# Patient Record
Sex: MALE | Race: WHITE | NOT HISPANIC OR LATINO | Employment: OTHER | URBAN - METROPOLITAN AREA
[De-identification: names, ages, dates, MRNs, and addresses within clinical notes are randomized per-mention and may not be internally consistent; named-entity substitution may affect disease eponyms.]

---

## 2017-04-17 ENCOUNTER — OFFICE VISIT (OUTPATIENT)
Dept: FAMILY MEDICINE | Facility: CLINIC | Age: 82
End: 2017-04-17
Payer: MEDICARE

## 2017-04-17 VITALS
HEART RATE: 72 BPM | TEMPERATURE: 98 F | WEIGHT: 245 LBS | OXYGEN SATURATION: 98 % | BODY MASS INDEX: 34.3 KG/M2 | RESPIRATION RATE: 18 BRPM | SYSTOLIC BLOOD PRESSURE: 130 MMHG | HEIGHT: 71 IN | DIASTOLIC BLOOD PRESSURE: 80 MMHG

## 2017-04-17 DIAGNOSIS — Z13.220 LIPID SCREENING: Primary | ICD-10-CM

## 2017-04-17 DIAGNOSIS — Z13.1 SCREENING FOR DIABETES MELLITUS: ICD-10-CM

## 2017-04-17 DIAGNOSIS — Z13.0 SCREENING, ANEMIA, DEFICIENCY, IRON: ICD-10-CM

## 2017-04-17 DIAGNOSIS — I10 BENIGN ESSENTIAL HYPERTENSION: ICD-10-CM

## 2017-04-17 LAB
ANION GAP SERPL CALCULATED.3IONS-SCNC: 9 MMOL/L (ref 3–14)
BUN SERPL-MCNC: 14 MG/DL (ref 7–30)
CALCIUM SERPL-MCNC: 9.2 MG/DL (ref 8.5–10.1)
CHLORIDE SERPL-SCNC: 108 MMOL/L (ref 94–109)
CO2 SERPL-SCNC: 25 MMOL/L (ref 20–32)
CREAT SERPL-MCNC: 1.05 MG/DL (ref 0.66–1.25)
ERYTHROCYTE [DISTWIDTH] IN BLOOD BY AUTOMATED COUNT: 13 % (ref 10–15)
GFR SERPL CREATININE-BSD FRML MDRD: 67 ML/MIN/1.7M2
GLUCOSE SERPL-MCNC: 104 MG/DL (ref 70–99)
HCT VFR BLD AUTO: 42.9 % (ref 40–53)
HGB BLD-MCNC: 14.8 G/DL (ref 13.3–17.7)
MCH RBC QN AUTO: 30.2 PG (ref 26.5–33)
MCHC RBC AUTO-ENTMCNC: 34.5 G/DL (ref 31.5–36.5)
MCV RBC AUTO: 88 FL (ref 78–100)
PLATELET # BLD AUTO: 178 10E9/L (ref 150–450)
POTASSIUM SERPL-SCNC: 3.9 MMOL/L (ref 3.4–5.3)
RBC # BLD AUTO: 4.9 10E12/L (ref 4.4–5.9)
SODIUM SERPL-SCNC: 142 MMOL/L (ref 133–144)
WBC # BLD AUTO: 5.5 10E9/L (ref 4–11)

## 2017-04-17 PROCEDURE — 36415 COLL VENOUS BLD VENIPUNCTURE: CPT | Performed by: FAMILY MEDICINE

## 2017-04-17 PROCEDURE — 80048 BASIC METABOLIC PNL TOTAL CA: CPT | Performed by: FAMILY MEDICINE

## 2017-04-17 PROCEDURE — 99213 OFFICE O/P EST LOW 20 MIN: CPT | Performed by: FAMILY MEDICINE

## 2017-04-17 PROCEDURE — 85027 COMPLETE CBC AUTOMATED: CPT | Performed by: FAMILY MEDICINE

## 2017-04-17 RX ORDER — DOXAZOSIN 4 MG/1
4 TABLET ORAL AT BEDTIME
Qty: 90 TABLET | Refills: 3 | Status: SHIPPED | OUTPATIENT
Start: 2017-04-17 | End: 2018-04-09

## 2017-04-17 RX ORDER — AMLODIPINE BESYLATE 10 MG/1
10 TABLET ORAL DAILY
Qty: 90 TABLET | Refills: 3 | Status: SHIPPED | OUTPATIENT
Start: 2017-04-17 | End: 2018-04-09

## 2017-04-17 NOTE — LETTER
[facility]  [address]  [address]  [facility] [id number] [phone]                                                                                                           Fish Sanchez Wilmajohana  [address]  [facility] [address]    April 18, 2017      Dear Fish,    The results of your recent tests were reviewed and are enclosed.     Results are Normal!    Results for orders placed or performed in visit on 04/17/17   Basic metabolic panel   Result Value Ref Range    Sodium 142 133 - 144 mmol/L    Potassium 3.9 3.4 - 5.3 mmol/L    Chloride 108 94 - 109 mmol/L    Carbon Dioxide 25 20 - 32 mmol/L    Anion Gap 9 3 - 14 mmol/L    Glucose 104 (H) 70 - 99 mg/dL    Urea Nitrogen 14 7 - 30 mg/dL    Creatinine 1.05 0.66 - 1.25 mg/dL    GFR Estimate 67 >60 mL/min/1.7m2    GFR Estimate If Black 81 >60 mL/min/1.7m2    Calcium 9.2 8.5 - 10.1 mg/dL   CBC with platelets   Result Value Ref Range    WBC 5.5 4.0 - 11.0 10e9/L    RBC Count 4.90 4.4 - 5.9 10e12/L    Hemoglobin 14.8 13.3 - 17.7 g/dL    Hematocrit 42.9 40.0 - 53.0 %    MCV 88 78 - 100 fl    MCH 30.2 26.5 - 33.0 pg    MCHC 34.5 31.5 - 36.5 g/dL    RDW 13.0 10.0 - 15.0 %    Platelet Count 178 150 - 450 10e9/L           Thank you for choosing [facility].  We appreciate the opportunity to serve you and look forward to supporting your healthcare needs in the future.    If you have any questions or concerns, please call me or my staff at [phone].      Sincerely,    Antonio Underwood MD

## 2017-04-17 NOTE — PROGRESS NOTES
SUBJECTIVE:                                                    Fish Hong is a 86 year old male who presents to clinic today for the following health issues:      Hypertension Follow-up      Outpatient blood pressures are being checked at home.  Results are average.    Low Salt Diet: no added salt       Amount of exercise or physical activity: 2-3 days/week for an average of 15-30 minutes    Problems taking medications regularly: No    Medication side effects: none    Diet: low salt and low fat/cholesterol    Here with  His daughter. His wive is in icu at the [facility] and he says she is dying. He is due for bp refills discussed gettign pneumovax, prefers to wait as anticipates timse is dying soon.   PROBLEMS TO ADD ON...    Problem list and histories reviewed & adjusted, as indicated.  Additional history: as documented    Patient Active Problem List   Diagnosis     Essential hypertension     Past Surgical History:   Procedure Laterality Date     C TOTAL KNEE ARTHROPLASTY      Bilateral     CIRCUMCISION N/A 7/13/2016    Procedure: CIRCUMCISION;  Surgeon: Elgin Rhodes MD;  Location: [facility] OR     ENT SURGERY      detached retina--bilateral     HERNIA REPAIR  11/3/2008    mesh repair umbilical hernia       Social History   Substance Use Topics     Smoking status: Former Smoker     Quit date: 1/1/1962     Smokeless tobacco: Never Used     Alcohol use Yes      Comment: very little     Family History   Problem Relation Age of Onset     Family history unknown: Yes           Reviewed and updated as needed this visit by clinical staff       Reviewed and updated as needed this visit by Provider         ROS:  CONSTITUTIONAL:NEGATIVE for fever, chills, change in weight  INTEGUMENTARY/SKIN: NEGATIVE for worrisome rashes, moles or lesions  EYES: NEGATIVE for vision changes or irritation  ENT/MOUTH: NEGATIVE for ear, mouth and throat problems  RESP:NEGATIVE for significant cough or SOB  CV: NEGATIVE for  "chest pain, palpitations or peripheral edema and has hypertension      OBJECTIVE:                                                    /80  Pulse 72  Temp 98  F (36.7  C)  Resp 18  Ht 5' 11\" (1.803 m)  Wt 245 lb (111.1 kg)  SpO2 98%  BMI 34.17 kg/m2  Body mass index is 34.17 kg/(m^2).  GENERAL APPEARANCE: healthy, alert and mild distress  EYES: Eyes grossly normal to inspection, PERRL and conjunctivae and sclerae normal  RESP: lungs clear to auscultation - no rales, rhonchi or wheezes  CV: regular rates and rhythm, normal S1 S2, no S3 or S4 and no murmur, click or rub  SKIN: no suspicious lesions or rashes    Diagnostic test results:  Diagnostic Test Results:  Labs a sordered.      ASSESSMENT/PLAN:                                                    1. Benign essential hypertension    - doxazosin (CARDURA) 4 MG tablet; Take 1 tablet (4 mg) by mouth At Bedtime  Dispense: 90 tablet; Refill: 3  - amLODIPine (NORVASC) 10 MG tablet; Take 1 tablet (10 mg) by mouth daily  Dispense: 90 tablet; Refill: 3    2. Lipid screening      3. Screening for diabetes mellitus    - Basic metabolic panel    4. Screening, anemia, deficiency, iron    - CBC with platelets      Follow up with Provider - 1-3 monhts or as needed.      Antonio Underwood MD  [facility]    "

## 2017-04-17 NOTE — NURSING NOTE
"Chief Complaint   Patient presents with     Hypertension       Initial /80  Pulse 72  Temp 98  F (36.7  C)  Resp 18  Ht 5' 11\" (1.803 m)  Wt 245 lb (111.1 kg)  SpO2 98%  BMI 34.17 kg/m2 Estimated body mass index is 34.17 kg/(m^2) as calculated from the following:    Height as of this encounter: 5' 11\" (1.803 m).    Weight as of this encounter: 245 lb (111.1 kg).  Medication Reconciliation: kiera Solis CMA      "

## 2017-04-17 NOTE — MR AVS SNAPSHOT
"              After Visit Summary   2017    Fish Hong    MRN: [id number]           Patient Information     Date Of Birth          1931        Visit Information        Provider Department      2017 8:30 AM Antonio Underwood MD [facility]        Today's Diagnoses     Lipid screening    -  1    Benign essential hypertension        Screening for diabetes mellitus        Screening, anemia, deficiency, iron           Follow-ups after your visit        Who to contact     If you have questions or need follow up information about today's clinic visit or your schedule please contact [facility] directly at [phone].  Normal or non-critical lab and imaging results will be communicated to you by MyChart, letter or phone within 4 business days after the clinic has received the results. If you do not hear from us within 7 days, please contact the clinic through [employer] or phone. If you have a critical or abnormal lab result, we will notify you by phone as soon as possible.  Submit refill requests through [employer] or call your pharmacy and they will forward the refill request to us. Please allow 3 business days for your refill to be completed.          Additional Information About Your Visit        MyChart Information     [employer] lets you send messages to your doctor, view your test results, renew your prescriptions, schedule appointments and more. To sign up, go to [email or web address] . Click on \"Log in\" on the left side of the screen, which will take you to the Welcome page. Then click on \"Sign up Now\" on the right side of the page.     You will be asked to enter the access code listed below, as well as some personal information. Please follow the directions to create your username and password.     Your access code is: [email or web address]  Expires: 2017 10:21 AM     Your access code will  in 90 days. If you need help " "or a new code, please call your [place] clinic or [phone].        Care EveryWhere ID     This is your Care EveryWhere ID. This could be used by other organizations to access your [place] medical records  [id number]        Your Vitals Were     Pulse Temperature Respirations Height Pulse Oximetry BMI (Body Mass Index)    72 98  F (36.7  C) 18 5' 11\" (1.803 m) 98% 34.17 kg/m2       Blood Pressure from Last 3 Encounters:   04/17/17 130/80   11/10/16 137/79   08/04/16 131/79    Weight from Last 3 Encounters:   04/17/17 245 lb (111.1 kg)   11/10/16 250 lb (113.4 kg)   08/04/16 246 lb 11.2 oz (111.9 kg)              We Performed the Following     Basic metabolic panel     CBC with platelets          Where to get your medicines      These medications were sent to [facility] - [place], [place] - [address]  [address], [facility] [address]     Phone:  [phone]     amLODIPine 10 MG tablet    doxazosin 4 MG tablet          Primary Care Provider Office Phone # Fax #    Antonio Underwood [phone]       [facility] [address] [facility] [address]        Thank you!     Thank you for choosing [facility]  for your care. Our goal is always to provide you with excellent care. Hearing back from our patients is one way we can continue to improve our services. Please take a few minutes to complete the written survey that you may receive in the mail after your visit with us. Thank you!             Your Updated Medication List - Protect others around you: Learn how to safely use, store and throw away your medicines at www.disposemymeds.org.          This list is accurate as of: 4/17/17 10:21 AM.  Always use your most recent med list.                   Brand Name Dispense Instructions for use    amLODIPine 10 MG tablet    NORVASC    90 tablet    Take 1 tablet (10 mg) by mouth daily       doxazosin 4 MG tablet "    CARDURA    90 tablet    Take 1 tablet (4 mg) by mouth At Bedtime       IBUPROFEN PO      Take 200 mg by mouth every 6 hours as needed Reported on 4/17/2017

## 2017-07-19 NOTE — TELEPHONE ENCOUNTER
clotrimazole-betamethasone (LOTRISONE) cream (Discontinued)      Last Written Prescription Date: 7/5/16  Last Fill Quantity: 30 G,  # refills: 1   Last Office Visit with [facility], [facility] or [facility] prescribing provider: 7/5/16

## 2017-07-20 RX ORDER — CLOTRIMAZOLE AND BETAMETHASONE DIPROPIONATE 10; .64 MG/G; MG/G
CREAM TOPICAL 2 TIMES DAILY
Qty: 30 G | Refills: 1 | OUTPATIENT
Start: 2017-07-20

## 2017-07-27 ENCOUNTER — OFFICE VISIT (OUTPATIENT)
Dept: FAMILY MEDICINE | Facility: CLINIC | Age: 82
End: 2017-07-27
Payer: MEDICARE

## 2017-07-27 VITALS
SYSTOLIC BLOOD PRESSURE: 138 MMHG | OXYGEN SATURATION: 100 % | RESPIRATION RATE: 18 BRPM | DIASTOLIC BLOOD PRESSURE: 86 MMHG | WEIGHT: 245 LBS | HEART RATE: 74 BPM | BODY MASS INDEX: 34.17 KG/M2 | TEMPERATURE: 97.5 F

## 2017-07-27 DIAGNOSIS — R10.84 ABDOMINAL PAIN, GENERALIZED: Primary | ICD-10-CM

## 2017-07-27 PROCEDURE — 99214 OFFICE O/P EST MOD 30 MIN: CPT | Performed by: FAMILY MEDICINE

## 2017-07-27 NOTE — MR AVS SNAPSHOT
"              After Visit Summary   2017    Fish Hong    MRN: [id number]           Patient Information     Date Of Birth          1931        Visit Information        Provider Department      2017 7:30 AM Antonio Underwood MD [facility]        Care Instructions    [facility]    Phone- [phone]            Follow-ups after your visit        Who to contact     If you have questions or need follow up information about today's clinic visit or your schedule please contact [facility] directly at [phone].  Normal or non-critical lab and imaging results will be communicated to you by [employer], letter or phone within 4 business days after the clinic has received the results. If you do not hear from us within 7 days, please contact the clinic through [employer] or phone. If you have a critical or abnormal lab result, we will notify you by phone as soon as possible.  Submit refill requests through [employer] or call your pharmacy and they will forward the refill request to us. Please allow 3 business days for your refill to be completed.          Additional Information About Your Visit        MyChart Information     [employer] lets you send messages to your doctor, view your test results, renew your prescriptions, schedule appointments and more. To sign up, go to [email or web address] . Click on \"Log in\" on the left side of the screen, which will take you to the Welcome page. Then click on \"Sign up Now\" on the right side of the page.     You will be asked to enter the access code listed below, as well as some personal information. Please follow the directions to create your username and password.     Your access code is: [email or web address]  Expires: 10/25/2017  7:51 AM     Your access code will  in 90 days. If you need help or a new code, please call your [facility] or [phone].        Care EveryWhere ID     This is " your Care EveryWhere ID. This could be used by other organizations to access your [place] medical records  [id number]        Your Vitals Were     Pulse Temperature Respirations Pulse Oximetry BMI (Body Mass Index)       74 97.5  F (36.4  C) 18 100% 34.17 kg/m2        Blood Pressure from Last 3 Encounters:   07/27/17 138/86   04/17/17 130/80   11/10/16 137/79    Weight from Last 3 Encounters:   07/27/17 245 lb (111.1 kg)   04/17/17 245 lb (111.1 kg)   11/10/16 250 lb (113.4 kg)              Today, you had the following     No orders found for display       Primary Care Provider Office Phone # Fax #    Antonio Underwood [phone]       [facility] [address]  [facility] [address]        Equal Access to Services     [facility]: Hadii aad ku hadasho Soomaali, waaxda luqadaha, qaybta kaalmada adeegyada, waxay idiin hayaan adeeg kharash lahuong . So [facility] [phone].    ATENCIÓN: Si habla español, tiene a donahue disposición servicios gratuitos de asistencia lingüística. Llame al [phone].    We comply with applicable federal civil rights laws and [place] laws. We do not discriminate on the basis of race, color, national origin, age, disability sex, sexual orientation or gender identity.            Thank you!     Thank you for choosing [facility]  for your care. Our goal is always to provide you with excellent care. Hearing back from our patients is one way we can continue to improve our services. Please take a few minutes to complete the written survey that you may receive in the mail after your visit with us. Thank you!             Your Updated Medication List - Protect others around you: Learn how to safely use, store and throw away your medicines at www.disposemymeds.org.          This list is accurate as of: 7/27/17  7:51 AM.  Always use your most recent med list.                   Brand Name Dispense Instructions for use Diagnosis     amLODIPine 10 MG tablet    NORVASC    90 tablet    Take 1 tablet (10 mg) by mouth daily    Benign essential hypertension       doxazosin 4 MG tablet    CARDURA    90 tablet    Take 1 tablet (4 mg) by mouth At Bedtime    Benign essential hypertension       IBUPROFEN PO      Take 200 mg by mouth every 6 hours as needed Reported on 4/17/2017

## 2017-07-27 NOTE — PROGRESS NOTES
SUBJECTIVE:                                                    Fish Hong is a 86 year old male who presents to clinic today for the following health issues:      Abdominal Pain      Duration: a few days    Description (location/character/radiation): pt having abdominal and back pain on and off       Associated flank pain: None    Intensity:  moderate    Accompanying signs and symptoms:        Fever/Chills: no        Gas/Bloating: YES- bloating       Nausea/vomitting: no        Diarrhea: no        Dysuria or Hematuria: no     History (previous similar pain/trauma/previous testing):     Precipitating or alleviating factors:       Pain worse with eating/BM/urination: no       Pain relieved by BM: no     Therapies tried and outcome: None    LMP:  not applicable      Generalized abdominal discomfort.   PROBLEMS TO ADD ON...    Problem list and histories reviewed & adjusted, as indicated.  Additional history: as documented    Decreased appetite.   Patient Active Problem List   Diagnosis     Essential hypertension     Past Surgical History:   Procedure Laterality Date     C TOTAL KNEE ARTHROPLASTY      Bilateral     CIRCUMCISION N/A 7/13/2016    Procedure: CIRCUMCISION;  Surgeon: Elgin Rhodes MD;  Location: [facility] OR     ENT SURGERY      detached retina--bilateral     HERNIA REPAIR  11/3/2008    mesh repair umbilical hernia       Social History   Substance Use Topics     Smoking status: Former Smoker     Quit date: 1/1/1962     Smokeless tobacco: Never Used     Alcohol use Yes      Comment: very little     Family History   Problem Relation Age of Onset     Family history unknown: Yes             Reviewed and updated as needed this visit by clinical staff     Reviewed and updated as needed this visit by Provider         ROS:  CONSTITUTIONAL:NEGATIVE for fever, chills, change in weight  INTEGUMENTARY/SKIN: NEGATIVE for worrisome rashes, moles or lesions  RESP:NEGATIVE for significant cough or SOB  CV:  NEGATIVE for chest pain, palpitations or peripheral edema  GI: abdominal pain.   : negative for dysuria, hematuria, decreased urinary stream, erectile dysfunction  ENDOCRINE: NEGATIVE for temperature intolerance, skin/hair changes  HEME/ALLERGY/IMMUNE: NEGATIVE for bleeding problems    OBJECTIVE:                                                    /86  Pulse 74  Temp 97.5  F (36.4  C)  Resp 18  Wt 245 lb (111.1 kg)  SpO2 100%  BMI 34.17 kg/m2  Body mass index is 34.17 kg/(m^2).  GENERAL APPEARANCE: healthy, alert and moderate distress  EYES: Eyes grossly normal to inspection, PERRL and conjunctivae and sclerae normal  RESP: lungs clear to auscultation - no rales, rhonchi or wheezes  CV: regular rates and rhythm, normal S1 S2, no S3 or S4 and no murmur, click or rub  ABDOMEN: soft, nontender, without hepatosplenomegaly or masses and bowel sounds normal  SKIN: no suspicious lesions or rashes  NEURO: Normal strength and tone, mentation intact and speech normal    Diagnostic test results:  Diagnostic Test Results:  none        ASSESSMENT/PLAN:                                                    1. Abdominal pain, generalized    - omeprazole (PRILOSEC) 20 MG CR capsule; Take 1 capsule (20 mg) by mouth daily  Dispense: 90 capsule; Refill: 3  - CT Abdomen Pelvis w Contrast; Future      Get ct follow up after this.   Follow up with Provider - 2-4 weeks or as needed.      Antonio Underwood MD  [facility]

## 2017-07-27 NOTE — NURSING NOTE
"Chief Complaint   Patient presents with     Abdominal Pain     Back Pain       Initial /86  Pulse 74  Temp 97.5  F (36.4  C)  Resp 18  Wt 245 lb (111.1 kg)  SpO2 100%  BMI 34.17 kg/m2 Estimated body mass index is 34.17 kg/(m^2) as calculated from the following:    Height as of 4/17/17: 5' 11\" (1.803 m).    Weight as of this encounter: 245 lb (111.1 kg).  Medication Reconciliation: kiera Solis CMA      "

## 2017-07-28 ENCOUNTER — TELEPHONE (OUTPATIENT)
Dept: FAMILY MEDICINE | Facility: CLINIC | Age: 82
End: 2017-07-28

## 2017-07-28 NOTE — TELEPHONE ENCOUNTER
Reason for Call: Request for an order or referral:    Order or referral being requested: MRI at [place]    Date needed: as soon as possible    Has the patient been seen by the PCP for this problem? YES    Additional comments: Pt was seen yesterday by Dr Underwood and was told to get an  MRI at [place].  No order was placed in [facility].  Please let pt know when ordered.      Phone number Patient can be reached at:  Home number on file [phone] (home)    Best Time:  any    Can we leave a detailed message on this number?  YES    Call taken on 7/28/2017 at 12:09 PM by KAMERON CUELLAR

## 2017-07-28 NOTE — TELEPHONE ENCOUNTER
We discussed ct of abdomen and pelvis. This magy ordered please let him know. He will have to call [place] to get the appointment.     [facility]    Phone- [phone]    Please call him and let him know.

## 2017-08-01 ENCOUNTER — HOSPITAL ENCOUNTER (OUTPATIENT)
Dept: CT IMAGING | Facility: CLINIC | Age: 82
Discharge: HOME | End: 2017-08-01
Attending: FAMILY MEDICINE | Admitting: FAMILY MEDICINE
Payer: MEDICARE

## 2017-08-01 DIAGNOSIS — R10.84 ABDOMINAL PAIN, GENERALIZED: ICD-10-CM

## 2017-08-01 PROCEDURE — 25000125 ZZHC RX 250: Performed by: FAMILY MEDICINE

## 2017-08-01 PROCEDURE — 74177 CT ABD & PELVIS W/CONTRAST: CPT

## 2017-08-01 PROCEDURE — 25000128 H RX IP 250 OP 636: Performed by: FAMILY MEDICINE

## 2017-08-01 RX ORDER — IOPAMIDOL 755 MG/ML
100 INJECTION, SOLUTION INTRAVASCULAR ONCE
Status: COMPLETED | OUTPATIENT
Start: 2017-08-01 | End: 2017-08-01

## 2017-08-01 RX ADMIN — IOPAMIDOL 97 ML: 755 INJECTION, SOLUTION INTRAVENOUS at 13:31

## 2017-08-01 RX ADMIN — SODIUM CHLORIDE 69 ML: 9 INJECTION, SOLUTION INTRAVENOUS at 13:32

## 2017-08-01 NOTE — LETTER
Fish Hong  [address]  [facility] [address]        August 15, 2017          Dear ,    We are writing to inform you of your test results.    No abnormalities on the abdominal ct. If the pain is persisting please come in for re-assessment.    Resulted Orders   CT Abdomen Pelvis w Contrast    Narrative    CT ABDOMEN AND PELVIS WITH CONTRAST   8/1/2017 1:53 PM     HISTORY: Abdominal pain generalized. No history of renal stones.  Generalized abdominal pain.    TECHNIQUE:  97 mL [id number],  50 mL Omnipaque 140. CT images of the  abdomen and pelvis following oral and nonionic intravenous contrast.  Radiation dose for this scan was reduced using automated exposure  control, adjustment of the mA and/or kV according to patient size, or  iterative reconstruction technique.    COMPARISON: None.    FINDINGS: There is a calcified granuloma in the right major fissure.  There is a noncalcified nodule in the right lower lobe that measures 7  mm (series 2, image 12). The liver, gallbladder, pancreas, and adrenal  glands are unremarkable. There are splenic granulomata. There are  numerous bilateral parapelvic cysts. No urinary tract calculi or  hydronephrosis. The ureters are nondilated. There is no abdominal or  retroperitoneal adenopathy. No bowel obstruction, free air, or  ascites. Fat-containing left inguinal hernia is present. The prostate  is enlarged. There is mild diffuse urinary bladder wall thickening. No  pelvic adenopathy or free fluid.      Impression    IMPRESSION:  1. No cause for abdominal pain demonstrated.  2. 7 mm nodule in the right lower lobe.  Recommendations for an incidental lung nodule = or > 6mm to 8mm:    Low risk patients: Initial follow-up CT at 6-12 months, then  consider CT at 18-24 months if no change.    High risk patients: Initial follow-up CT at 6-12 months, then CT at  18-24 months if no change.    *Low Risk: Minimal or absent history of smoking or other known  risk  factors.  *Nonsolid (ground-glass) or partly solid nodules may require longer  follow-up to exclude indolent adenocarcinoma.  *Recommendations based on Guidelines for the Management of Incidental  Pulmonary Nodules Detected at CT: From the Fleischner Society 2017,  Radiology 2017.    EM SCOTT MD       If you have any questions or concerns, please call the clinic at the number listed above.       Sincerely,        [facility] CT ROOM 2

## 2017-08-15 NOTE — PROGRESS NOTES
Please send normal result letter.  No abnormalities on the abdominal ct. If the pain is persisting please come in for re-assessment.

## 2018-04-08 PROBLEM — Z29.9 PREVENTIVE MEASURE: Status: ACTIVE | Noted: 2018-04-08

## 2018-04-08 PROBLEM — R91.8 PULMONARY NODULES: Status: ACTIVE | Noted: 2018-04-08

## 2018-04-09 ENCOUNTER — OFFICE VISIT (OUTPATIENT)
Dept: FAMILY MEDICINE | Facility: CLINIC | Age: 83
End: 2018-04-09
Payer: MEDICARE

## 2018-04-09 VITALS
BODY MASS INDEX: 33.32 KG/M2 | OXYGEN SATURATION: 94 % | HEART RATE: 87 BPM | RESPIRATION RATE: 20 BRPM | SYSTOLIC BLOOD PRESSURE: 130 MMHG | DIASTOLIC BLOOD PRESSURE: 78 MMHG | WEIGHT: 238 LBS | TEMPERATURE: 97.3 F | HEIGHT: 71 IN

## 2018-04-09 DIAGNOSIS — M70.61 GREATER TROCHANTERIC BURSITIS, RIGHT: ICD-10-CM

## 2018-04-09 DIAGNOSIS — Z23 NEED FOR PNEUMOCOCCAL VACCINATION: ICD-10-CM

## 2018-04-09 DIAGNOSIS — I10 ESSENTIAL HYPERTENSION: Primary | ICD-10-CM

## 2018-04-09 DIAGNOSIS — M48.061 SPINAL STENOSIS OF LUMBAR REGION WITHOUT NEUROGENIC CLAUDICATION: ICD-10-CM

## 2018-04-09 DIAGNOSIS — L98.9 SKIN LESION: ICD-10-CM

## 2018-04-09 DIAGNOSIS — L20.9 ATOPIC DERMATITIS, UNSPECIFIED TYPE: ICD-10-CM

## 2018-04-09 LAB
ALBUMIN SERPL-MCNC: 3.5 G/DL (ref 3.4–5)
ALP SERPL-CCNC: 81 U/L (ref 40–150)
ALT SERPL W P-5'-P-CCNC: 15 U/L (ref 0–70)
ANION GAP SERPL CALCULATED.3IONS-SCNC: 6 MMOL/L (ref 3–14)
AST SERPL W P-5'-P-CCNC: 11 U/L (ref 0–45)
BASOPHILS # BLD AUTO: 0 10E9/L (ref 0–0.2)
BASOPHILS NFR BLD AUTO: 0.2 %
BILIRUB SERPL-MCNC: 0.4 MG/DL (ref 0.2–1.3)
BUN SERPL-MCNC: 23 MG/DL (ref 7–30)
CALCIUM SERPL-MCNC: 8.9 MG/DL (ref 8.5–10.1)
CHLORIDE SERPL-SCNC: 106 MMOL/L (ref 94–109)
CO2 SERPL-SCNC: 26 MMOL/L (ref 20–32)
CREAT SERPL-MCNC: 1.09 MG/DL (ref 0.66–1.25)
DIFFERENTIAL METHOD BLD: NORMAL
EOSINOPHIL # BLD AUTO: 0.3 10E9/L (ref 0–0.7)
EOSINOPHIL NFR BLD AUTO: 3.1 %
ERYTHROCYTE [DISTWIDTH] IN BLOOD BY AUTOMATED COUNT: 13.3 % (ref 10–15)
GFR SERPL CREATININE-BSD FRML MDRD: 64 ML/MIN/1.7M2
GLUCOSE SERPL-MCNC: 92 MG/DL (ref 70–99)
HCT VFR BLD AUTO: 41.4 % (ref 40–53)
HGB BLD-MCNC: 13.8 G/DL (ref 13.3–17.7)
LYMPHOCYTES # BLD AUTO: 1.5 10E9/L (ref 0.8–5.3)
LYMPHOCYTES NFR BLD AUTO: 18.9 %
MCH RBC QN AUTO: 29.7 PG (ref 26.5–33)
MCHC RBC AUTO-ENTMCNC: 33.3 G/DL (ref 31.5–36.5)
MCV RBC AUTO: 89 FL (ref 78–100)
MONOCYTES # BLD AUTO: 0.6 10E9/L (ref 0–1.3)
MONOCYTES NFR BLD AUTO: 7.1 %
NEUTROPHILS # BLD AUTO: 5.7 10E9/L (ref 1.6–8.3)
NEUTROPHILS NFR BLD AUTO: 70.7 %
PLATELET # BLD AUTO: 253 10E9/L (ref 150–450)
POTASSIUM SERPL-SCNC: 3.7 MMOL/L (ref 3.4–5.3)
PROT SERPL-MCNC: 7.4 G/DL (ref 6.8–8.8)
RBC # BLD AUTO: 4.65 10E12/L (ref 4.4–5.9)
SODIUM SERPL-SCNC: 138 MMOL/L (ref 133–144)
WBC # BLD AUTO: 8.1 10E9/L (ref 4–11)

## 2018-04-09 PROCEDURE — G0009 ADMIN PNEUMOCOCCAL VACCINE: HCPCS | Performed by: INTERNAL MEDICINE

## 2018-04-09 PROCEDURE — 85025 COMPLETE CBC W/AUTO DIFF WBC: CPT | Performed by: INTERNAL MEDICINE

## 2018-04-09 PROCEDURE — 80053 COMPREHEN METABOLIC PANEL: CPT | Performed by: INTERNAL MEDICINE

## 2018-04-09 PROCEDURE — 99214 OFFICE O/P EST MOD 30 MIN: CPT | Mod: 25 | Performed by: INTERNAL MEDICINE

## 2018-04-09 PROCEDURE — 36415 COLL VENOUS BLD VENIPUNCTURE: CPT | Performed by: INTERNAL MEDICINE

## 2018-04-09 PROCEDURE — 90670 PCV13 VACCINE IM: CPT | Performed by: INTERNAL MEDICINE

## 2018-04-09 RX ORDER — AMLODIPINE BESYLATE 10 MG/1
10 TABLET ORAL DAILY
Qty: 90 TABLET | Refills: 3 | Status: SHIPPED | OUTPATIENT
Start: 2018-04-09 | End: 2019-05-20

## 2018-04-09 RX ORDER — DOXAZOSIN 4 MG/1
4 TABLET ORAL AT BEDTIME
Qty: 90 TABLET | Refills: 3 | Status: SHIPPED | OUTPATIENT
Start: 2018-04-09 | End: 2019-05-20

## 2018-04-09 RX ORDER — TRIAMCINOLONE ACETONIDE 1 MG/G
CREAM TOPICAL
Qty: 30 G | Refills: 3 | Status: SHIPPED | OUTPATIENT
Start: 2018-04-09 | End: 2019-04-22

## 2018-04-09 NOTE — PROGRESS NOTES
SUBJECTIVE:   Fish Hong is a 86 year old male who presents to clinic today for the following health issues:      Hypertension Follow-up      Outpatient blood pressures are being checked at home.    Low Salt Diet: no added salt      Amount of exercise or physical activity: None    Problems taking medications regularly: No    Medication side effects: none    Diet: low salt      Rash  Onset: unknown    Description:   Location: both ankles, and right lower leg  Character: round, blotchy, red  Itching (Pruritis): YES    Progression of Symptoms:  same and constant    Accompanying Signs & Symptoms:  Fever: no   Body aches or joint pain: no   Sore throat symptoms: no   Recent cold symptoms: no     History:   Previous similar rash: YES    Precipitating factors:   Exposure to similar rash: no   New exposures: None   Recent travel: no     Alleviating factors:  nothing    Therapies Tried and outcome: hydrocortisone cream -not too effective                            Here with his dtr.         Home BP has been good.                       He has been a  for 1 year.  He has several children and grandchildren who live      near him.             After his last visit, in the summer, he took omeprazole for a while but then stopped it as his symptoms resolved.  He has changed his eating pattern.  He eat smaller meals and eats more frequently.          He has a recurrent pruritic rash left lower ankle.  He uses OTC hydrocortisone cream which eventually helps.  He also has some raised chronic lesions right lower leg; duration uncertain.               He has been working with orthopedics regarding some right lateral hip pain.  He had an injection in the bursa which helped.  He takes Aleve from time to time.  He does not take aspirin on any regular basis.               Problem list and histories reviewed & adjusted, as indicated.      Patient Active Problem List    Diagnosis Date Noted     Pulmonary nodules 04/08/2018      Priority: Medium     7 mm RLL in 7/17 (quit smoking 1962)       Essential hypertension 09/22/2015     Priority: Medium     Preventive measure 04/08/2018     Priority: Low     PSA 2.57 in 4/16       Past Surgical History:   Procedure Laterality Date     C TOTAL KNEE ARTHROPLASTY      Bilateral     CIRCUMCISION N/A 7/13/2016    Procedure: CIRCUMCISION;  Surgeon: Elgin Rhodes MD;  Location: [facility] OR     ENT SURGERY      detached retina--bilateral     HERNIA REPAIR  11/3/2008    mesh repair umbilical hernia     Social History     Social History     Marital status:      Spouse name: N/A     Number of children: N/A     Years of education: N/A     Occupational History     Not on file.     Social History Main Topics     Smoking status: Former Smoker     Quit date: 1/1/1962     Smokeless tobacco: Never Used     Alcohol use Yes      Comment: very little     Drug use: No     Sexual activity: No     Other Topics Concern     Not on file     Social History Narrative     Immunization History   Administered Date(s) Administered     Influenza (High Dose) 3 valent vaccine 10/05/2015, 09/19/2016     Pneumococcal 23 valent 10/20/2009     TDAP Vaccine (Adacel) 03/31/2015     Tdap (Adacel,Boostrix) 08/25/2004       Current Outpatient Prescriptions   Medication Sig Dispense Refill     omeprazole (PRILOSEC) 20 MG CR capsule Take 1 capsule (20 mg) by mouth daily 90 capsule 3     doxazosin (CARDURA) 4 MG tablet Take 1 tablet (4 mg) by mouth At Bedtime 90 tablet 3     amLODIPine (NORVASC) 10 MG tablet Take 1 tablet (10 mg) by mouth daily 90 tablet 3     IBUPROFEN PO Take 200 mg by mouth every 6 hours as needed Reported on 4/17/2017       No Known Allergies  BP Readings from Last 3 Encounters:   04/09/18 130/78   07/27/17 138/86   04/17/17 130/80    Wt Readings from Last 3 Encounters:   04/09/18 238 lb (108 kg)   07/27/17 245 lb (111.1 kg)   04/17/17 245 lb (111.1 kg)                    Reviewed and updated as needed this  "visit by clinical staff       Reviewed and updated as needed this visit by Provider         ROS:  CONSTITUTIONAL:NEGATIVE for fever, chills, change in weight  RESP:NEGATIVE for significant cough or SOB  CV: NEGATIVE for chest pain, palpitations or peripheral edema  GI: NEGATIVE for hematochezia and melena  : negative for dysuria, hematuria    OBJECTIVE:                                                    /78 (BP Location: Left arm, Patient Position: Chair, Cuff Size: Adult Large)  Pulse 87  Temp 97.3  F (36.3  C)  Resp 20  Ht 5' 11\" (1.803 m)  Wt 238 lb (108 kg)  SpO2 94%  BMI 33.19 kg/m2  Body mass index is 33.19 kg/(m^2).  GENERAL APPEARANCE: healthy, alert and no distress  RESP: lungs clear to auscultation - no rales, rhonchi or wheezes  CV: normal S1 S2, no S3 or S4, no murmur, click or rub and occasional premature beats  SKIN: Eczema type rash left ankle.    Posterior aspect right calf shows a group of several raised keratotic lesions on an erythematous base    Diagnostic test results:  Pending     ASSESSMENT/PLAN:                                                        ICD-10-CM    1. Essential hypertension I10 Comprehensive metabolic panel (BMP + Alb, Alk Phos, ALT, AST, Total. Bili, TP)   2. Atopic dermatitis, unspecified type L20.9 triamcinolone (KENALOG) 0.1 % cream     CBC with platelets and differential    ankles   3. Skin lesion L98.9 DERMATOLOGY REFERRAL     CBC with platelets and differential    R lower leg   4. Greater trochanteric bursitis, right M70.61 CBC with platelets and differential   5. Spinal stenosis of lumbar region without neurogenic claudication M48.061 CBC with platelets and differential       Overall he is doing well at age 87.       Cognition seems intact.     He is requesting some basic labs.              Triamcinolone to the left lower leg rash.                   Continue the same antihypertensive medications.                Prevnar.  Patient Instructions   Plan on " going to the Dermatology clinic regarding the skin lesions on your right lower leg.                          Please follow up in 6 months, or earlier if needed.                       Herman Jarrett MD  [facility]   Results for orders placed or performed in visit on 04/09/18   Comprehensive metabolic panel (BMP + Alb, Alk Phos, ALT, AST, Total. Bili, TP)   Result Value Ref Range    Sodium 138 133 - 144 mmol/L    Potassium 3.7 3.4 - 5.3 mmol/L    Chloride 106 94 - 109 mmol/L    Carbon Dioxide 26 20 - 32 mmol/L    Anion Gap 6 3 - 14 mmol/L    Glucose 92 70 - 99 mg/dL    Urea Nitrogen 23 7 - 30 mg/dL    Creatinine 1.09 0.66 - 1.25 mg/dL    GFR Estimate 64 >60 mL/min/1.7m2    GFR Estimate If Black 77 >60 mL/min/1.7m2    Calcium 8.9 8.5 - 10.1 mg/dL    Bilirubin Total 0.4 0.2 - 1.3 mg/dL    Albumin 3.5 3.4 - 5.0 g/dL    Protein Total 7.4 6.8 - 8.8 g/dL    Alkaline Phosphatase 81 40 - 150 U/L    ALT 15 0 - 70 U/L    AST 11 0 - 45 U/L   CBC with platelets and differential   Result Value Ref Range    WBC 8.1 4.0 - 11.0 10e9/L    RBC Count 4.65 4.4 - 5.9 10e12/L    Hemoglobin 13.8 13.3 - 17.7 g/dL    Hematocrit 41.4 40.0 - 53.0 %    MCV 89 78 - 100 fl    MCH 29.7 26.5 - 33.0 pg    MCHC 33.3 31.5 - 36.5 g/dL    RDW 13.3 10.0 - 15.0 %    Platelet Count 253 150 - 450 10e9/L    Diff Method Automated Method     % Neutrophils 70.7 %    % Lymphocytes 18.9 %    % Monocytes 7.1 %    % Eosinophils 3.1 %    % Basophils 0.2 %    Absolute Neutrophil 5.7 1.6 - 8.3 10e9/L    Absolute Lymphocytes 1.5 0.8 - 5.3 10e9/L    Absolute Monocytes 0.6 0.0 - 1.3 10e9/L    Absolute Eosinophils 0.3 0.0 - 0.7 10e9/L    Absolute Basophils 0.0 0.0 - 0.2 10e9/L     Letter sent.        Your lab results are normal,including the liver,kidney,glucose,bone marrow,and the potassium level.      Keep taking the same medications.

## 2018-04-09 NOTE — PATIENT INSTRUCTIONS
Plan on going to the Dermatology clinic regarding the skin lesions on your right lower leg.                          Please follow up in 6 months, or earlier if needed.

## 2018-04-09 NOTE — LETTER
April 9, 2018      Fish Hong  [address]  [facility] [address]        Dear ,    We are writing to inform you of your test results.          Your lab results are normal,including the liver,kidney,glucose,bone marrow,and the potassium level.      Keep taking the same medications.     Resulted Orders   Comprehensive metabolic panel (BMP + Alb, Alk Phos, ALT, AST, Total. Bili, TP)   Result Value Ref Range    Sodium 138 133 - 144 mmol/L    Potassium 3.7 3.4 - 5.3 mmol/L    Chloride 106 94 - 109 mmol/L    Carbon Dioxide 26 20 - 32 mmol/L    Anion Gap 6 3 - 14 mmol/L    Glucose 92 70 - 99 mg/dL      Comment:      Fasting specimen    Urea Nitrogen 23 7 - 30 mg/dL    Creatinine 1.09 0.66 - 1.25 mg/dL    GFR Estimate 64 >60 mL/min/1.7m2      Comment:      Non  GFR Calc    GFR Estimate If Black 77 >60 mL/min/1.7m2      Comment:       GFR Calc    Calcium 8.9 8.5 - 10.1 mg/dL    Bilirubin Total 0.4 0.2 - 1.3 mg/dL    Albumin 3.5 3.4 - 5.0 g/dL    Protein Total 7.4 6.8 - 8.8 g/dL    Alkaline Phosphatase 81 40 - 150 U/L    ALT 15 0 - 70 U/L    AST 11 0 - 45 U/L   CBC with platelets and differential   Result Value Ref Range    WBC 8.1 4.0 - 11.0 10e9/L    RBC Count 4.65 4.4 - 5.9 10e12/L    Hemoglobin 13.8 13.3 - 17.7 g/dL    Hematocrit 41.4 40.0 - 53.0 %    MCV 89 78 - 100 fl    MCH 29.7 26.5 - 33.0 pg    MCHC 33.3 31.5 - 36.5 g/dL    RDW 13.3 10.0 - 15.0 %    Platelet Count 253 150 - 450 10e9/L    Diff Method Automated Method     % Neutrophils 70.7 %    % Lymphocytes 18.9 %    % Monocytes 7.1 %    % Eosinophils 3.1 %    % Basophils 0.2 %    Absolute Neutrophil 5.7 1.6 - 8.3 10e9/L    Absolute Lymphocytes 1.5 0.8 - 5.3 10e9/L    Absolute Monocytes 0.6 0.0 - 1.3 10e9/L    Absolute Eosinophils 0.3 0.0 - 0.7 10e9/L    Absolute Basophils 0.0 0.0 - 0.2 10e9/L       If you have any questions or concerns, please call the clinic at the number listed above.        Sincerely,        Herman Jarrett MD

## 2018-04-09 NOTE — MR AVS SNAPSHOT
After Visit Summary   4/9/2018    Fish Hong    MRN: [id number]           Patient Information     Date Of Birth          4/11/1931        Visit Information        Provider Department      4/9/2018 7:45 AM Herman Jarrett MD [facility]        Today's Diagnoses     Essential hypertension    -  1    Atopic dermatitis, unspecified type        Skin lesion        Greater trochanteric bursitis, right        Spinal stenosis of lumbar region without neurogenic claudication        Benign essential hypertension          Care Instructions    Plan on going to the Dermatology clinic regarding the skin lesions on your right lower leg.                          Please follow up in 6 months, or earlier if needed.                           Follow-ups after your visit        Additional Services     DERMATOLOGY REFERRAL       Your provider has referred you to: [facility]: Dermatology Clinic [facility] [phone]   [email or web address]                   PLEASE EVALUATE SKIN LESIONS R LOWER LEG.                       Please be aware that coverage of these services is subject to the terms and limitations of your health insurance plan.  Call member services at your health plan with any benefit or coverage questions.      Please bring the following with you to your appointment:    (1) Any X-Rays, CTs or MRIs which have been performed.  Contact the facility where they were done to arrange for  prior to your scheduled appointment.    (2) List of current medications  (3) This referral request   (4) Any documents/labs given to you for this referral                  Who to contact     If you have questions or need follow up information about today's clinic visit or your schedule please contact [facility] directly at [phone].  Normal or non-critical lab and imaging results will be communicated to you by  "MyChart, letter or phone within 4 business days after the clinic has received the results. If you do not hear from us within 7 days, please contact the clinic through [employer] or phone. If you have a critical or abnormal lab result, we will notify you by phone as soon as possible.  Submit refill requests through [employer] or call your pharmacy and they will forward the refill request to us. Please allow 3 business days for your refill to be completed.          Additional Information About Your Visit        [employer] Information     [employer] lets you send messages to your doctor, view your test results, renew your prescriptions, schedule appointments and more. To sign up, go to [email or web address] . Click on \"Log in\" on the left side of the screen, which will take you to the Welcome page. Then click on \"Sign up Now\" on the right side of the page.     You will be asked to enter the access code listed below, as well as some personal information. Please follow the directions to create your username and password.     Your access code is: [email or web address]  Expires: 2018  8:18 AM     Your access code will  in 90 days. If you need help or a new code, please call your [facility] clinic or [phone].        Care EveryWhere ID     This is your Care EveryWhere ID. This could be used by other organizations to access your [facility] medical records  [id number]        Your Vitals Were     Pulse Temperature Respirations Height Pulse Oximetry BMI (Body Mass Index)    87 97.3  F (36.3  C) 20 5' 11\" (1.803 m) 94% 33.19 kg/m2       Blood Pressure from Last 3 Encounters:   18 130/78   17 138/86   17 130/80    Weight from Last 3 Encounters:   18 238 lb (108 kg)   17 245 lb (111.1 kg)   17 245 lb (111.1 kg)              We Performed the Following     DERMATOLOGY REFERRAL          Today's Medication Changes          These changes are accurate as of 18  8:18 AM.  If you have any questions, ask " your nurse or doctor.               Start taking these medicines.        Dose/Directions    triamcinolone 0.1 % cream   Commonly known as:  KENALOG   Used for:  Atopic dermatitis, unspecified type   Started by:  Herman Jarrett MD        Apply sparingly to affected area two times daily as needed (left ankle.lower leg)   Quantity:  30 g   Refills:  3         Stop taking these medicines if you haven't already. Please contact your care team if you have questions.     IBUPROFEN PO   Stopped by:  Herman Jarrett MD           omeprazole 20 MG CR capsule   Commonly known as:  priLOSEC   Stopped by:  Herman Jarrett MD                Where to get your medicines      These medications were sent to [facility] - [place], [place] - [address] [facility] [address]     Phone:  [phone]     amLODIPine 10 MG tablet    doxazosin 4 MG tablet    triamcinolone 0.1 % cream                Primary Care Provider Office Phone # Fax #    Herman Jarrett [phone] [address] [facility] [address]        Equal Access to Services     [facility] AH: Hadii aad ku hadasho Soomaali, waaxda luqadaha, qaybta kaalmada adeegyada, waxay idiin hayaan adeeg kharash lahuong . So [facility] [phone].    ATENCIÓN: Si habla español, tiene a donahue disposición servicios gratuitos de asistencia lingüística. Llame al [phone].    We comply with applicable federal civil rights laws and [place] laws. We do not discriminate on the basis of race, color, national origin, age, disability, sex, sexual orientation, or gender identity.            Thank you!     Thank you for choosing [facility]  for your care. Our goal is always to provide you with excellent care. Hearing back from our patients is one way we can continue to improve our services. Please take a few minutes to complete the written survey that you  may receive in the mail after your visit with us. Thank you!             Your Updated Medication List - Protect others around you: Learn how to safely use, store and throw away your medicines at www.disposemymeds.org.          This list is accurate as of 4/9/18  8:18 AM.  Always use your most recent med list.                   Brand Name Dispense Instructions for use Diagnosis    amLODIPine 10 MG tablet    NORVASC    90 tablet    Take 1 tablet (10 mg) by mouth daily    Benign essential hypertension       doxazosin 4 MG tablet    CARDURA    90 tablet    Take 1 tablet (4 mg) by mouth At Bedtime    Benign essential hypertension       triamcinolone 0.1 % cream    KENALOG    30 g    Apply sparingly to affected area two times daily as needed (left ankle.lower leg)    Atopic dermatitis, unspecified type

## 2018-04-09 NOTE — NURSING NOTE
"Chief Complaint   Patient presents with     Hypertension     Derm Problem       Initial /78 (BP Location: Left arm, Patient Position: Chair, Cuff Size: Adult Large)  Pulse 87  Temp 97.3  F (36.3  C)  Resp 20  Ht 5' 11\" (1.803 m)  Wt 238 lb (108 kg)  SpO2 94%  BMI 33.19 kg/m2 Estimated body mass index is 33.19 kg/(m^2) as calculated from the following:    Height as of this encounter: 5' 11\" (1.803 m).    Weight as of this encounter: 238 lb (108 kg).  Medication Reconciliation: complete   Rebecca Gorman LPN  "

## 2018-04-09 NOTE — NURSING NOTE

## 2018-06-08 ENCOUNTER — OFFICE VISIT (OUTPATIENT)
Dept: DERMATOLOGY | Facility: CLINIC | Age: 83
End: 2018-06-08
Payer: MEDICARE

## 2018-06-08 DIAGNOSIS — D48.5 NEOPLASM OF UNCERTAIN BEHAVIOR OF SKIN: Primary | ICD-10-CM

## 2018-06-08 PROCEDURE — 88305 TISSUE EXAM BY PATHOLOGIST: CPT | Performed by: DERMATOLOGY

## 2018-06-08 ASSESSMENT — PAIN SCALES - GENERAL
PAINLEVEL: NO PAIN (0)
PAINLEVEL: NO PAIN (0)

## 2018-06-08 ASSESSMENT — ACTIVITIES OF DAILY LIVING (ADL)
EATING: 0 - INDEPENDENT
BATHING: 0-->INDEPENDENT
AMBULATION: 0 - INDEPENDENT
COGNITION: 0 - NO COGNITION ISSUES REPORTED
DRESS: 0-->INDEPENDENT
TOILETING: 0 - INDEPENDENT
SWALLOWING: 0 - SWALLOWS FOODS/LIQUIDS WITHOUT DIFFICULTY
DRESS: 0 - INDEPENDENT
TRANSFERRING: 0 - INDEPENDENT
BATHING: 0 - INDEPENDENT
AMBULATION: 0-->INDEPENDENT
CHANGE_IN_FUNCTIONAL_STATUS_SINCE_ONSET_OF_CURRENT_ILLNESS/INJURY: NO
TRANSFERRING: 0-->INDEPENDENT
COMMUNICATION: 0 - UNDERSTANDS/COMMUNICATES WITHOUT DIFFICULTY
SWALLOWING: 0-->SWALLOWS FOODS/LIQUIDS WITHOUT DIFFICULTY
TOILETING: 0-->INDEPENDENT
RETIRED_EATING: 0-->INDEPENDENT
FALL_HISTORY_WITHIN_LAST_SIX_MONTHS: NO
RETIRED_COMMUNICATION: 0-->UNDERSTANDS/COMMUNICATES WITHOUT DIFFICULTY

## 2018-06-08 NOTE — LETTER
6/8/2018       RE: Fish Hong  [address]  [facility] [address]     Dear Colleague,    Thank you for referring your patient, Fish Hong, to the [facility] DERMATOLOGY at [facility]. Please see a copy of my visit note below.    CHIEF COMPLAINT:  Spot on leg.  Spot on upper lip.      HISTORY OF PRESENT ILLNESS:  A very pleasant 87-year-old male who presents to clinic today with a growing lesion on his posterior right lower leg.  It has been present for many months, occasionally bleeds.  He also has a new lesion on his cutaneous right upper lip that has been present for a few months.  It does not itch, hurt or bleed.      PAST MEDICAL HISTORY:  Atopic dermatitis, hypertension, pulmonary nodule, spinal stenosis.      MEDICATIONS:  Amlodipine, doxazosin, triamcinolone cream.      ALLERGIES:  No known drug allergies.      REVIEW OF SYSTEMS:  Positive for bleeding of the lesion of his leg.  Denies pain or itching.      PHYSICAL EXAMINATION:   GENERAL:  Well-appearing male in no acute distress, oriented x3.   SKIN:  Examination of his right posterior lower leg shows a 2.5 cm erythematous verrucous plaque.  Examination of his cutaneous upper lip shows a 2 mm filiform wart with no atypia seen under dermatoscopy.      ASSESSMENT AND PLAN:     1.  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  We will perform a shave biopsy of this.  After obtaining informed consent, approximately 1 mL lidocaine with epinephrine was injected into his leg.  A Shahnaz blade was used to sample part of the lesion.  Aluminum chloride was applied for chemical cauterization.  Vaseline and Band-Aid were applied.  The patient will be notified of biopsy results in 2 weeks.  If he does not hear from us in 2 weeks, he is told to call the office.   2.  Filiform wart of his cutaneous upper lip.  Benign.     Again, thank you for allowing me to participate in the care of your patient.       Sincerely,    Yohannes Bright MD,MS, FAAD

## 2018-06-08 NOTE — MR AVS SNAPSHOT
After Visit Summary   6/8/2018    Fish Hong    MRN: [id number]           Patient Information     Date Of Birth          4/11/1931        Visit Information        Provider Department      6/8/2018 8:30 AM Yohannes Bright MD [facility] Derm[facility]        Today's Diagnoses     Neoplasm of uncertain behavior of skin    -  1      Care Instructions    Wound Care After a Biopsy    What is a skin biopsy?  A skin biopsy allows the doctor to examine a very small piece of tissue under the microscope to determine the diagnosis and the best treatment for the skin condition. A local anesthetic (numbing medicine)  is injected with a very small needle into the skin area to be tested. A small piece of skin is taken from the area. Sometimes a suture (stitch) is used.     What are the risks of a skin biopsy?  I will experience scar, bleeding, swelling, pain, crusting and redness. I may experience incomplete removal or recurrence. Risks of this procedure are excessive bleeding, bruising, infection, nerve damage, numbness, thick (hypertrophic or keloidal) scar and non-diagnostic biopsy.    How should I care for my wound for the first 24 hours?    Keep the wound dry and covered for 24 hours    If it bleeds, hold direct pressure on the area for 15 minutes. If bleeding does not stop then go to the emergency room    Avoid strenuous exercise the first 1-2 days or as your doctor instructs you    How should I care for the wound after 24 hours?    After 24 hours, remove the bandage    You may bathe or shower as normal    If you had a scalp biopsy, you can shampoo as usual and can use shower water to clean the biopsy site daily    Clean the wound twice a day with gentle soap and water    Do not scrub, be gentle    Apply white petroleum/Vaseline after cleaning the wound with a cotton swab or a clean finger, and keep the site covered with a Bandaid /bandage. Bandages are not necessary with a scalp biopsy    If you are  unable to cover the site with a Bandaid /bandage, re-apply ointment 2-3 times a day to keep the site moist. Moisture will help with healing    Avoid strenuous activity for first 1-2 days    Avoid lakes, rivers, pools, and oceans until the stitches are removed or the site is healed    How do I clean my wound?    Wash hands thoroughly with soap or use hand  before all wound care    Clean the wound with gentle soap and water    Apply white petroleum/Vaseline  to wound after it is clean    Replace the Bandaid /bandage to keep the wound covered for the first few days or as instructed by your doctor    If you had a scalp biopsy, warm shower water to the area on a daily basis should suffice    What should I use to clean my wound?     Cotton-tipped applicators (Qtips )    White petroleum jelly (Vaseline ). Use a clean new container and use Q-tips to apply.    Bandaids   as needed    Gentle soap     How should I care for my wound long term?    Do not get your wound dirty    Keep up with wound care for one week or until the area is healed.    A small scab will form and fall off by itself when the area is completely healed. The area will be red and will become pink in color as it heals. Sun protection is very important for how your scar will turn out. Sunscreen with an SPF 30 or greater is recommended once the area is healed.    If you have stitches, stitches need to be removed in 14 days. You may return to our clinic for this or you may have it done locally at your doctor s office.    You should have some soreness but it should be mild and slowly go away over several days. Talk to your doctor about using tylenol for pain,    When should I call my doctor?  If you have increased:     Pain or swelling    Pus or drainage (clear or slightly yellow drainage is ok)    Temperature over 100F    Spreading redness or warmth around wound    When will I hear about my results?  The biopsy results can take 2-3 weeks to come back.  The clinic will call you with the results, send you a Databox message, or have you schedule a follow-up clinic or phone time to discuss the results. Contact our clinics if you do not hear from us in 3 weeks.     Who should I call with questions?    [facility]: [phone]     [facility]: [phone]    For urgent needs outside of business hours call the [facility] at [phone] and ask for the dermatology resident on call  Cryotherapy    What is it?    Use of a very cold liquid, such as liquid nitrogen, to freeze and destroy abnormal skin cells that need to be removed    What should I expect?    Tenderness and redness    A small blister that might grow and fill with dark purple blood. There may be crusting.    More than one treatment may be needed if the lesions do not go away.    How do I care for the treated area?    Gently wash the area with your hands when bathing.    Use a thin layer of Vaseline to help with healing. You may use a Band-Aid.     The area should heal within 7-10 days and may leave behind a pink or lighter color.     Do not use an antibiotic or Neosporin ointment.     You may take acetaminophen (Tylenol) for pain.     Call your Doctor if you have:    Severe pain    Signs of infection (warmth, redness, cloudy yellow drainage, and or a bad smell)    Questions or concerns    Who should I call with questions?       [facility]: [phone]       [facility]: [phone]       For urgent needs outside of business hours call the [facility] at [phone]        and ask for the dermatology resident on call          Follow-ups after your visit        Who to contact     Please call your clinic at [phone] to:    Ask questions about your health    Make or cancel appointments    Discuss your medicines    Learn about your test results    Speak to your doctor             Additional Information About Your Visit        Care EveryWhere ID     This is your Care EveryWhere ID. This could be used by other organizations to access your [place] medical records  [id number]         Blood Pressure from Last 3 Encounters:   04/09/18 130/78   07/27/17 138/86   04/17/17 130/80    Weight from Last 3 Encounters:   04/09/18 108 kg (238 lb)   07/27/17 111.1 kg (245 lb)   04/17/17 111.1 kg (245 lb)              We Performed the Following     Dermatological path order and indications        Primary Care Provider Office Phone # Fax #    Herman Jarrett [phone] [address] [facility] [address]        Equal Access to Services     [facility] : Hadii jerman ludwig hadasho Soomaali, waaxda luqadaha, qaybta kaalmada adeegyada, milady lino . So [facility] [phone].    ATENCIÓN: Si habla español, tiene a donahue disposición servicios gratuitos de asistencia lingüística. Llame al [phone].    We comply with applicable federal civil rights laws and [place] laws. We do not discriminate on the basis of race, color, national origin, age, disability, sex, sexual orientation, or gender identity.            Thank you!     Thank you for choosing [facility] DERMATOLOGY  for your care. Our goal is always to provide you with excellent care. Hearing back from our patients is one way we can continue to improve our services. Please take a few minutes to complete the written survey that you may receive in the mail after your visit with us. Thank you!             Your Updated Medication List - Protect others around you: Learn how to safely use, store and throw away your medicines at www.disposemymeds.org.          This list is accurate as of 6/8/18  8:37 AM.  Always use your most recent med list.                   Brand Name Dispense Instructions for use Diagnosis    amLODIPine 10 MG tablet    NORVASC    90 tablet    Take 1 tablet (10 mg) by mouth daily         doxazosin 4 MG tablet    CARDURA    90 tablet    Take 1 tablet (4 mg) by mouth At Bedtime        triamcinolone 0.1 % cream    KENALOG    30 g    Apply sparingly to affected area two times daily as needed (left ankle.lower leg)    Atopic dermatitis, unspecified type

## 2018-06-08 NOTE — NURSING NOTE
Lidocaine1-1% injection   1mL once for one use, starting 6/8/2018 ending 6/8/2018,  2mL disp, R-0, injection  Injected by Dr. Bright

## 2018-06-08 NOTE — PATIENT INSTRUCTIONS
Wound Care After a Biopsy    What is a skin biopsy?  A skin biopsy allows the doctor to examine a very small piece of tissue under the microscope to determine the diagnosis and the best treatment for the skin condition. A local anesthetic (numbing medicine)  is injected with a very small needle into the skin area to be tested. A small piece of skin is taken from the area. Sometimes a suture (stitch) is used.     What are the risks of a skin biopsy?  I will experience scar, bleeding, swelling, pain, crusting and redness. I may experience incomplete removal or recurrence. Risks of this procedure are excessive bleeding, bruising, infection, nerve damage, numbness, thick (hypertrophic or keloidal) scar and non-diagnostic biopsy.    How should I care for my wound for the first 24 hours?    Keep the wound dry and covered for 24 hours    If it bleeds, hold direct pressure on the area for 15 minutes. If bleeding does not stop then go to the emergency room    Avoid strenuous exercise the first 1-2 days or as your doctor instructs you    How should I care for the wound after 24 hours?    After 24 hours, remove the bandage    You may bathe or shower as normal    If you had a scalp biopsy, you can shampoo as usual and can use shower water to clean the biopsy site daily    Clean the wound twice a day with gentle soap and water    Do not scrub, be gentle    Apply white petroleum/Vaseline after cleaning the wound with a cotton swab or a clean finger, and keep the site covered with a Bandaid /bandage. Bandages are not necessary with a scalp biopsy    If you are unable to cover the site with a Bandaid /bandage, re-apply ointment 2-3 times a day to keep the site moist. Moisture will help with healing    Avoid strenuous activity for first 1-2 days    Avoid lakes, rivers, pools, and oceans until the stitches are removed or the site is healed    How do I clean my wound?    Wash hands thoroughly with soap or use hand  before all  wound care    Clean the wound with gentle soap and water    Apply white petroleum/Vaseline  to wound after it is clean    Replace the Bandaid /bandage to keep the wound covered for the first few days or as instructed by your doctor    If you had a scalp biopsy, warm shower water to the area on a daily basis should suffice    What should I use to clean my wound?     Cotton-tipped applicators (Qtips )    White petroleum jelly (Vaseline ). Use a clean new container and use Q-tips to apply.    Bandaids   as needed    Gentle soap     How should I care for my wound long term?    Do not get your wound dirty    Keep up with wound care for one week or until the area is healed.    A small scab will form and fall off by itself when the area is completely healed. The area will be red and will become pink in color as it heals. Sun protection is very important for how your scar will turn out. Sunscreen with an SPF 30 or greater is recommended once the area is healed.    If you have stitches, stitches need to be removed in 14 days. You may return to our clinic for this or you may have it done locally at your doctor s office.    You should have some soreness but it should be mild and slowly go away over several days. Talk to your doctor about using tylenol for pain,    When should I call my doctor?  If you have increased:     Pain or swelling    Pus or drainage (clear or slightly yellow drainage is ok)    Temperature over 100F    Spreading redness or warmth around wound    When will I hear about my results?  The biopsy results can take 2-3 weeks to come back. The clinic will call you with the results, send you a Justylet message, or have you schedule a follow-up clinic or phone time to discuss the results. Contact our clinics if you do not hear from us in 3 weeks.     Who should I call with questions?    [facility]: [phone]     [facility]: [phone]    For  urgent needs outside of business hours call the [facility] at [phone] and ask for the dermatology resident on call  Cryotherapy    What is it?    Use of a very cold liquid, such as liquid nitrogen, to freeze and destroy abnormal skin cells that need to be removed    What should I expect?    Tenderness and redness    A small blister that might grow and fill with dark purple blood. There may be crusting.    More than one treatment may be needed if the lesions do not go away.    How do I care for the treated area?    Gently wash the area with your hands when bathing.    Use a thin layer of Vaseline to help with healing. You may use a Band-Aid.     The area should heal within 7-10 days and may leave behind a pink or lighter color.     Do not use an antibiotic or Neosporin ointment.     You may take acetaminophen (Tylenol) for pain.     Call your Doctor if you have:    Severe pain    Signs of infection (warmth, redness, cloudy yellow drainage, and or a bad smell)    Questions or concerns    Who should I call with questions?       [facility]: [phone]       [facility]: [phone]       For urgent needs outside of business hours call the [facility] at [phone]        and ask for the dermatology resident on call

## 2018-06-08 NOTE — PROGRESS NOTES
CHIEF COMPLAINT:  Spot on leg.  Spot on upper lip.      HISTORY OF PRESENT ILLNESS:  A very pleasant 87-year-old male who presents to clinic today with a growing lesion on his posterior right lower leg.  It has been present for many months, occasionally bleeds.  He also has a new lesion on his cutaneous right upper lip that has been present for a few months.  It does not itch, hurt or bleed.      PAST MEDICAL HISTORY:  Atopic dermatitis, hypertension, pulmonary nodule, spinal stenosis.      MEDICATIONS:  Amlodipine, doxazosin, triamcinolone cream.      ALLERGIES:  No known drug allergies.      REVIEW OF SYSTEMS:  Positive for bleeding of the lesion of his leg.  Denies pain or itching.      PHYSICAL EXAMINATION:   GENERAL:  Well-appearing male in no acute distress, oriented x3.   SKIN:  Examination of his right posterior lower leg shows a 2.5 cm erythematous verrucous plaque.  Examination of his cutaneous upper lip shows a 2 mm filiform wart with no atypia seen under dermatoscopy.      ASSESSMENT AND PLAN:     1.  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  We will perform a shave biopsy of this.  After obtaining informed consent, approximately 1 mL lidocaine with epinephrine was injected into his leg.  A Shahnaz blade was used to sample part of the lesion.  Aluminum chloride was applied for chemical cauterization.  Vaseline and Band-Aid were applied.  The patient will be notified of biopsy results in 2 weeks.  If he does not hear from us in 2 weeks, he is told to call the office.   2.  Filiform wart of his cutaneous upper lip.  Benign.     Yohannes Bright MD,MS, FAAD

## 2018-06-08 NOTE — NURSING NOTE
Dermatology Rooming Note    Fish Hong's goals for this visit include:   Chief Complaint   Patient presents with     Derm Problem     Fish is visiting to discuss lesion on the back of right leg.     Jayda Juan LPN

## 2018-06-13 LAB — COPATH REPORT: NORMAL

## 2018-06-25 ENCOUNTER — OFFICE VISIT (OUTPATIENT)
Dept: FAMILY MEDICINE | Facility: CLINIC | Age: 83
End: 2018-06-25
Payer: MEDICARE

## 2018-06-25 VITALS
HEART RATE: 82 BPM | BODY MASS INDEX: 33.33 KG/M2 | RESPIRATION RATE: 16 BRPM | OXYGEN SATURATION: 97 % | TEMPERATURE: 97.2 F | DIASTOLIC BLOOD PRESSURE: 72 MMHG | WEIGHT: 239 LBS | SYSTOLIC BLOOD PRESSURE: 134 MMHG

## 2018-06-25 DIAGNOSIS — J20.9 ACUTE BRONCHITIS, UNSPECIFIED ORGANISM: Primary | ICD-10-CM

## 2018-06-25 PROCEDURE — 99214 OFFICE O/P EST MOD 30 MIN: CPT | Performed by: FAMILY MEDICINE

## 2018-06-25 RX ORDER — AZITHROMYCIN 500 MG/1
500 TABLET, FILM COATED ORAL DAILY
Qty: 3 TABLET | Refills: 0 | Status: SHIPPED | OUTPATIENT
Start: 2018-06-25 | End: 2018-10-15

## 2018-06-25 RX ORDER — FLUTICASONE PROPIONATE 50 MCG
1-2 SPRAY, SUSPENSION (ML) NASAL DAILY
Qty: 1 BOTTLE | Refills: 11 | Status: SHIPPED | OUTPATIENT
Start: 2018-06-25 | End: 2019-06-19

## 2018-06-25 NOTE — MR AVS SNAPSHOT
After Visit Summary   6/25/2018    Fish Hong    MRN: [id number]           Patient Information     Date Of Birth          4/11/1931        Visit Information        Provider Department      6/25/2018 2:30 PM Alex Harris MD [facility]        Today's Diagnoses     Acute bronchitis, unspecified organism    -  1      Care Instructions    (J20.9) Acute bronchitis, unspecified organism  (primary encounter diagnosis)  Comment:    Plan: azithromycin (ZITHROMAX) 500 MG tablet            One daily  With food x 3 days with food   Alex Harris Jr., MD                  Follow-ups after your visit        Follow-up notes from your care team     Return in about 1 week (around 7/2/2018).      Who to contact     If you have questions or need follow up information about today's clinic visit or your schedule please contact [facility] directly at [phone].  Normal or non-critical lab and imaging results will be communicated to you by MyChart, letter or phone within 4 business days after the clinic has received the results. If you do not hear from us within 7 days, please contact the clinic through MyChart or phone. If you have a critical or abnormal lab result, we will notify you by phone as soon as possible.  Submit refill requests through [employer] or call your pharmacy and they will forward the refill request to us. Please allow 3 business days for your refill to be completed.          Additional Information About Your Visit        Care EveryWhere ID     This is your Care EveryWhere ID. This could be used by other organizations to access your [place] medical records  [id number]        Your Vitals Were     Pulse Temperature Respirations Pulse Oximetry BMI (Body Mass Index)       82 97.2  F (36.2  C) (Tympanic) 16 97% 33.33 kg/m2        Blood Pressure from Last 3 Encounters:   06/25/18 134/72   04/09/18 130/78   07/27/17  138/86    Weight from Last 3 Encounters:   06/25/18 239 lb (108.4 kg)   04/09/18 238 lb (108 kg)   07/27/17 245 lb (111.1 kg)              Today, you had the following     No orders found for display         Today's Medication Changes          These changes are accurate as of 6/25/18  3:05 PM.  If you have any questions, ask your nurse or doctor.               Start taking these medicines.        Dose/Directions    azithromycin 500 MG tablet   Commonly known as:  ZITHROMAX   Used for:  Acute bronchitis, unspecified organism   Started by:  Alex Harris MD        Dose:  500 mg   Take 1 tablet (500 mg) by mouth daily   Quantity:  3 tablet   Refills:  0       fluticasone 50 MCG/ACT spray   Commonly known as:  FLONASE   Used for:  Acute bronchitis, unspecified organism   Started by:  Alex Harris MD        Dose:  1-2 spray   Spray 1-2 sprays into both nostrils daily   Quantity:  1 Bottle   Refills:  11            Where to get your medicines      These medications were sent to [address] [facility]  [address] [facility] [address]     Phone:  [phone]     azithromycin 500 MG tablet    fluticasone 50 MCG/ACT spray                Primary Care Provider Office Phone # Fax #    Herman Jarrett [phone] [address] [facility] [address]        Equal Access to Services     [facility] AH: Hadii jerman hillo Sokhadar, waaxda luqadaha, qaybta kaalmada adesooda, milady lino . So [facility] [phone].    ATENCIÓN: Si habla español, tiene a donahue disposición servicios gratuitos de asistencia lingüística. Llame al [phone].    We comply with applicable federal civil rights laws and [place] laws. We do not discriminate on the basis of race, color, national origin, age, disability, sex, sexual orientation, or gender identity.            Thank you!     Thank you for choosing [facility]  for your care. Our goal is always to provide you with excellent care. Hearing back from our patients is one way we can continue to improve our services. Please take a few minutes to complete the written survey that you may receive in the mail after your visit with us. Thank you!             Your Updated Medication List - Protect others around you: Learn how to safely use, store and throw away your medicines at www.disposemymeds.org.          This list is accurate as of 6/25/18  3:05 PM.  Always use your most recent med list.                   Brand Name Dispense Instructions for use Diagnosis    amLODIPine 10 MG tablet    NORVASC    90 tablet    Take 1 tablet (10 mg) by mouth daily        azithromycin 500 MG tablet    ZITHROMAX    3 tablet    Take 1 tablet (500 mg) by mouth daily    Acute bronchitis, unspecified organism       doxazosin 4 MG tablet    CARDURA    90 tablet    Take 1 tablet (4 mg) by mouth At Bedtime        fluticasone 50 MCG/ACT spray    FLONASE    1 Bottle    Spray 1-2 sprays into both nostrils daily    Acute bronchitis, unspecified organism       triamcinolone 0.1 % cream    KENALOG    30 g    Apply sparingly to affected area two times daily as needed (left ankle.lower leg)    Atopic dermatitis, unspecified type

## 2018-06-25 NOTE — PROGRESS NOTES
SUBJECTIVE:   Fish Hong is a 87 year old male who presents to clinic today for the following health issues:      Acute Illness 6 week history    Acute illness concerns: URI  Onset: 1 month    Fever: no    Chills/Sweats: no    Headache (location?): YES    Sinus Pressure:YES    Conjunctivitis:  YES- matted in am    Ear Pain: no    Rhinorrhea: YES    Congestion: YES    Sore Throat: no     Cough: no    Wheeze: no    Decreased Appetite: no    Nausea: no    Vomiting: no    Diarrhea:  no    Dysuria/Freq.: no    Fatigue/Achiness: no    Sick/Strep Exposure: YES     Therapies Tried and outcome: aspirin, sinus and allergy meds    No sinus pressure or pain   Supposedly has no sinuses   Quit smoking in the 70's     .DARLENE MUNOZ MD .6/25/2018 3:22 PM .June 25, 2018                   Hypertension Follow-up    Outpatient blood pressures are not being checked.  Low Salt Diet: no added salt    Amount of exercise or physical activity: 6-7 days/week for an average of 30-45 minutes  Problems taking medications regularly: No  Medication side effects: none  Diet: low salt and low fat/cholesterol             There are no preventive care reminders to display for this patient.          .  Current Outpatient Prescriptions   Medication Sig Dispense Refill     amLODIPine (NORVASC) 10 MG tablet Take 1 tablet (10 mg) by mouth daily 90 tablet 3     azithromycin (ZITHROMAX) 500 MG tablet Take 1 tablet (500 mg) by mouth daily 3 tablet 0     doxazosin (CARDURA) 4 MG tablet Take 1 tablet (4 mg) by mouth At Bedtime 90 tablet 3     fluticasone (FLONASE) 50 MCG/ACT spray Spray 1-2 sprays into both nostrils daily 1 Bottle 11     triamcinolone (KENALOG) 0.1 % cream Apply sparingly to affected area two times daily as needed (left ankle.lower leg) 30 g 3              No Known Allergies      Immunization History   Administered Date(s) Administered     Influenza (High Dose) 3 valent vaccine 10/05/2015, 09/19/2016, 10/09/2017     Pneumo Conj  13-V (2010&after) 2018     Pneumococcal 23 valent 10/20/2009     TDAP Vaccine (Adacel) 2015     Tdap (Adacel,Boostrix) 2004               reports that he drinks alcohol.          reports that he does not use illicit drugs.        family history includes No Known Problems in his father and mother. There is no history of Melanoma or Skin Cancer.        indicated that the status of his no family hx of is unknown. He indicated that his mother is . He indicated that his father is .          has a past surgical history that includes hernia repair (11/3/2008); ENT surgery; TOTAL KNEE ARTHROPLASTY; and Circumcision (N/A, 2016).         reports that he does not engage in sexual activity.    .  Pediatric History   Patient Guardian Status     Not on file.     Other Topics Concern     Not on file     Social History Narrative     since                reports that he quit smoking about 56 years ago. He has never used smokeless tobacco.        Medical, social, surgical, and family histories reviewed.        Labs reviewed in EPIC  Patient Active Problem List   Diagnosis     Essential hypertension     Pulmonary nodules     Preventive measure     Atopic dermatitis, unspecified type     Spinal stenosis of lumbar region without neurogenic claudication     Greater trochanteric bursitis, right       Past Surgical History:   Procedure Laterality Date     C TOTAL KNEE ARTHROPLASTY      Bilateral     CIRCUMCISION N/A 2016    Procedure: CIRCUMCISION;  Surgeon: Elgin Rhodes MD;  Location: [facility] OR     ENT SURGERY      detached retina--bilateral     HERNIA REPAIR  11/3/2008    mesh repair umbilical hernia         Social History   Substance Use Topics     Smoking status: Former Smoker     Quit date: 1962     Smokeless tobacco: Never Used     Alcohol use Yes      Comment: very little       Family History   Problem Relation Age of Onset     No Known Problems Mother      No Known  Problems Father      Melanoma No family hx of      Skin Cancer No family hx of              Current Outpatient Prescriptions   Medication Sig Dispense Refill     amLODIPine (NORVASC) 10 MG tablet Take 1 tablet (10 mg) by mouth daily 90 tablet 3     azithromycin (ZITHROMAX) 500 MG tablet Take 1 tablet (500 mg) by mouth daily 3 tablet 0     doxazosin (CARDURA) 4 MG tablet Take 1 tablet (4 mg) by mouth At Bedtime 90 tablet 3     fluticasone (FLONASE) 50 MCG/ACT spray Spray 1-2 sprays into both nostrils daily 1 Bottle 11     triamcinolone (KENALOG) 0.1 % cream Apply sparingly to affected area two times daily as needed (left ankle.lower leg) 30 g 3           Recent Labs   Lab Test  04/09/18   0824  04/17/17   0848   04/11/16   0901  03/31/15   0847  01/09/14   1017   LDL   --    --    --   99  107  125   HDL   --    --    --   42  38*  37*   TRIG   --    --    --   119  112  105   ALT  15   --    --    --    --   19   CR  1.09  1.05   < >  1.20  1.30*  1.30*   GFRESTIMATED  64  67   < >  58*  53*  53*   GFRESTBLACK  77  81   < >  70  64  64   POTASSIUM  3.7  3.9   < >  3.6  4.4  4.5    < > = values in this interval not displayed.            BP Readings from Last 6 Encounters:   06/25/18 134/72   04/09/18 130/78   07/27/17 138/86   04/17/17 130/80   11/10/16 137/79   08/04/16 131/79           Wt Readings from Last 3 Encounters:   06/25/18 239 lb (108.4 kg)   04/09/18 238 lb (108 kg)   07/27/17 245 lb (111.1 kg)                 Positive symptoms or findings indicated by bold designation:         ROS: 10 point ROS neg other than the symptoms noted above in the HPI.except  has Essential hypertension; Pulmonary nodules; Preventive measure; Atopic dermatitis, unspecified type; Spinal stenosis of lumbar region without neurogenic claudication; and Greater trochanteric bursitis, right on his problem list.  Review Of Systems    Skin: negative    Eyes: negative    Ears/Nose/Throat: nasal congestion, purulent rhinorrhea, postnasal  drainage, hearing loss    Respiratory: Cough-  Sputum production    Cardiovascular: negative    Gastrointestinal: negative    Genitourinary: negative    Musculoskeletal: back pain    Neurologic: negative    Psychiatric: negative    Hematologic/Lymphatic/Immunologic: negative    Endocrine: negative                PE:  /72 (Cuff Size: Adult Large)  Pulse 82  Temp 97.2  F (36.2  C) (Tympanic)  Resp 16  Wt 239 lb (108.4 kg)  SpO2 97%  BMI 33.33 kg/m2 Body mass index is 33.33 kg/(m^2).        Constitutional: general appearance, well nourished, well developed, in no acute distress, well developed, appears stated age, normal body habitus,  Mild obesity         Eyes:; The patient has normal eyelids sclerae and conjunctivae :          Ears/Nose/Throat: external ear, overall: normal appearance; external nose, overall: benign appearance, normal moujth gums and lips       Rhinitis and ear wax         Neck: thyroid, overall: normal size, normal consistency, nontender,          Respiratory:  palpation of chest, overall: normal excursion,    Clear to percussion and auscultation     NO Tachypnea    NORMAL  Color          Cardiovascular:  Good color with no peripheral edema    Regular sinus rhythm without murmur.  Physiologic heart sounds   Heart is unelarged    .     Chest/Breast: normal shape           Abdominal exam,  Liver and spleen are  unenlarged         Tenderness     Scars              Urogenital; no renal, flank or bladder  tenderness;          Lymphatic: neck nodes,     Other nodes         Musculoskeletal:  Brief ortho exam normal except:   Lower back pain         Integument: inspection of skin, no rash, lesions; and, palpation, no induration, no tenderness.          Neurologic mental status, overall: alert and oriented; gait, no ataxia, no unsteadiness; coordination, no tremors; cranial nerves, overall: normal motor, overall: normal bulk, tone.           Psychiatric: orientation/consciousness, overall:  "oriented to person, place and time; behavior/psychomotor activity, no tics, normal psychomotor activity; mood and affect, overall: normal mood and affect; appearance, overall: well-groomed, good eye contact; speech, overall: normal quality, no aphasia and normal quality, quantity, intact.        Diagnostic Test Results:  Results for orders placed or performed in visit on 06/08/18   Dermatological path order and indications   Result Value Ref Range    Copath Report       Patient Name: ATIF MENG  MR#: [id number]  Specimen #: [id number]  Collected: 6/8/2018  Received: 6/8/2018  Reported: 6/13/2018 09:33  Ordering Phy(s): BRENNA CARY    For improved result formatting, select 'View Enhanced Report Format' under   Linked Documents section.    SPECIMEN(S):  Skin, right lower leg    FINAL DIAGNOSIS:  Skin, right lower leg:  - Squamous cell carcinoma, at least in situ - (see comment)    COMMENT:  The lesion extends to the deep margin, thus invasion cannot be entirely   excluded.    I have personally reviewed all specimens and/or slides, including the   listed special stains, and used them  with my medical judgement to determine or confirm the final diagnosis.    Electronically signed out by:    Antonio Frazier M.D., [facility]    CLINICAL HISTORY:  The patient is a    GROSS:  The specimen is received in formalin with proper patient identification,   labeled \"right lower leg\".  The  specimen consists of a 1.1 x 0.7 cm irregular tan skin shav e.  The skin   surface contains a 0.7 x 0.6 x 0.1 cm  tan-pink focally yellow-green plaque.  The resection margin is inked blue,   the specimen is trisected and  submitted in its entirety in cassette A1. (Dictated by: Yoni Campos   6/8/2018 01:53 PM)    MICROSCOPIC:  The specimen exhibits epidermal hyperplasia with full-thickness   keratinocyte atypia and suprabasal mitoses.    CPT Codes:  A: 23119-ZI0.P, 02577-HD3.T    TESTING LAB LOCATION:  [facility]" [facility], [facility] [address] [place]   [address] [phone]    COLLECTION SITE:  Client: [facility] [place]  Location: [facility] (B)               ICD-10-CM    1. Acute bronchitis, unspecified organism J20.9 azithromycin (ZITHROMAX) 500 MG tablet     fluticasone (FLONASE) 50 MCG/ACT spray              .    Side effects benefits and risks thoroughly discussed. .he may come in early if unimproved or getting worse          Please drink 2 glasses of water prior to meals and walk 15-30 minutes after meals        I spent  15 MINUTES   with patient discussing the following issues   The encounter diagnosis was Acute bronchitis, unspecified organism. over half of which involved counseling and coordination of care.      Patient Instructions   (J20.9) Acute bronchitis, unspecified organism  (primary encounter diagnosis)  Comment:    Plan: azithromycin (ZITHROMAX) 500 MG tablet            One daily  With food x 3 days with food   Darlene Munoz Jr., MD                    ALL THE ABOVE PROBLEMS ARE STABLE AND MED CHANGES AS NOTED        Diet:  MEDITERRANEAN DIET         Exercise:  RECOMMENDED WALKIN G  Exercises Range of motion, balance, isometric, and strengthening exercises 30 repetitions twice daily of involved joints            .DARLENE MUNOZ MD 6/25/2018 3:22 PM  June 25, 2018

## 2018-06-25 NOTE — PATIENT INSTRUCTIONS
(J20.9) Acute bronchitis, unspecified organism  (primary encounter diagnosis)  Comment:    Plan: azithromycin (ZITHROMAX) 500 MG tablet            One daily  With food x 3 days with food   Alex Harris Jr., MD

## 2018-06-26 ENCOUNTER — TELEPHONE (OUTPATIENT)
Dept: DERMATOLOGY | Facility: CLINIC | Age: 83
End: 2018-06-26

## 2018-06-26 DIAGNOSIS — C44.92 SQUAMOUS CELL SKIN CANCER: Primary | ICD-10-CM

## 2018-06-26 NOTE — TELEPHONE ENCOUNTER
Patient contacted regarding recent biopsy results of SCC at least in situ on the right lower leg. Treatment options were reviewed, and we elected to refer for Mohs surgery for definitive treatment. Patient voiced an understanding and agreement.    Vipin Del Toro MD  Dermatology resident, PGY-4  [phone]

## 2018-07-17 ENCOUNTER — OFFICE VISIT (OUTPATIENT)
Dept: DERMATOLOGY | Facility: CLINIC | Age: 83
End: 2018-07-17
Payer: MEDICARE

## 2018-07-17 DIAGNOSIS — D18.01 CHERRY ANGIOMA: ICD-10-CM

## 2018-07-17 DIAGNOSIS — L82.1 SEBORRHEIC KERATOSIS: ICD-10-CM

## 2018-07-17 DIAGNOSIS — D22.9 MULTIPLE BENIGN NEVI: ICD-10-CM

## 2018-07-17 DIAGNOSIS — D04.71 SQUAMOUS CELL CARCINOMA IN SITU OF SKIN OF RIGHT LOWER LEG: Primary | ICD-10-CM

## 2018-07-17 DIAGNOSIS — L72.9 CYST OF SKIN: ICD-10-CM

## 2018-07-17 ASSESSMENT — PAIN SCALES - GENERAL: PAINLEVEL: NO PAIN (0)

## 2018-07-17 NOTE — MR AVS SNAPSHOT
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 Expect to be here majority of the morning.  The Mohs surgical procedure can be an extensive surgery requiring multiple stages. Each stage may take 1-2 hours.    ? You may eat breakfast  ? You may bring snacks or beverages with you  ? Take all normal medications morning of surgery -- unless otherwise indicated by your physician  ? If you have an artificial heart valve, or joint replacement within two years, we will prescribe an antibiotic. Please take one hour prior to surgery.    2. You will need a  to be with you if your surgical site is close to your eyes. You can get swelling/bruising immediately after surgery and will go home with a big bulky bandage that could obstruct your view of driving safely.    3. Wear comfortable clothing -- preferably a button down shirt or a loose fitted shirt. (to avoid pulling over pressure bandage off when you get home) If your surgery site is on your leg, try wearing loose fitted pants. If your surgery site is on your arm, try wearing a short-sleeve shirt.    4. Bring reading material or other items to help pass time. We do have internet access available if you own a laptop, iPad, etc.)    5. Women - do NOT wear make-up. We will be washing it off anyone needing surgery on the face    6. Do NOT stop blood thinning medication unless instructed to do so by your surgeon. This will include:  Warfarin, Coumadin, Jantoven, Aspirin, Plavix and Pradaxa. If you are taking Warfarin or Coumadin, please have your INR blood test results sent to our office no more than 7 days prior to your surgery.     7. Tylenol is a good alternative to take for headaches and pain, and can be taken throughout your surgery and postoperative recovery.    8. If your surgery is on your trunk, arms, hands, legs, feet, fingernail or a toenail, please wash the area with Hibiclens, an over-the-counter antiseptic soap, the night before and morning of your surgery.     If you have any questions, please feel free to contact us.    Phone numbers:  During business hours (M-F 8:00-4:30 p.m.)  [facility]:  [phone] - select option 1 for appt. desk  [phone] - select option 3 for nurse triage line  [facility]:   [phone] - goes straight to call center   ---------------------------------------------------------  Evenings/Weekends/Holidays  Hospital - [phone]   TTY for hearing sqerdwlv-[phone]  *Ask  to page dermatologist on-call  Emergency Y[place]ge-[phone]  TTY for hearing impaired- [phone]                Follow-ups after your visit        Your next 10 appointments already scheduled     Oct 30, 2018  1:30 PM CDT   (Arrive by 1:15 PM)   Return Visit with Braydon Buchanan MD   [facility] Dermatologic Surgery ([facility])    [address] [address] [phone]              Who to contact     Please call your clinic at [phone] to:    Ask questions about your health    Make or cancel appointments    Discuss your medicines    Learn about your test results    Speak to your doctor            Additional Information About Your Visit        Care EveryWhere ID     This is your Care EveryWhere ID. This could be used by other organizations to access your [place] medical records  [id number]          Blood Pressure from Last 3 Encounters:   07/18/18 111/61   06/25/18 134/72   04/09/18 130/78    Weight from Last 3 Encounters:   06/25/18 108.4 kg (239 lb)   04/09/18 108 kg (238 lb)   07/27/17 111.1 kg (245 lb)              Today, you had the following     No orders found for display       Primary Care Provider Office Phone # Fax #    Herman Jarrett [phone] [address] [facility] [address]        Equal Access to Services     [facility]: Hadii aad ku hadasho Soomaali, waaxda luqadaha, qaybta kaalmada adefamyada, milady lino . So [facility] [phone].    ATENCIÓN: Si habla español, tiene a donahue disposición servicios gratuitos de asistencia lingüística. [facility] [phone].    We comply with applicable federal civil rights laws and [place] laws. We do not discriminate on the basis of race, color, national origin, age, disability, sex, sexual orientation, or gender identity.            Thank you!     Thank you for choosing [facility] DERMATOLOGIC SURGERY  for your care. Our goal is always to provide you with excellent care. Hearing back from our patients is one way we can continue to improve our services. Please take a few minutes to complete the written survey that you may receive in the mail after your visit with us. Thank you!             Your Updated Medication List - Protect others around you: Learn how to safely use, store and throw away your medicines at www.disposemymeds.org.          This list is accurate as of 7/17/18 11:59 PM.  Always use your most recent med list.                   Brand Name Dispense Instructions for use Diagnosis    amLODIPine 10 MG tablet    NORVASC    90 tablet    Take 1 tablet (10 mg) by mouth daily        azithromycin 500 MG tablet    ZITHROMAX    3 tablet    Take 1 tablet (500 mg) by mouth daily    Acute bronchitis, unspecified organism       doxazosin 4 MG tablet    CARDURA    90 tablet    Take 1 tablet (4 mg)  by mouth At Bedtime        fluticasone 50 MCG/ACT spray    FLONASE    1 Bottle    Spray 1-2 sprays into both nostrils daily    Acute bronchitis, unspecified organism       triamcinolone 0.1 % cream    KENALOG    30 g    Apply sparingly to affected area two times daily as needed (left ankle.lower leg)    Atopic dermatitis, unspecified type

## 2018-07-17 NOTE — NURSING NOTE
Chief Complaint   Patient presents with     Derm Problem     David is here today for consult for MOHS on his right lower leg due to SCC     Candace Rosales LPN

## 2018-07-17 NOTE — LETTER
7/17/2018       RE: Fish Hong  [address]  [facility] [address]     Dear Colleague,    Thank you for referring your patient, Fish Hong, to the [facility] DERMATOLOGIC SURGERY at [facility]. Please see a copy of my visit note below.    [facility] Dermatology Note      Dermatology Problem List:  1. NMSC  - SCC, R lower leg, Mohs scheduled   2. Filiform wart, cutaneous upper lip     Encounter Date: Jul 17, 2018    CC:  Chief Complaint   Patient presents with     Derm Problem     Gordan is here today for consult for MOHS on his right lower leg due to SCC         History of Present Illness:  Mr. Fish Hong is a 87 year old male who presents today for a Mohs consultation regarding a squamous cell carcinoma, at least in situ, that was diagnosed on his right lower leg after a biopsy with Dr. Bright on 6/8/18. He states that this lesion has history of bleeding and growth, and first appeared several months before the biopsy.     He also notes a raised skin colored mole near the location of the skin cancer. He wants to know if its worth getting excised. Denies bleeding, growth, or other symptoms. His daughter who accompanies him today asks if his entire back can be checked. There are various lesions she is concerned about. There is also a cyst around his left clavicle that has not been bothering him.      This is his first experience with Mohs surgery. He's had several cysts excised before in the past, but these were taken out with standard excisions. He had knee replacements around 20 years ago, nothing in the past 2 years. The patient is otherwise feeling well. There are no other skin concerns at this time.      Past Medical History:   Patient Active Problem List   Diagnosis     Essential hypertension     Pulmonary nodules     Preventive measure     Atopic dermatitis, unspecified type     Spinal stenosis of lumbar region without  neurogenic claudication     Greater trochanteric bursitis, right     Past Medical History:   Diagnosis Date     Hypertension      Rheumatic fever 1948     Past Surgical History:   Procedure Laterality Date     C TOTAL KNEE ARTHROPLASTY      Bilateral     CIRCUMCISION N/A 7/13/2016    Procedure: CIRCUMCISION;  Surgeon: Elgin Rhodes MD;  Location: [facility] OR     ENT SURGERY      detached retina--bilateral     HERNIA REPAIR  11/3/2008    mesh repair umbilical hernia       Social History:  Not reviewed at this visit.     Family History:  Not reviewed at this visit.     Medications:  Current Outpatient Prescriptions   Medication Sig Dispense Refill     amLODIPine (NORVASC) 10 MG tablet Take 1 tablet (10 mg) by mouth daily 90 tablet 3     azithromycin (ZITHROMAX) 500 MG tablet Take 1 tablet (500 mg) by mouth daily 3 tablet 0     doxazosin (CARDURA) 4 MG tablet Take 1 tablet (4 mg) by mouth At Bedtime 90 tablet 3     fluticasone (FLONASE) 50 MCG/ACT spray Spray 1-2 sprays into both nostrils daily 1 Bottle 11     triamcinolone (KENALOG) 0.1 % cream Apply sparingly to affected area two times daily as needed (left ankle.lower leg) 30 g 3       No Known Allergies    Review of Systems:  -Skin Establ Pt: The patient denies any new rash, pruritus, or lesions that are symptomatic, changing or bleeding, except as per HPI.  -Constitutional: The patient is feeling generally well.    Physical exam:  Vitals: There were no vitals taken for this visit.  GEN: This is a well developed, well-nourished male in no acute distress, in a pleasant mood.    SKIN: Focused examination of the areas of concern was performed.    - 2.9 x 2.0 cm pink scaly plaque on the right lower leg.   - Raised skin colored nevus medial to the SCC.   - Raised dome shaped nodule with a central punctum located on left clavicle.  - Waxy stuck on tan to brown papules on the back.  - Dome shaped bright red papules on the back.     - No other lesions of concern on  areas examined.       Impression/Plan:  1. Squamous cell carcinoma, at least in situ, R lower leg     Reviewed nature and etiology of squamous cell carcinoma.     AUC Score = 7    Risks, benefits, and process of Mohs micrographic surgery were discussed including possibility of damage to surrounding anatomical structures and infection. Patient is comfortable proceeding with the surgery; consent form was obtained. He is scheduled for tomorrow morning.     Recommended compression socks to combat post-op swelling. Avoid physical activity for 48 hrches after surgery.     Discussed linear scar that will result.     No indications for pre-op antibiotics.    Pre-op written instructions provided.     2. Cyst, left clavicle    Benign nature reassured. Provided option to come back to have the cyst excised, however pt states that it does not bother him nor does it produce any symptoms. If at any point it becomes bothersome, he can contact the clinic.     3. Seborrheic keratosis, back    Benign nature reassured. No further intervention required.     4. Cherry angiomas, back     Benign nature reassured. No further intervention required.     5. Intradermal nevus, right lower leg    Benign nature reassured. No further intervention required.       Follow-up as scheduled for MMS.       Staff Involved:    Scribe Disclosure  I, Brett Keita, am serving as a scribe to document services personally performed by Dr. Braydon Buchanan DO, based on data collection and the provider's statements to me.     Provider Disclosure:   The documentation recorded by the scribe accurately reflects the services I personally performed and the decisions made by me.    Braydon Buchanan DO    Department of Dermatology  [facility]: Phone: [phone], Fax:[phone]  [facility]: Phone: [phone], Fax: [phone]

## 2018-07-17 NOTE — NURSING NOTE
History of Skin cancer : No    History of Mohs Surgery: No    History of a solid organ transplant: No Organ(s):  Year(s):  Creatinine:  Bone Marrow Transplant :  (year, )    Immunosuppressive Medications: No (if yes, which ones: )    HIV or Hepatitis B or C : No    Chronic lymphocytic leukemia: No    Diabetes: No (Type 1 or 2: )    Any Bleeding disorders: No    Do you have a Pacemaker or Defibrillator: No (year placed: )    Artificial heart valve: No (mechanical or porcine: )    Joint Replacement in the last 2 years: No Joint(s):  Year(s):     Do you typically take Prophylactic Antibiotics before seeing a dentist or having a procedure?: No    If yes, verify that patient has the antibiotic on hand and instruct to take one hour before their surgery appointment.    If they do not have the antibiotic, verify the patients pharmacy and notify Dr. Burrell by printing the pre-mohs call sheet.    Do you wear a C Pap Mask: No    If yes, and procedure is on the face, ask patient to bring in the mask with them (Mask only)    Do you have any mobility issues:     If yes, is a van service is required to transport you to/from your appointment? Daughter will drive     Smoking History (tobacco of any sort): Not since 1960    Do you have any other health issues that we should know about? No    Do you take any of the following Blood Thinners:    Aspirin:     Plavix/Aggrastat/Brilinta:     Warfarin:  (Last INR:  Date: )    Pradaxa/Eliquis/Xarelto:     Ibuprofen (Advil/Motrin):     Naproxen (Aleve):     Vitamin E:     Fish Oil:     Ginkgo biloba:     Other:    Regla was instructed of the following: Ibuprofen, naproxen, Vitamin E, Fish Oil, and Ginkgo biloba should be stopped 1 week prior to and 1 week after the procedure.    Medication Allergies: NKA    Are medications in Epic: Yes    Patient was reminded to take all medications as usual, other than those listed above, on the day of surgery    Patient was instructed to bring all  medications to clinic on day of surgery    Patient will bring a     (A  is helpful for the following reasons: some patients need medications to relax, but once given, patients should not drive; sometimes bandages obstruct your vision making it difficult to see and unsafe to drive; the day can get long so it s nice to have a timi; sometimes the procedure wears people out/makes them quite tired)    Photograph of the surgical site(s) is/are available    Patient was reminded that this can be an all-day procedure and that no other appointments should be scheduled on the day of Mohs

## 2018-07-17 NOTE — PROGRESS NOTES
[facility] Dermatology Note      Dermatology Problem List:  1. NMSC  - SCC, R lower leg, Mohs scheduled   2. Filiform wart, cutaneous upper lip     Encounter Date: Jul 17, 2018    CC:  Chief Complaint   Patient presents with     Derm Problem     David is here today for consult for MOHS on his right lower leg due to SCC         History of Present Illness:  Mr. Fish Hong is a 87 year old male who presents today for a Mohs consultation regarding a squamous cell carcinoma, at least in situ, that was diagnosed on his right lower leg after a biopsy with Dr. Bright on 6/8/18. He states that this lesion has history of bleeding and growth, and first appeared several months before the biopsy.     He also notes a raised skin colored mole near the location of the skin cancer. He wants to know if its worth getting excised. Denies bleeding, growth, or other symptoms. His daughter who accompanies him today asks if his entire back can be checked. There are various lesions she is concerned about. There is also a cyst around his left clavicle that has not been bothering him.      This is his first experience with Mohs surgery. He's had several cysts excised before in the past, but these were taken out with standard excisions. He had knee replacements around 20 years ago, nothing in the past 2 years. The patient is otherwise feeling well. There are no other skin concerns at this time.      Past Medical History:   Patient Active Problem List   Diagnosis     Essential hypertension     Pulmonary nodules     Preventive measure     Atopic dermatitis, unspecified type     Spinal stenosis of lumbar region without neurogenic claudication     Greater trochanteric bursitis, right     Past Medical History:   Diagnosis Date     Hypertension      Rheumatic fever 1948     Past Surgical History:   Procedure Laterality Date     C TOTAL KNEE ARTHROPLASTY      Bilateral     CIRCUMCISION N/A 7/13/2016    Procedure:  CIRCUMCISION;  Surgeon: Elgin Rhodes MD;  Location: [facility] OR     ENT SURGERY      detached retina--bilateral     HERNIA REPAIR  11/3/2008    mesh repair umbilical hernia       Social History:  Not reviewed at this visit.     Family History:  Not reviewed at this visit.     Medications:  Current Outpatient Prescriptions   Medication Sig Dispense Refill     amLODIPine (NORVASC) 10 MG tablet Take 1 tablet (10 mg) by mouth daily 90 tablet 3     azithromycin (ZITHROMAX) 500 MG tablet Take 1 tablet (500 mg) by mouth daily 3 tablet 0     doxazosin (CARDURA) 4 MG tablet Take 1 tablet (4 mg) by mouth At Bedtime 90 tablet 3     fluticasone (FLONASE) 50 MCG/ACT spray Spray 1-2 sprays into both nostrils daily 1 Bottle 11     triamcinolone (KENALOG) 0.1 % cream Apply sparingly to affected area two times daily as needed (left ankle.lower leg) 30 g 3       No Known Allergies    Review of Systems:  -Skin Establ Pt: The patient denies any new rash, pruritus, or lesions that are symptomatic, changing or bleeding, except as per HPI.  -Constitutional: The patient is feeling generally well.    Physical exam:  Vitals: There were no vitals taken for this visit.  GEN: This is a well developed, well-nourished male in no acute distress, in a pleasant mood.    SKIN: Focused examination of the areas of concern was performed.    - 2.9 x 2.0 cm pink scaly plaque on the right lower leg.   - Raised skin colored nevus medial to the SCC.   - Raised dome shaped nodule with a central punctum located on left clavicle.  - Waxy stuck on tan to brown papules on the back.  - Dome shaped bright red papules on the back.     - No other lesions of concern on areas examined.       Impression/Plan:  1. Squamous cell carcinoma, at least in situ, R lower leg     Reviewed nature and etiology of squamous cell carcinoma.     AUC Score = 7    Risks, benefits, and process of Mohs micrographic surgery were discussed including possibility of damage to  surrounding anatomical structures and infection. Patient is comfortable proceeding with the surgery; consent form was obtained. He is scheduled for tomorrow morning.     Recommended compression socks to combat post-op swelling. Avoid physical activity for 48 hrches after surgery.     Discussed linear scar that will result.     No indications for pre-op antibiotics.    Pre-op written instructions provided.     2. Cyst, left clavicle    Benign nature reassured. Provided option to come back to have the cyst excised, however pt states that it does not bother him nor does it produce any symptoms. If at any point it becomes bothersome, he can contact the clinic.     3. Seborrheic keratosis, back    Benign nature reassured. No further intervention required.     4. Cherry angiomas, back     Benign nature reassured. No further intervention required.     5. Intradermal nevus, right lower leg    Benign nature reassured. No further intervention required.       Follow-up as scheduled for [facility]MS.       Staff Involved:    Scribe Disclosure  I, Brett Keita, am serving as a scribe to document services personally performed by Dr. Braydon Buchanan DO, based on data collection and the provider's statements to me.     Provider Disclosure:   The documentation recorded by the scribe accurately reflects the services I personally performed and the decisions made by me.    Braydon Buchanan DO    Department of Dermatology  [facility]: Phone: [phone], Fax:[phone]  [facility]: Phone: [phone], Fax: [phone]

## 2018-07-17 NOTE — PATIENT INSTRUCTIONS
Mohs Cutaneous Micrographic Surgery Unit  Braydon Buchanan DO      Pre-Surgical Instruction Sheet    1. Expect to be here majority of the morning.  The Mohs surgical procedure can be an extensive surgery requiring multiple stages. Each stage may take 1-2 hours.    ? You may eat breakfast  ? You may bring snacks or beverages with you  ? Take all normal medications morning of surgery -- unless otherwise indicated by your physician  ? If you have an artificial heart valve, or joint replacement within two years, we will prescribe an antibiotic. Please take one hour prior to surgery.    2. You will need a  to be with you if your surgical site is close to your eyes. You can get swelling/bruising immediately after surgery and will go home with a big bulky bandage that could obstruct your view of driving safely.    3. Wear comfortable clothing -- preferably a button down shirt or a loose fitted shirt. (to avoid pulling over pressure bandage off when you get home) If your surgery site is on your leg, try wearing loose fitted pants. If your surgery site is on your arm, try wearing a short-sleeve shirt.    4. Bring reading material or other items to help pass time. We do have internet access available if you own a laptop, iPad, etc.)    5. Women - do NOT wear make-up. We will be washing it off anyone needing surgery on the face    6. Do NOT stop blood thinning medication unless instructed to do so by your surgeon. This will include: Warfarin, Coumadin, Jantoven, Aspirin, Plavix and Pradaxa. If you are taking Warfarin or Coumadin, please have your INR blood test results sent to our office no more than 7 days prior to your surgery.     7. Tylenol is a good alternative to take for headaches and pain, and can be taken throughout your surgery and postoperative recovery.    8. If your surgery is on your trunk, arms, hands, legs, feet, fingernail or a toenail, please wash the area with Hibiclens, an over-the-counter antiseptic  soap, the night before and morning of your surgery.     If you have any questions, please feel free to contact us.    Phone numbers:  During business hours (M-F 8:00-4:30 p.m.)  [facility]:  [phone] - select option 1 for appt. desk  [phone] - select option 3 for nurse triage line  [facility]:   [phone] - goes straight to call center   ---------------------------------------------------------  Evenings/Weekends/Holidays  Hospital - [phone]   TTY for hearing awldmnol-[phone]  *Ask  to page dermatologist on-call  Emergency Ygdz-[phone]  TTY for hearing impaired- [phone]

## 2018-07-18 ENCOUNTER — OFFICE VISIT (OUTPATIENT)
Dept: DERMATOLOGY | Facility: CLINIC | Age: 83
End: 2018-07-18
Payer: MEDICARE

## 2018-07-18 VITALS — SYSTOLIC BLOOD PRESSURE: 111 MMHG | DIASTOLIC BLOOD PRESSURE: 61 MMHG | HEART RATE: 92 BPM

## 2018-07-18 DIAGNOSIS — D04.71: Primary | ICD-10-CM

## 2018-07-18 RX ORDER — CIPROFLOXACIN 500 MG/1
500 TABLET, FILM COATED ORAL 2 TIMES DAILY
Qty: 14 TABLET | Refills: 0 | Status: SHIPPED | OUTPATIENT
Start: 2018-07-18 | End: 2018-10-15

## 2018-07-18 ASSESSMENT — PAIN SCALES - GENERAL: PAINLEVEL: NO PAIN (0)

## 2018-07-18 NOTE — PATIENT INSTRUCTIONS
Wound Care Instructions  I will experience scar, altered skin color, bleeding, swelling, pain, crusting and redness. I may experience altered sensation. Risks are excessive bleeding, infection, muscle weakness, thick (hypertrophic or keloidal) scar, and recurrence,. A second procedure may be recommended to obtain the best cosmetic or functional result.  Possible complications of any surgical procedure are bleeding, infection, scarring, alteration in skin color and sensation, muscle weakness in the area, wound dehiscence or seperation, or recurrence of the lesion or disease. On occasion, after healing, a secondary procedure or revision may be recommended in order to obtain the best cosmetic or functional result.   After your surgery, a pressure bandage will be placed over the area that has sutures. This will help prevent bleeding. Please follow these instructions as they will help you to prevent complications as your wound heals.  For the First 48 hours After Surgery:  1. Leave the pressure bandage on and keep it dry. If it should come loose, you may retape it, but do not take it off.  2. Relax and take it easy. Do not do any vigorous exercise, heavy lifting, or bending forward. This could cause the wound to bleed.  3. Post-operative pain is usually mild. You may take plain or extra strength Tylenol every 4 hours as needed (do not take more than 4,000mg in one day). Do not take any medicine that contains aspirin, ibuprofen or motrin unless you have been recommended these by a doctor.  Avoid alcohol and vitamin E as these may increase your tendency to bleed.  4. You may put an ice pack around the bandaged area for 20 minutes every 2-3 hours. This may help reduce swelling, bruising, and pain. Make sure the ice pack is waterproof so that the pressure bandage does not get wet.   5. You may see a small amount of drainage or blood on your pressure bandage. This is normal. However, if drainage or bleeding continues or  saturates the bandage, you will need to apply firm pressure over the bandage with a washcloth for 15 minutes. If bleeding continues after applying pressure for 15 minutes then go to the nearest emergency room.  48 Hours After Surgery  Carefully remove the bandage and start daily wound care and dressing changes. You may also now shower and get the wound wet. Wash wound with a mild soap and water.  Use caution when washing the wound. Be gentle and do not let the forceful shower stream hit the wound directly.  PAT dry.  Daily Wound Care:  1. Wash wound with a mild soap and water.  Use caution when washing the wound, be gentle and do not let the forceful shower stream hit the wound directly.  2. PAT DRY.  3. Apply Vaseline (from a new container or tube) over the suture line with a Q-tip. It is very important to keep the wound continuously moist, as wounds heal best in a moist environment.  4.  Keep the site covered until sutures are removed, you can cover it with a Telfa (non-stick) dressing and tape or a band-aid.    5. If you are unable to keep wound covered, you must apply Vaseline every 2 - 3 hours (while awake) to ensure it is being kept moist for optimal healing. A dressing overnight is recommended to keep the area moist.      Skin Graft Wound Care      No strenuous activity for 48 hours. Resume moderate activity in 48 hours. No heavy exercising until you are seen for your follow-up in 1 week.    Take Tylenol 1 hour after surgery.    No alcoholic beverages for 48 hours.    Leave the bandage in place until you come for your follow-up in 1 week. If the bandage becomes saturated or loose, reinforce the bandage with gauze and tape    Keep the bandage dry and wash around it carefully.    If the tape becomes soiled or starts to come off, reinforce it with more paper tape.    Do not smoke for 3 weeks. Smoking is detrimental to wound healing and may cause the graft to die.    Avoid prolonged exposure to extremely cold  temperatures for 3 weeks.    It is normal to have swelling and bruising around the surgical site. The bruising will fade in about 10 to 14 days. Elevate the area to reduce swelling.    Numbness, itchiness and sensitivity to temperature changes can occur after surgery and may take up to 18 months to normalize.    Bleeding    1. Leave the bandage in place  2. Use tightly rolled up gauze or a cloth to apply direct pressure over the bandage for 20 minutes  3. Reapply pressure for an additional 20 minutes if necessary  4. Call the triage line or go to the nearest emergency room if pressure fails to stop the bleeding  5. Use additional gauze and tape to maintain pressure once the bleeding has stopped.    Pain    1. Post operative pain should slowly get better, beginning the evening after surgery  2. A sudden or severe increase in pain may indicate a problem. Call the triage line if this occurs.     Call Us If:  1. You have pain that is not controlled with Tylenol.  2. You have signs or symptoms of an infection, such as: fever over 100 degrees F, redness, warmth, or foul-smelling or yellow/creamy drainage from the wound.  Who should I call with questions?    [facility]: [phone]     [facility]: [phone]    For urgent needs outside of business hours call the [facility] at [phone] and ask for the dermatology resident on call

## 2018-07-18 NOTE — MR AVS SNAPSHOT
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 I may experience altered sensation. Risks are excessive bleeding, infection, muscle weakness, thick (hypertrophic or keloidal) scar, and recurrence,. A second procedure may be recommended to obtain the best cosmetic or functional result.  Possible complications of any surgical procedure are bleeding, infection, scarring, alteration in skin color and sensation, muscle weakness in the area, wound dehiscence or seperation, or recurrence of the lesion or disease. On occasion, after healing, a secondary procedure or revision may be recommended in order to obtain the best cosmetic or functional result.   After your surgery, a pressure bandage will be placed over the area that has sutures. This will help prevent bleeding. Please follow these instructions as they will help you to prevent complications as your wound heals.  For the First 48 hours After Surgery:  1. Leave the pressure bandage on and keep it dry. If it should come loose, you may retape it, but do not take it off.  2. Relax and take it easy. Do not do any vigorous exercise, heavy lifting, or bending forward. This could cause the wound to bleed.  3. Post-operative pain is usually mild. You may take plain or extra strength Tylenol every 4 hours as needed (do not take more than 4,000mg in one day). Do not take any medicine that contains aspirin, ibuprofen or motrin unless you have been recommended these by a doctor.  Avoid alcohol and vitamin E as these may  increase your tendency to bleed.  4. You may put an ice pack around the bandaged area for 20 minutes every 2-3 hours. This may help reduce swelling, bruising, and pain. Make sure the ice pack is waterproof so that the pressure bandage does not get wet.   5. You may see a small amount of drainage or blood on your pressure bandage. This is normal. However, if drainage or bleeding continues or saturates the bandage, you will need to apply firm pressure over the bandage with a washcloth for 15 minutes. If bleeding continues after applying pressure for 15 minutes then go to the nearest emergency room.  48 Hours After Surgery  Carefully remove the bandage and start daily wound care and dressing changes. You may also now shower and get the wound wet. Wash wound with a mild soap and water.  Use caution when washing the wound. Be gentle and do not let the forceful shower stream hit the wound directly.  PAT dry.  Daily Wound Care:  1. Wash wound with a mild soap and water.  Use caution when washing the wound, be gentle and do not let the forceful shower stream hit the wound directly.  2. PAT DRY.  3. Apply Vaseline (from a new container or tube) over the suture line with a Q-tip. It is very important to keep the wound continuously moist, as wounds heal best in a moist environment.  4.  Keep the site covered until sutures are removed, you can cover it with a Telfa (non-stick) dressing and tape or a band-aid.    5. If you are unable to keep wound covered, you must apply Vaseline every 2 - 3 hours (while awake) to ensure it is being kept moist for optimal healing. A dressing overnight is recommended to keep the area moist.      Skin Graft Wound Care      No strenuous activity for 48 hours. Resume moderate activity in 48 hours. No heavy exercising until you are seen for your follow-up in 1 week.    Take Tylenol 1 hour after surgery.    No alcoholic beverages for 48 hours.    Leave the bandage in place until you come for your  follow-up in 1 week. If the bandage becomes saturated or loose, reinforce the bandage with gauze and tape    Keep the bandage dry and wash around it carefully.    If the tape becomes soiled or starts to come off, reinforce it with more paper tape.    Do not smoke for 3 weeks. Smoking is detrimental to wound healing and may cause the graft to die.    Avoid prolonged exposure to extremely cold temperatures for 3 weeks.    It is normal to have swelling and bruising around the surgical site. The bruising will fade in about 10 to 14 days. Elevate the area to reduce swelling.    Numbness, itchiness and sensitivity to temperature changes can occur after surgery and may take up to 18 months to normalize.    Bleeding    1. Leave the bandage in place  2. Use tightly rolled up gauze or a cloth to apply direct pressure over the bandage for 20 minutes  3. Reapply pressure for an additional 20 minutes if necessary  4. Call the triage line or go to the nearest emergency room if pressure fails to stop the bleeding  5. Use additional gauze and tape to maintain pressure once the bleeding has stopped.    Pain    1. Post operative pain should slowly get better, beginning the evening after surgery  2. A sudden or severe increase in pain may indicate a problem. Call the triage line if this occurs.     Call Us If:  1. You have pain that is not controlled with Tylenol.  2. You have signs or symptoms of an infection, such as: fever over 100 degrees F, redness, warmth, or foul-smelling or yellow/creamy drainage from the wound.  Who should I call with questions?    [facility]: [phone]     [facility]: [phone]    For urgent needs outside of business hours call the [facility] at [phone] and ask for the dermatology resident on call            Follow-ups after your visit        Your next 10 appointments already scheduled     Jul 25, 2018 11:30 AM CDT   (Arrive by  11:15 AM)   Post-Op with Braydon Buchanan MD   [facility] Dermatologic Surgery ([facility])    [address]  [address] [facility] [id number] [phone]            Jul 31, 2018  3:00 PM CDT   (Arrive by 2:45 PM)   Return Visit with Braydon Buchanan MD   [facility] Dermatologic Surgery ([facility] [place])    [address]  [address] [facility] [id number] [phone]              Who to contact     Please call your clinic at [phone] to:    Ask questions about your health    Make or cancel appointments    Discuss your medicines    Learn about your test results    Speak to your doctor            Additional Information About Your Visit        Care EveryWhere ID     This is your Care EveryWhere ID. This could be used by other organizations to access your [facility] medical records  [id number]        Your Vitals Were     Pulse                   92            Blood Pressure from Last 3 Encounters:   07/18/18 111/61   06/25/18 134/72   04/09/18 130/78    Weight from Last 3 Encounters:   06/25/18 108.4 kg (239 lb)   04/09/18 108 kg (238 lb)   07/27/17 111.1 kg (245 lb)              Today, you had the following     No orders found for display         Today's Medication Changes          These changes are accurate as of 7/18/18 11:45 AM.  If you have any questions, ask your nurse or doctor.               Start taking these medicines.        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   Started by:  Braydon Buchanan MD        Dose:  500 mg   Take 1 tablet (500 mg) by mouth 2 times daily   Quantity:  14 tablet   Refills:  0            Where to get your medicines      These medications were sent to [facility] [address] [place], [place] - [address]  [address] [address]     Phone:  [phone]     ciprofloxacin 500 MG tablet                 Primary Care Provider Office Phone # Fax #    Herman Jarrett [phone] [address]  [facility] [address]        Equal Access to Services     SCARLET MAS : Hadbettina jerman ku lizo Sovanessaali, waaxda luqadaha, qaybta kaalmada adeegyada, milady singhn carly wei laBelmegan zuñiga. So [facility] [phone].    ATENCIÓN: Si habla español, tiene a donahue disposición servicios gratuitos de asistencia lingüística. Llame al [phone].    We comply with applicable federal civil rights laws and [place] laws. We do not discriminate on the basis of race, color, national origin, age, disability, sex, sexual orientation, or gender identity.            Thank you!     Thank you for choosing [facility] DERMATOLOGIC SURGERY  for your care. Our goal is always to provide you with excellent care. Hearing back from our patients is one way we can continue to improve our services. Please take a few minutes to complete the written survey that you may receive in the mail after your visit with us. Thank you!             Your Updated Medication List - Protect others around you: Learn how to safely use, store and throw away your medicines at www.disposemymeds.org.          This list is accurate as of 7/18/18 11:45 AM.  Always use your most recent med list.                   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

## 2018-07-18 NOTE — NURSING NOTE
1st layer performed on the right lower leg. Injected 15.0ml of Xylocaine  (Lidocaine 1% and Epinephrine  (1:100,000))      Present for procedure:  Dr. Chanel Rosales LPN

## 2018-07-18 NOTE — NURSING NOTE
Closure performed on the right lower leg. Injected 12.2ml of Xylocaine  (Lidocaine 1% and Epinephrine  (1:100,000))    Defect Photo: BH  Closure Photo: LT    Present for procedure  Fabio , VIRAJ

## 2018-07-18 NOTE — NURSING NOTE
Chief Complaint   Patient presents with     Derm Problem     Fish is here today for his MOHS procedure on his right lower leg due to SCC     Candace Rosales LPN

## 2018-07-18 NOTE — LETTER
7/18/2018       RE: Fish Hong  [address]  [facility] [address]     Dear Colleague,    Thank you for referring your patient, Fish Hong, to the [facility] DERMATOLOGIC SURGERY at [facility]. Please see a copy of my visit note below.    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  The area was cleansed with chlorhexidine and infiltrated with 1% Lidocaine and epinephrine. Tumor was debulked. Using a #15-blade, complete excision was made around the tumor in 1 section.  Hemostasis was obtained by electrodesiccation.  A dressing was placed.  Tissue was kept as 1 tissue block that was subsequently mapped, color coded and processed in the Mohs Laboratory.  Microscopic tumor was not found in the tissue block.    With the lesion clear of micrographic tumor, surgery was considered complete.  The defect extended to the fat and measured 5.3 x 4.6 cm.    RECONSTRUCTIVE DERMATOLOGIC SURGERY REPORT  We discussed the options for wound management in full with the patient including risks/benefits/possible outcomes.       Burow's Full Thickness Skin Graft  Final defect measured 5.3 x 4.6 cm. Because of the excess tension of a linear repair, a Burow's graft was planned.  After Betasept prep and local anesthesia, the patient was draped in a sterile fashion.  A triangular incision was made in the relaxed skin tension lines proximal and distal to the defect and Burow s grafts  were harvested.  The donor site was then widely undermined, and, after hemostasis, closed by advancement in a layered fashion (4-0 Vicryl buried vertical mattress sutures and 4-0 Prolene simple running). The graft skin was then defatted and trimmed to fit the residual defect (2.5 x 1.5cm).  It was sutured into place with 4-0 Prolene sutures and a bolster dressing was applied.  Post-operative size was 2.5x1.5cm2.  Estimated blood loss, minimal; complications, none; bandaging and wound care, routine. Patient was discharged in good condition and will return for bandage change/suture removal in 1 week and for assessment of surgical repair in 3 months.     Total anesthesia 15.0 ml.      Staff Involved:    Scribe Disclosure  IBrett am serving as a scribe to document services personally performed by Dr. Braydon Buchanan DO, based on data collection and the provider's statements to me.     Provider Disclosure:   The documentation recorded by the scribe accurately reflects the services I personally performed and the decisions made by me.  I was physically present and scrubbed for all key portions of the procedure today. I was immediately available at all times.    Braydon Buchanan DO    Department of Dermatology  [facility]: Phone: [phone], Fax:[phone]  [facility]: Phone: [phone], Fax: [phone]

## 2018-07-18 NOTE — PROGRESS NOTES
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  The area was cleansed with chlorhexidine and infiltrated with 1% Lidocaine and epinephrine. Tumor was debulked. Using a #15-blade, complete excision was made around the tumor in 1 section.  Hemostasis was obtained by electrodesiccation.  A dressing was placed.  Tissue was kept as 1 tissue block that was subsequently mapped, color coded and processed in the Mohs Laboratory.  Microscopic tumor was not found in the tissue block.    With the lesion clear of micrographic tumor, surgery was considered complete.  The defect extended to the fat and measured 5.3 x 4.6 cm.    RECONSTRUCTIVE DERMATOLOGIC SURGERY REPORT  We discussed the options for wound management in full with the patient including risks/benefits/possible outcomes.       Burow's Full Thickness Skin Graft  Final defect measured 5.3 x 4.6 cm. Because of the excess tension of a linear repair, a Burow's graft was planned.  After Betasept prep and local anesthesia, the patient was draped in a sterile fashion.  A triangular incision was made in the relaxed skin tension lines proximal and distal to the defect and Burow s grafts were harvested.  The donor site was then widely undermined, and, after hemostasis, closed by advancement in a layered fashion (4-0 Vicryl buried vertical mattress sutures and 4-0 Prolene simple running). The graft skin was then defatted and trimmed to fit the residual defect (2.5 x 1.5cm).  It was  sutured into place with 4-0 Prolene sutures and a bolster dressing was applied.  Post-operative size was 2.5x1.5cm2.  Estimated blood loss, minimal; complications, none; bandaging and wound care, routine. Patient was discharged in good condition and will return for bandage change/suture removal in 1 week and for assessment of surgical repair in 3 months.     Total anesthesia 15.0 ml.     Staff Involved:    Scribe Disclosure  I, Brett Rjoas, am serving as a scribe to document services personally performed by Dr. Braydon Buchanan DO, based on data collection and the provider's statements to me.     Provider Disclosure:   The documentation recorded by the scribe accurately reflects the services I personally performed and the decisions made by me.  I was physically present and scrubbed for all key portions of the procedure today. I was immediately available at all times.    Braydon Buchanan DO    Department of Dermatology  [facility]: Phone: [phone], Fax:[phone]  [facility]: Phone: [phone], Fax: [phone]

## 2018-07-19 ENCOUNTER — TELEPHONE (OUTPATIENT)
Dept: DERMATOLOGY | Facility: CLINIC | Age: 83
End: 2018-07-19

## 2018-07-19 NOTE — TELEPHONE ENCOUNTER
Follow up call completed following Mohs procedure with Dr. Buchanan.     The following questions were asked:    What are you doing to manage your pain? Tylenol  Is it helping? yes  Are you applying ice?  no  Have you had any noticeable bleeding through the bandage? no   Do you have any concerns? None at this time.          Please call [phone] option 3 if you have any additional questions.

## 2018-07-25 ENCOUNTER — OFFICE VISIT (OUTPATIENT)
Dept: DERMATOLOGY | Facility: CLINIC | Age: 83
End: 2018-07-25
Payer: MEDICARE

## 2018-07-25 DIAGNOSIS — D04.71 SQUAMOUS CELL CARCINOMA IN SITU OF SKIN OF RIGHT LOWER LEG: Primary | ICD-10-CM

## 2018-07-25 ASSESSMENT — PAIN SCALES - GENERAL: PAINLEVEL: NO PAIN (0)

## 2018-07-25 NOTE — NURSING NOTE
Bolster removed. Patient tolerated procedure well. Denies pain.    Applied vaseline along suture line. Covered with Telfa and wrapped with coban. Wound care instructions reviewed again. No other questions or concerns.  Candace Rosales LPN

## 2018-07-25 NOTE — LETTER
7/25/2018       RE: Fish Hong  [address]  [place] [place] [address]     Dear Colleague,    Thank you for referring your patient, Fish Hong, to the [facility] DERMATOLOGIC SURGERY at [facility]. Please see a copy of my visit note below.    [facility] Dermatology Post-operative Visit note:  Subjective: The patient follows up for a wound check after undergoing Mohs surgery to remove a squamous cell carcinoma in situ from the right lower leg on 7/18/18. This was repaired with a Burow's full thickness skin graft. The patient says the site is doing well and denies any bleeding, purulence, or excess pain/tenderness. He has not experienced any concerning symptoms. The patient is otherwise feeling well. There are no other skin concerns at this time.  Objective: On exam, there is an appropriately healing surgical site on the right lower leg with no purulence, tenderness or surrounding erythema. Well approximated wound edges. Graft appears to have good perfusion. No areas of necrosis.   Assessment and Plan: Appropriately healing surgical site without any signs of infection    Instructed to continue daily dressing changes and application of petroleum jelly until healed over with new pink skin and all sutures are dissolved.     Reminded that grafts can often look worse before looking better as the top layer of skin sheds leaving behind new pink skin.     Recommended sunscreens SPF #50 or greater and protective clothing.     Continue periodic self skin exams and report of any new or changing lesions.     Please refer to previous clinic note for details regarding treatment.  Follow up in 1 week for suture removal.     Staff:    Scribe Disclosure  I, Brett Keita, am serving as a scribe to document services personally performed by Dr. Braydon Buchanan DO, based on data collection and the provider's statements to me.     Provider Disclosure:    The documentation recorded by the scribe accurately reflects the services I personally performed and the decisions made by me.      Again, thank you for allowing me to participate in the care of your patient.      Sincerely,    Braydon Buchanan MD

## 2018-07-25 NOTE — PROGRESS NOTES
[facility] Dermatology Post-operative Visit note:  Subjective: The patient follows up for a wound check after undergoing Mohs surgery to remove a squamous cell carcinoma in situ from the right lower leg on 7/18/18. This was repaired with a Burow's full thickness skin graft. The patient says the site is doing well and denies any bleeding, purulence, or excess pain/tenderness. He has not experienced any concerning symptoms. The patient is otherwise feeling well. There are no other skin concerns at this time.  Objective: On exam, there is an appropriately healing surgical site on the right lower leg with no purulence, tenderness or surrounding erythema. Well approximated wound edges. Graft appears to have good perfusion. No areas of necrosis.   Assessment and Plan: Appropriately healing surgical site without any signs of infection    Instructed to continue daily dressing changes and application of petroleum jelly until healed over with new pink skin and all sutures are dissolved.     Reminded that grafts can often look worse before looking better as the top layer of skin sheds leaving behind new pink skin.     Recommended sunscreens SPF #50 or greater and protective clothing.     Continue periodic self skin exams and report of any new or changing lesions.     Please refer to previous clinic note for details regarding treatment.  Follow up in 1 week for suture removal.     Staff:    Scribe Disclosure  I, Brett Keita, am serving as a scribe to document services personally performed by Dr. Braydon Buchanan DO, based on data collection and the provider's statements to me.     Provider Disclosure:   The documentation recorded by the scribe accurately reflects the services I personally performed and the decisions made by me.    Braydon Buchanan DO    Department of Dermatology  [facility]: Phone: [phone],  Fax:[phone]  [facility]: Phone: [phone], Fax: [phone]

## 2018-07-25 NOTE — NURSING NOTE
Chief Complaint   Patient presents with     Derm Problem     Fish is here today for bolster removal post mohs, patient states everything is going well since his procedure.      Candace Rosales LPN

## 2018-07-25 NOTE — MR AVS SNAPSHOT
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    -  1       Follow-ups after your visit        Your next 10 appointments already scheduled     Oct 30, 2018  1:30 PM CDT   (Arrive by 1:15 PM)   Return Visit with MD DONG Saba [facility] Dermatologic Surgery ([facility] [place])    [address] [place] [address] [phone]              Who to contact     Please call your clinic at [phone] to:    Ask questions about your health    Make or cancel appointments    Discuss your medicines    Learn about your test results    Speak to your doctor            Additional Information About Your Visit        Care EveryWhere ID     This is your Care EveryWhere ID. This could be used by other organizations to access your [place] medical records  [id number]         Blood Pressure from Last 3 Encounters:   07/18/18 111/61   06/25/18 134/72   04/09/18 130/78    Weight from Last 3 Encounters:   06/25/18 108.4 kg (239 lb)   04/09/18 108 kg (238 lb)   07/27/17 111.1 kg (245 lb)              Today, you had the following     No orders found for display       Primary Care Provider Office Phone # Fax #    Herman Jarrett [phone]       [address] [facility] [address]        Equal Access to Services     JONATHAN MAS AH: Hadii aad ku hadasho Soomaali, waaxda luqadaha, qaybta kaalmada adeegyada, milady zuñiga. So [facility] [phone].    ATENCIÓN: Si habla español, tiene a donahue disposición servicios gratuitos de asistencia lingüística. Llame al [phone].    We comply with applicable federal civil rights laws and  [place] laws. We do not discriminate on the basis of race, color, national origin, age, disability, sex, sexual orientation, or gender identity.            Thank you!     Thank you for choosing [facility] DERMATOLOGIC SURGERY  for your care. Our goal is always to provide you with excellent care. Hearing back from our patients is one way we can continue to improve our services. Please take a few minutes to complete the written survey that you may receive in the mail after your visit with us. Thank you!             Your Updated Medication List - Protect others around you: Learn how to safely use, store and throw away your medicines at www.disposemymeds.org.          This list is accurate as of 7/25/18 11:59 PM.  Always use your most recent med list.                   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

## 2018-07-26 ENCOUNTER — TELEPHONE (OUTPATIENT)
Dept: FAMILY MEDICINE | Facility: CLINIC | Age: 83
End: 2018-07-26

## 2018-07-26 DIAGNOSIS — R10.84 ABDOMINAL PAIN, GENERALIZED: ICD-10-CM

## 2018-07-26 NOTE — TELEPHONE ENCOUNTER
omeprazole (PRILOSEC) 20 MG CR capsule (Discontinued)  Is not on the med list please send a new rx thanks

## 2018-07-31 ENCOUNTER — OFFICE VISIT (OUTPATIENT)
Dept: DERMATOLOGY | Facility: CLINIC | Age: 83
End: 2018-07-31
Payer: MEDICARE

## 2018-07-31 DIAGNOSIS — D04.71 SQUAMOUS CELL CARCINOMA IN SITU OF SKIN OF LOWER LEG, RIGHT: Primary | ICD-10-CM

## 2018-07-31 ASSESSMENT — PAIN SCALES - GENERAL: PAINLEVEL: NO PAIN (0)

## 2018-07-31 NOTE — PROGRESS NOTES
[facility] Dermatology Post-operative Visit note:  Subjective: The patient follows up for a wound check after undergoing Mohs surgery to remove a squamous cell carcinoma in situ from the right lower leg on 7/18/18. This was repaired with a Burow's full thickness skin graft. The patient says the site is doing well and denies any bleeding, purulence, or excess pain/tenderness. He does not have any additional concerns to report. The patient is otherwise feeling well.   Objective: On exam, there is an appropriately healing surgical site on the right lower leg with no purulence, tenderness or surrounding erythema. Well approximated wound edges. Graft appears to have good blood supply. No areas of necrosis. Sutures intact.   Assessment and Plan: Appropriately healing surgical site without any signs of infection    4-0 Prolene sutures were removed from the right lower leg.     Instructed to continue daily dressing changes and application of petroleum jelly until healed over with new pink skin and all sutures are dissolved.     Recommended sunscreens SPF #50 or greater and protective clothing.     Continue periodic self skin exams and report of any new or changing lesions.     Please refer to previous clinic note for details regarding treatment.    Contact clinic for any concerns regarding healing.   Follow up in 3 months for final scar evaluation.     Staff:    Scribe Disclosure  I, Brett Keita, richard serving as a scribe to document services personally performed by Dr. Braydon Buchanan DO, based on data collection and the provider's statements to me.     Provider Disclosure:   The documentation recorded by the scribe accurately reflects the services I personally performed and the decisions made by me.    Braydon Buchanan DO    Department of Dermatology  [facility]: Phone: [phone], Fax:[phone]  [facility]  [facility]: Phone: [phone], Fax: [phone]

## 2018-07-31 NOTE — MR AVS SNAPSHOT
After Visit Summary   7/31/2018    Fish Hong    MRN: [id number]           Patient Information     Date Of Birth          4/11/1931        Visit Information        Provider Department      7/31/2018 3:00 PM Braydon Buchanan MD [facility] Dermatologic Surgery        Care Instructions    Check back in 3 months to evaluate the scar after suture removal.             Follow-ups after your visit        Follow-up notes from your care team     Return in about 3 months (around 10/31/2018).      Your next 10 appointments already scheduled     Oct 30, 2018  1:30 PM CDT   (Arrive by 1:15 PM)   Return Visit with MD DONG Saba [facility] Dermatologic Surgery ([facility] [place])    [address] [address] [phone]              Who to contact     Please call your clinic at [phone] to:    Ask questions about your health    Make or cancel appointments    Discuss your medicines    Learn about your test results    Speak to your doctor            Additional Information About Your Visit        Care EveryWhere ID     This is your Care EveryWhere ID. This could be used by other organizations to access your [place] medical records  [id number]         Blood Pressure from Last 3 Encounters:   07/18/18 111/61   06/25/18 134/72   04/09/18 130/78    Weight from Last 3 Encounters:   06/25/18 108.4 kg (239 lb)   04/09/18 108 kg (238 lb)   07/27/17 111.1 kg (245 lb)              Today, you had the following     No orders found for display       Primary Care Provider Office Phone # Fax #    Herman Jarrett [phone] [address] [facility] [address]        Equal Access to Services     [facility] AH: Hadii jerman Ponce, wakathyda luqadaha, qaybta kaalmada milady lees. So [facility] [phone].    ATENCIÓN: Si habla español, tiene a donahue disposición servicios gratuitos de asistencia  lingüística. Demetra al [phone].    We comply with applicable federal civil rights laws and [place] laws. We do not discriminate on the basis of race, color, national origin, age, disability, sex, sexual orientation, or gender identity.            Thank you!     Thank you for choosing [facility] DERMATOLOGIC SURGERY  for your care. Our goal is always to provide you with excellent care. Hearing back from our patients is one way we can continue to improve our services. Please take a few minutes to complete the written survey that you may receive in the mail after your visit with us. Thank you!             Your Updated Medication List - Protect others around you: Learn how to safely use, store and throw away your medicines at www.disposemymeds.org.          This list is accurate as of 7/31/18  3:39 PM.  Always use your most recent med list.                   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

## 2018-07-31 NOTE — LETTER
7/31/2018       RE: Fish Hong  [address]  [facility] [address]     Dear Colleague,    Thank you for referring your patient, Fish Hong, to the [facility] DERMATOLOGIC SURGERY at [facility]. Please see a copy of my visit note below.    [facility] Dermatology Post-operative Visit note:  Subjective: The patient follows up for a wound check after undergoing Mohs surgery to remove a squamous cell carcinoma in situ from the right lower leg on 7/18/18. This was repaired with a full thickness skin graft. The patient says the site is doing well and denies any bleeding, purulence, or excess pain/tenderness. He does not have any additional concerns to report. The patient is otherwise feeling well.   Objective: On exam, there is an appropriately healing surgical site on the right lower leg with no purulence, tenderness or surrounding erythema. Well approximated wound edges. Graft appears to have good blood supply. No areas of necrosis. Sutures intact.   Assessment and Plan: Appropriately healing surgical site without any signs of infection    4-0 Prolene sutures were removed from the right lower leg.     Instructed to continue daily dressing changes and application of petroleum jelly until healed over with new pink skin and all sutures are dissolved.     Recommended sunscreens SPF #50 or greater and protective clothing.     Continue periodic self skin exams and report of any new or changing lesions.     Please refer to previous clinic note for details regarding treatment.    Contact clinic for any concerns regarding healing.   Follow up in 3 months for final evaluation.     Staff:    Scribe Disclosure  I, Brett Keita, am serving as a scribe to document services personally performed by Dr. Braydon Buchanan DO, based on data collection and the provider's statements to me.     ***          Again, thank you for allowing me to participate in  the care of your patient.      Sincerely,    Braydon Buchanan MD

## 2018-07-31 NOTE — LETTER
7/31/2018      RE: Fish Hong  [address]  [facility] [address]       [facility] Dermatology Post-operative Visit note:  Subjective: The patient follows up for a wound check after undergoing Mohs surgery to remove a squamous cell carcinoma in situ from the right lower leg on 7/18/18. This was repaired with a Burow's full thickness skin graft. The patient says the site is doing well and denies any bleeding, purulence, or excess pain/tenderness. He does not have any additional concerns to report. The patient is otherwise feeling well.   Objective: On exam, there is an appropriately healing surgical site on the right lower leg with no purulence, tenderness or surrounding erythema. Well approximated wound edges. Graft appears to have good blood supply. No areas of necrosis. Sutures intact.   Assessment and Plan: Appropriately healing surgical site without any signs of infection    4-0 Prolene sutures were removed from the right lower leg.     Instructed to continue daily dressing changes and application of petroleum jelly until healed over with new pink skin and all sutures are dissolved.     Recommended sunscreens SPF #50 or greater and protective clothing.     Continue periodic self skin exams and report of any new or changing lesions.     Please refer to previous clinic note for details regarding treatment.    Contact clinic for any concerns regarding healing.   Follow up in 3 months for final scar evaluation.     Staff:    Scribe Disclosure  I, Brett Keita am serving as a scribe to document services personally performed by Dr. Braydon Buchanan DO, based on data collection and the provider's statements to me.     Provider Disclosure:   The documentation recorded by the scribe accurately reflects the services I personally performed and the decisions made by me.    Braydon Buchanan DO    Department of Dermatology  [facility]  [facility]: Phone: [phone], Fax:[phone]  [facility]: Phone: [phone], Fax: [phone]

## 2018-07-31 NOTE — LETTER
7/31/2018      RE: Fish Hong  [address]  [facility] [address]       [facility] Dermatology Post-operative Visit note:  Subjective: The patient follows up for a wound check after undergoing Mohs surgery to remove a squamous cell carcinoma in situ from the right lower leg on 7/18/18. This was repaired with a full thickness skin graft. The patient says the site is doing well and denies any bleeding, purulence, or excess pain/tenderness. He does not have any additional concerns to report. The patient is otherwise feeling well.   Objective: On exam, there is an appropriately healing surgical site on the right lower leg with no purulence, tenderness or surrounding erythema. Well approximated wound edges. Graft appears to have good blood supply. No areas of necrosis. Sutures intact.   Assessment and Plan: Appropriately healing surgical site without any signs of infection    4-0 Prolene sutures were removed from the right lower leg.     Instructed to continue daily dressing changes and application of petroleum jelly until healed over with new pink skin and all sutures are dissolved.     Recommended sunscreens SPF #50 or greater and protective clothing.     Continue periodic self skin exams and report of any new or changing lesions.     Please refer to previous clinic note for details regarding treatment.    Contact clinic for any concerns regarding healing.   Follow up in 3 months for final evaluation.     Staff:    Scribe Disclosure  I, Brett Keita, am serving as a scribe to document services personally performed by Dr. Braydon Buchanan DO, based on data collection and the provider's statements to me.     ***          Braydon Buchanan MD

## 2018-07-31 NOTE — LETTER
7/31/2018       RE: Fish Hong  [address]  [facility] [address]     Dear Colleague,    Thank you for referring your patient, Fish Hong, to the [facility] DERMATOLOGIC SURGERY at [facility]. Please see a copy of my visit note below.    [facility] Dermatology Post-operative Visit note:  Subjective: The patient follows up for a wound check after undergoing Mohs surgery to remove a squamous cell carcinoma in situ from the right lower leg on 7/18/18. This was repaired with a Burow's full thickness skin graft. The patient says the site is doing well and denies any bleeding, purulence, or excess pain/tenderness. He does not have any additional concerns to report. The patient is otherwise feeling well.   Objective: On exam, there is an appropriately healing surgical site on the right lower leg with no purulence, tenderness or surrounding erythema. Well approximated wound edges. Graft appears to have good blood supply. No areas of necrosis. Sutures intact.   Assessment and Plan: Appropriately healing surgical site without any signs of infection    4-0 Prolene sutures were removed from the right lower leg.     Instructed to continue daily dressing changes and application of petroleum jelly until healed over with new pink skin and all sutures are dissolved.     Recommended sunscreens SPF #50 or greater and protective clothing.     Continue periodic self skin exams and report of any new or changing lesions.     Please refer to previous clinic note for details regarding treatment.    Contact clinic for any concerns regarding healing.   Follow up in 3 months for final scar evaluation.     Staff:    Scribe Disclosure  I, Brett Keita, am serving as a scribe to document services personally performed by Dr. Braydon Buchanan DO, based on data collection and the provider's statements to me.     Provider Disclosure:   The documentation recorded  by the scribe accurately reflects the services I personally performed and the decisions made by me.    Braydon Buchanan DO    Department of Dermatology  [facility]: Phone: [phone], Fax:[phone]  [facility]: Phone: [phone], Fax: [phone]            Again, thank you for allowing me to participate in the care of your patient.      Sincerely,    Braydon Buchanan MD

## 2018-10-15 ENCOUNTER — OFFICE VISIT (OUTPATIENT)
Dept: FAMILY MEDICINE | Facility: CLINIC | Age: 83
End: 2018-10-15
Payer: MEDICARE

## 2018-10-15 VITALS
WEIGHT: 245 LBS | SYSTOLIC BLOOD PRESSURE: 136 MMHG | OXYGEN SATURATION: 99 % | RESPIRATION RATE: 20 BRPM | TEMPERATURE: 97.8 F | BODY MASS INDEX: 34.3 KG/M2 | DIASTOLIC BLOOD PRESSURE: 76 MMHG | HEIGHT: 71 IN | HEART RATE: 86 BPM

## 2018-10-15 DIAGNOSIS — M16.11 ARTHRITIS OF RIGHT HIP: ICD-10-CM

## 2018-10-15 DIAGNOSIS — I10 ESSENTIAL HYPERTENSION: Primary | ICD-10-CM

## 2018-10-15 DIAGNOSIS — Z23 NEED FOR PROPHYLACTIC VACCINATION AND INOCULATION AGAINST INFLUENZA: ICD-10-CM

## 2018-10-15 DIAGNOSIS — M70.61 GREATER TROCHANTERIC BURSITIS, RIGHT: ICD-10-CM

## 2018-10-15 DIAGNOSIS — M48.061 SPINAL STENOSIS OF LUMBAR REGION WITHOUT NEUROGENIC CLAUDICATION: ICD-10-CM

## 2018-10-15 PROCEDURE — 99213 OFFICE O/P EST LOW 20 MIN: CPT | Mod: 25 | Performed by: INTERNAL MEDICINE

## 2018-10-15 PROCEDURE — 90662 IIV NO PRSV INCREASED AG IM: CPT | Performed by: INTERNAL MEDICINE

## 2018-10-15 PROCEDURE — G0008 ADMIN INFLUENZA VIRUS VAC: HCPCS | Performed by: INTERNAL MEDICINE

## 2018-10-15 NOTE — PATIENT INSTRUCTIONS
You are planning an orthopedics appointment.                   Call for an appointment.                                            Consider getting the shingles vaccine (Shingrix) at your pharmacy.

## 2018-10-15 NOTE — PROGRESS NOTES
SUBJECTIVE:   Fish Hong is a 87 year old male who presents to clinic today for the following health issues:      Hypertension Follow-up      Outpatient blood pressures are  being checked.    Low Salt Diet: no added salt      Amount of exercise or physical activity: None    Problems taking medications regularly: No    Medication side effects: none    Diet: low salt      Medication Followup of multiple health issues. (Also needs checking on Right Hip and Right low back pains)    Taking Medication as prescribed: yes    Side Effects:  None    Medication Helping Symptoms:  Has concerns                  Lots of pain; low back,R lateral hip.                 See xrays and MRI spine 2013.      Sx are worsening.                    Ibuprofen 400 mg per day.             Wants to see ortho.                              Home BP is good.                    Stopped using PPI.                  Has another derm appt.                      Problem list and histories reviewed & adjusted, as indicated.      Current Outpatient Prescriptions   Medication Sig Dispense Refill     amLODIPine (NORVASC) 10 MG tablet Take 1 tablet (10 mg) by mouth daily 90 tablet 3     doxazosin (CARDURA) 4 MG tablet Take 1 tablet (4 mg) by mouth At Bedtime 90 tablet 3     fluticasone (FLONASE) 50 MCG/ACT spray Spray 1-2 sprays into both nostrils daily 1 Bottle 11     triamcinolone (KENALOG) 0.1 % cream Apply sparingly to affected area two times daily as needed (left ankle.lower leg) 30 g 3     No Known Allergies  BP Readings from Last 3 Encounters:   10/15/18 136/76   07/18/18 111/61   06/25/18 134/72    Wt Readings from Last 3 Encounters:   10/15/18 245 lb (111.1 kg)   06/25/18 239 lb (108.4 kg)   04/09/18 238 lb (108 kg)                    Reviewed and updated as needed this visit by clinical staff       Reviewed and updated as needed this visit by Provider         ROS:  CONSTITUTIONAL:NEGATIVE for fever, chills, change in weight  CV: NEGATIVE  "for chest pain, palpitations or peripheral edema    OBJECTIVE:                                                    /76 (BP Location: Left arm, Patient Position: Chair, Cuff Size: Adult Large)  Pulse 86  Temp 97.8  F (36.6  C)  Resp 20  Ht 5' 11\" (1.803 m)  Wt 245 lb (111.1 kg)  SpO2 99%  BMI 34.17 kg/m2  Body mass index is 34.17 kg/(m^2).  GENERAL APPEARANCE: alert, no distress and over weight  CV: regular rates and rhythm, normal S1 S2, no S3 or S4, no murmur, click or rub and occas premature beat  MS: decreased range of motion lumbar spine and R hip.              Tender over the R greater trochanter.     Diagnostic test results:  none      ASSESSMENT/PLAN:                                                        ICD-10-CM    1. Essential hypertension I10    2. Spinal stenosis of lumbar region without neurogenic claudication M48.061 ORTHOPEDICS ADULT REFERRAL   3. Greater trochanteric bursitis, right M70.61 ORTHOPEDICS ADULT REFERRAL   4. Arthritis of right hip M16.11 ORTHOPEDICS ADULT REFERRAL   5. Need for prophylactic vaccination and inoculation against influenza Z23 ADMIN INFLUENZA (For MEDICARE Patients ONLY) []     FLU VACCINE, INCREASED ANTIGEN, PRESV FREE, AGE 65+ [39125]       BP controlled.            Follow up with Provider - 6 months.    Patient Instructions   You are planning an orthopedics appointment.                   Call for an appointment.                                            Consider getting the shingles vaccine (Shingrix) at your pharmacy.         Herman Jarrett MD  [facility]  "

## 2018-10-15 NOTE — PROGRESS NOTES

## 2018-10-15 NOTE — NURSING NOTE
Prior to injection verified patient identity using patient's name and date of birth.  Due to injection administration, patient instructed to remain in clinic for 15 minutes  afterwards, and to report any adverse reaction to me immediately.  Rebecca Gorman LPN

## 2018-10-15 NOTE — MR AVS SNAPSHOT
After Visit Summary   10/15/2018    Fish Hong    MRN: [id number]           Patient Information     Date Of Birth          4/11/1931        Visit Information        Provider Department      10/15/2018 8:30 AM Herman Jarrett MD [facility]        Today's Diagnoses     Essential hypertension    -  1    Spinal stenosis of lumbar region without neurogenic claudication        Greater trochanteric bursitis, right        Arthritis of right hip          Care Instructions    You are planning an orthopedics appointment.                   Call for an appointment.                                            Consider getting the shingles vaccine (Shingrix) at your pharmacy.             Follow-ups after your visit        Additional Services     ORTHOPEDICS ADULT REFERRAL       Your provider has referred you to: [facility] [phone]   [email or web address]      Please be aware that coverage of these services is subject to the terms and limitations of your health insurance plan.  Call member services at your health plan with any benefit or coverage questions.      Please bring the following to your appointment:    >>   Any x-rays, CTs or MRIs which have been performed.  Contact the facility where they were done to arrange for  prior to your scheduled appointment.    >>   List of current medications   >>   This referral request   >>   Any documents/labs given to you for this referral                  Follow-up notes from your care team     Return in about 6 months (around 4/15/2019) for BP Recheck.      Your next 10 appointments already scheduled     Oct 30, 2018  1:30 PM CDT   (Arrive by 1:15 PM)   Return Visit with Braydon Buchanan MD   [facility] Dermatologic Surgery ([facility] [place])    [address]  3rd Floor  [facility] [address] [phone]              Who to  "contact     If you have questions or need follow up information about today's clinic visit or your schedule please contact [facility] directly at [phone].  Normal or non-critical lab and imaging results will be communicated to you by MyChart, letter or phone within 4 business days after the clinic has received the results. If you do not hear from us within 7 days, please contact the clinic through MyChart or phone. If you have a critical or abnormal lab result, we will notify you by phone as soon as possible.  Submit refill requests through [employer] or call your pharmacy and they will forward the refill request to us. Please allow 3 business days for your refill to be completed.          Additional Information About Your Visit        Care EveryWhere ID     This is your Care EveryWhere ID. This could be used by other organizations to access your [place] medical records  [id number]        Your Vitals Were     Pulse Temperature Respirations Height Pulse Oximetry BMI (Body Mass Index)    86 97.8  F (36.6  C) 20 5' 11\" (1.803 m) 99% 34.17 kg/m2       Blood Pressure from Last 3 Encounters:   10/15/18 136/76   07/18/18 111/61   06/25/18 134/72    Weight from Last 3 Encounters:   10/15/18 245 lb (111.1 kg)   06/25/18 239 lb (108.4 kg)   04/09/18 238 lb (108 kg)              We Performed the Following     ORTHOPEDICS ADULT REFERRAL          Today's Medication Changes          These changes are accurate as of 10/15/18  8:57 AM.  If you have any questions, ask your nurse or doctor.               Stop taking these medicines if you haven't already. Please contact your care team if you have questions.     omeprazole 20 MG CR capsule   Commonly known as:  priLOSEC   Stopped by:  Herman Jarrett MD                    Primary Care Provider Office Phone # Fax #    Herman Jarrett [phone] [address] [facility][1] [address]        Equal Access to Services  "    SCARLET MAS : Hadii aad ku lanny Ponce, waaxda luqadaha, qaybta kaalmada adedamian, milady oneal samanthajoel carroll abrahamchelle lino . So [place] [phone].    ATENCIÓN: Si habla español, tiene a donahue disposición servicios gratuitos de asistencia lingüística. Llame al [phone].    We comply with applicable federal civil rights laws and [place] laws. We do not discriminate on the basis of race, color, national origin, age, disability, sex, sexual orientation, or gender identity.            Thank you!     Thank you for choosing [facility]  for your care. Our goal is always to provide you with excellent care. Hearing back from our patients is one way we can continue to improve our services. Please take a few minutes to complete the written survey that you may receive in the mail after your visit with us. Thank you!             Your Updated Medication List - Protect others around you: Learn how to safely use, store and throw away your medicines at www.disposemymeds.org.          This list is accurate as of 10/15/18  8:57 AM.  Always use your most recent med list.                   Brand Name Dispense Instructions for use Diagnosis    amLODIPine 10 MG tablet    NORVASC    90 tablet    Take 1 tablet (10 mg) by mouth daily        doxazosin 4 MG tablet    CARDURA    90 tablet    Take 1 tablet (4 mg) by mouth At Bedtime        fluticasone 50 MCG/ACT spray    FLONASE    1 Bottle    Spray 1-2 sprays into both nostrils daily    Acute bronchitis, unspecified organism       triamcinolone 0.1 % cream    KENALOG    30 g    Apply sparingly to affected area two times daily as needed (left ankle.lower leg)    Atopic dermatitis, unspecified type

## 2018-10-16 NOTE — TELEPHONE ENCOUNTER
FUTURE VISIT INFORMATION      FUTURE VISIT INFORMATION:    Date: 10/17/18    Time:     [place] [facility]  REFERRAL INFORMATION:    Referring provider:  self    Referring providers clinic:      Reason for visit/diagnosis  Spinal stenosis, Rt hip arthritis with bursitis, appt per pt. Records and referral in Epic.    RECORDS REQUESTED FROM:       Clinic name Comments Records Status Imaging Status     internal internal                                   RECORDS STATUS

## 2018-10-17 ENCOUNTER — OFFICE VISIT (OUTPATIENT)
Dept: ORTHOPEDICS | Facility: CLINIC | Age: 83
End: 2018-10-17
Payer: MEDICARE

## 2018-10-17 ENCOUNTER — PRE VISIT (OUTPATIENT)
Dept: ORTHOPEDICS | Facility: CLINIC | Age: 83
End: 2018-10-17

## 2018-10-17 VITALS — HEIGHT: 71 IN | RESPIRATION RATE: 16 BRPM | WEIGHT: 245 LBS | BODY MASS INDEX: 34.3 KG/M2

## 2018-10-17 DIAGNOSIS — M54.50 CHRONIC BILATERAL LOW BACK PAIN WITHOUT SCIATICA: ICD-10-CM

## 2018-10-17 DIAGNOSIS — G89.29 CHRONIC BILATERAL LOW BACK PAIN WITHOUT SCIATICA: ICD-10-CM

## 2018-10-17 DIAGNOSIS — M67.951 TENDINOPATHY OF RIGHT GLUTEUS MEDIUS: Primary | ICD-10-CM

## 2018-10-17 RX ORDER — LIDOCAINE HYDROCHLORIDE 10 MG/ML
3 INJECTION, SOLUTION INFILTRATION; PERINEURAL
Status: DISCONTINUED | OUTPATIENT
Start: 2018-10-17 | End: 2020-07-09

## 2018-10-17 RX ORDER — TRIAMCINOLONE ACETONIDE 40 MG/ML
40 INJECTION, SUSPENSION INTRA-ARTICULAR; INTRAMUSCULAR
Status: DISCONTINUED | OUTPATIENT
Start: 2018-10-17 | End: 2020-07-09

## 2018-10-17 RX ADMIN — TRIAMCINOLONE ACETONIDE 40 MG: 40 INJECTION, SUSPENSION INTRA-ARTICULAR; INTRAMUSCULAR at 11:10

## 2018-10-17 RX ADMIN — LIDOCAINE HYDROCHLORIDE 3 ML: 10 INJECTION, SOLUTION INFILTRATION; PERINEURAL at 11:10

## 2018-10-17 NOTE — LETTER
"  10/17/2018      RE: Fish Hong  [address]  [facility] [address]        Subjective:   Fish Hong is a 87 year old male who presents with R glut pain with standing. It feels sharp and worse with standing up and stairs also laying on his side at night. More pain 2 days ago, but better today. No radiation of pain, denies numbness or tingling.  Xray R hip with mild degenerative changes in 2013. No fall or trauma recently.    Chronic back pain that is unchanged.  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     Background:   Date of injury: NA   Duration of symptoms: intermittent, worse over the past few weeks, ongoing for 5-6 months.  Mechanism of Injury: Chronic; Unknown   Aggravating factors: Weather, stairs, standing   Relieving Factors: NSAIDs  Prior Evaluation: Prior Physician Evalutation:  and X-rays    PAST MEDICAL, SOCIAL, SURGICAL AND FAMILY HISTORY: He  has a past medical history of Hypertension and Rheumatic fever (1948).  He  has a past surgical history that includes hernia repair (11/3/2008); ENT surgery; TOTAL KNEE ARTHROPLASTY; and Circumcision (N/A, 7/13/2016).  His family history includes No Known Problems in his father and mother. There is no history of Melanoma or Skin Cancer.  He reports that he quit smoking about 58 years ago. He started smoking about 70 years ago. He has a 11.00 pack-year smoking history. He has never used smokeless tobacco. He reports that he drinks alcohol. He reports that he does not use illicit drugs.    ALLERGIES: He has No Known Allergies.    CURRENT MEDICATIONS: He has a current medication list which includes the following prescription(s): amlodipine, doxazosin, fluticasone, and triamcinolone.     REVIEW OF SYSTEMS: 3 point review of systems is negative except as noted above.     Exam:   Resp 16  Ht 5' 11\" (1.803 m)  Wt 245 lb (111.1 kg)  BMI 34.17 kg/m2     CONSTITUTIONAL: healthy, alert and no distress  HEAD: " Normocephalic. No masses, lesions, tenderness or abnormalities  SKIN: no suspicious lesions or rashes  GAIT: normal  NEUROLOGIC: Non-focal  PSYCHIATRIC: affect normal/bright and mentation appears normal.    MUSCULOSKELETAL:   right hip  Inspection  nl gait    Palpation  Tender at the lateral hip posterior to the greater trochanter reproducing symptoms  ROM  IR and ER full with not pain reproduced    Strength  5/5 flexion  4/5 Abduction ++ pain  5/5 Adduction at 0,30 and 60 deg of hip flexion    Special Tests  Anterior Impingement testing neg      Back   Tender diffusely lumbar spine B  SLR neg  Symmetric LE reflexes and sensation  ROM causes back but not hip pain     Assessment/Plan:   R lateral hip pain  --most consistent with glut med tendinosis including location of tenderness and pain with resisted abduction, lack of pain with IR/ER hip or back ROM    Plan  --recommended trial of PT, he would like more immediate relief and talked with his primary about shots or surgeries to help this. We discussed I could do a greater trochanter injection to help with sleep, but he should then follow this with 4-6 weeks of exercises.  --recheck as needed      Large Joint Injection/Arthocentesis  Date/Time: 10/17/2018 11:10 AM  Performed by: WASHINGTON DAVIS  Authorized by: WASHINGTON DAVIS     Indications:  Pain  Needle Size:  22 G  Guidance: landmark guided    Approach:  Lateral  Medications:  40 mg triamcinolone acetonide 40 MG/ML; 3 mL lidocaine 1 %  Procedure discussed: discussed risks, benefits, and alternatives    Consent Given by:  Patient  Timeout: timeout called immediately prior to procedure    Prep: patient was prepped and draped in usual sterile fashion     17ml of 1% lidocaine was wasted per MD Washington Davis MD

## 2018-10-17 NOTE — MR AVS SNAPSHOT
"              After Visit Summary   10/17/2018    Fish Hong    MRN: [id number]           Patient Information     Date Of Birth          4/11/1931        Visit Information        Provider Department      10/17/2018 11:00 AM Daniel Ceballos MD [facility]        Today's Diagnoses     Tendinopathy of right gluteus medius    -  1    Chronic bilateral low back pain without sciatica           Follow-ups after your visit        Additional Services     PHYSICAL THERAPY REFERRAL (Internal)       Physical Therapy Referral                  Your next 10 appointments already scheduled     Oct 30, 2018  1:30 PM CDT   (Arrive by 1:15 PM)   Return Visit with Braydon Buchanan MD   [facility] Dermatologic Surgery ([facility] [place])    [address]  [address] [facility] [address] [phone]              Future tests that were ordered for you today     Open Future Orders        Priority Expected Expires Ordered    PHYSICAL THERAPY REFERRAL (Internal) Routine  10/17/2019 10/17/2018            Who to contact     Please call your clinic at [phone] to:    Ask questions about your health    Make or cancel appointments    Discuss your medicines    Learn about your test results    Speak to your doctor            Additional Information About Your Visit        Care EveryWhere ID     This is your Care EveryWhere ID. This could be used by other organizations to access your [place] medical records  [id number]        Your Vitals Were     Respirations Height BMI (Body Mass Index)             16 5' 11\" (1.803 m) 34.17 kg/m2          Blood Pressure from Last 3 Encounters:   10/15/18 136/76   07/18/18 111/61   06/25/18 134/72    Weight from Last 3 Encounters:   10/17/18 245 lb (111.1 kg)   10/15/18 245 lb (111.1 kg)   06/25/18 239 lb (108.4 kg)              We Performed the Following     Large Joint Injection/Arthocentesis        Primary Care Provider Office Phone # Fax #    Herman " MEHREEN Jarrett [phone]       [address]  [facility] [address]        Equal Access to Services     SCARLET MAS : Jonathan jerman ludwig lanny Ponce, wakathyda luqdelano, qaborata kaalmada yoana, milady wei lalylejoel yogesh. So [facility] [phone].    ATENCIÓN: Si habla español, tiene a donahue disposición servicios gratuitos de asistencia lingüística. Llame al [phone].    We comply with applicable federal civil rights laws and [place] laws. We do not discriminate on the basis of race, color, national origin, age, disability, sex, sexual orientation, or gender identity.            Thank you!     Thank you for choosing [facility]  for your care. Our goal is always to provide you with excellent care. Hearing back from our patients is one way we can continue to improve our services. Please take a few minutes to complete the written survey that you may receive in the mail after your visit with us. Thank you!             Your Updated Medication List - Protect others around you: Learn how to safely use, store and throw away your medicines at www.disposemymeds.org.          This list is accurate as of 10/17/18 11:15 AM.  Always use your most recent med list.                   Brand Name Dispense Instructions for use Diagnosis    amLODIPine 10 MG tablet    NORVASC    90 tablet    Take 1 tablet (10 mg) by mouth daily        doxazosin 4 MG tablet    CARDURA    90 tablet    Take 1 tablet (4 mg) by mouth At Bedtime        fluticasone 50 MCG/ACT spray    FLONASE    1 Bottle    Spray 1-2 sprays into both nostrils daily    Acute bronchitis, unspecified organism       triamcinolone 0.1 % cream    KENALOG    30 g    Apply sparingly to affected area two times daily as needed (left ankle.lower leg)    Atopic dermatitis, unspecified type

## 2018-10-17 NOTE — PROGRESS NOTES
" Subjective:   Fish Hong is a 87 year old male who presents with R glut pain with standing. It feels sharp and worse with standing up and stairs also laying on his side at night. More pain 2 days ago, but better today. No radiation of pain, denies numbness or tingling.  Xray R hip with mild degenerative changes in 2013. No fall or trauma recently.    Chronic back pain that is unchanged.  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     Background:   Date of injury: NA   Duration of symptoms: intermittent, worse over the past few weeks, ongoing for 5-6 months.  Mechanism of Injury: Chronic; Unknown   Aggravating factors: Weather, stairs, standing   Relieving Factors: NSAIDs  Prior Evaluation: Prior Physician Evalutation:  and X-rays    PAST MEDICAL, SOCIAL, SURGICAL AND FAMILY HISTORY: He  has a past medical history of Hypertension and Rheumatic fever (1948).  He  has a past surgical history that includes hernia repair (11/3/2008); ENT surgery; TOTAL KNEE ARTHROPLASTY; and Circumcision (N/A, 7/13/2016).  His family history includes No Known Problems in his father and mother. There is no history of Melanoma or Skin Cancer.  He reports that he quit smoking about 58 years ago. He started smoking about 70 years ago. He has a 11.00 pack-year smoking history. He has never used smokeless tobacco. He reports that he drinks alcohol. He reports that he does not use illicit drugs.    ALLERGIES: He has No Known Allergies.    CURRENT MEDICATIONS: He has a current medication list which includes the following prescription(s): amlodipine, doxazosin, fluticasone, and triamcinolone.     REVIEW OF SYSTEMS: 3 point review of systems is negative except as noted above.     Exam:   Resp 16  Ht 5' 11\" (1.803 m)  Wt 245 lb (111.1 kg)  BMI 34.17 kg/m2     CONSTITUTIONAL: healthy, alert and no distress  HEAD: Normocephalic. No masses, lesions, tenderness or abnormalities  SKIN: no suspicious lesions or " rashes  GAIT: normal  NEUROLOGIC: Non-focal  PSYCHIATRIC: affect normal/bright and mentation appears normal.    MUSCULOSKELETAL:   right hip  Inspection  nl gait    Palpation  Tender at the lateral hip posterior to the greater trochanter reproducing symptoms  ROM  IR and ER full with not pain reproduced    Strength  5/5 flexion  4/5 Abduction ++ pain  5/5 Adduction at 0,30 and 60 deg of hip flexion    Special Tests  Anterior Impingement testing neg      Back   Tender diffusely lumbar spine B  SLR neg  Symmetric LE reflexes and sensation  ROM causes back but not hip pain     Assessment/Plan:   R lateral hip pain  --most consistent with glut med tendinosis including location of tenderness and pain with resisted abduction, lack of pain with IR/ER hip or back ROM    Plan  --recommended trial of PT, he would like more immediate relief and talked with his primary about shots or surgeries to help this. We discussed I could do a greater trochanter injection to help with sleep, but he should then follow this with 4-6 weeks of exercises.  --recheck as needed    MD NOE HendrixQ    Large Joint Injection/Arthocentesis  Date/Time: 10/17/2018 11:10 AM  Performed by: WASHINGTON DAVIS  Authorized by: WASHINGTON DAVIS     Indications:  Pain  Needle Size:  22 G  Guidance: landmark guided    Approach:  Lateral  Medications:  40 mg triamcinolone acetonide 40 MG/ML; 3 mL lidocaine 1 %  Procedure discussed: discussed risks, benefits, and alternatives    Consent Given by:  Patient  Timeout: timeout called immediately prior to procedure    Prep: patient was prepped and draped in usual sterile fashion     17ml of 1% lidocaine was wasted per MD Washington Davis MD CAQ

## 2018-10-22 ENCOUNTER — THERAPY VISIT (OUTPATIENT)
Dept: PHYSICAL THERAPY | Facility: CLINIC | Age: 83
End: 2018-10-22
Payer: MEDICARE

## 2018-10-22 DIAGNOSIS — M25.551 HIP PAIN, RIGHT: ICD-10-CM

## 2018-10-22 DIAGNOSIS — M67.951 TENDINOPATHY OF RIGHT GLUTEUS MEDIUS: ICD-10-CM

## 2018-10-22 DIAGNOSIS — M54.50 LUMBAGO: ICD-10-CM

## 2018-10-22 PROCEDURE — 97161 PT EVAL LOW COMPLEX 20 MIN: CPT | Mod: GP | Performed by: PHYSICAL THERAPIST

## 2018-10-22 PROCEDURE — 97110 THERAPEUTIC EXERCISES: CPT | Mod: GP | Performed by: PHYSICAL THERAPIST

## 2018-10-22 PROCEDURE — G8979 MOBILITY GOAL STATUS: HCPCS | Mod: GP | Performed by: PHYSICAL THERAPIST

## 2018-10-22 PROCEDURE — G8978 MOBILITY CURRENT STATUS: HCPCS | Mod: GP | Performed by: PHYSICAL THERAPIST

## 2018-10-22 ASSESSMENT — ACTIVITIES OF DAILY LIVING (ADL)
WALKING_APPROXIMATELY_10_MINUTES: EXTREME DIFFICULTY
WALKING_UP_STEEP_HILLS: EXTREME DIFFICULTY
STEPPING_UP_AND_DOWN_CURBS: EXTREME DIFFICULTY
WALKING_DOWN_STEEP_HILLS: EXTREME DIFFICULTY
ROLLING_OVER_IN_BED: MODERATE DIFFICULTY
HOS_ADL_ITEM_SCORE_TOTAL: 18
PUTTING_ON_SOCKS_AND_SHOES: EXTREME DIFFICULTY
TWISTING/PIVOTING_ON_INVOLVED_LEG: EXTREME DIFFICULTY
GOING_DOWN_1_FLIGHT_OF_STAIRS: EXTREME DIFFICULTY
LIGHT_TO_MODERATE_WORK: MODERATE DIFFICULTY
HOS_ADL_COUNT: 16
HOW_WOULD_YOU_RATE_YOUR_CURRENT_LEVEL_OF_FUNCTION_DURING_YOUR_USUAL_ACTIVITIES_OF_DAILY_LIVING_FROM_0_TO_100_WITH_100_BEING_YOUR_LEVEL_OF_FUNCTION_PRIOR_TO_YOUR_HIP_PROBLEM_AND_0_BEING_THE_INABILITY_TO_PERFORM_ANY_OF_YOUR_USUAL_DAILY_ACTIVITIES?: 65
GETTING_INTO_AND_OUT_OF_AN_AVERAGE_CAR: EXTREME DIFFICULTY
WALKING_INITIALLY: MODERATE DIFFICULTY
DEEP_SQUATTING: EXTREME DIFFICULTY
GOING_UP_1_FLIGHT_OF_STAIRS: EXTREME DIFFICULTY
HOS_ADL_HIGHEST_POTENTIAL_SCORE: 64
HEAVY_WORK: EXTREME DIFFICULTY
SITTING_FOR_15_MINUTES: EXTREME DIFFICULTY
HOS_ADL_SCORE(%): 28.12
GETTING_INTO_AND_OUT_OF_A_BATHTUB: EXTREME DIFFICULTY
STANDING_FOR_15_MINUTES: EXTREME DIFFICULTY
WALKING_15_MINUTES_OR_GREATER: UNABLE TO DO

## 2018-10-22 NOTE — LETTER
DEPARTMENT OF HEALTH AND HUMAN SERVICES  CENTERS FOR MEDICARE & MEDICAID SERVICES    PLAN/UPDATED PLAN OF PROGRESS FOR OUTPATIENT REHABILITATION    PATIENTS NAME:  Fish Hong     : 1931    PROVIDER NUMBER:    [id number]    HICN:  [id number]     PROVIDER NAME: [employer] [facility]    MEDICAL RECORD NUMBER: [id number]     START OF CARE DATE:  SOC Date: 10/22/18   TYPE:  PT    PRIMARY/TREATMENT DIAGNOSIS: (Pertinent Medical Diagnosis)  Tendinopathy of right gluteus medius  Hip pain, right  Lumbago    VISITS FROM START OF CARE:  Rxs Used: 1     [facility] Initial Evaluation    Subjective:  Fish Hong is a 87 year old male with a right hip and low back condition. Symptoms commenced as a result of: unknown cause. Condition occurred in the following environment: unknown cause. Onset of symptoms: 2018. Location of symptoms: right buttock, bilateral low back. Pain level on number scale: 6/10. Quality of pain: sharp. Associated symptoms: denies numbness and tingling. Pain frequency (constant/intermittent): constant. Pain worse (day/night/same all the time/AM/PM): pain is not dependent on time of day. Symptoms are exacerbated by: standing, walking, stairs, transferring sit<>stand. Symptoms are relieved by: moving, activity. Progression of symptoms since onset (same/better/worse): better. Special tests (x-ray, MRI, CT scan, EMG, bone scan): None. Previous treatment: steroid injection last week. Improvement with previous treatment: yes. General health as reported by patient is good. Pertinent medical history includes:  See Epic. Medical allergies: see Epic. Other pertinent surgeries: bilateral TKA 20 years ago; see Epic. Current medications: See Epic. Occupation: retired. Patient is (working in normal job without restrictions/working in normal job with restrictions/working in an alternate job/not working due to present treatment problem): NA. Primary job  tasks: NA. Barriers at home/work:  None reported by patient. Red flags:  None reported by patient.    Objective  Posture    Sitting (good/fair/poor): poor  Standing (good/fair/poor):  Poor  Lordosis (red/acc/normal):  Normal  Lateral shift (right/left/nil):  nil  Relevant lateral shift (yes/no):  TBD  Correction of posture (better/worse/no effect): better  Other observations:  Compensated trendelenburg bilaterally    Neurological  PATIENTS NAME:  Fish Hong   : 1931    Myotomes L R   L1-2 (hip flexion)    L3 (knee extension)    L4 (ankle DF)    L5 (g. toe ext)    S1 (ankle PF or knee flex)      Dural Signs L R   Slump - -   SLR - -     Lumbar Movement Loss Chaitanya Mod Min Nil Pain   Flexion   x     Extension  x   +right buttock   Side Gliding L   x  + low back   Side Gliding R   x  +right buttock     Repeated movement testing (During: produces, abolishes, increases, decreases, no effect, centralizing, peripheralizing; After: better, worse, no better, no worse, no effect, centralized, peripheralized)    Pre-test Symptoms Standing: low back pain     Symptoms During Symptoms After ROM increased ROM decreased No Effect   FIS        Rep FIS        EIS Produces right buttock  NW      Rep EIS increases NW x     Right hip IR and ER = mod loss compared to left hip  Weak proximal hip musculature    Assessment/Plan:    Patient is a 87 year old male with lumbar and right side hip complaints.    Patient has the following significant findings with corresponding treatment plan.                Diagnosis 1:  Right hip and LBP  Pain -  manual therapy, self management, education, directional preference exercise and home program  Decreased ROM/flexibility - manual therapy and therapeutic exercise  Decreased joint mobility - manual therapy and therapeutic exercise  Decreased strength - therapeutic exercise and therapeutic activities  Impaired balance - neuro re-education and therapeutic  activities  Decreased proprioception - neuro re-education and therapeutic activities  Impaired gait - gait training  Impaired muscle performance - neuro re-education  Decreased function - therapeutic activities  Impaired posture - neuro re-education    Therapy Evaluation Codes:   1) History comprised of:   Personal factors that impact the plan of care:      None.    Comorbidity factors that impact the plan of care are:      None.     Medications impacting care: None.  PATIENTS NAME:  Fish Hong   : 1931    2) Examination of Body Systems comprised of:   Body structures and functions that impact the plan of care:      Hip and Lumbar spine.   Activity limitations that impact the plan of care are:      Bathing, Bending, Dressing, Lifting, Sitting, Squatting/kneeling, Stairs, Standing and Walking.  3) Clinical presentation characteristics are:   Stable/Uncomplicated.  4) Decision-Making    Low complexity using standardized patient assessment instrument and/or measureable assessment of functional outcome.  Cumulative Therapy Evaluation is: Low complexity.    Previous and current functional limitations:  (See Goal Flow Sheet for this information)    Short term and Long term goals: (See Goal Flow Sheet for this information)     Communication ability:  Patient appears to be able to clearly communicate and understand verbal and written communication and follow directions correctly.  Treatment Explanation - The following has been discussed with the patient:   RX ordered/plan of care  Anticipated outcomes  Possible risks and side effects  This patient would benefit from PT intervention to resume normal activities.   Rehab potential is good.    Frequency:  1 X week, once daily  Duration:  for 4 weeks tapering to 2 X a month over 1 month  Discharge Plan:  Achieve all LTG.  Independent in home treatment program.  Reach maximal therapeutic benefit.    Please refer to the daily flowsheet for treatment today, total  "treatment time and time spent performing 1:1 timed codes.       Caregiver Signature/Credentials _____________________________ Date ________       Treating Provider: Constantin Manuel DPT   I have reviewed and certified the need for these services and plan of treatment while under my care.        PHYSICIAN'S SIGNATURE:   _____________________________________  Date___________     Daniel Ceballos MD    Certification period:  Beginning of Cert date period: 10/22/18 to End of Cert period date: 01/12/19     Functional Level Progress Report: Please see attached \"Goal Flow sheet for Functional level.\"    ____X____ Continue Services or       ________ DC Services                Service dates: From  SOC Date: 10/22/18 date to present                         "

## 2018-10-22 NOTE — PROGRESS NOTES
[facility] Initial Evaluation    Subjective:  Fish Hong is a 87 year old male with a right hip and low back condition. Symptoms commenced as a result of: unknown cause. Condition occurred in the following environment: unknown cause. Onset of symptoms: October 2018. Location of symptoms: right buttock, bilateral low back. Pain level on number scale: 6/10. Quality of pain: sharp. Associated symptoms: denies numbness and tingling. Pain frequency (constant/intermittent): constant. Pain worse (day/night/same all the time/AM/PM): pain is not dependent on time of day. Symptoms are exacerbated by: standing, walking, stairs, transferring sit<>stand. Symptoms are relieved by: moving, activity. Progression of symptoms since onset (same/better/worse): better. Special tests (x-ray, MRI, CT scan, EMG, bone scan): None. Previous treatment: steroid injection last week. Improvement with previous treatment: yes. General health as reported by patient is good. Pertinent medical history includes:  See Epic. Medical allergies: see Epic. Other pertinent surgeries: bilateral TKA 20 years ago; see Epic. Current medications: See Epic. Occupation: retired. Patient is (working in normal job without restrictions/working in normal job with restrictions/working in an alternate job/not working due to present treatment problem): NA. Primary job tasks: NA. Barriers at home/work:  None reported by patient. Red flags:  None reported by patient.    Objective  Posture    Sitting (good/fair/poor): poor  Standing (good/fair/poor):  Poor  Lordosis (red/acc/normal):  Normal  Lateral shift (right/left/nil):  nil  Relevant lateral shift (yes/no):  TBD  Correction of posture (better/worse/no effect): better  Other observations:  Compensated trendelenburg bilaterally    Neurological    Myotomes L R   L1-2 (hip flexion) 5/5 5/5   L3 (knee extension) 5/5 5/5   L4 (ankle DF) 5/5 5/5   L5 (g. toe ext) 5/5 5/5   S1 (ankle PF or knee  flex) 5/5 5/5     Dural Signs L R   Slump - -   SLR - -     Lumbar Movement Loss Chaitanya Mod Min Nil Pain   Flexion   x     Extension  x   +right buttock   Side Gliding L   x  + low back   Side Gliding R   x  +right buttock     Repeated movement testing (During: produces, abolishes, increases, decreases, no effect, centralizing, peripheralizing; After: better, worse, no better, no worse, no effect, centralized, peripheralized)    Pre-test Symptoms Standing: low back pain     Symptoms During Symptoms After ROM increased ROM decreased No Effect   FIS        Rep FIS        EIS Produces right buttock  NW      Rep EIS increases NW x       Right hip IR and ER = mod loss compared to left hip  Weak proximal hip musculature    Assessment/Plan:    Patient is a 87 year old male with lumbar and right side hip complaints.    Patient has the following significant findings with corresponding treatment plan.                Diagnosis 1:  Right hip and LBP  Pain -  manual therapy, self management, education, directional preference exercise and home program  Decreased ROM/flexibility - manual therapy and therapeutic exercise  Decreased joint mobility - manual therapy and therapeutic exercise  Decreased strength - therapeutic exercise and therapeutic activities  Impaired balance - neuro re-education and therapeutic activities  Decreased proprioception - neuro re-education and therapeutic activities  Impaired gait - gait training  Impaired muscle performance - neuro re-education  Decreased function - therapeutic activities  Impaired posture - neuro re-education    Therapy Evaluation Codes:   1) History comprised of:   Personal factors that impact the plan of care:      None.    Comorbidity factors that impact the plan of care are:      None.     Medications impacting care: None.  2) Examination of Body Systems comprised of:   Body structures and functions that impact the plan of care:      Hip and Lumbar spine.   Activity limitations that  impact the plan of care are:      Bathing, Bending, Dressing, Lifting, Sitting, Squatting/kneeling, Stairs, Standing and Walking.  3) Clinical presentation characteristics are:   Stable/Uncomplicated.  4) Decision-Making    Low complexity using standardized patient assessment instrument and/or measureable assessment of functional outcome.  Cumulative Therapy Evaluation is: Low complexity.    Previous and current functional limitations:  (See Goal Flow Sheet for this information)    Short term and Long term goals: (See Goal Flow Sheet for this information)     Communication ability:  Patient appears to be able to clearly communicate and understand verbal and written communication and follow directions correctly.  Treatment Explanation - The following has been discussed with the patient:   RX ordered/plan of care  Anticipated outcomes  Possible risks and side effects  This patient would benefit from PT intervention to resume normal activities.   Rehab potential is good.    Frequency:  1 X week, once daily  Duration:  for 4 weeks tapering to 2 X a month over 1 month  Discharge Plan:  Achieve all LTG.  Independent in home treatment program.  Reach maximal therapeutic benefit.    Please refer to the daily flowsheet for treatment today, total treatment time and time spent performing 1:1 timed codes.

## 2018-10-30 ENCOUNTER — THERAPY VISIT (OUTPATIENT)
Dept: PHYSICAL THERAPY | Facility: CLINIC | Age: 83
End: 2018-10-30
Payer: MEDICARE

## 2018-10-30 ENCOUNTER — OFFICE VISIT (OUTPATIENT)
Dept: DERMATOLOGY | Facility: CLINIC | Age: 83
End: 2018-10-30
Payer: MEDICARE

## 2018-10-30 DIAGNOSIS — M25.551 HIP PAIN, RIGHT: ICD-10-CM

## 2018-10-30 DIAGNOSIS — D04.71 SQUAMOUS CELL CARCINOMA IN SITU OF SKIN OF RIGHT LOWER LEG: Primary | ICD-10-CM

## 2018-10-30 DIAGNOSIS — M54.50 LUMBAGO: ICD-10-CM

## 2018-10-30 PROCEDURE — 97110 THERAPEUTIC EXERCISES: CPT | Mod: GP | Performed by: PHYSICAL THERAPIST

## 2018-10-30 NOTE — NURSING NOTE
Chief Complaint   Patient presents with     RECHECK     3 month MOHS recheck on right back calf.     Yara Blevins RN

## 2018-10-30 NOTE — PROGRESS NOTES
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 This was repaired with a Burow's full thickness skin graft. He states that this site has healed very well since his last visit. He has not had complications or concerns during this entire healing process. Denies any tenderness or nodules at the scar. Otherwise, the patient reports no painful, bleeding, nonhealing, or pruritic lesions, and denies new or changing moles.  Objective: On exam, there is an appropriately healed surgical site on the right lower leg with no purulence, tenderness or surrounding erythema. No ulcerations.   Assessment and Plan: Appropriately healed surgical site without any signs of infection    Discontinue wound care; okay to treat the area as normal skin.     Recommended sunscreens SPF #50 or greater and protective clothing.     Continue periodic self skin exams and report of any new or changing lesions.     Please refer to previous clinic note for details regarding treatment.    Contact clinic for any concerns regarding healing.   Follow up in 1 year, earlier for new or changing lesions.     Staff:    Scribe Disclosure  I, Brett Keita, am serving as a scribe to document services personally performed by Dr. Braydon Buchanan DO, based on data collection and the provider's statements to me.     Provider Disclosure:   The documentation recorded by the scribe accurately reflects the services I personally performed and the decisions made by me.    Braydon Buchanan DO    Department of Dermatology  [facility]: Phone: [phone], Fax:[phone]  [facility]: Phone: [phone], Fax: [phone]

## 2018-10-30 NOTE — LETTER
10/30/2018       RE: Fish Hong  [address]  [place] [address]     Dear Colleague,    Thank you for referring your patient, Fish Hong, to the [facility] DERMATOLOGIC SURGERY at [facility]. Please see a copy of my visit note below.    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 This was repaired with a Burow's full thickness skin graft. He states that this site has healed very well since his last visit. He has not had complications or concerns during this entire healing process. Denies any tenderness or nodules at the scar. Otherwise, the patient reports no painful, bleeding, nonhealing, or pruritic lesions, and denies new or changing moles.  Objective: On exam, there is an appropriately healed surgical site on the right lower leg with no purulence, tenderness or surrounding erythema. No ulcerations.   Assessment and Plan: Appropriately healed surgical site without any signs of infection    Discontinue wound care; okay to treat the area as normal skin.     Recommended sunscreens SPF #50 or greater and protective clothing.     Continue periodic self skin exams and report of any new or changing lesions.     Please refer to previous clinic note for details regarding treatment.    Contact clinic for any concerns regarding healing.   Follow up in 1 year, earlier for new or changing lesions.     Staff:    Scribe Disclosure  I, Brett Keita, am serving as a scribe to document services personally performed by Dr. Braydon Buchanan DO, based on data collection and the provider's statements to me.     Provider Disclosure:   The documentation recorded by the scribe accurately reflects the services I personally performed and the decisions made by me.    Braydon Buchanan      Department of Dermatology  [facility]: Phone: [phone], Fax:[phone]  [facility]: Phone: [phone], Fax: [phone]

## 2018-10-30 NOTE — MR AVS SNAPSHOT
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    -  1       Follow-ups after your visit        Your next 10 appointments already scheduled     Dec 04, 2018  8:50 AM CST   [facility] Extremity with Constantin Manuel PT   [facility] ([facility])    [address] [place] [address] [phone]              Who to contact     Please call your clinic at [phone] to:    Ask questions about your health    Make or cancel appointments    Discuss your medicines    Learn about your test results    Speak to your doctor            Additional Information About Your Visit        Care EveryWhere ID     This is your Care EveryWhere ID. This could be used by other organizations to access your [facility] medical records  [id number]         Blood Pressure from Last 3 Encounters:   10/15/18 136/76   07/18/18 111/61   06/25/18 134/72    Weight from Last 3 Encounters:   10/17/18 111.1 kg (245 lb)   10/15/18 111.1 kg (245 lb)   06/25/18 108.4 kg (239 lb)              Today, you had the following     No orders found for display       Primary Care Provider Office Phone # Fax #    Herman Jarrett [phone]       [address] [facility] [address]        Equal Access to Services     JONATHAN MAS AH: Hadii aad ku hadasho Soomaali, waaxda luqadaha, qaybta kaalmada adeegyada, waxay nereidain hayaan carly may'megan . So [facility] [phone].    ATENCIÓN: Si habla español, tiene a donahue disposición servicios gratuitos de asistencia lingüística. Llame al [phone].    We comply with applicable federal civil rights laws and [place] laws. We do not discriminate  on the basis of race, color, national origin, age, disability, sex, sexual orientation, or gender identity.            Thank you!     Thank you for choosing [facility] DERMATOLOGIC SURGERY  for your care. Our goal is always to provide you with excellent care. Hearing back from our patients is one way we can continue to improve our services. Please take a few minutes to complete the written survey that you may receive in the mail after your visit with us. Thank you!             Your Updated Medication List - Protect others around you: Learn how to safely use, store and throw away your medicines at www.disposemymeds.org.          This list is accurate as of 10/30/18 11:59 PM.  Always use your most recent med list.                   Brand Name Dispense Instructions for use Diagnosis    amLODIPine 10 MG tablet    NORVASC    90 tablet    Take 1 tablet (10 mg) by mouth daily        doxazosin 4 MG tablet    CARDURA    90 tablet    Take 1 tablet (4 mg) by mouth At Bedtime        fluticasone 50 MCG/ACT spray    FLONASE    1 Bottle    Spray 1-2 sprays into both nostrils daily    Acute bronchitis, unspecified organism       triamcinolone 0.1 % cream    KENALOG    30 g    Apply sparingly to affected area two times daily as needed (left ankle.lower leg)    Atopic dermatitis, unspecified type

## 2018-11-06 ENCOUNTER — THERAPY VISIT (OUTPATIENT)
Dept: PHYSICAL THERAPY | Facility: CLINIC | Age: 83
End: 2018-11-06
Payer: MEDICARE

## 2018-11-06 DIAGNOSIS — M54.50 LUMBAGO: ICD-10-CM

## 2018-11-06 DIAGNOSIS — M25.551 HIP PAIN, RIGHT: ICD-10-CM

## 2018-11-06 PROCEDURE — 97110 THERAPEUTIC EXERCISES: CPT | Mod: GP | Performed by: PHYSICAL THERAPIST

## 2018-11-06 PROCEDURE — G8979 MOBILITY GOAL STATUS: HCPCS | Mod: GP | Performed by: PHYSICAL THERAPIST

## 2018-11-06 PROCEDURE — G8978 MOBILITY CURRENT STATUS: HCPCS | Mod: GP | Performed by: PHYSICAL THERAPIST

## 2018-11-06 NOTE — PROGRESS NOTES
PROGRESS/DISCHARGE  REPORT    Progress reporting period is from 10/22/18 to 11/6/18.       SUBJECTIVE  Subjective changes noted by patient:  Patient states he is feeling pretty good today. New exercise has been going well. A little flare up of low back pain after raking the leaves yesterday. Patient states he feels comfortable with current HEP.    Current Pain level:  (1-2/10).     Initial Pain level: 6/10.   Changes in function:  Yes (See Goal flowsheet attached for changes in current functional level)  Adverse reaction to treatment or activity: None    OBJECTIVE  Changes noted in objective findings:  Yes,   Objective: lumbar AROM: FL = WNL, EXT = min loss and low back pain, right SG = min loss and right LBP, left SG = WNL and low back pain     ASSESSMENT/PLAN  Updated problem list and treatment plan: Diagnosis 1:  Right hip and LBP  Pain -  manual therapy, self management, education, directional preference exercise and home program  Decreased ROM/flexibility - manual therapy and therapeutic exercise  Decreased joint mobility - manual therapy and therapeutic exercise  Decreased strength - therapeutic exercise and therapeutic activities  Impaired balance - neuro re-education and therapeutic activities  Decreased proprioception - neuro re-education and therapeutic activities  Impaired gait - gait training  Impaired muscle performance - neuro re-education  Decreased function - therapeutic activities  Impaired posture - neuro re-education  STG/LTGs have been met or progress has been made towards goals:  Yes (See Goal flow sheet completed today.)  Assessment of Progress: The patient's condition is improving.  Self Management Plans:  Patient has been instructed in a home treatment program.  Patient  has been instructed in self management of symptoms.  I have re-evaluated this patient and find that the nature, scope, duration and intensity of the therapy is appropriate for the medical condition of the patient.  Fish  continues to require the following intervention to meet STG and LTG's:  PT    Recommendations:  Patient will follow up as necessary; discharge if no return in 4 weeks    Please refer to the daily flowsheet for treatment today, total treatment time and time spent performing 1:1 timed codes.    Patient was seen for 3 visits and did not schedule further PT. Patient will be discharged.

## 2018-12-20 PROBLEM — M54.50 LUMBAGO: Status: RESOLVED | Noted: 2018-10-22 | Resolved: 2018-12-20

## 2018-12-20 PROBLEM — M25.551 HIP PAIN, RIGHT: Status: RESOLVED | Noted: 2018-10-22 | Resolved: 2018-12-20

## 2019-01-18 DIAGNOSIS — K21.9 GASTROESOPHAGEAL REFLUX DISEASE WITHOUT ESOPHAGITIS: Primary | ICD-10-CM

## 2019-01-18 DIAGNOSIS — R10.84 ABDOMINAL PAIN, GENERALIZED: ICD-10-CM

## 2019-01-18 NOTE — TELEPHONE ENCOUNTER
"Requested Prescriptions   Pending Prescriptions Disp Refills     omeprazole (PRILOSEC) 20 MG DR capsule [Pharmacy Med Name: OMEPRAZOLE DR 20 MG CAPSULE] 90 capsule 1      Last Written Prescription Date:  7/26/18  Last Fill Quantity: 90,  # refills: 1   Last office visit: 10/15/2018 with prescribing provider:     Future Office Visit:     Sig: TAKE 1 CAPSULE BY MOUTH EVERY DAY    PPI Protocol Failed - 1/18/2019  1:15 AM       Failed - Medication is active on med list       Passed - Not on Clopidogrel (unless Pantoprazole ordered)       Passed - No diagnosis of osteoporosis on record       Passed - Recent (12 mo) or future (30 days) visit within the authorizing provider's specialty    Patient had office visit in the last 12 months or has a visit in the next 30 days with authorizing provider or within the authorizing provider's specialty.  See \"Patient Info\" tab in inbasket, or \"Choose Columns\" in Meds & Orders section of the refill encounter.             Passed - Patient is age 18 or older          "

## 2019-04-21 PROBLEM — K21.9 GASTROESOPHAGEAL REFLUX DISEASE WITHOUT ESOPHAGITIS: Status: ACTIVE | Noted: 2019-04-21

## 2019-04-22 ENCOUNTER — OFFICE VISIT (OUTPATIENT)
Dept: FAMILY MEDICINE | Facility: CLINIC | Age: 84
End: 2019-04-22
Payer: MEDICARE

## 2019-04-22 VITALS
DIASTOLIC BLOOD PRESSURE: 74 MMHG | OXYGEN SATURATION: 95 % | WEIGHT: 248 LBS | TEMPERATURE: 98.8 F | HEIGHT: 71 IN | BODY MASS INDEX: 34.72 KG/M2 | HEART RATE: 81 BPM | SYSTOLIC BLOOD PRESSURE: 138 MMHG | RESPIRATION RATE: 22 BRPM

## 2019-04-22 DIAGNOSIS — I10 ESSENTIAL HYPERTENSION: Primary | ICD-10-CM

## 2019-04-22 DIAGNOSIS — K21.9 GASTROESOPHAGEAL REFLUX DISEASE WITHOUT ESOPHAGITIS: ICD-10-CM

## 2019-04-22 DIAGNOSIS — L98.9 SKIN LESION: ICD-10-CM

## 2019-04-22 DIAGNOSIS — R91.8 PULMONARY NODULES: ICD-10-CM

## 2019-04-22 DIAGNOSIS — M70.61 GREATER TROCHANTERIC BURSITIS, RIGHT: ICD-10-CM

## 2019-04-22 DIAGNOSIS — E53.8 VITAMIN B12 DEFICIENCY (NON ANEMIC): ICD-10-CM

## 2019-04-22 DIAGNOSIS — L20.9 ATOPIC DERMATITIS, UNSPECIFIED TYPE: ICD-10-CM

## 2019-04-22 PROBLEM — Z87.891 HISTORY OF SMOKING: Status: ACTIVE | Noted: 2019-04-22

## 2019-04-22 LAB
BASOPHILS # BLD AUTO: 0 10E9/L (ref 0–0.2)
BASOPHILS NFR BLD AUTO: 0.2 %
DIFFERENTIAL METHOD BLD: NORMAL
EOSINOPHIL # BLD AUTO: 0.2 10E9/L (ref 0–0.7)
EOSINOPHIL NFR BLD AUTO: 4.1 %
ERYTHROCYTE [DISTWIDTH] IN BLOOD BY AUTOMATED COUNT: 13.2 % (ref 10–15)
HCT VFR BLD AUTO: 42.5 % (ref 40–53)
HGB BLD-MCNC: 14.7 G/DL (ref 13.3–17.7)
LYMPHOCYTES # BLD AUTO: 1.5 10E9/L (ref 0.8–5.3)
LYMPHOCYTES NFR BLD AUTO: 27.5 %
MCH RBC QN AUTO: 30.9 PG (ref 26.5–33)
MCHC RBC AUTO-ENTMCNC: 34.6 G/DL (ref 31.5–36.5)
MCV RBC AUTO: 90 FL (ref 78–100)
MONOCYTES # BLD AUTO: 0.4 10E9/L (ref 0–1.3)
MONOCYTES NFR BLD AUTO: 6.8 %
NEUTROPHILS # BLD AUTO: 3.4 10E9/L (ref 1.6–8.3)
NEUTROPHILS NFR BLD AUTO: 61.4 %
PLATELET # BLD AUTO: 158 10E9/L (ref 150–450)
RBC # BLD AUTO: 4.75 10E12/L (ref 4.4–5.9)
VIT B12 SERPL-MCNC: 279 PG/ML (ref 193–986)
WBC # BLD AUTO: 5.6 10E9/L (ref 4–11)

## 2019-04-22 PROCEDURE — 80053 COMPREHEN METABOLIC PANEL: CPT | Performed by: INTERNAL MEDICINE

## 2019-04-22 PROCEDURE — 99214 OFFICE O/P EST MOD 30 MIN: CPT | Performed by: INTERNAL MEDICINE

## 2019-04-22 PROCEDURE — 85025 COMPLETE CBC W/AUTO DIFF WBC: CPT | Performed by: INTERNAL MEDICINE

## 2019-04-22 PROCEDURE — 82607 VITAMIN B-12: CPT | Performed by: INTERNAL MEDICINE

## 2019-04-22 PROCEDURE — 36415 COLL VENOUS BLD VENIPUNCTURE: CPT | Performed by: INTERNAL MEDICINE

## 2019-04-22 RX ORDER — TRIAMCINOLONE ACETONIDE 1 MG/G
CREAM TOPICAL
Qty: 80 G | Refills: 3 | Status: SHIPPED | OUTPATIENT
Start: 2019-04-22 | End: 2021-01-22

## 2019-04-22 ASSESSMENT — MIFFLIN-ST. JEOR: SCORE: 1817.05

## 2019-04-22 NOTE — PROGRESS NOTES
SUBJECTIVE:   Fish Hong is a 88 year old male who presents to clinic today for the following   health issues:      Hypertension Follow-up      Outpatient blood pressures are being checked at home.    Low Salt Diet: no added salt      Amount of exercise or physical activity: None    Problems taking medications regularly: No    Medication side effects: none    Diet: low salt and low fat/cholesterol             Usual BP (at the dental clinic) has been very good.                          Omeprazole was reordered by the ins co in 1/19; he does not take any of this.                                     Wants a refill of topical steroid for ankle/foot dermatitis.               Wants another R greater troch injection.            The injection 6 months ago was very helpful until recently.                      Also wants to see derm again.                              CT 8/17 reviewed with pt; a lung nodule,7 mm, was noted.             He only has a very remote smoking history.                     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                     Reviewed  and updated as needed this visit by clinical staff  Tobacco  Allergies  Meds  Med Hx  Surg Hx  Fam Hx  Soc Hx        Reviewed and updated as needed this visit by Provider         Current Outpatient Medications   Medication Sig Dispense Refill     amLODIPine (NORVASC) 10 MG tablet Take 1 tablet (10 mg) by mouth daily 90 tablet 3     doxazosin (CARDURA) 4 MG tablet Take 1 tablet (4 mg) by mouth At Bedtime 90 tablet 3     fluticasone (FLONASE) 50 MCG/ACT spray Spray 1-2 sprays into both nostrils daily 1 Bottle 11     triamcinolone (KENALOG) 0.1 % external cream Apply sparingly to affected area two times daily as needed (left ankle.lower leg) 80 g 3     No Known Allergies  BP Readings from Last 3 Encounters:   04/22/19 138/74   10/15/18 136/76   07/18/18 111/61    Wt Readings  "from Last 3 Encounters:   04/22/19 112.5 kg (248 lb)   10/17/18 111.1 kg (245 lb)   10/15/18 111.1 kg (245 lb)                    ROS:  CONSTITUTIONAL:NEGATIVE for fever, chills, change in weight  RESP:NEGATIVE for significant cough or SOB  CV: NEGATIVE for chest pain, palpitations or peripheral edema    OBJECTIVE:                                                    /74   Pulse 81   Temp 98.8  F (37.1  C)   Resp 22   Ht 1.803 m (5' 11\")   Wt 112.5 kg (248 lb)   SpO2 95%   BMI 34.59 kg/m    Body mass index is 34.59 kg/m .  GENERAL APPEARANCE: alert, no distress and over weight  RESP: lungs clear to auscultation - no rales, rhonchi or wheezes  CV: regular rates and rhythm, normal S1 S2, no S3 or S4 and no murmur, click or rub    Diagnostic test results:  Pending     ASSESSMENT/PLAN:                                                        ICD-10-CM    1. Essential hypertension I10 Comprehensive metabolic panel (BMP + Alb, Alk Phos, ALT, AST, Total. Bili, TP)     Vitamin B12     CBC with platelets and differential   2. Pulmonary nodules R91.8 CT Chest w/o Contrast   3. Atopic dermatitis, unspecified type L20.9 triamcinolone (KENALOG) 0.1 % external cream    ankles   4. Greater trochanteric bursitis, right M70.61 ORTHOPEDICS ADULT REFERRAL   5. Skin lesion L98.9 DERMATOLOGY REFERRAL   6. Gastroesophageal reflux disease without esophagitis K21.9        Summary and implications:              His blood pressure is under borderline control today.                               Given his overall good health, despite his age 88 years,we decided to go ahead with a follow-up chest CT to reevaluate lung nodules.                      Patient Instructions   I will let you know your lab results.    call for orthopedic and dermatology appointments.                              Let's also plan a follow CT of your lungs.                                                    Consider getting the shingles vaccine (Shingrix) at " your pharmacy.         Return in about 6 months (around 10/22/2019) for BP Recheck.      Herman Jarrett MD  [facility]    Results for orders placed or performed in visit on 04/22/19   Comprehensive metabolic panel (BMP + Alb, Alk Phos, ALT, AST, Total. Bili, TP)   Result Value Ref Range    Sodium 139 133 - 144 mmol/L    Potassium 4.2 3.4 - 5.3 mmol/L    Chloride 109 94 - 109 mmol/L    Carbon Dioxide 24 20 - 32 mmol/L    Anion Gap 6 3 - 14 mmol/L    Glucose 82 70 - 99 mg/dL    Urea Nitrogen 21 7 - 30 mg/dL    Creatinine 1.29 (H) 0.66 - 1.25 mg/dL    GFR Estimate 49 (L) >60 mL/min/[1.73_m2]    GFR Estimate If Black 57 (L) >60 mL/min/[1.73_m2]    Calcium 9.2 8.5 - 10.1 mg/dL    Bilirubin Total 0.5 0.2 - 1.3 mg/dL    Albumin 3.8 3.4 - 5.0 g/dL    Protein Total 7.2 6.8 - 8.8 g/dL    Alkaline Phosphatase 75 40 - 150 U/L    ALT 18 0 - 70 U/L    AST 20 0 - 45 U/L   Vitamin B12   Result Value Ref Range    Vitamin B12 279 193 - 986 pg/mL   CBC with platelets and differential   Result Value Ref Range    WBC 5.6 4.0 - 11.0 10e9/L    RBC Count 4.75 4.4 - 5.9 10e12/L    Hemoglobin 14.7 13.3 - 17.7 g/dL    Hematocrit 42.5 40.0 - 53.0 %    MCV 90 78 - 100 fl    MCH 30.9 26.5 - 33.0 pg    MCHC 34.6 31.5 - 36.5 g/dL    RDW 13.2 10.0 - 15.0 %    Platelet Count 158 150 - 450 10e9/L    % Neutrophils 61.4 %    % Lymphocytes 27.5 %    % Monocytes 6.8 %    % Eosinophils 4.1 %    % Basophils 0.2 %    Absolute Neutrophil 3.4 1.6 - 8.3 10e9/L    Absolute Lymphocytes 1.5 0.8 - 5.3 10e9/L    Absolute Monocytes 0.4 0.0 - 1.3 10e9/L    Absolute Eosinophils 0.2 0.0 - 0.7 10e9/L    Absolute Basophils 0.0 0.0 - 0.2 10e9/L    Diff Method Automated Method      Letter sent.                      Your vitamin B12 level is low.  This is a very important vitamin for brain and nerve and bone marrow function.            Please get some vitamin B12 and take 500 mcg every day and DO NOT STOP.        This is very important.            You do not need a prescription for this; however I did send in an Rx to your mail order pharmacy for vitamin B12.                Your other lab results are normal or stable,including the liver,kidney,glucose,and the bone marrow testing.                                                      Addendum:   Recent Results (from the past 48 hour(s))   CT Chest w/o Contrast    Narrative    EXAMINATION: CT CHEST W/O CONTRAST, 4/29/2019 7:12 AM    TECHNIQUE:  Helical CT images from the thoracic inlet through the lung  bases were obtained without IV contrast.     COMPARISON: 8/1/2017    HISTORY: f/u lung nodule; Pulmonary nodules    FINDINGS:    In the inferior right thyroid gland, there is a 1.0 cm hypodense  nodule (series 5 image 39). The central tracheobronchial tree is  patent. Confluent regions of centrilobular emphysema apices. Unchanged  peripheral reticular opacities. No pneumothorax or pleural effusion.  The previously described right lower lobe pulmonary nodule is now  densely calcified and as such most likely represents a benign  calcified granuloma. Multiple additional calcified granulomas, for  example an 8 x 7 mm right lower lobe pulmonary nodule with a central  calcification which is unchanged from 8/1/2017 (series 4 image 213).  No suspicious nodules or masses.    The heart size is normal. Mild coronary calcifications. Mild aortic  valve calcium. No pericardial effusion. Normal caliber and  configuration of the thoracic great vessels. Multiple calcified  mediastinal and right hilar lymph nodes.    Partial pancreatic lipomatosis. Enlarging ill-defined 2.7 cm water  density area in the superior aspect of the spleen (series 5 image 54),  favor a splenic cyst. Calcified granulomas in the spleen. Unchanged  lipid rich adrenal adenoma in the left adrenal gland.     Heterogeneity in the T4-7 vertebral bodies, including prominent  lucency in the T6 vertebral body. No worrisome bony or soft tissue  lesions.       Impression    IMPRESSION:   1. The previously described right lower lobe pulmonary nodule is now  densely calcified and as such represents a benign calcified granuloma.  Multiple additional calcified pulmonary granulomas. No suspicious  nodules or masses.  2. Heterogeneity in the T4-7 vertebral bodies, including prominent  lucency in the T6 vertebral body, possibly vertebral body hemangiomas.  If clinically indicated, thoracic MRI with contrast could be performed  for further evaluation.     I have personally reviewed the examination and initial interpretation  and I agree with the findings.    TRA DIAZ MD     Letter sent.                    According to the Radiologist,there are some tiny lung nodules which all appear benign.  No further testing is needed.

## 2019-04-22 NOTE — LETTER
April 23, 2019      Fish Hong  [address]  [facility] [address]        Dear ,    We are writing to inform you of your test results.        Your vitamin B12 level is low.  This is a very important vitamin for brain and nerve and bone marrow function.            Please get some vitamin B12 and take 500 mcg every day and DO NOT STOP.        This is very important.           You do not need a prescription for this; however I did send in an Rx to your mail order pharmacy for vitamin B12.                                                        Your other lab results are normal or stable,including the liver,kidney,glucose,and the bone marrow testing.      Results for orders placed or performed in visit on 04/22/19   Comprehensive metabolic panel (BMP + Alb, Alk Phos, ALT, AST, Total. Bili, TP)   Result Value Ref Range    Sodium 139 133 - 144 mmol/L    Potassium 4.2 3.4 - 5.3 mmol/L    Chloride 109 94 - 109 mmol/L    Carbon Dioxide 24 20 - 32 mmol/L    Anion Gap 6 3 - 14 mmol/L    Glucose 82 70 - 99 mg/dL    Urea Nitrogen 21 7 - 30 mg/dL    Creatinine 1.29 (H) 0.66 - 1.25 mg/dL    GFR Estimate 49 (L) >60 mL/min/[1.73_m2]    GFR Estimate If Black 57 (L) >60 mL/min/[1.73_m2]    Calcium 9.2 8.5 - 10.1 mg/dL    Bilirubin Total 0.5 0.2 - 1.3 mg/dL    Albumin 3.8 3.4 - 5.0 g/dL    Protein Total 7.2 6.8 - 8.8 g/dL    Alkaline Phosphatase 75 40 - 150 U/L    ALT 18 0 - 70 U/L    AST 20 0 - 45 U/L   Vitamin B12   Result Value Ref Range    Vitamin B12 279 193 - 986 pg/mL   CBC with platelets and differential   Result Value Ref Range    WBC 5.6 4.0 - 11.0 10e9/L    RBC Count 4.75 4.4 - 5.9 10e12/L    Hemoglobin 14.7 13.3 - 17.7 g/dL    Hematocrit 42.5 40.0 - 53.0 %    MCV 90 78 - 100 fl    MCH 30.9 26.5 - 33.0 pg    MCHC 34.6 31.5 - 36.5 g/dL    RDW 13.2 10.0 - 15.0 %    Platelet Count 158 150 - 450 10e9/L    % Neutrophils 61.4 %    % Lymphocytes 27.5 %    % Monocytes 6.8 %    % Eosinophils 4.1 %    %  Basophils 0.2 %    Absolute Neutrophil 3.4 1.6 - 8.3 10e9/L    Absolute Lymphocytes 1.5 0.8 - 5.3 10e9/L    Absolute Monocytes 0.4 0.0 - 1.3 10e9/L    Absolute Eosinophils 0.2 0.0 - 0.7 10e9/L    Absolute Basophils 0.0 0.0 - 0.2 10e9/L    Diff Method Automated Method          If you have any questions or concerns, please call the clinic at the number listed above.       Sincerely,        Herman Jarrett MD

## 2019-04-22 NOTE — PATIENT INSTRUCTIONS
I will let you know your lab results.    call for orthopedic and dermatology appointments.                              Let's also plan a follow CT of your lungs.                                                    Consider getting the shingles vaccine (Shingrix) at your pharmacy.

## 2019-04-22 NOTE — LETTER
April 29, 2019      Fish Hong  [address] [facility] [address]        Dear ,    We are writing to inform you of your test results.               According to the Radiologist,there are some tiny lung nodules which all appear benign.  No further testing is needed.         If you have any questions or concerns, please call the clinic at the number listed above.       Sincerely,        Herman Jarrett MD

## 2019-04-23 PROBLEM — E53.8 VITAMIN B12 DEFICIENCY (NON ANEMIC): Status: ACTIVE | Noted: 2019-04-23

## 2019-04-23 LAB
ALBUMIN SERPL-MCNC: 3.8 G/DL (ref 3.4–5)
ALP SERPL-CCNC: 75 U/L (ref 40–150)
ALT SERPL W P-5'-P-CCNC: 18 U/L (ref 0–70)
ANION GAP SERPL CALCULATED.3IONS-SCNC: 6 MMOL/L (ref 3–14)
AST SERPL W P-5'-P-CCNC: 20 U/L (ref 0–45)
BILIRUB SERPL-MCNC: 0.5 MG/DL (ref 0.2–1.3)
BUN SERPL-MCNC: 21 MG/DL (ref 7–30)
CALCIUM SERPL-MCNC: 9.2 MG/DL (ref 8.5–10.1)
CHLORIDE SERPL-SCNC: 109 MMOL/L (ref 94–109)
CO2 SERPL-SCNC: 24 MMOL/L (ref 20–32)
CREAT SERPL-MCNC: 1.29 MG/DL (ref 0.66–1.25)
GFR SERPL CREATININE-BSD FRML MDRD: 49 ML/MIN/{1.73_M2}
GLUCOSE SERPL-MCNC: 82 MG/DL (ref 70–99)
POTASSIUM SERPL-SCNC: 4.2 MMOL/L (ref 3.4–5.3)
PROT SERPL-MCNC: 7.2 G/DL (ref 6.8–8.8)
SODIUM SERPL-SCNC: 139 MMOL/L (ref 133–144)

## 2019-04-23 RX ORDER — UREA 10 %
500 LOTION (ML) TOPICAL DAILY
Qty: 100 TABLET | Refills: 11 | Status: SHIPPED | OUTPATIENT
Start: 2019-04-23 | End: 2022-06-15

## 2019-04-24 ENCOUNTER — OFFICE VISIT (OUTPATIENT)
Dept: ORTHOPEDICS | Facility: CLINIC | Age: 84
End: 2019-04-24
Attending: INTERNAL MEDICINE
Payer: MEDICARE

## 2019-04-24 VITALS — WEIGHT: 248 LBS | RESPIRATION RATE: 16 BRPM | BODY MASS INDEX: 34.72 KG/M2 | HEIGHT: 71 IN

## 2019-04-24 DIAGNOSIS — M67.951 TENDINOPATHY OF RIGHT GLUTEUS MEDIUS: Primary | ICD-10-CM

## 2019-04-24 RX ORDER — LIDOCAINE HYDROCHLORIDE 10 MG/ML
4 INJECTION, SOLUTION EPIDURAL; INFILTRATION; INTRACAUDAL; PERINEURAL
Status: DISCONTINUED | OUTPATIENT
Start: 2019-04-24 | End: 2020-07-09

## 2019-04-24 RX ORDER — TRIAMCINOLONE ACETONIDE 40 MG/ML
40 INJECTION, SUSPENSION INTRA-ARTICULAR; INTRAMUSCULAR
Status: DISCONTINUED | OUTPATIENT
Start: 2019-04-24 | End: 2020-07-09

## 2019-04-24 RX ADMIN — TRIAMCINOLONE ACETONIDE 40 MG: 40 INJECTION, SUSPENSION INTRA-ARTICULAR; INTRAMUSCULAR at 11:28

## 2019-04-24 RX ADMIN — LIDOCAINE HYDROCHLORIDE 4 ML: 10 INJECTION, SOLUTION EPIDURAL; INFILTRATION; INTRACAUDAL; PERINEURAL at 11:28

## 2019-04-24 ASSESSMENT — MIFFLIN-ST. JEOR: SCORE: 1817.05

## 2019-04-24 NOTE — NURSING NOTE
[address] [address]  Dept: [phone]  ______________________________________________________________________________    Patient: Fish Hong   : 1931   MRN: [id number]   2019    INVASIVE PROCEDURE SAFETY CHECKLIST    Date: 19   Procedure:Right greater trochanteric bursa kenalog injection   Patient Name: Fish Hong  MRN: [id number]  YOB: 1931    Action: Complete sections as appropriate. Any discrepancy results in a HARD COPY until resolved.     PRE PROCEDURE:  Patient ID verified with 2 identifiers (name and  or MRN): Yes  Procedure and site verified with patient/designee (when able): Yes  Accurate consent documentation in medical record: Yes  H&P (or appropriate assessment) documented in medical record: Yes  H&P must be up to 20 days prior to procedure and updates within 24 hours of procedure as applicable: NA  Relevant diagnostic and radiology test results appropriately labeled and displayed as applicable: Yes  Procedure site(s) marked with provider initials: NA    TIMEOUT:  Time-Out performed immediately prior to starting procedure, including verbal and active participation of all team members addressing the following:Yes  * Correct patient identify  * Confirmed that the correct side and site are marked  * An accurate procedure consent form  * Agreement on the procedure to be done  * Correct patient position  * Relevant images and results are properly labeled and appropriately displayed  * The need to administer antibiotics or fluids for irrigation purposes during the procedure as applicable   * Safety precautions based on patient history or medication use    DURING PROCEDURE: Verification of correct person, site, and procedures any time the responsibility for care of the patient is transferred to another member of the care team.       Prior to injection, verified patient identity using  patient's name and date of birth.  Due to injection administration, patient instructed to remain in clinic for 15 minutes  afterwards, and to report any adverse reaction to me immediately.    Tendon sheath injection was performed.     Drug Amount Wasted:  Yes: 24 mg/ml lidocaine   Vial/Syringe: Single dose vial  Expiration Date:  6/1/22      Chema Mayfield ATC  April 24, 2019

## 2019-04-24 NOTE — PROGRESS NOTES
" Subjective:   Fish Hong is a 88 year old male who presents with right lateral hip pain.  Since last being seen, he has began to redevelop right lateral hip pain.  He is previously had an injection performed by Dr. Ceballos with very good relief of his symptoms.  He does have a known history of mild to moderate left hip osteoarthrosis but denies any groin pain.  He has continued to perform the physical therapy exercises at home.  Pain is made worse with laying on the right side and with direct palpation of the lateral hip or hip abduction.  He endorses increased activity with recent yard work.    Background:   Date of injury: NA   Duration of symptoms: 6 years  Mechanism of Injury: Chronic; Unknown   Aggravating factors: climbing a ladder, lying on right side   Relieving Factors: ibuprofen, physical therapy   Prior Evaluation: Prior Physician Evalutation: , Physical Therapy and Injection 10/2018    PAST MEDICAL, SOCIAL, SURGICAL AND FAMILY HISTORY: He  has a past medical history of Hypertension and Rheumatic fever (1948).  He  has a past surgical history that includes hernia repair (11/3/2008); ENT surgery; TOTAL KNEE ARTHROPLASTY; and Circumcision (N/A, 7/13/2016).  His family history includes No Known Problems in his father and mother.  He reports that he quit smoking about 59 years ago. He started smoking about 70 years ago. He has a 11.00 pack-year smoking history. He has never used smokeless tobacco. He reports that he drinks alcohol. He reports that he does not use drugs.    ALLERGIES: He has No Known Allergies.    CURRENT MEDICATIONS: He has a current medication list which includes the following prescription(s): amlodipine, cyanocobalamin, doxazosin, fluticasone, and triamcinolone, and the following Facility-Administered Medications: lidocaine and triamcinolone.     REVIEW OF SYSTEMS: 9 point review of systems is negative except as noted above.     Exam:   Resp 16   Ht 1.803 m (5' 11\")   " Wt 112.5 kg (248 lb)   BMI 34.59 kg/m             CONSTITUTIONAL: healthy, alert and no distress  HEAD: Normocephalic.   SKIN: no suspicious lesions or rashes  GAIT: normal  NEUROLOGIC: Non-focal  PSYCHIATRIC: affect normal/bright and mentation appears normal.    MUSCULOSKELETAL:   -Right hip: Slight decreased motion with both internal and external rotation with mild discomfort upon internal rotation.  Negative logroll.  Significant tenderness palpation over the lateral hip which reproduces the patient's pain.  Weakness and discomfort with resisted hip abduction       Assessment/Plan:   #) Right lateral hip pain, gluteus medius tendinitis setting of mild to moderate right hip osteoarthritis    -Findings reviewed with the patient as above, discussed options including continued physical therapy, activity modification, and injections  -Following discussion of risks and benefits, patient elected to proceed with injection of the right trochanteric bursa/gluteus medius insertion as below without complication  -He will continue HEP  -If he does not experience relief as he has in the past, he will then follow-up at which time we will obtain repeat hip films to further evaluate his hip osteoarthrosis    Patient endorsed understanding and agreement with the above plan    David Wright MD  [facility] [place]      Large Joint Injection/Arthocentesis: R greater trochanteric bursa  Date/Time: 4/24/2019 11:28 AM  Performed by: David Wright MD  Authorized by: David Wright MD     Indications:  Pain  Needle Size:  25 G  Guidance: landmark guided    Approach:  Lateral  Location:  Hip        Site:  R greater trochanteric bursa    Medications:  4 mL lidocaine (PF) 1 %; 40 mg triamcinolone 40 MG/ML  Procedure discussed: discussed risks, benefits, and alternatives    Consent Given by:  Patient  Timeout: timeout called immediately prior to procedure    Prep: patient was prepped and draped in usual sterile  fashion     Scribed by Chema Mayfield ATC for  on 4/24/19 at 11:30AM, based on providers statements to me.

## 2019-04-24 NOTE — LETTER
4/24/2019      RE: Fish Hong  [address]  [facility] [address]        Subjective:   Fish Hong is a 88 year old male who presents with right lateral hip pain.  Since last being seen, he has began to redevelop right lateral hip pain.  He is previously had an injection performed by Dr. Ceballos with very good relief of his symptoms.  He does have a known history of mild to moderate left hip osteoarthrosis but denies any groin pain.  He has continued to perform the physical therapy exercises at home.  Pain is made worse with laying on the right side and with direct palpation of the lateral hip or hip abduction.  He endorses increased activity with recent yard work.    Background:   Date of injury: NA   Duration of symptoms: 6 years  Mechanism of Injury: Chronic; Unknown   Aggravating factors: climbing a ladder, lying on right side   Relieving Factors: ibuprofen, physical therapy   Prior Evaluation: Prior Physician Evalutation: , Physical Therapy and Injection 10/2018    PAST MEDICAL, SOCIAL, SURGICAL AND FAMILY HISTORY: He  has a past medical history of Hypertension and Rheumatic fever (1948).  He  has a past surgical history that includes hernia repair (11/3/2008); ENT surgery; TOTAL KNEE ARTHROPLASTY; and Circumcision (N/A, 7/13/2016).  His family history includes No Known Problems in his father and mother.  He reports that he quit smoking about 59 years ago. He started smoking about 70 years ago. He has a 11.00 pack-year smoking history. He has never used smokeless tobacco. He reports that he drinks alcohol. He reports that he does not use drugs.    ALLERGIES: He has No Known Allergies.    CURRENT MEDICATIONS: He has a current medication list which includes the following prescription(s): amlodipine, cyanocobalamin, doxazosin, fluticasone, and triamcinolone, and the following Facility-Administered Medications: lidocaine and triamcinolone.     REVIEW OF SYSTEMS: 9 point review  "of systems is negative except as noted above.     Exam:   Resp 16   Ht 1.803 m (5' 11\")   Wt 112.5 kg (248 lb)   BMI 34.59 kg/m              CONSTITUTIONAL: healthy, alert and no distress  HEAD: Normocephalic.   SKIN: no suspicious lesions or rashes  GAIT: normal  NEUROLOGIC: Non-focal  PSYCHIATRIC: affect normal/bright and mentation appears normal.    MUSCULOSKELETAL:   -Right hip: Slight decreased motion with both internal and external rotation with mild discomfort upon internal rotation.  Negative logroll.  Significant tenderness palpation over the lateral hip which reproduces the patient's pain.  Weakness and discomfort with resisted hip abduction       Assessment/Plan:   #) Right lateral hip pain, gluteus medius tendinitis setting of mild to moderate right hip osteoarthritis    -Findings reviewed with the patient as above, discussed options including continued physical therapy, activity modification, and injections  -Following discussion of risks and benefits, patient elected to proceed with injection of the right trochanteric bursa/gluteus medius insertion as below without complication  -He will continue HEP  -If he does not experience relief as he has in the past, he will then follow-up at which time we will obtain repeat hip films to further evaluate his hip osteoarthrosis    Patient endorsed understanding and agreement with the above plan    David Wright MD  [facility]      Large Joint Injection/Arthocentesis: R greater trochanteric bursa  Date/Time: 4/24/2019 11:28 AM  Performed by: David Wright MD  Authorized by: David Wright MD     Indications:  Pain  Needle Size:  25 G  Guidance: landmark guided    Approach:  Lateral  Location:  Hip        Site:  R greater trochanteric bursa    Medications:  4 mL lidocaine (PF) 1 %; 40 mg triamcinolone 40 MG/ML  Procedure discussed: discussed risks, benefits, and alternatives    Consent Given by:  Patient  Timeout: timeout " called immediately prior to procedure    Prep: patient was prepped and draped in usual sterile fashion     Scribed by Chema Mayfield ATC for  on 4/24/19 at 11:30AM, based on providers statements to me.            David Wright MD

## 2019-04-29 ENCOUNTER — ANCILLARY PROCEDURE (OUTPATIENT)
Dept: CT IMAGING | Facility: CLINIC | Age: 84
End: 2019-04-29
Attending: INTERNAL MEDICINE
Payer: MEDICARE

## 2019-04-29 DIAGNOSIS — R91.8 PULMONARY NODULES: ICD-10-CM

## 2019-05-03 ENCOUNTER — OFFICE VISIT (OUTPATIENT)
Dept: DERMATOLOGY | Facility: CLINIC | Age: 84
End: 2019-05-03
Attending: INTERNAL MEDICINE
Payer: MEDICARE

## 2019-05-03 DIAGNOSIS — L72.9 CYST OF SKIN: Primary | ICD-10-CM

## 2019-05-03 DIAGNOSIS — D18.01 CHERRY ANGIOMA: ICD-10-CM

## 2019-05-03 DIAGNOSIS — L82.1 SK (SEBORRHEIC KERATOSIS): ICD-10-CM

## 2019-05-03 ASSESSMENT — PAIN SCALES - GENERAL: PAINLEVEL: NO PAIN (0)

## 2019-05-03 NOTE — NURSING NOTE
Dermatology Rooming Note    Fish Hong's goals for this visit include:   Chief Complaint   Patient presents with     Derm Problem     Fish  is her for spot on his leg and neck     Caprice Isaacs, CMA

## 2019-05-03 NOTE — LETTER
5/3/2019       RE: Fish Hong  [address]  [facility] [address]     Dear Colleague,    Thank you for referring your patient, Fish Hong, to the [facility] DERMATOLOGY at [facility]. Please see a copy of my visit note below.    [facility] Dermatology Note    Dermatology Clinic  [facility]  Clinics and Surgery Center  [address] [place] [id number]    Dermatology Problem List:  1. 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 Filiform wart, cutaneous upper lip     Encounter Date: May 3, 2019    CC:  Chief Complaint   Patient presents with     Derm Problem     Fish  is her for spot on his leg and neck     History of Present Illness:  Mr. Fish Hong is a 88 year old male, with a history of NMSC, who presents in self referral for concerning lesions on his right leg. Her last visit was on 10/30/2019 with Dr. Buchanan for a post-op wound review. There were not concerns at that time. Today he states concerns on the right side of his neck, and right lower leg. The lesion on his right leg for as long as he can remember. No other concerns at this time.    Past Medical History:   Patient Active Problem List   Diagnosis     Essential hypertension     Pulmonary nodules     Preventive measure     Atopic dermatitis, unspecified type     Spinal stenosis of lumbar region without neurogenic claudication     Greater trochanteric bursitis, right     Gastroesophageal reflux disease without esophagitis     History of smoking     Vitamin B12 deficiency (non anemic)     Past Medical History:   Diagnosis Date     Hypertension      Rheumatic fever 1948     Past Surgical History:   Procedure Laterality Date     C TOTAL KNEE ARTHROPLASTY      Bilateral     CIRCUMCISION N/A 7/13/2016    Procedure: CIRCUMCISION;  Surgeon: Elgin Rhodes MD;  Location: [facility] OR     ENT SURGERY      detached retina--bilateral      HERNIA REPAIR  11/3/2008    mesh repair umbilical hernia       Social History:  Patient reports that he quit smoking about 59 years ago. He started smoking about 70 years ago. He has a 11.00 pack-year smoking history. He has never used smokeless tobacco. He reports that he drinks alcohol. He reports that he does not use drugs.    Family History:  Family History   Problem Relation Age of Onset     No Known Problems Mother      No Known Problems Father      Melanoma No family hx of      Skin Cancer No family hx of        Medications:  Current Outpatient Medications   Medication Sig Dispense Refill     amLODIPine (NORVASC) 10 MG tablet Take 1 tablet (10 mg) by mouth daily 90 tablet 3     cyanocobalamin (VITAMIN B-12) 500 MCG tablet Take 1 tablet (500 mcg) by mouth daily 100 tablet 11     doxazosin (CARDURA) 4 MG tablet Take 1 tablet (4 mg) by mouth At Bedtime 90 tablet 3     fluticasone (FLONASE) 50 MCG/ACT spray Spray 1-2 sprays into both nostrils daily 1 Bottle 11     triamcinolone (KENALOG) 0.1 % external cream Apply sparingly to affected area two times daily as needed (left ankle.lower leg) 80 g 3       No Known Allergies    Review of Systems:  -As per HPI  -Otherwise nml state of health. No other skin concerns.      Physical exam:  Vitals: There were no vitals taken for this visit.  GEN: This is a well developed, well-nourished male in no acute distress, in a pleasant mood.    SKIN: Total skin excluding the undergarment areas was performed. The exam included the head/face, neck, both arms, chest, back, abdomen, both legs, digits and/or nails.   >> R leg dorsal, 7mm exophytic hard lesion. Superficial in the skin and not related to the dermis   >> Angiomas and SKs of the trunk and extremeites  >> AKs on the tip of R ear and forehead  -No other lesions of concern on areas examined.       Impression/Plan:  1. Actinic Keratosis of the R ear and Forehead, very superficial. >> No intervention at this time, will monitor  in future visits.    2. Cyst, left clavicle    Benign nature reassured. Provided option to come back to have the cyst excised, however pt declines excision at this time     3. Seborrheic keratosis, back    Benign nature reassured. No further intervention required.      4. Cherry angiomas, back     Benign nature reassured. No further intervention required.      5.  Dermatofibroma, right lower leg    Benign nature reassured. No further intervention required.       CC Herman Jarrett MD  [address] [place] [address] on close of this encounter.    Follow-up in 1 year, earlier for new or changing lesions.       Staff Involved:    Scribe Disclosure  I, Elgin Esteban, am serving as a scribe to document services personally performed by Dr. Sean Beckham, based on data collection and the provider's statements to me.     Start Time: 11:04 AM   End Time: 11:09 AM     I spent a total of 5 min face to face with Fish Hong during today's office visit. About 50% of the time was spent counseling the patient and/or coordinating care regarding their allergy.        Again, thank you for allowing me to participate in the care of your patient.      Sincerely,    Sean Beckham MD

## 2019-05-03 NOTE — PROGRESS NOTES
[facility] Dermatology Note    Dermatology Clinic  [facility]  Clinics and Surgery Center  [address] [place] [phone]    Dermatology Problem List:  1. 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 Filiform wart, cutaneous upper lip     Encounter Date: May 3, 2019    CC:  Chief Complaint   Patient presents with     Derm Problem     Fish  is her for spot on his leg and neck     History of Present Illness:  Mr. Fish Hong is a 88 year old male, with a history of NMSC, who presents in self referral for concerning lesions on his right leg. Her last visit was on 10/30/2019 with Dr. Buchanan for a post-op wound review. There were not concerns at that time. Today he states concerns on the right side of his neck, and right lower leg. The lesion on his right leg for as long as he can remember. No other concerns at this time.    Past Medical History:   Patient Active Problem List   Diagnosis     Essential hypertension     Pulmonary nodules     Preventive measure     Atopic dermatitis, unspecified type     Spinal stenosis of lumbar region without neurogenic claudication     Greater trochanteric bursitis, right     Gastroesophageal reflux disease without esophagitis     History of smoking     Vitamin B12 deficiency (non anemic)     Past Medical History:   Diagnosis Date     Hypertension      Rheumatic fever 1948     Past Surgical History:   Procedure Laterality Date     C TOTAL KNEE ARTHROPLASTY      Bilateral     CIRCUMCISION N/A 7/13/2016    Procedure: CIRCUMCISION;  Surgeon: Elgin Rhodes MD;  Location: [facility] OR     ENT SURGERY      detached retina--bilateral     HERNIA REPAIR  11/3/2008    mesh repair umbilical hernia       Social History:  Patient reports that he quit smoking about 59 years ago. He started smoking about 70 years ago. He has a 11.00 pack-year smoking history. He has never used smokeless tobacco. He reports that he drinks alcohol. He  reports that he does not use drugs.    Family History:  Family History   Problem Relation Age of Onset     No Known Problems Mother      No Known Problems Father      Melanoma No family hx of      Skin Cancer No family hx of        Medications:  Current Outpatient Medications   Medication Sig Dispense Refill     amLODIPine (NORVASC) 10 MG tablet Take 1 tablet (10 mg) by mouth daily 90 tablet 3     cyanocobalamin (VITAMIN B-12) 500 MCG tablet Take 1 tablet (500 mcg) by mouth daily 100 tablet 11     doxazosin (CARDURA) 4 MG tablet Take 1 tablet (4 mg) by mouth At Bedtime 90 tablet 3     fluticasone (FLONASE) 50 MCG/ACT spray Spray 1-2 sprays into both nostrils daily 1 Bottle 11     triamcinolone (KENALOG) 0.1 % external cream Apply sparingly to affected area two times daily as needed (left ankle.lower leg) 80 g 3       No Known Allergies    Review of Systems:  -As per HPI  -Otherwise nml state of health. No other skin concerns.      Physical exam:  Vitals: There were no vitals taken for this visit.  GEN: This is a well developed, well-nourished male in no acute distress, in a pleasant mood.    SKIN: Total skin excluding the undergarment areas was performed. The exam included the head/face, neck, both arms, chest, back, abdomen, both legs, digits and/or nails.   >> R leg dorsal, 7mm exophytic hard lesion. Superficial in the skin and not related to the dermis   >> Angiomas and SKs of the trunk and extremeites  >> AKs on the tip of R ear and forehead  -No other lesions of concern on areas examined.       Impression/Plan:  1. Actinic Keratosis of the R ear and Forehead, very superficial. >> No intervention at this time, will monitor in future visits.    2. Cyst, left clavicle    Benign nature reassured. Provided option to come back to have the cyst excised, however pt declines excision at this time     3. Seborrheic keratosis, back    Benign nature reassured. No further intervention required.      4. Cherry angiomas, back      Benign nature reassured. No further intervention required.      5.  Dermatofibroma, right lower leg    Benign nature reassured. No further intervention required.       CC Herman Jarrett MD  [address] [place] [address] on close of this encounter.    Follow-up in 1 year, earlier for new or changing lesions.       Staff Involved:    Scribe Disclosure  I, Elgin Esteban, am serving as a scribe to document services personally performed by Dr. Sean Beckham, based on data collection and the provider's statements to me.     Start Time: 11:04 AM   End Time: 11:09 AM     I spent a total of 5 min face to face with Fish Hong during today's office visit. About 50% of the time was spent counseling the patient and/or coordinating care regarding their allergy.

## 2019-05-16 DIAGNOSIS — I10 ESSENTIAL HYPERTENSION: Primary | ICD-10-CM

## 2019-05-16 NOTE — TELEPHONE ENCOUNTER
"Requested Prescriptions   Pending Prescriptions Disp Refills     amLODIPine (NORVASC) 10 MG tablet  Last Written Prescription Date:  4/9/2018  Last Fill Quantity: 90 tablet,  # refills: 3   Last office visit: 4/22/2019 with prescribing provider:  Kolby   Future Office Visit:     90 tablet 3     Sig: Take 1 tablet (10 mg) by mouth daily       Calcium Channel Blockers Protocol  Failed - 5/16/2019  2:22 PM        Failed - Normal serum creatinine on file in past 12 months     Recent Labs   Lab Test 04/22/19  1010   CR 1.29*             Passed - Blood pressure under 140/90 in past 12 months     BP Readings from Last 3 Encounters:   04/22/19 138/74   10/15/18 136/76   07/18/18 111/61                 Passed - Recent (12 mo) or future (30 days) visit within the authorizing provider's specialty     Patient had office visit in the last 12 months or has a visit in the next 30 days with authorizing provider or within the authorizing provider's specialty.  See \"Patient Info\" tab in inbasket, or \"Choose Columns\" in Meds & Orders section of the refill encounter.              Passed - Medication is active on med list        Passed - Patient is age 18 or older        doxazosin (CARDURA) 4 MG tablet  Last Written Prescription Date:  4/9/2018  Last Fill Quantity: 90 tablet,  # refills: 3   Last office visit: 4/22/2019 with prescribing provider:  Kolby   Future Office Visit:     90 tablet 3     Sig: Take 1 tablet (4 mg) by mouth At Bedtime       Alpha Blockers Passed - 5/16/2019  2:22 PM        Passed - Blood pressure under 140/90 in past 12 months     BP Readings from Last 3 Encounters:   04/22/19 138/74   10/15/18 136/76   07/18/18 111/61                 Passed - Recent (12 mo) or future (30 days) visit within the authorizing provider's specialty     Patient had office visit in the last 12 months or has a visit in the next 30 days with authorizing provider or within the authorizing provider's specialty.  See \"Patient Info\" tab in " "inbasket, or \"Choose Columns\" in Meds & Orders section of the refill encounter.              Passed - Patient does not have Tadalafil, Vardenafil, or Sildenafil on their medication list        Passed - Medication is active on med list        Passed - Patient is 18 years of age or older           "

## 2019-05-20 RX ORDER — AMLODIPINE BESYLATE 10 MG/1
10 TABLET ORAL DAILY
Qty: 90 TABLET | Refills: 2 | Status: SHIPPED | OUTPATIENT
Start: 2019-05-20 | End: 2020-06-30

## 2019-05-20 RX ORDER — DOXAZOSIN 4 MG/1
4 TABLET ORAL AT BEDTIME
Qty: 90 TABLET | Refills: 2 | Status: SHIPPED | OUTPATIENT
Start: 2019-05-20 | End: 2020-06-30

## 2019-05-20 NOTE — TELEPHONE ENCOUNTER
Routing refill request to provider for review/approval because:  Labs out of range:  Cr 1.29  No associated diagnosis for doxazosin

## 2019-06-19 DIAGNOSIS — J20.9 ACUTE BRONCHITIS, UNSPECIFIED ORGANISM: ICD-10-CM

## 2019-06-19 NOTE — TELEPHONE ENCOUNTER
"Requested Prescriptions   Pending Prescriptions Disp Refills     fluticasone (FLONASE) 50 MCG/ACT nasal spray [Pharmacy Med Name: FLUTICASONE PROP 50 MCG SPRAY]  Last Written Prescription Date:  6/25/2018  Last Fill Quantity: 1 Bottle,  # refills: 11   Last office visit: 4/22/2019 with prescribing provider:  Kolby   Future Office Visit:     16 mL 11     Sig: USE 1-2 SPRAYS INTO BOTH NOSTRILS DAILY       Inhaled Steroids Protocol Passed - 6/19/2019  1:12 AM        Passed - Patient is age 12 or older        Passed - Recent (12 mo) or future (30 days) visit within the authorizing provider's specialty     Patient had office visit in the last 12 months or has a visit in the next 30 days with authorizing provider or within the authorizing provider's specialty.  See \"Patient Info\" tab in inbasket, or \"Choose Columns\" in Meds & Orders section of the refill encounter.              Passed - Medication is active on med list           "

## 2019-06-24 RX ORDER — FLUTICASONE PROPIONATE 50 MCG
SPRAY, SUSPENSION (ML) NASAL
Qty: 16 ML | Refills: 9 | Status: SHIPPED | OUTPATIENT
Start: 2019-06-24 | End: 2020-06-30

## 2019-07-23 DIAGNOSIS — M25.552 BILATERAL HIP PAIN: Primary | ICD-10-CM

## 2019-07-23 DIAGNOSIS — M25.551 BILATERAL HIP PAIN: Primary | ICD-10-CM

## 2019-07-23 NOTE — TELEPHONE ENCOUNTER
RECORDS RECEIVED FROM: left hip pain. Similar pain to right hip before he got his shot. appt per pt.   DATE RECEIVED: 7/23   NOTES STATUS DETAILS   OFFICE NOTE from referring provider N/A    OFFICE NOTE from other specialist Internal    DISCHARGE SUMMARY from hospital N/A    DISCHARGE REPORT from the ER N/A    OPERATIVE REPORT N/A    MEDICATION LIST Internal    IMPLANT RECORD/STICKER N/A    LABS     CBC/DIFF Internal    CULTURES N/A    INJECTIONS Internal    MRI Internal LUMBAR 2013   CT SCAN N/A    XRAYS (IMAGES & REPORTS) Internal 2013   TUMOR     PATHOLOGY  Slides & report N/A

## 2019-07-24 ENCOUNTER — ANCILLARY PROCEDURE (OUTPATIENT)
Dept: GENERAL RADIOLOGY | Facility: CLINIC | Age: 84
End: 2019-07-24
Payer: MEDICARE

## 2019-07-24 ENCOUNTER — PRE VISIT (OUTPATIENT)
Dept: ORTHOPEDICS | Facility: CLINIC | Age: 84
End: 2019-07-24

## 2019-07-24 ENCOUNTER — OFFICE VISIT (OUTPATIENT)
Dept: ORTHOPEDICS | Facility: CLINIC | Age: 84
End: 2019-07-24
Payer: MEDICARE

## 2019-07-24 VITALS — BODY MASS INDEX: 34.44 KG/M2 | WEIGHT: 246 LBS | HEIGHT: 71 IN | RESPIRATION RATE: 18 BRPM

## 2019-07-24 DIAGNOSIS — M25.551 BILATERAL HIP PAIN: ICD-10-CM

## 2019-07-24 DIAGNOSIS — M25.552 BILATERAL HIP PAIN: ICD-10-CM

## 2019-07-24 DIAGNOSIS — M16.0 OSTEOARTHRITIS OF BOTH HIPS, UNSPECIFIED OSTEOARTHRITIS TYPE: ICD-10-CM

## 2019-07-24 DIAGNOSIS — M70.62 TROCHANTERIC BURSITIS OF LEFT HIP: ICD-10-CM

## 2019-07-24 DIAGNOSIS — M67.952 TENDINOPATHY OF LEFT GLUTEUS MEDIUS: Primary | ICD-10-CM

## 2019-07-24 RX ORDER — TRIAMCINOLONE ACETONIDE 40 MG/ML
40 INJECTION, SUSPENSION INTRA-ARTICULAR; INTRAMUSCULAR
Status: DISCONTINUED | OUTPATIENT
Start: 2019-07-24 | End: 2020-07-09

## 2019-07-24 RX ORDER — LIDOCAINE HYDROCHLORIDE 10 MG/ML
4 INJECTION, SOLUTION INFILTRATION; PERINEURAL
Status: DISCONTINUED | OUTPATIENT
Start: 2019-07-24 | End: 2020-07-09

## 2019-07-24 RX ADMIN — TRIAMCINOLONE ACETONIDE 40 MG: 40 INJECTION, SUSPENSION INTRA-ARTICULAR; INTRAMUSCULAR at 09:57

## 2019-07-24 RX ADMIN — LIDOCAINE HYDROCHLORIDE 4 ML: 10 INJECTION, SOLUTION INFILTRATION; PERINEURAL at 09:57

## 2019-07-24 ASSESSMENT — MIFFLIN-ST. JEOR: SCORE: 1807.98

## 2019-07-24 NOTE — LETTER
7/24/2019      RE: Fish Hong  [address]  [facility] [address]        Subjective:   Fish Hong is a 88 year old male who presents with left hip pain for about 8 days. He notes that the pain started last Tuesday, there was no trauma. He just had gradual onset of lateral hip pain. He reports this is similar to the symptoms he had on the right side in April when he came in and got a trochanteric bursae injection.     He also slipped on some stairs a few days ago but did not fall. His knee is a little sore from that slip.     He has pain sleeping at night since he tends to sleep on his left side. Mild groin pain but most of his pain is on the lateral aspect of his hip.     Background:   Date of injury: NA   Duration of symptoms: 1.5 weeks  Mechanism of Injury: Insidious Onset; Unknown   Aggravating factors: Sitting,standing   Relieving Factors: rest  Prior Evaluation: None    PAST MEDICAL, SOCIAL, SURGICAL AND FAMILY HISTORY: He  has a past medical history of Hypertension and Rheumatic fever (1948).  He  has a past surgical history that includes hernia repair (11/3/2008); ENT surgery; TOTAL KNEE ARTHROPLASTY; and Circumcision (N/A, 7/13/2016).  His family history includes No Known Problems in his father and mother.  He reports that he quit smoking about 59 years ago. He started smoking about 70 years ago. He has a 11.00 pack-year smoking history. He has never used smokeless tobacco. He reports that he drinks alcohol. He reports that he does not use drugs.    ALLERGIES: He has No Known Allergies.    CURRENT MEDICATIONS: He has a current medication list which includes the following prescription(s): amlodipine, cyanocobalamin, doxazosin, fluticasone, and triamcinolone, and the following Facility-Administered Medications: lidocaine (pf), lidocaine, triamcinolone, and triamcinolone.     REVIEW OF SYSTEMS: 3 point review of systems is negative except as noted above.     Exam:   Resp 18   Ht  "1.803 m (5' 11\")   Wt 111.6 kg (246 lb)   BMI 34.31 kg/m              CONSTITUTIONAL: healthy, alert and no distress  HEAD: Normocephalic. No masses, lesions, tenderness or abnormalities  SKIN: no suspicious lesions or rashes  GAIT: antalgic  NEUROLOGIC: Non-focal  PSYCHIATRIC: affect normal/bright and mentation appears normal.    MUSCULOSKELETAL:     TTP right greater trochanter  Good hip flexion, external rotation, and internal rotation without hip pain    Trochanteric Bursitis Injection (left)  After discussing the indications, risks, expected benefits, and formal consent obtained, left landmark-guided trochanteric bursitis injection was performed. The patient was initially placed on his right side and his greater trochanter, and site of most discomfort, was located via palpation and the area was marked. Chloraprep was used to clean the skin. Ethyl Chloride spray was used to assist in numbing the area for injection. 4mL of 1% lidocaine and 40mg of Kenalog were injected into the bursae using a 25G, 90mm needle. The wound was dressed with a bandaid. The procedure was well-tolerated. Post injection instructions given.      Assessment/Plan:   1. Trochanteric Bursitis/Gluteus medius tendinitis  2. Bilateral hip OA    -discussed this exam and XR findings with Fish. Although he does have bilateral hip OA, his symptoms today seem more consistent with trochanteric bursitis/gluteus medius tendinitis  -injection as above  -will return to clinic if this injection does not help alleviate his symptoms  -we did discuss ultrasound-guided injections as an alternative if symptoms persist  -follow up as needed    X-RAY INTERPRETATION:   X-Ray of the Hip: 2-view, ap pelvis, Right Frogleg Lateral ordered and interpreted in the office today was positive for bilateral hip joint space narrowing (R>L) suggestive of moderate hip OA.     Patient seen and discussed with Dr. Soniya Merchant MD  Primary Care Sports Medicine " Fellow    Large Joint Injection/Arthocentesis: L greater trochanteric bursa  Date/Time: 7/24/2019 9:57 AM  Performed by: Selam Merchant MD  Authorized by: Selam Merchant MD     Indications:  Pain  Needle Size:  25 G  Guidance: landmark guided    Approach:  Lateral  Location:  Hip      Site:  L greater trochanteric bursa  Medications:  40 mg triamcinolone 40 MG/ML; 4 mL lidocaine 1 %  Procedure discussed: discussed risks, benefits, and alternatives    Consent Given by:  Patient  Timeout: timeout called immediately prior to procedure    Prep: patient was prepped and draped in usual sterile fashion     Scribed by Chema Mayfield ATC for  on 7/24/19 at 9:45AM, based on providers statements to me.              Attending Note:   I have personally examined this patient and have reviewed the clinical presentation and progress note with the fellow. I agree with the treatment plan as outlined. The plan was formulated with the fellow on the day of the patient's visit. I have reviewed all imaging with the fellow and agree with the findings in the documentation. I have supervised the injection.      Beatrice Byrnes MD, CAQ, CCD  [facility]  Sports Medicine and Bone Health    Selam Merchant MD

## 2019-07-24 NOTE — NURSING NOTE
[address] [address]  Dept: [phone]  ______________________________________________________________________________    Patient: Fish Hong   : 1931   MRN: [id number]   2019    INVASIVE PROCEDURE SAFETY CHECKLIST    Date: 19   Procedure:Left greater trochanteric bursa kenalog injection  Patient Name: Fish Hong  MRN: [id number]  YOB: 1931    Action: Complete sections as appropriate. Any discrepancy results in a HARD COPY until resolved.     PRE PROCEDURE:  Patient ID verified with 2 identifiers (name and  or MRN): Yes  Procedure and site verified with patient/designee (when able): Yes  Accurate consent documentation in medical record: Yes  H&P (or appropriate assessment) documented in medical record: Yes  H&P must be up to 20 days prior to procedure and updates within 24 hours of procedure as applicable: NA  Relevant diagnostic and radiology test results appropriately labeled and displayed as applicable: Yes  Procedure site(s) marked with provider initials: NA    TIMEOUT:  Time-Out performed immediately prior to starting procedure, including verbal and active participation of all team members addressing the following:Yes  * Correct patient identify  * Confirmed that the correct side and site are marked  * An accurate procedure consent form  * Agreement on the procedure to be done  * Correct patient position  * Relevant images and results are properly labeled and appropriately displayed  * The need to administer antibiotics or fluids for irrigation purposes during the procedure as applicable   * Safety precautions based on patient history or medication use    DURING PROCEDURE: Verification of correct person, site, and procedures any time the responsibility for care of the patient is transferred to another member of the care team.       Prior to injection, verified patient identity using  patient's name and date of birth.  Due to injection administration, patient instructed to remain in clinic for 15 minutes  afterwards, and to report any adverse reaction to me immediately.    Bursa injection was performed.      Drug Amount Wasted:  Yes: 26 mg/ml lidocaine   Vial/Syringe: Single dose vial  Expiration Date:  4/23      Chema Mayfield ATC  July 24, 2019

## 2019-07-24 NOTE — PROGRESS NOTES
Attending Note:   I have personally examined this patient and have reviewed the clinical presentation and progress note with the fellow. I agree with the treatment plan as outlined. The plan was formulated with the fellow on the day of the patient's visit. I have reviewed all imaging with the fellow and agree with the findings in the documentation. I have supervised the injection.      Beatrice Byrnes MD, CAQ, CCD  [facility]  Sports Medicine and Bone Health

## 2019-07-24 NOTE — PROGRESS NOTES
" Subjective:   Fish Hong is a 88 year old male who presents with left hip pain for about 8 days. He notes that the pain started last Tuesday, there was no trauma. He just had gradual onset of lateral hip pain. He reports this is similar to the symptoms he had on the right side in April when he came in and got a trochanteric bursae injection.     He also slipped on some stairs a few days ago but did not fall. His knee is a little sore from that slip.     He has pain sleeping at night since he tends to sleep on his left side. Mild groin pain but most of his pain is on the lateral aspect of his hip.     Background:   Date of injury: NA   Duration of symptoms: 1.5 weeks  Mechanism of Injury: Insidious Onset; Unknown   Aggravating factors: Sitting,standing   Relieving Factors: rest  Prior Evaluation: None    PAST MEDICAL, SOCIAL, SURGICAL AND FAMILY HISTORY: He  has a past medical history of Hypertension and Rheumatic fever (1948).  He  has a past surgical history that includes hernia repair (11/3/2008); ENT surgery; TOTAL KNEE ARTHROPLASTY; and Circumcision (N/A, 7/13/2016).  His family history includes No Known Problems in his father and mother.  He reports that he quit smoking about 59 years ago. He started smoking about 70 years ago. He has a 11.00 pack-year smoking history. He has never used smokeless tobacco. He reports that he drinks alcohol. He reports that he does not use drugs.    ALLERGIES: He has No Known Allergies.    CURRENT MEDICATIONS: He has a current medication list which includes the following prescription(s): amlodipine, cyanocobalamin, doxazosin, fluticasone, and triamcinolone, and the following Facility-Administered Medications: lidocaine (pf), lidocaine, triamcinolone, and triamcinolone.     REVIEW OF SYSTEMS: 3 point review of systems is negative except as noted above.     Exam:   Resp 18   Ht 1.803 m (5' 11\")   Wt 111.6 kg (246 lb)   BMI 34.31 kg/m             CONSTITUTIONAL: " healthy, alert and no distress  HEAD: Normocephalic. No masses, lesions, tenderness or abnormalities  SKIN: no suspicious lesions or rashes  GAIT: antalgic  NEUROLOGIC: Non-focal  PSYCHIATRIC: affect normal/bright and mentation appears normal.    MUSCULOSKELETAL:     TTP right greater trochanter  Good hip flexion, external rotation, and internal rotation without hip pain    Trochanteric Bursitis Injection (left)  After discussing the indications, risks, expected benefits, and formal consent obtained, left landmark-guided trochanteric bursitis injection was performed. The patient was initially placed on his right side and his greater trochanter, and site of most discomfort, was located via palpation and the area was marked. Chloraprep was used to clean the skin. Ethyl Chloride spray was used to assist in numbing the area for injection. 4mL of 1% lidocaine and 40mg of Kenalog were injected into the bursae using a 25G, 90mm needle. The wound was dressed with a bandaid. The procedure was well-tolerated. Post injection instructions given.      Assessment/Plan:   1. Trochanteric Bursitis/Gluteus medius tendinitis  2. Bilateral hip OA    -discussed this exam and XR findings with Fish. Although he does have bilateral hip OA, his symptoms today seem more consistent with trochanteric bursitis/gluteus medius tendinitis  -injection as above  -will return to clinic if this injection does not help alleviate his symptoms  -we did discuss ultrasound-guided injections as an alternative if symptoms persist  -follow up as needed    X-RAY INTERPRETATION:   X-Ray of the Hip: 2-view, ap pelvis, Right Frogleg Lateral ordered and interpreted in the office today was positive for bilateral hip joint space narrowing (R>L) suggestive of moderate hip OA.     Patient seen and discussed with Dr. Soniya Merchant MD  Primary Care Sports Medicine Fellow    Large Joint Injection/Arthocentesis: L greater trochanteric bursa  Date/Time:  7/24/2019 9:57 AM  Performed by: Selam Merchant MD  Authorized by: Selam Merchant MD     Indications:  Pain  Needle Size:  25 G  Guidance: landmark guided    Approach:  Lateral  Location:  Hip      Site:  L greater trochanteric bursa  Medications:  40 mg triamcinolone 40 MG/ML; 4 mL lidocaine 1 %  Procedure discussed: discussed risks, benefits, and alternatives    Consent Given by:  Patient  Timeout: timeout called immediately prior to procedure    Prep: patient was prepped and draped in usual sterile fashion     Scribed by Chema Mayfield ATC for  on 7/24/19 at 9:45AM, based on providers statements to me.

## 2019-08-08 ENCOUNTER — OFFICE VISIT (OUTPATIENT)
Dept: UROLOGY | Facility: CLINIC | Age: 84
End: 2019-08-08
Payer: MEDICARE

## 2019-08-08 VITALS
SYSTOLIC BLOOD PRESSURE: 149 MMHG | HEART RATE: 71 BPM | WEIGHT: 245 LBS | HEIGHT: 71 IN | BODY MASS INDEX: 34.3 KG/M2 | DIASTOLIC BLOOD PRESSURE: 76 MMHG

## 2019-08-08 DIAGNOSIS — N43.3 HYDROCELE, UNSPECIFIED HYDROCELE TYPE: Primary | ICD-10-CM

## 2019-08-08 ASSESSMENT — MIFFLIN-ST. JEOR: SCORE: 1803.44

## 2019-08-08 ASSESSMENT — PAIN SCALES - GENERAL: PAINLEVEL: NO PAIN (0)

## 2019-08-08 NOTE — PROGRESS NOTES
"Urology Clinic Progress Note  08/08/19       HPI/Subjective: Fish Hong is a 88 year old male with a hx of HTN and  hx of phimosis, s/p circumcision in 07/2016, presenting with bilateral hydroceles, L>R.     Patient has had the left hydrocele for approximately 10 years and it has been gradually worsening. Has not noticed right testicular swelling. The left hydrocele has gotten significantly larger over the last 2-3 years. No pain at baseline but if you squeeze it together or it gets caught in his shorts he can have pain. No other exacerbating or alleviating factors. Size fluctuates. No f/c/n/v. Able to urinate okay.     Lower Urinary Tract Symptoms, at present:    Frequency: q2h    Urgency: + urges sometimes.  Sometimes large urge and not much flow.    Nocturia 1-2x/night    Weakness of stream: weak stream    Intermittency: steady    Straining: yes    Emptying:  not feeling empty always s.    Hematuria or dysuria: none     Objective:  BP (!) 149/76   Pulse 71   Ht 1.803 m (5' 11\")   Wt 111.1 kg (245 lb)   BMI 34.17 kg/m    GENERAL: No acute distress. Well nourished.   HEENT:  Sclerae anicteric.  Conjunctivae pink.  Moist mucous membranes.  NECK:  Supple.  No lymphadenopathy.  CARDIAC:  Regular rate and rhythm.  LUNGS:  Non-labored breathing  BACK:  No costovertebral tenderness.  ABDOMEN: Soft, non-tender, no surgical scars, no organomegaly, non-tender.  :  Phallus circumcised, meatus adequate, no plaques palpated. Testes descended bilaterally, no intratesticular masses.  Epididymes non-tender.  No varicoceles or inguinal hernias. Large left hydrocele present, small right hydrocele.  SKIN: No rashes.  Dry.     EXTREMITIES:  Warm, well perfused.  No lower extremity edema bilaterally.  NEURO: normal gait, no focal deficits.     Imaging:  US Testicular 11/10/2016:  Impression:   1.  Heterogeneous echotexture throughout the testicles, left greater  than right, with a somewhat striated appearance as can " be seen in  lymphoma/leukemia or benign conditions such as fibrosis. Additionally  there is nonspecific left paratesticular soft tissue prominence.  2. Trace bilateral hydroceles.  3.  Incidental right epididymal head cyst and inferior left  scrotolith.     Assessment & Plan: Fish Hong is a 88 year old male with bilateral hydroceles, L>R.  Risks and benefits of hydrocelectomy vs. Needle aspiration were discussed. Decision was made to attempt needle aspiration. We can re-evaluate in the event that he experiences recurrence.    -he'll call to schedule Left hydrocele needle aspiration and doxycycline sclerosis    Emory Daly MD  Urology Resident     I saw and examined the patient with the resident today.  I agree with the resident note and plan of care as above.   Discussed 10% risk of bothersome testis pain, possible long term, after the aspiration and sclerosis procedure.    15min visit, over 50% face to face in counseling/discussion of above issues.     Yobani Valenzuela MD  Urology Staff

## 2019-08-08 NOTE — PATIENT INSTRUCTIONS
Artemio make a appointment for a hydrocele drain in clinic       It was a pleasure meeting with you today.  Thank you for allowing me and my team the privilege of caring for you today.  YOU are the reason we are here, and I truly hope we provided you with the excellent service you deserve.  Please let us know if there is anything else we can do for you so that we can be sure you are leaving completely satisfied with your care experience.

## 2019-08-08 NOTE — LETTER
"8/8/2019       RE: Fish Hong  [address]  [facility] [address]     Dear Colleague,    Thank you for referring your patient, Fish Hong, to the [facility] UROLOGY AND INST FOR PROSTATE AND UROLOGIC CANCERS at [facility]. Please see a copy of my visit note below.    Urology Clinic Progress Note  08/08/19       HPI/Subjective: Fish Hong is a 88 year old male with a hx of HTN and  hx of phimosis, s/p circumcision in 07/2016, presenting with bilateral hydroceles, L>R.     Patient has had the left hydrocele for approximately 10 years and it has been gradually worsening. Has not noticed right testicular swelling. The left hydrocele has gotten significantly larger over the last 2-3 years. No pain at baseline but if you squeeze it together or it gets caught in his shorts he can have pain. No other exacerbating or alleviating factors. Size fluctuates. No f/c/n/v. Able to urinate okay.     Lower Urinary Tract Symptoms, at present:    Frequency: q2h    Urgency: + urges sometimes.  Sometimes large urge and not much flow.    Nocturia 1-2x/night    Weakness of stream: weak stream    Intermittency: steady    Straining: yes    Emptying:  not feeling empty always s.    Hematuria or dysuria: none     Objective:  BP (!) 149/76   Pulse 71   Ht 1.803 m (5' 11\")   Wt 111.1 kg (245 lb)   BMI 34.17 kg/m     GENERAL: No acute distress. Well nourished.   HEENT:  Sclerae anicteric.  Conjunctivae pink.  Moist mucous membranes.  NECK:  Supple.  No lymphadenopathy.  CARDIAC:  Regular rate and rhythm.  LUNGS:  Non-labored breathing  BACK:  No costovertebral tenderness.  ABDOMEN: Soft, non-tender, no surgical scars, no organomegaly, non-tender.  :  Phallus circumcised, meatus adequate, no plaques palpated. Testes descended bilaterally, no intratesticular masses.  Epididymes non-tender.  No varicoceles or inguinal hernias. Large left hydrocele present, small right " hydrocele.  SKIN: No rashes.  Dry.     EXTREMITIES:  Warm, well perfused.  No lower extremity edema bilaterally.  NEURO: normal gait, no focal deficits.     Imaging:  US Testicular 11/10/2016:  Impression:   1.  Heterogeneous echotexture throughout the testicles, left greater  than right, with a somewhat striated appearance as can be seen in  lymphoma/leukemia or benign conditions such as fibrosis. Additionally  there is nonspecific left paratesticular soft tissue prominence.  2. Trace bilateral hydroceles.  3.  Incidental right epididymal head cyst and inferior left  scrotolith.     Assessment & Plan: Fish Hong is a 88 year old male with bilateral hydroceles, L>R.  Risks and benefits of hydrocelectomy vs. Needle aspiration were discussed. Decision was made to attempt needle aspiration. We can re-evaluate in the event that he experiences recurrence.    -he'll call to schedule Left hydrocele needle aspiration and doxycycline sclerosis    Emory Daly MD  Urology Resident     I saw and examined the patient with the resident today.  I agree with the resident note and plan of care as above.   Discussed 10% risk of bothersome testis pain, possible long term, after the aspiration and sclerosis procedure.    15min visit, over 50% face to face in counseling/discussion of above issues.     Yobani Valenzuela MD  Urology Staff     Again, thank you for allowing me to participate in the care of your patient.      Sincerely,    Yobani Valenzuela MD

## 2019-08-20 ENCOUNTER — PRE VISIT (OUTPATIENT)
Dept: UROLOGY | Facility: CLINIC | Age: 84
End: 2019-08-20

## 2019-08-20 NOTE — TELEPHONE ENCOUNTER
Reason for Visit: Hydrocele aspiration and drain, no prep    Diagnosis: hydrocele    Orders/Procedures/Records: in system    Contact Patient: n/a    Rooming Requirements: hydrocele aspiration and drain, as Dr. Valenzuela how much amanda Allan, VIRAJ  08/20/19  7:12 AM

## 2019-08-22 ENCOUNTER — OFFICE VISIT (OUTPATIENT)
Dept: ORTHOPEDICS | Facility: CLINIC | Age: 84
End: 2019-08-22
Payer: MEDICARE

## 2019-08-22 DIAGNOSIS — M16.0 OSTEOARTHRITIS OF BOTH HIPS, UNSPECIFIED OSTEOARTHRITIS TYPE: Primary | ICD-10-CM

## 2019-08-22 NOTE — LETTER
8/22/2019      RE: Fish Hong  [address]  [facility] [address]        Subjective:   Fish Hong is a 88 year old male who is here for follow up of left hip pain. He underwent landmark-guided trochanteric bursae injection on the left on 7/24/19 without significant improvement in pain. He is mostly noticing anterior groin pain bilaterally. No radiating of pain down leg.     At his last visit, we discussed bilateral hip OA evidenced on his XR and that we may need to perform ultrasound-guided intraarticular injections.     PAST MEDICAL, SOCIAL, SURGICAL AND FAMILY HISTORY: He  has a past medical history of Hypertension and Rheumatic fever (1948).  He  has a past surgical history that includes hernia repair (11/3/2008); ENT surgery; TOTAL KNEE ARTHROPLASTY; and Circumcision (N/A, 7/13/2016).  His family history includes No Known Problems in his father and mother.  He reports that he quit smoking about 59 years ago. He started smoking about 70 years ago. He has a 11.00 pack-year smoking history. He has never used smokeless tobacco. He reports that he drinks alcohol. He reports that he does not use drugs.    ALLERGIES: He has No Known Allergies.    CURRENT MEDICATIONS: He has a current medication list which includes the following prescription(s): amlodipine, doxazosin, cyanocobalamin, fluticasone, and triamcinolone, and the following Facility-Administered Medications: lidocaine (pf), lidocaine, lidocaine, triamcinolone, triamcinolone, and triamcinolone.     REVIEW OF SYSTEMS: 3 point review of systems is negative except as noted above.     Exam:   There were no vitals taken for this visit.     CONSTITUTIONAL: healthy, alert and no distress  HEAD: Normocephalic. No masses, lesions, tenderness or abnormalities  SKIN: no suspicious lesions or rashes  GAIT: antalgic. Uses walker  NEUROLOGIC: Non-focal  PSYCHIATRIC: affect normal/bright and mentation appears normal.       Assessment/Plan:   1.   Bilateral hip OA    -Fish's symptoms did not improve with trochanteric bursae injection  -at this point, I will refer him to my colleague, Dr. Sullivan, for bilateral intra-articular hip injections  -he is in agreement with this plan and will schedule him for next week with Dr. Sullivan.    Patient seen and discussed with Dr. Rosendo Merchant MD  Primary Care Sports Medicine Fellow            I have personally reviewed the history and examination as documented by Dr. Merchant.  I was present during key portions of the visit and agree with the assessment and plan as documented for 88 yr old female with bilateral hip OA here for follow-up. Will setup for intra-articular injections. Precautions given. Anticipatory guidance given.     Darion Robbins MD  August 27, 2019  11:34 AM      Margaux Merchant MD

## 2019-08-22 NOTE — PROGRESS NOTES
Subjective:   Fish Hong is a 88 year old male who is here for follow up of left hip pain. He underwent landmark-guided trochanteric bursae injection on the left on 7/24/19 without significant improvement in pain. He is mostly noticing anterior groin pain bilaterally. No radiating of pain down leg.     At his last visit, we discussed bilateral hip OA evidenced on his XR and that we may need to perform ultrasound-guided intraarticular injections.     PAST MEDICAL, SOCIAL, SURGICAL AND FAMILY HISTORY: He  has a past medical history of Hypertension and Rheumatic fever (1948).  He  has a past surgical history that includes hernia repair (11/3/2008); ENT surgery; TOTAL KNEE ARTHROPLASTY; and Circumcision (N/A, 7/13/2016).  His family history includes No Known Problems in his father and mother.  He reports that he quit smoking about 59 years ago. He started smoking about 70 years ago. He has a 11.00 pack-year smoking history. He has never used smokeless tobacco. He reports that he drinks alcohol. He reports that he does not use drugs.    ALLERGIES: He has No Known Allergies.    CURRENT MEDICATIONS: He has a current medication list which includes the following prescription(s): amlodipine, doxazosin, cyanocobalamin, fluticasone, and triamcinolone, and the following Facility-Administered Medications: lidocaine (pf), lidocaine, lidocaine, triamcinolone, triamcinolone, and triamcinolone.     REVIEW OF SYSTEMS: 3 point review of systems is negative except as noted above.     Exam:   There were no vitals taken for this visit.     CONSTITUTIONAL: healthy, alert and no distress  HEAD: Normocephalic. No masses, lesions, tenderness or abnormalities  SKIN: no suspicious lesions or rashes  GAIT: antalgic. Uses walker  NEUROLOGIC: Non-focal  PSYCHIATRIC: affect normal/bright and mentation appears normal.       Assessment/Plan:   1.  Bilateral hip OA    -Fish's symptoms did not improve with trochanteric bursae  injection  -at this point, I will refer him to my colleague, Dr. Sullivan, for bilateral intra-articular hip injections  -he is in agreement with this plan and will schedule him for next week with Dr. Sullivan.    Patient seen and discussed with Dr. Rosendo Merchant MD  Primary Care Sports Medicine Fellow

## 2019-08-27 NOTE — PROGRESS NOTES
I have personally reviewed the history and examination as documented by Dr. Merchant.  I was present during key portions of the visit and agree with the assessment and plan as documented for 88 yr old female with bilateral hip OA here for follow-up. Will setup for intra-articular injections. Precautions given. Anticipatory guidance given.     Darion Robbins MD  August 27, 2019  11:34 AM

## 2019-08-29 ENCOUNTER — OFFICE VISIT (OUTPATIENT)
Dept: ORTHOPEDICS | Facility: CLINIC | Age: 84
End: 2019-08-29
Payer: MEDICARE

## 2019-08-29 VITALS
DIASTOLIC BLOOD PRESSURE: 74 MMHG | SYSTOLIC BLOOD PRESSURE: 125 MMHG | BODY MASS INDEX: 34.65 KG/M2 | HEART RATE: 93 BPM | WEIGHT: 247.5 LBS | HEIGHT: 71 IN

## 2019-08-29 DIAGNOSIS — M16.0 OSTEOARTHRITIS OF BOTH HIPS, UNSPECIFIED OSTEOARTHRITIS TYPE: Primary | ICD-10-CM

## 2019-08-29 RX ORDER — TRIAMCINOLONE ACETONIDE 40 MG/ML
80 INJECTION, SUSPENSION INTRA-ARTICULAR; INTRAMUSCULAR
Status: DISCONTINUED | OUTPATIENT
Start: 2019-08-29 | End: 2020-07-09

## 2019-08-29 RX ORDER — LIDOCAINE HYDROCHLORIDE 10 MG/ML
5 INJECTION, SOLUTION EPIDURAL; INFILTRATION; INTRACAUDAL; PERINEURAL
Status: DISCONTINUED | OUTPATIENT
Start: 2019-08-29 | End: 2020-07-09

## 2019-08-29 RX ORDER — ROPIVACAINE HYDROCHLORIDE 5 MG/ML
20 INJECTION, SOLUTION EPIDURAL; INFILTRATION; PERINEURAL
Status: DISCONTINUED | OUTPATIENT
Start: 2019-08-29 | End: 2020-07-09

## 2019-08-29 RX ORDER — ROPIVACAINE HYDROCHLORIDE 5 MG/ML
3 INJECTION, SOLUTION EPIDURAL; INFILTRATION; PERINEURAL
Status: DISCONTINUED | OUTPATIENT
Start: 2019-08-29 | End: 2020-07-09

## 2019-08-29 RX ADMIN — TRIAMCINOLONE ACETONIDE 80 MG: 40 INJECTION, SUSPENSION INTRA-ARTICULAR; INTRAMUSCULAR at 10:07

## 2019-08-29 RX ADMIN — ROPIVACAINE HYDROCHLORIDE 3 ML: 5 INJECTION, SOLUTION EPIDURAL; INFILTRATION; PERINEURAL at 10:07

## 2019-08-29 RX ADMIN — ROPIVACAINE HYDROCHLORIDE 20 ML: 5 INJECTION, SOLUTION EPIDURAL; INFILTRATION; PERINEURAL at 10:07

## 2019-08-29 RX ADMIN — LIDOCAINE HYDROCHLORIDE 5 ML: 10 INJECTION, SOLUTION EPIDURAL; INFILTRATION; INTRACAUDAL; PERINEURAL at 10:07

## 2019-08-29 ASSESSMENT — MIFFLIN-ST. JEOR: SCORE: 1818.74

## 2019-08-29 NOTE — LETTER
8/29/2019      RE: Fish Hong  [address]  [facility] [address]       PROBLEM: Bilateral hip osteoarthritis    SUBJECTIVE: Pt referred by Dr. Merchant for US-guided corticosteroid injections for both hips symptomatic for hip OA.    OBJECTIVE: X-rays reviewed.    ASSESSMENT: Bilateral hip OA    PLAN: US-guided corticosteroid injections right and left hips    PROCEDURE: After discussing the indications, risks, and expected benefits, a formal consent was obtained. The acetabulum, femoral head, femoral neck and the anterior joint recess were identified by ultrasound.  Initially, 5 mL 1% lidocaine  were injected along the injection path using US guidance.  Using sterile technique and an anterolateral approach, an ultrasound-guided corticosteroid injection was performed into the left femoroacetabular joint space injecting 8 mL 0.5% ropivicaine and 2 mL 40 mg/mL Kenalog with a 110 mm, 22 gauge needle.  US used to guide needle placement and verify proper needle position.  Images and video were recorded demonstrating proper needle position. The wound was dressed with a bandaid.  The procedure was well tolerated.  Follow-up with personal physician in 4 weeks.    Dr. Samson performed the procedure.  I supervised the entire procedure.    The acetabulum, femoral head, femoral neck and the anterior joint recess were identified by ultrasound.  Initially, 5 mL 1% lidocaine  were injected along the injection path using US guidance.  Using sterile technique and an anterolateral approach, an ultrasound-guided corticosteroid injection was performed into the right femoroacetabular joint space injecting 8 mL 0.5% ropivicaine and 2 mL 40 mg/mL Kenalog with a 110 mm, 22 gauge needle.  US used to guide needle placement and verify proper needle position.  Images and video were recorded demonstrating proper needle position. The wound was dressed with a bandaid.  The procedure was well tolerated.  Follow-up with personal  physician in 4 weeks.    Dr. Samson performed the procedure.  I supervised the entire procedure.        Large Joint Injection/Arthocentesis: bilateral hip joint  Date/Time: 8/29/2019 10:07 AM  Performed by: Nicho Sullivan MD  Authorized by: Nicho Sullivan MD     Needle Size:  21 G  Guidance: ultrasound    Approach:  Anterior  Location:  Hip  Laterality:  Bilateral      Site:  Bilateral hip joint  Medications (Right):  5 mL lidocaine (PF) 1 %; 80 mg triamcinolone 40 MG/ML; 20 mL ropivacaine 5 MG/ML   Medications (Right) comment:  8 of 20mg Ropivicaine used  Medications (Left):  5 mL lidocaine (PF) 1 %; 80 mg triamcinolone 40 MG/ML; 3 mL ropivacaine 5 MG/ML   Medications (Left) comment:  8 of 20mg Ropivicaine used  Outcome:  Tolerated well, no immediate complications  Procedure discussed: discussed risks, benefits, and alternatives    Consent Given by:  Patient  Timeout: timeout called immediately prior to procedure    Prep: patient was prepped and draped in usual sterile fashion     Scribed by Rebecca Kaiser ATC for Dr. Sullivan on 8/29/19 at 10AM, based on the provider s statements to me.            Nicho Sullivan MD

## 2019-08-29 NOTE — NURSING NOTE
[address] [address]  Dept: [phone]  ______________________________________________________________________________    Patient: Fish Hong   : 1931   MRN: [id number]   2019    INVASIVE PROCEDURE SAFETY CHECKLIST    Date: 19    Procedure: Bilateral hip CSI with Kenalog under US Guidance  Patient Name: Fish Hong  MRN: [id number]  YOB: 1931    Action: Complete sections as appropriate. Any discrepancy results in a HARD COPY until resolved.     PRE PROCEDURE:  Patient ID verified with 2 identifiers (name and  or MRN): Yes  Procedure and site verified with patient/designee (when able): Yes  Accurate consent documentation in medical record: Yes  H&P (or appropriate assessment) documented in medical record: Yes  H&P must be up to 20 days prior to procedure and updates within 24 hours of procedure as applicable: NA  Relevant diagnostic and radiology test results appropriately labeled and displayed as applicable: Yes  Procedure site(s) marked with provider initials: NA    TIMEOUT:  Time-Out performed immediately prior to starting procedure, including verbal and active participation of all team members addressing the following:Yes  * Correct patient identify  * Confirmed that the correct side and site are marked  * An accurate procedure consent form  * Agreement on the procedure to be done  * Correct patient position  * Relevant images and results are properly labeled and appropriately displayed  * The need to administer antibiotics or fluids for irrigation purposes during the procedure as applicable   * Safety precautions based on patient history or medication use    DURING PROCEDURE: Verification of correct person, site, and procedures any time the responsibility for care of the patient is transferred to another member of the care team.       Prior to injection, verified patient identity using  patient's name and date of birth.  Due to injection administration, patient instructed to remain in clinic for 15 minutes  afterwards, and to report any adverse reaction to me immediately.    Joint injection was performed.      Drug Amount Wasted:  4 of 20mg Ropivicaine wasted; 10 of 20mg Lidocaine wasted  Vial/Syringe: Single dose vial  Expiration Date:  3/22; 12/22      Rebecca Kaiser ATC  August 29, 2019

## 2019-08-29 NOTE — PROGRESS NOTES
PROBLEM: Bilateral hip osteoarthritis    SUBJECTIVE: Pt referred by Dr. Merchant for US-guided corticosteroid injections for both hips symptomatic for hip OA.    OBJECTIVE: X-rays reviewed.    ASSESSMENT: Bilateral hip OA    PLAN: US-guided corticosteroid injections right and left hips    PROCEDURE: After discussing the indications, risks, and expected benefits, a formal consent was obtained. The acetabulum, femoral head, femoral neck and the anterior joint recess were identified by ultrasound.  Initially, 5 mL 1% lidocaine  were injected along the injection path using US guidance.  Using sterile technique and an anterolateral approach, an ultrasound-guided corticosteroid injection was performed into the left femoroacetabular joint space injecting 8 mL 0.5% ropivicaine and 2 mL 40 mg/mL Kenalog with a 110 mm, 22 gauge needle.  US used to guide needle placement and verify proper needle position.  Images and video were recorded demonstrating proper needle position. The wound was dressed with a bandaid.  The procedure was well tolerated.  Follow-up with personal physician in 4 weeks.    Dr. Samson performed the procedure.  I supervised the entire procedure.    The acetabulum, femoral head, femoral neck and the anterior joint recess were identified by ultrasound.  Initially, 5 mL 1% lidocaine  were injected along the injection path using US guidance.  Using sterile technique and an anterolateral approach, an ultrasound-guided corticosteroid injection was performed into the right femoroacetabular joint space injecting 8 mL 0.5% ropivicaine and 2 mL 40 mg/mL Kenalog with a 110 mm, 22 gauge needle.  US used to guide needle placement and verify proper needle position.  Images and video were recorded demonstrating proper needle position. The wound was dressed with a bandaid.  The procedure was well tolerated.  Follow-up with personal physician in 4 weeks.    Dr. Samson performed the procedure.  I supervised the entire  procedure.        Large Joint Injection/Arthocentesis: bilateral hip joint  Date/Time: 8/29/2019 10:07 AM  Performed by: Nicho Sullivan MD  Authorized by: Nicho Sullivan MD     Needle Size:  21 G  Guidance: ultrasound    Approach:  Anterior  Location:  Hip  Laterality:  Bilateral      Site:  Bilateral hip joint  Medications (Right):  5 mL lidocaine (PF) 1 %; 80 mg triamcinolone 40 MG/ML; 20 mL ropivacaine 5 MG/ML   Medications (Right) comment:  8 of 20mg Ropivicaine used  Medications (Left):  5 mL lidocaine (PF) 1 %; 80 mg triamcinolone 40 MG/ML; 3 mL ropivacaine 5 MG/ML   Medications (Left) comment:  8 of 20mg Ropivicaine used  Outcome:  Tolerated well, no immediate complications  Procedure discussed: discussed risks, benefits, and alternatives    Consent Given by:  Patient  Timeout: timeout called immediately prior to procedure    Prep: patient was prepped and draped in usual sterile fashion     Scribed by Rebecca Kaiser ATC for Dr. Sullivan on 8/29/19 at 10AM, based on the provider s statements to me.

## 2019-09-05 ENCOUNTER — ANCILLARY PROCEDURE (OUTPATIENT)
Dept: ULTRASOUND IMAGING | Facility: CLINIC | Age: 84
End: 2019-09-05
Attending: UROLOGY
Payer: MEDICARE

## 2019-09-05 ENCOUNTER — OFFICE VISIT (OUTPATIENT)
Dept: UROLOGY | Facility: CLINIC | Age: 84
End: 2019-09-05
Payer: MEDICARE

## 2019-09-05 VITALS
HEIGHT: 71 IN | SYSTOLIC BLOOD PRESSURE: 138 MMHG | HEART RATE: 69 BPM | DIASTOLIC BLOOD PRESSURE: 84 MMHG | WEIGHT: 247 LBS | BODY MASS INDEX: 34.58 KG/M2

## 2019-09-05 DIAGNOSIS — N43.3 HYDROCELE, UNSPECIFIED HYDROCELE TYPE: Primary | ICD-10-CM

## 2019-09-05 DIAGNOSIS — N43.3 HYDROCELE, UNSPECIFIED HYDROCELE TYPE: ICD-10-CM

## 2019-09-05 RX ORDER — LIDOCAINE HYDROCHLORIDE 20 MG/ML
5 INJECTION, SOLUTION EPIDURAL; INFILTRATION; INTRACAUDAL; PERINEURAL ONCE
Status: DISCONTINUED | OUTPATIENT
Start: 2019-09-05 | End: 2020-07-09

## 2019-09-05 ASSESSMENT — MIFFLIN-ST. JEOR: SCORE: 1816.47

## 2019-09-05 ASSESSMENT — PAIN SCALES - GENERAL: PAINLEVEL: NO PAIN (0)

## 2019-09-05 NOTE — NURSING NOTE
"Chief Complaint   Patient presents with     Minor Procedure     hydrocele aspiration and sclerosis       Blood pressure 138/84, pulse 69, height 1.81 m (5' 11.25\"), weight 112 kg (247 lb). Body mass index is 34.21 kg/m .    Patient Active Problem List   Diagnosis     Essential hypertension     Pulmonary nodules     Preventive measure     Atopic dermatitis, unspecified type     Spinal stenosis of lumbar region without neurogenic claudication     Greater trochanteric bursitis, right     Gastroesophageal reflux disease without esophagitis     History of smoking     Vitamin B12 deficiency (non anemic)       No Known Allergies    Current Outpatient Medications   Medication Sig Dispense Refill     amLODIPine (NORVASC) 10 MG tablet Take 1 tablet (10 mg) by mouth daily 90 tablet 2     cyanocobalamin (VITAMIN B-12) 500 MCG tablet Take 1 tablet (500 mcg) by mouth daily 100 tablet 11     doxazosin (CARDURA) 4 MG tablet Take 1 tablet (4 mg) by mouth At Bedtime 90 tablet 2     fluticasone (FLONASE) 50 MCG/ACT nasal spray USE 1-2 SPRAYS INTO BOTH NOSTRILS DAILY 16 mL 9     triamcinolone (KENALOG) 0.1 % external cream Apply sparingly to affected area two times daily as needed (left ankle.lower leg) (Patient not taking: Reported on 2019) 80 g 3       Social History     Tobacco Use     Smoking status: Former Smoker     Packs/day: 1.00     Years: 11.00     Pack years: 11.00     Start date: 10/15/1948     Last attempt to quit: 1960     Years since quittin.7     Smokeless tobacco: Never Used   Substance Use Topics     Alcohol use: Yes     Comment: very little     Drug use: No       Invasive Procedure Safety Checklist:    Procedure: hydrocele aspiration and sclerosis    Action: Complete sections and checkboxes as appropriate.    Pre-procedure:  1. Patient ID Verified with 2 identifiers (Ada and  or MRN) : YES    2. Procedure and site verified with patient/designee (when able) : YES    3. Accurate consent documentation " in medical record : YES    4. H&P (or appropriate assessment) documented in medical record : N/A  H&P must be up to 30 days prior to procedure an updated within 24 hours of                 Procedure as applicable.     5. Relevant diagnostic and radiology test results appropriately labeled and displayed as applicable : YES    6. Blood products, implants, devices, and/or special equipment available for the procedure as applicable : YES    7. Procedure site(s) marked with provider initials [Exclusions: none] : NO    8. Marking not required. Reason : Yes  Procedure does not require site marking    Time Out:     Time-Out performed immediately prior to starting procedure, including verbal and active participation of all team members addressing: YES    1. Correct patient identity.  2. Confirmed that the correct side and site are marked.  3. An accurate procedure to be done.  4. Agreement on the procedure to be done.  5. Correct patient position.  6. Relevant images and results are properly labeled and appropriately displayed.  7. The need to administer antibiotics or fluids for irrigation purposes during the procedure as applicable.  8. Safety precautions based on patient history or medication use.    During Procedure: Verification of correct person, site, and procedure occurs any time the responsibility for care of the patient is transferred to another member of the care team.    The following medication was given:     MEDICATION:  Lidocaine without epinephrine 2%  ROUTE: administered by physician - scrotal  SITE: administered by physician - scrotal  DOSE: 3 mL  LOT #: [id number]  : [employer]  EXPIRATION DATE: 02/2023  NDC#: [address]-38   Was there drug waste? Yes    Prior to injection, verified patient identity using patient's name and date of birth.  Due to injection administration, patient instructed to remain in clinic for 15 minutes  afterwards, and to report any adverse reaction to me  immediately.    Drug Amount Wasted:  Yes, 17 mL wasted  Vial/Syringe: Single dose vial      Did pull out 400 mg (4 vials) of doxycyline. Physician nacho meds up. Procedure was cancelled when hydrocele was not found. Talked with [facility] on the phone regarding wasted drug and how to not charge patient for this medication. They stated to use the return function in the Pyxis and place used bottles in the black box.      Teri Allan LPN  9/5/2019  2:36 PM

## 2019-09-05 NOTE — LETTER
9/5/2019       RE: Fish Hong  [address]  [facility] [address]     Dear Colleague,    Thank you for referring your patient, Fish Hong, to the [facility] UR[facility] AND [facility] FOR PROSTATE AND UROLOGIC CANCERS at [facility]. Please see a copy of my visit note below.    Procedure Note    Pre-operative diagnosis: left hydrocele   Post-operative diagnosis Left inguinal hernia.   Procedure: Needle aspiration of left scrotum.   Surgeon: Yobani Valenzuela MD   Assistants(s):  Anesthesia E. Strand LPN  2cc 2% lidocaine placed in scrotal skin.   Estimated blood loss: scant    Specimens: None   Findings:          Procedure Could not aspirate any fluid from the left hemiscrotum. Exam supine is more     Pt was identified, brought to the urology procedure room, and consented.      Genitals were prepped and draped in the supine position.    Time out performed.    2cc of 2% lidocaine without epinephrine were injected to numb an area on the hemiscrotum.    A 16ga angiocath was passed into the hydrocele cavity. < 1cc of blood returned.  Same result when another area aspirated.  Diagnosis of hernia was determined.    Pt dressed and left clinic without complication.  Will arrange scrotal ultrasound and referral to gen surgery.    Pt tolerated the procedure very well.    Javad GALLAGHER              Again, thank you for allowing me to participate in the care of your patient.      Sincerely,    Yobani Valenzuela MD

## 2019-09-05 NOTE — PATIENT INSTRUCTIONS
Schedule testicular ultrasound today if possible.      It was a pleasure meeting with you today.  Thank you for allowing me and my team the privilege of caring for you today.  YOU are the reason we are here, and I truly hope we provided you with the excellent service you deserve.  Please let us know if there is anything else we can do for you so that we can be sure you are leaving completely satisfied with your care experience.

## 2019-09-06 ENCOUNTER — TELEPHONE (OUTPATIENT)
Dept: UROLOGY | Facility: CLINIC | Age: 84
End: 2019-09-06

## 2019-09-06 DIAGNOSIS — K40.90 UNILATERAL INGUINAL HERNIA WITHOUT OBSTRUCTION OR GANGRENE, RECURRENCE NOT SPECIFIED: Primary | ICD-10-CM

## 2019-09-06 NOTE — PROGRESS NOTES
Procedure Note    Pre-operative diagnosis: left hydrocele   Post-operative diagnosis Left inguinal hernia.   Procedure: Needle aspiration of left scrotum.   Surgeon: Yobani Valenzuela MD   Assistants(s):  Anesthesia E. Strand LPN  2cc 2% lidocaine placed in scrotal skin.   Estimated blood loss: scant    Specimens: None   Findings:          Procedure Could not aspirate any fluid from the left hemiscrotum. Exam supine is more     Pt was identified, brought to the urology procedure room, and consented.      Genitals were prepped and draped in the supine position.    Time out performed.    2cc of 2% lidocaine without epinephrine were injected to numb an area on the hemiscrotum.    A 16ga angiocath was passed into the hydrocele cavity. < 1cc of blood returned.  Same result when another area aspirated.  Diagnosis of hernia was determined.    Pt dressed and left clinic without complication.  Will arrange scrotal ultrasound and referral to gen surgery.    Pt tolerated the procedure very well.    Javad GALLAGHER

## 2019-09-06 NOTE — TELEPHONE ENCOUNTER
----- Message from Criselda Arreola sent at 9/6/2019  1:36 PM CDT -----  Regarding: RE: General surgery ref  I got the patient scheduled next Thurs with Dr Landaverde and he is aware.   ----- Message -----  From: Teri Allan LPN  Sent: 9/6/2019  11:20 AM  To: [facility]  Subject: General surgery ref                              Hello,    We referred this patient to see general surgery for inguinal hernia. Didn't know if they need to be called by us or if they will call due to the referral?    Teri Allan LPN

## 2019-09-12 ENCOUNTER — OFFICE VISIT (OUTPATIENT)
Dept: SURGERY | Facility: CLINIC | Age: 84
End: 2019-09-12
Attending: UROLOGY
Payer: MEDICARE

## 2019-09-12 VITALS
BODY MASS INDEX: 34.28 KG/M2 | DIASTOLIC BLOOD PRESSURE: 74 MMHG | HEIGHT: 71 IN | WEIGHT: 244.9 LBS | SYSTOLIC BLOOD PRESSURE: 142 MMHG | HEART RATE: 77 BPM | OXYGEN SATURATION: 96 %

## 2019-09-12 DIAGNOSIS — K40.90 UNILATERAL INGUINAL HERNIA WITHOUT OBSTRUCTION OR GANGRENE, RECURRENCE NOT SPECIFIED: Primary | ICD-10-CM

## 2019-09-12 ASSESSMENT — MIFFLIN-ST. JEOR: SCORE: 1806.95

## 2019-09-12 NOTE — PATIENT INSTRUCTIONS
You saw Dr Landaverde today.     Instructions per today's visit: Call [facility] at [phone] for scheduling.  Please call [facility] at [phone] to schedule a PAC appointment.    Please call during clinic hours Monday through Friday 8:00a - 4:00p if you have questions or you can contact us via [employer] at anytime.      General Surgery Call Center: [phone]  Fax: [phone]  Surgery Scheduler: [phone]    Please call the hospital at [phone] to speak with our on call MDs if you have urgent needs after hours, during weekends, or holidays.  It was a pleasure meeting with you today.     Thank you for allowing us the privilege of caring for you. We hope we provided you with the excellent service you deserve.     Please let us know if there is anything else we can do for you so that we can be sure you are leaving completely satisfied with your care experience.

## 2019-09-12 NOTE — LETTER
Fish Hong  [address]  [facility] [address]      SURGERY PACKET            Your surgery is scheduled:    Date:   ________________________________    Time:   ________________________________    Please arrive at:    ______________________    Surgeon's Name: Dr Landaverde  _______________________        Pre-Op Physical Fax Numbers:         [facility]-Admissions  [facility] [place]:  [phone] / Phone:  [phone]        Your surgery is located at:      [facility], [address] [address]      [phone]      [email or web address]       Before Your Surgery  For Patients and Visitors at [facility]    Welcome  As you get ready for surgery, you may have a lot of questions.  This brochure will help you know what to expect before and after surgery.  You and your family are the most important members of your health care team.  You will need to take an active role in your care.    Be sure to ask questions and learn all that you can about your surgery.  If you have any safety concerns, we urge you to tell a nurse as soon as possible.   This brochure is for information only.  It does not replace the advice of your doctor.  Always follow your doctor's advice.    Please tell us if you need a .    GETTING READY FOR SURGERY  Always follow your surgeon's instructions.  If you don't, your surgery could be canceled.  Please use the following checklist.    Within 30 Days of Surgery:    Have a pre-surgery physical exam with your family doctor or partner.    If you use a [facility], all of your information from the pre-op physical will be in the [employer] computer system.    Ask the doctor to send all of your results to the hospital before the surgery.  The doctor also may ask you to bring the results with you on the day of surgery or you can fax them to [facility] [place]:  [phone] / Phone:   [phone].  Tell the doctor if:    You are allergic to latex or rubber  (Latex and rubber gloves are often used in medical care).    You are taking any medicines (including aspirin), vitamins (Vitamin E, Fish Oil, Omegas) or herbal products.  You will need to stop taking some medicines before surgery.    You have any medical problems (allergies, diabetes or heart disease, for example).    You have a pacemaker or an AICD (automatic implanted cardiac defibrillator).  If you do, please bring the ID card with you on the day of surgery.    You are a smoker.  People who smoke have a higher risk of infection after surgery.  Ask your doctor how you can quit smoking.  Within 7 days of Surgery:    Pre-register with the hospital.  Please use the hospital's phone number, [phone]. Or, to register online, go to [email or web address].      Prior to your surgical procedure, a nurse will be contacting you to obtain a health history [facility] [place] Fax:  [phone] / Phone:  [phone].  Additionally, someone from the Admissions Department will also contact you for preregistration ([phone]).      Call your insurance company.  Ask if you need pre-approval for your surgery.  If you do not have insurance, please let us know.      Arrange for someone to drive you home after surgery.  If you will have same-day surgery, you may not drive or take public transportation home by yourself.    Arrange for someone to stay with you for 24 hours after you go home.  This person must be a responsible adult (ie- Family member or friend).  The Day Before Surgery:     Call your surgeon if there are any changes in your health.  This includes signs of a cold or flu (such as a sore throat, runny nose, cough, rash or fever).    Do not smoke, drink alcohol or take over-the-counter medicine (unless your surgeon tells you to) for 24 hours before and after surgery.    If you take prescribed drugs:  You may need to stop them until after the  surgery.  Follow your doctor's orders.  You may resume Aspirin and/or blood thinners after your surgery as directed by your physician/surgeon.    NO FOOD OR DRINK AFTER MIDNIGHT.  Follow your surgeon's orders for eating and drinking.  You need to have an empty stomach before surgery.  This will make the surgery as safe as possible.  If you don't follow your doctor's orders, your surgery could be changed to another date.  A nurse will call you within a few days of surgery to go over these and other instructions.  If you do not hear from them, please call them at [facility] Fax:  [phone] / Phone:  [phone]  The Day of Surgery:    Take a shower or bath the night before and the morning of surgery.  Use antiseptic soap or the soap your surgeon gave you.  Gently clean the skin.  Do not shave or scrub your surgery site.    Please remove deodorant, cologne, scented lotion, makeup, nail polish and jewelry (including rings and body piercings).  If you wear artificial nails, please remove at least one nail before coming to the hospital.    Wear clean, loose clothing to the hospital.    Bring these items to the hospital:  1. Your insurance card.  2. Money for parking and co-pays (for medicines or the surgery), if needed.   3. A list of all the medicines you take.  Include vitamins, minerals, herbs and over-the-counter drugs.  Note any drug allergies.  4. A copy of your advance health care directive, if you have one.  This tells us what treatment you would want -- and who would make health care decisions -- if you could no longer speak for yourself.  You may request this form in advance or download it from [email or web address].  5. A case for any glasses, contact lenses, hearing aids or dentures.  6. Your inhaler or CPAP machine, if you use these at home.  Leave extra cash, jewelry and other valuables at home.    When You Arrive:  When you get to the hospital, you will:    Check in.  If you are under age 18, you  must be with a parent or legal guardian.    Sign consent forms, if you haven't already.  These forms state that you know the risks and benefits of surgery.  When you sign the forms, you give us permission to do the surgery.  Do not sign them unless you understand what will happen during and after your surgery.  If you have any questions about your surgery, ask to speak with your doctor before you sign the forms.  If you don't understand the answers, ask again.    Receive a copy of the Patients Bill of Rights.  If you do not receive a copy, please ask for one.    Change into hospital clothes.  Your belongings will be placed in a bag.  We will return them to you after surgery.    Meet with the anesthesia provider.  He or she will tell you what kind of anesthesia (medicine) will be used to keep you comfortable during surgery.  Remember: It's okay to remind doctors and nurses to wash their hands before touching you.   In most cases, your surgeon will use a marker to write his or her initials on the surgery site.  This ensures that the exact site is operated on.  For safety reasons, we will ask you the same questions many times.  For example, we may ask your name and birth date over and over again.  Friends and family can stay with you until it's time for surgery.  While you're in surgery, they will be in the waiting area.  Please note that cell phones are not allowed in some patient care areas.  If you have questions about what will happen in the operating room, talk to your care team.  After Surgery:    We will move you to a recovery room where we will watch you closely.  If you have any pain or discomfort, tell your nurse.  He or she will try to make you comfortable.      If you are staying overnight we will move you to your hospital room after you are awake.    If you are going home we will move you to another room.  Friends and family may be able to join you.  The length of time you spend in recovery depends on the  "type of medicine you received, your medical condition, and the type of surgery you had.  Dealing with pain:  A nurse will check your comfort level often during your stay.  He or she will work with you to manage your pain.  Remember:    All pain is real.  There are many ways to control pain.  We can help you decide what works best for you.    Ask for pain medicine when you need it.  Don't try to \"tough it out\" -- this can make you feel worse.  Always take your medicine as ordered.    Medicine doesn't work the same for everyone.  If your medicine isn't working tell your nurse.  There may be other medicines or treatments we can try.  Going Home:  We will let you know when you're ready to leave the hospital.  Before you leave, we will tell you how to care for yourself at home and prevent infections.  If you do not understand something, please say so.  We will answer any questions you have.  We will then help you get ready to leave.  You must have an adult with you for the first 24 hours after you leave the hospital. Take it easy when you get home.  You will need some time to recover -- you may be more tired than you realize at first.  Rest and relax for at least the first 24 hours at home.  You'll feel better and heal faster if you take good care of yourself.                      Pre-Operative Surgery Scrub    Purpose:   The skin harbors a large variety of bacteria, both infectious and noninfectious.  Showering with an antiseptic soap prior to an invasive procedure will decrease the number of transient and resident (good and bad) bacteria that is normally found on the skin.    Procedure:      Shower or bathe with 1/2 of the bottle of antiseptic soap (enclosed) the evening before and 1/2 of the bottle the morning of surgery (bathing the day of the procedure is most important).       Apply the soap at full strength (use the entire bottle).  Gently clean the skin, rinse, and dry with a clean towel that is freshly laundered " (out of the dryer) and then put on clean clothing that is freshly laundered.        We have given you information regarding pre-op showering.  We recommend that patients wash with an antiseptic soap prior to surgery.  This cleanser will be given to you at the clinic or mailed to your home.  It is advised that you liberally wash the specific area surgery area the night before, and again in the morning before the surgery.  Do not apply lotion afterward.  We would like to keep the skin as clean as possible.    Thank you for following these important instructions.      You have been scheduled for surgery and we would like to give you some information that will assist in helping get the best possible outcome.      Before Surgery:   If for any reason you decide not to have the surgery, please contact our office.  We can easily cancel or reschedule the procedure. Please call the  at [phone].      Any pain related to the surgery that occurs before the surgery needs to be reported and managed by your primary care or referring doctor.      Please keep in mind that the time of surgery is subject to change.  Make sure you have nothing to eat or drink after midnight.  If your surgery is later in the afternoon, this recommendation might change, but not until the day before surgery after the actual time of the surgery has been established.    After Surgery:  When you are discharged from the recovery room, the nurses will review instructions with you and your caregiver.      Please wash your hands every time you touch the wound or change bandages or dressings.      Do not submerge the wound in water.  You may not use a bathtub or hot tub until the wound is closed.  The wait time frame is generally 2-3 weeks but any open area can be a source of incoming bacteria, so it is better to be on the safe side and avoid the tub until your wound is fully healed.      You may take a shower 24 hours after surgery.  Double check  with your surgeon if it is ok for water to run over a wound, whether it has been sutured, stapled, glued or is open.  You may gently wash the wound using the antiseptic soap provided for your pre-surgery showering (do not use a washcloth).  Any mild soap will work as well.      Many surgical wounds will have small white strips of tape on them called Steri Strips.  Do not remove these.  The edges will curl and fall off within 7-10 days with normal showering.      If you are going home with sutures (stitches) or staples, you must return to the clinic to have them taken out, usually within 1-2 weeks.      Signs and symptoms of infection include:  1. Fever, temperature over 101.5 ' F  2. Redness  3. Swelling  4. Increasing pain  5. Green or yellow drainage which may or may not have a foul odor.    Symptoms of infection need to be reported to your surgery office. Please call the nurse at [phone].   If you have had surgery with Dr. Landaverde or Dr. Cedeno please call [phone] (option # 4).    If you or your  are deaf or hard of hearing, or prefer a language other than English, please let us know.  We have many free services, including interpreters and other aids to help you communicate. You may ask for help  through any member of your care team or by calling Language Services at [phone], option 2.

## 2019-09-12 NOTE — PROGRESS NOTES
New Hernia Consultation Note      Fish Hong  [id number]  4/11/1931    Requesting Provider: Yobani Valenzuela    Dear Dr. Jarrett,    I was asked by Yobani Valenzuela to see this patient for the following problem:    CHIEF COMPLAINT:  Left inguinal hernia    HISTORY OF PRESENT ILLNESS:  Location: left inguinal  Severity: moderate-severe     Mr. Fish Hong is an 88 year old man who presents to the clinic today for evaluation of a left inguinal hernia. He first became aware of the hernia about 3 years ago, when his urologist was concerned about the increased size of the left hemiscrotum relative to the right during a circumcision. Since that time, he has been worked up for hydroceles, and last week on 9/05/2019, his urologist attempted to aspirate a left hydrocele. Failure to aspirate the hydrocele prompted a recommendation for inguinal hernia evaluation. He experiences discomfort due to the size of the left scrotum relative to the right, and he will have pain when wearing tight pants or when closing or crossing his legs around his scrotum. He has not experienced loss or change in appetite, nausea, vomiting, constipation, diarrhea, or dysuria associated with the left inguinal hernia. He has not experienced any pain or symptoms associated with left testicle vascular compromise from the hernia. He does not have any symptoms associated with his right scrotum, and he does not have any concerns about his right scrotum.           Patient Supplied Answers To HerQLes Assessment Questionnaire  No flowsheet data found.     NUTRITIONAL STATUS:  Lab Results   Component Value Date    ALBUMIN 3.8 04/22/2019       Body mass index is 33.92 kg/m .    Patient is not immunosuppressed.    Patient is not a current smoker.    Past Medical History:   Diagnosis Date     Hypertension      Rheumatic fever 1948       Patient Active Problem List   Diagnosis     Essential hypertension     Pulmonary nodules     Preventive measure      Atopic dermatitis, unspecified type     Spinal stenosis of lumbar region without neurogenic claudication     Greater trochanteric bursitis, right     Gastroesophageal reflux disease without esophagitis     History of smoking     Vitamin B12 deficiency (non anemic)       Past Surgical History:   Procedure Laterality Date     C TOTAL KNEE ARTHROPLASTY      Bilateral     CIRCUMCISION N/A 7/13/2016    Procedure: CIRCUMCISION;  Surgeon: Elgin Rhodes MD;  Location: [facility] OR     ENT SURGERY      detached retina--bilateral     HERNIA REPAIR  11/3/2008    mesh repair umbilical hernia       MEDICATIONS:  Current Outpatient Medications   Medication     amLODIPine (NORVASC) 10 MG tablet     cyanocobalamin (VITAMIN B-12) 500 MCG tablet     doxazosin (CARDURA) 4 MG tablet     fluticasone (FLONASE) 50 MCG/ACT nasal spray     triamcinolone (KENALOG) 0.1 % external cream     Current Facility-Administered Medications   Medication     lidocaine (PF) (XYLOCAINE) 1 % injection 4 mL     lidocaine (PF) (XYLOCAINE) 1 % injection 5 mL     lidocaine (PF) (XYLOCAINE) 1 % injection 5 mL     lidocaine (PF) (XYLOCAINE) 2 % injection 5 mg     lidocaine 1 % injection 3 mL     lidocaine 1 % injection 4 mL     ropivacaine (NAROPIN) injection 20 mL     ropivacaine (NAROPIN) injection 3 mL     triamcinolone (KENALOG-40) injection 40 mg     triamcinolone (KENALOG-40) injection 40 mg     triamcinolone (KENALOG-40) injection 80 mg     triamcinolone (KENALOG-40) injection 80 mg     triamcinolone acetonide (KENALOG-40) injection 40 mg       ALLERGIES:  No Known Allergies    Social History     Socioeconomic History     Marital status:      Spouse name:      Number of children: None     Years of education: None     Highest education level: None   Occupational History     None   Social Needs     Financial resource strain: None     Food insecurity:     Worry: None     Inability: None     Transportation needs:     Medical: None      "Non-medical: None   Tobacco Use     Smoking status: Former Smoker     Packs/day: 1.00     Years: 11.00     Pack years: 11.00     Start date: 10/15/1948     Last attempt to quit: 1960     Years since quittin.7     Smokeless tobacco: Never Used   Substance and Sexual Activity     Alcohol use: Yes     Comment: very little     Drug use: No     Sexual activity: Not Currently   Lifestyle     Physical activity:     Days per week: None     Minutes per session: None     Stress: None   Relationships     Social connections:     Talks on phone: None     Gets together: None     Attends Mandaeism service: None     Active member of club or organization: None     Attends meetings of clubs or organizations: None     Relationship status: None     Intimate partner violence:     Fear of current or ex partner: None     Emotionally abused: None     Physically abused: None     Forced sexual activity: None   Other Topics Concern     Parent/sibling w/ CABG, MI or angioplasty before 65F 55M? Not Asked   Social History Narrative     since                      for 32 years                      Family History   Problem Relation Age of Onset     No Known Problems Mother      No Known Problems Father      Melanoma No family hx of      Skin Cancer No family hx of        ROS  General: no fevers, sweats, chills  HEENT: no headache, no recent hearing or vision changes  Cardiovascular: no chest pain, no palpitations  Respiratory: no coughing, no wheezing  Gastrointestinal: no abdominal pain, no changes in appetite, no dysphagia, no constipation, no diarrhea  Genitourinary: left inguinal hernia; no dysuria, no changes in bladder function  Musculoskeletal: left hip pain, back pain  Neurological: no weakness, no tingling, no numbness  Skin: no recent rashes, no recent changes in moles    PHYSICAL EXAMINATION:  Vital Signs: BP (!) 142/74   Pulse 77   Ht 1.81 m (5' 11.25\")   Wt 111.1 kg (244 lb 14.4 oz)   SpO2 96%   BMI " 33.92 kg/m    HEENT: NCAT; MMM;  Cardiovascular: Cap refill less than 2 seconds.  Lungs: Breathing unlabored  Abdomen: Marked central obesity. Abdomen is firm due to severity of adiposity but is otherwise benign, nontender, and nondistended.     PHYSICAL EXAM AREA OF INTEREST:  Hernia: Left hemiscrotum is noticeably enlarged relative to right, about 2-3 times the size of the right hemiscrotum. Fullness is visible over the left inguinal canal relative to the right. Palpation over the left inguinal canal reveals notable nontender fullness relative to the right. Palpation of the left scrotum is nontender but reveals significant fatty infiltration consistent with a large inguinal hernia. Severe degree of left inguinal herniation limits exam of left testicle and spermatic cord, but deep palpation over structures is nontender. Right scrotum also has palpable evidence for mild fatty infiltration but to a significantly lesser degree than on the left. No tenderness with palpation over right scrotum.     IMAGING:  US from 9/05/2019 reviewed.  CT from 8/01/2017 reviewed.    ASSESSMENT:  Large left inguinal hernia  Hernia size is more than 5cm in size.    DISCUSSION OF RISKS:  I discussed the alternatives, benefits, risks and possible complications of hernia repair with the patient. The risks of hernia surgery with and without mesh are described below.    Based on FDA s analysis of medical device adverse event reports and of peer-reviewed, scientific literature, the most common adverse events for all surgical repair of hernias--with or without mesh--are pain, infection, hernia recurrence, scar-like tissue that sticks tissues together (adhesion), blockage of the large or small intestine (obstruction), bleeding, abnormal connection between organs, vessels, or intestines (fistula), fluid build-up at the surgical site (seroma), and a hole in neighboring tissues or organs (perforation).  Some other potential adverse events that can  occur following hernia repair with mesh are mesh migration and mesh shrinkage (contraction).    http://www.fda.gov/MedicalDevices/ProductsandMedicalProcedures/ImplantsandProsthetics/HerniaSurgicalMesh/default.htm    PLAN:  Conservative and surgical interventions were reviewed. Due to the size of Mr. Hong's hernia and the extent of his concern regarding his symptoms, he would be a reasonable surgical candidate. Open left inguinal hernia repair with possible orchiectomy was considered to minimize chance of recurrence of the hernia. He was interested in the procedure and elected to proceed. He may schedule at his convenience and call with any questions or concerns.    Sincerely,    Scribe Disclosure:   I, promise garnica, am serving as a scribe; to document services personally performed by Dr. Landaverde- -based on data collection and the provider's statements to me.     Provider Disclosure:  I agree with above History, Review of Systems, Physical exam and Plan.  I have reviewed the content of the documentation and have edited it as needed. I have personally performed the services documented here and the documentation accurately represents those services and the decisions I have made.      Electronically signed by:    Antonio Landaverde MD  Surgery  [phone] (hospital )  [phone] (clinic nurses)                  Promise Garnica MS3

## 2019-09-12 NOTE — LETTER
9/12/2019       RE: Fish Hong  [address]  [facility] [address]     Dear Colleague,    Thank you for referring your patient, Fish Hong, to the [facility] GENERAL SURGERY at [facility]. Please see a copy of my visit note below.        New Hernia Consultation Note      Fish Hong  [id number]  4/11/1931    Requesting Provider: Yobani Valenzuela    Dear Dr. Jarrett,    I was asked by Yobani Valenzuela to see this patient for the following problem:    CHIEF COMPLAINT:  Left inguinal hernia    HISTORY OF PRESENT ILLNESS:  Location: left inguinal  Severity: moderate-severe     Mr. Fish Hong is an 88 year old man who presents to the clinic today for evaluation of a left inguinal hernia. He first became aware of the hernia about 3 years ago, when his urologist was concerned about the increased size of the left hemiscrotum relative to the right during a circumcision. Since that time, he has been worked up for hydroceles, and last week on 9/05/2019, his urologist attempted to aspirate a left hydrocele. Failure to aspirate the hydrocele prompted a recommendation for inguinal hernia evaluation. He experiences discomfort due to the size of the left scrotum relative to the right, and he will have pain when wearing tight pants or when closing or crossing his legs around his scrotum. He has not experienced loss or change in appetite, nausea, vomiting, constipation, diarrhea, or dysuria associated with the left inguinal hernia. He has not experienced any pain or symptoms associated with left testicle vascular compromise from the hernia. He does not have any symptoms associated with his right scrotum, and he does not have any concerns about his right scrotum.           Patient Supplied Answers To HerQLes Assessment Questionnaire  No flowsheet data found.     NUTRITIONAL STATUS:  Lab Results   Component Value Date    ALBUMIN 3.8 04/22/2019       Body mass  index is 33.92 kg/m .    Patient is not immunosuppressed.    Patient is not a current smoker.    Past Medical History:   Diagnosis Date     Hypertension      Rheumatic fever 1948       Patient Active Problem List   Diagnosis     Essential hypertension     Pulmonary nodules     Preventive measure     Atopic dermatitis, unspecified type     Spinal stenosis of lumbar region without neurogenic claudication     Greater trochanteric bursitis, right     Gastroesophageal reflux disease without esophagitis     History of smoking     Vitamin B12 deficiency (non anemic)       Past Surgical History:   Procedure Laterality Date     C TOTAL KNEE ARTHROPLASTY      Bilateral     CIRCUMCISION N/A 7/13/2016    Procedure: CIRCUMCISION;  Surgeon: Elgin Rhodes MD;  Location: [facility] OR     ENT SURGERY      detached retina--bilateral     HERNIA REPAIR  11/3/2008    mesh repair umbilical hernia       MEDICATIONS:  Current Outpatient Medications   Medication     amLODIPine (NORVASC) 10 MG tablet     cyanocobalamin (VITAMIN B-12) 500 MCG tablet     doxazosin (CARDURA) 4 MG tablet     fluticasone (FLONASE) 50 MCG/ACT nasal spray     triamcinolone (KENALOG) 0.1 % external cream     Current Facility-Administered Medications   Medication     lidocaine (PF) (XYLOCAINE) 1 % injection 4 mL     lidocaine (PF) (XYLOCAINE) 1 % injection 5 mL     lidocaine (PF) (XYLOCAINE) 1 % injection 5 mL     lidocaine (PF) (XYLOCAINE) 2 % injection 5 mg     lidocaine 1 % injection 3 mL     lidocaine 1 % injection 4 mL     ropivacaine (NAROPIN) injection 20 mL     ropivacaine (NAROPIN) injection 3 mL     triamcinolone (KENALOG-40) injection 40 mg     triamcinolone (KENALOG-40) injection 40 mg     triamcinolone (KENALOG-40) injection 80 mg     triamcinolone (KENALOG-40) injection 80 mg     triamcinolone acetonide (KENALOG-40) injection 40 mg       ALLERGIES:  No Known Allergies    Social History     Socioeconomic History     Marital status:       Spouse name:      Number of children: None     Years of education: None     Highest education level: None   Occupational History     None   Social Needs     Financial resource strain: None     Food insecurity:     Worry: None     Inability: None     Transportation needs:     Medical: None     Non-medical: None   Tobacco Use     Smoking status: Former Smoker     Packs/day: 1.00     Years: 11.00     Pack years: 11.00     Start date: 10/15/1948     Last attempt to quit: 1960     Years since quittin.7     Smokeless tobacco: Never Used   Substance and Sexual Activity     Alcohol use: Yes     Comment: very little     Drug use: No     Sexual activity: Not Currently   Lifestyle     Physical activity:     Days per week: None     Minutes per session: None     Stress: None   Relationships     Social connections:     Talks on phone: None     Gets together: None     Attends [employer] service: None     Active member of club or organization: None     Attends meetings of clubs or organizations: None     Relationship status: None     Intimate partner violence:     Fear of current or ex partner: None     Emotionally abused: None     Physically abused: None     Forced sexual activity: None   Other Topics Concern     Parent/sibling w/ CABG, MI or angioplasty before 65F 55M? Not Asked   Social History Narrative     since                      for 32 years                      Family History   Problem Relation Age of Onset     No Known Problems Mother      No Known Problems Father      Melanoma No family hx of      Skin Cancer No family hx of        ROS  General: no fevers, sweats, chills  HEENT: no headache, no recent hearing or vision changes  Cardiovascular: no chest pain, no palpitations  Respiratory: no coughing, no wheezing  Gastrointestinal: no abdominal pain, no changes in appetite, no dysphagia, no constipation, no diarrhea  Genitourinary: left inguinal hernia; no dysuria, no changes in  "bladder function  Musculoskeletal: left hip pain, back pain  Neurological: no weakness, no tingling, no numbness  Skin: no recent rashes, no recent changes in moles    PHYSICAL EXAMINATION:  Vital Signs: BP (!) 142/74   Pulse 77   Ht 1.81 m (5' 11.25\")   Wt 111.1 kg (244 lb 14.4 oz)   SpO2 96%   BMI 33.92 kg/m     HEENT: NCAT; MMM;  Cardiovascular: Cap refill less than 2 seconds.  Lungs: Breathing unlabored  Abdomen: Marked central obesity. Abdomen is firm due to severity of adiposity but is otherwise benign, nontender, and nondistended.     PHYSICAL EXAM AREA OF INTEREST:  Hernia: Left hemiscrotum is noticeably enlarged relative to right, about 2-3 times the size of the right hemiscrotum. Fullness is visible over the left inguinal canal relative to the right. Palpation over the left inguinal canal reveals notable nontender fullness relative to the right. Palpation of the left scrotum is nontender but reveals significant fatty infiltration consistent with a large inguinal hernia. Severe degree of left inguinal herniation limits exam of left testicle and spermatic cord, but deep palpation over structures is nontender. Right scrotum also has palpable evidence for mild fatty infiltration but to a significantly lesser degree than on the left. No tenderness with palpation over right scrotum.     IMAGING:  US from 9/05/2019 reviewed.  CT from 8/01/2017 reviewed.    ASSESSMENT:  Large left inguinal hernia  Hernia size is more than 5cm in size.    DISCUSSION OF RISKS:  I discussed the alternatives, benefits, risks and possible complications of hernia repair with the patient. The risks of hernia surgery with and without mesh are described below.    Based on FDA s analysis of medical device adverse event reports and of peer-reviewed, scientific literature, the most common adverse events for all surgical repair of hernias--with or without mesh--are pain, infection, hernia recurrence, scar-like tissue that sticks tissues " together (adhesion), blockage of the large or small intestine (obstruction), bleeding, abnormal connection between organs, vessels, or intestines (fistula), fluid build-up at the surgical site (seroma), and a hole in neighboring tissues or organs (perforation).  Some other potential adverse events that can occur following hernia repair with mesh are mesh migration and mesh shrinkage (contraction).    http://www.fda.gov/MedicalDevices/ProductsandMedicalProcedures/ImplantsandProsthetics/HerniaSurgicalMesh/default.htm    PLAN:  Conservative and surgical interventions were reviewed. Due to the size of Mr. Hong's hernia and the extent of his concern regarding his symptoms, he would be a reasonable surgical candidate. Open left inguinal hernia repair with possible orchiectomy was considered to minimize chance of recurrence of the hernia. He was interested in the procedure and elected to proceed. He may schedule at his convenience and call with any questions or concerns.    Sincerely,    Scribe Disclosure:   I, promise garnica, am serving as a scribe; to document services personally performed by Dr. Landaverde- -based on data collection and the provider's statements to me.     Provider Disclosure:  I agree with above History, Review of Systems, Physical exam and Plan.  I have reviewed the content of the documentation and have edited it as needed. I have personally performed the services documented here and the documentation accurately represents those services and the decisions I have made.      Electronically signed by:    Antonio Landaverde MD  Surgery  [phone] (hospital )  [phone] (clinic nurses)    Promise Garnica MS3

## 2019-09-12 NOTE — NURSING NOTE
"Chief Complaint   Patient presents with     New Patient     new hernia consult       Vitals:    09/12/19 0939   BP: (!) 142/74   Pulse: 77   SpO2: 96%   Weight: 111.1 kg (244 lb 14.4 oz)   Height: 1.81 m (5' 11.25\")       Body mass index is 33.92 kg/m .    Trisha Smith CMA    "

## 2019-09-17 ENCOUNTER — PRE VISIT (OUTPATIENT)
Dept: SURGERY | Facility: CLINIC | Age: 84
End: 2019-09-17

## 2019-09-17 NOTE — TELEPHONE ENCOUNTER
FUTURE VISIT INFORMATION      SURGERY INFORMATION:    Date: 10/8/19    Location: [facility] OR    Surgeon:  Antonio Landaverde    Anesthesia Type:  Combined MAC with Local    RECORDS REQUESTED FROM:       Primary Care Provider: Herman Jarrett MD- [place]    Pertinent Medical History: Hypertension, Pulmonary nodules    Most recent EKG+ Tracin16

## 2019-09-23 ENCOUNTER — OFFICE VISIT (OUTPATIENT)
Dept: SURGERY | Facility: CLINIC | Age: 84
End: 2019-09-23
Payer: MEDICARE

## 2019-09-23 ENCOUNTER — TELEPHONE (OUTPATIENT)
Dept: SURGERY | Facility: CLINIC | Age: 84
End: 2019-09-23

## 2019-09-23 ENCOUNTER — ANESTHESIA EVENT (OUTPATIENT)
Dept: SURGERY | Facility: CLINIC | Age: 84
End: 2019-09-23

## 2019-09-23 VITALS
HEIGHT: 71 IN | TEMPERATURE: 98 F | OXYGEN SATURATION: 96 % | SYSTOLIC BLOOD PRESSURE: 132 MMHG | HEART RATE: 84 BPM | WEIGHT: 245.7 LBS | DIASTOLIC BLOOD PRESSURE: 69 MMHG | BODY MASS INDEX: 34.4 KG/M2 | RESPIRATION RATE: 19 BRPM

## 2019-09-23 DIAGNOSIS — Z01.818 PRE-OP EXAMINATION: ICD-10-CM

## 2019-09-23 DIAGNOSIS — K40.90 UNILATERAL INGUINAL HERNIA WITHOUT OBSTRUCTION OR GANGRENE, RECURRENCE NOT SPECIFIED: ICD-10-CM

## 2019-09-23 DIAGNOSIS — K40.90 UNILATERAL INGUINAL HERNIA WITHOUT OBSTRUCTION OR GANGRENE, RECURRENCE NOT SPECIFIED: Primary | ICD-10-CM

## 2019-09-23 LAB
ANION GAP SERPL CALCULATED.3IONS-SCNC: 5 MMOL/L (ref 3–14)
BASOPHILS # BLD AUTO: 0 10E9/L (ref 0–0.2)
BASOPHILS NFR BLD AUTO: 0.5 %
BUN SERPL-MCNC: 20 MG/DL (ref 7–30)
CALCIUM SERPL-MCNC: 8.9 MG/DL (ref 8.5–10.1)
CHLORIDE SERPL-SCNC: 107 MMOL/L (ref 94–109)
CO2 SERPL-SCNC: 27 MMOL/L (ref 20–32)
CREAT SERPL-MCNC: 1.2 MG/DL (ref 0.66–1.25)
DIFFERENTIAL METHOD BLD: ABNORMAL
EOSINOPHIL # BLD AUTO: 0.1 10E9/L (ref 0–0.7)
EOSINOPHIL NFR BLD AUTO: 2.1 %
ERYTHROCYTE [DISTWIDTH] IN BLOOD BY AUTOMATED COUNT: 12.9 % (ref 10–15)
GFR SERPL CREATININE-BSD FRML MDRD: 54 ML/MIN/{1.73_M2}
GLUCOSE SERPL-MCNC: 81 MG/DL (ref 70–99)
HBA1C MFR BLD: 5.8 % (ref 0–5.6)
HCT VFR BLD AUTO: 43.9 % (ref 40–53)
HGB BLD-MCNC: 14.6 G/DL (ref 13.3–17.7)
IMM GRANULOCYTES # BLD: 0 10E9/L (ref 0–0.4)
IMM GRANULOCYTES NFR BLD: 0.5 %
LYMPHOCYTES # BLD AUTO: 1.6 10E9/L (ref 0.8–5.3)
LYMPHOCYTES NFR BLD AUTO: 27 %
MCH RBC QN AUTO: 31.1 PG (ref 26.5–33)
MCHC RBC AUTO-ENTMCNC: 33.3 G/DL (ref 31.5–36.5)
MCV RBC AUTO: 93 FL (ref 78–100)
MONOCYTES # BLD AUTO: 0.5 10E9/L (ref 0–1.3)
MONOCYTES NFR BLD AUTO: 8.2 %
NEUTROPHILS # BLD AUTO: 3.7 10E9/L (ref 1.6–8.3)
NEUTROPHILS NFR BLD AUTO: 61.7 %
NRBC # BLD AUTO: 0 10*3/UL
NRBC BLD AUTO-RTO: 0 /100
PLATELET # BLD AUTO: 136 10E9/L (ref 150–450)
POTASSIUM SERPL-SCNC: 4 MMOL/L (ref 3.4–5.3)
RBC # BLD AUTO: 4.7 10E12/L (ref 4.4–5.9)
SODIUM SERPL-SCNC: 138 MMOL/L (ref 133–144)
WBC # BLD AUTO: 6.1 10E9/L (ref 4–11)

## 2019-09-23 ASSESSMENT — PAIN SCALES - GENERAL: PAINLEVEL: MILD PAIN (3)

## 2019-09-23 ASSESSMENT — MIFFLIN-ST. JEOR: SCORE: 1806.62

## 2019-09-23 ASSESSMENT — LIFESTYLE VARIABLES: TOBACCO_USE: 1

## 2019-09-23 NOTE — PATIENT INSTRUCTIONS
Preparing for Your Surgery      Name:  Fish Hong   MRN:  [id number]   :  1931   Today's Date:  2019     Arriving for surgery:  Surgery date:  10/8/19  Arrival time:  10AM  Please come to:       [facility] [address]  [address] [place]  [address]    - ? parking is available in front of the hospital      -    Please proceed to [address] on the 3rd floor. [phone]?     - ?If you are in need of directions, wheelchair or escort please stop at the Information Desk in the lobby.  Inform the information person that you are here for surgery; a wheelchair and escort to [address] will be provided.?     What can I eat or drink?  -  You may have solid food or milk products until 8 hours prior to your surgery. 10/8/19, 4AM  -  You may have water, apple juice or 7up/Sprite until 2 hours prior to your surgery. 10/8/19, 10AM    Which medicines can I take?  Stop Aspirin, vitamins and supplements one week prior to surgery.  Hold Ibuprofen for 24 hours and/or Naproxen for 48 hours prior to surgery.   -  Do NOT take these medications in the morning, the day of surgery:    Vitamin B12  -  Please take these medications the day of surgery:     Amlodipine(Norvasc)  Fluticasone(Flonase)          How do I prepare myself?  -  Take two showers: one the night before surgery; and one the morning of surgery.         Use Scrubcare or Hibiclens to wash from neck down, leave soap on your skin for up to one minute.  Do not get soap in your eyes or ears.  You may use your own shampoo and conditioner; no other hair products.   -  Do NOT use lotion, powder, deodorant, or antiperspirant the day of your surgery.  -  Do NOT wear jewelry.  - Do not bring your own medications to the hospital, except for inhalers and eye   drops.  -  Bring your ID and insurance card.    -If you are scheduled to go home the Same Day as surgery you must have a responsible adult as a  and to stay with  you overnight the first 24 hours after surgery.     Questions or Concerns:  -If you are scheduled on the East or West campus and have questions or concerns regarding the day of surgery, please call Preadmission Nursing at [phone].     -For questions after surgery please call your surgeons office.

## 2019-09-23 NOTE — TELEPHONE ENCOUNTER
09/23/2019    Called Pt. Regarding his abnormal blood work.  Pt. Instructed to follow up with his primary care provider to have labs re-drawn in one month and six months respectfully.  Pt.  Instructed that if he had any questions to call the Clinic back.

## 2019-09-23 NOTE — H&P
Pre-Operative H & P     CC:  Preoperative exam to assess for increased cardiopulmonary risk while undergoing surgery and anesthesia.    Date of Encounter: 9/23/2019  Primary Care Physician:  Herman Jarrett  Reason:  moderate to severe large left inguinal hernia.    HPI  Fish Hong is a 88 year old male who presents for pre-operative H & P in preparation for Open Left Inguinal Hernia Repair; possible orchiectomy on 10/8/19 with Dr. Landaverde at [facility] under MAC and local anesthetic.  Mr. Hong was seen in new hernia consultation with Dr. Swanson on 9/12/19 for moderate to severe large left inguinal hernia.  Per Dr. Swanson's note, due to the size of the hernia (Hernia size is > 5cm) and the extent of the patient's concern regarding his symptoms, the patient would be a reasonable surgical candidate. The above procedure was recommended.      History is obtained from the patient and electronic health record.     Past Medical History  Past Medical History:   Diagnosis Date     Hypertension      Hypertension      Obesity      Osteoarthritis of both hips     s/p steroid injections     Phimosis      Pulmonary nodules      Rheumatic fever 1948       Past Surgical History  Past Surgical History:   Procedure Laterality Date     AS TOTAL KNEE ARTHROPLASTY       C TOTAL KNEE ARTHROPLASTY      Bilateral     CIRCUMCISION N/A 7/13/2016    Procedure: CIRCUMCISION;  Surgeon: Elgin Rhodes MD;  Location: [facility] OR     circumcision       ENT SURGERY      detached retina--bilateral     HERNIA REPAIR  11/3/2008    mesh repair umbilical hernia       Hx of Blood transfusions/reactions: denies     Hx of abnormal bleeding or anti-platelet use: denies    Steroid use in the last year: injection in hips.  No oral steroids.     Personal or FH with difficulty with Anesthesia:  1997 with knee arthroplasy woke up early.  Subsequent surgeries reports no issues.       Prior to Admission Medications  Current Outpatient Medications    Medication Sig Dispense Refill     amLODIPine (NORVASC) 10 MG tablet Take 1 tablet (10 mg) by mouth daily (Patient taking differently: Take 10 mg by mouth every morning ) 90 tablet 2     cyanocobalamin (VITAMIN B-12) 500 MCG tablet Take 1 tablet (500 mcg) by mouth daily (Patient taking differently: Take 500 mcg by mouth every morning ) 100 tablet 11     doxazosin (CARDURA) 4 MG tablet Take 1 tablet (4 mg) by mouth At Bedtime 90 tablet 2     fluticasone (FLONASE) 50 MCG/ACT nasal spray USE 1-2 SPRAYS INTO BOTH NOSTRILS DAILY (Patient taking differently: Spray 2 sprays into both nostrils every morning ) 16 mL 9     triamcinolone (KENALOG) 0.1 % external cream Apply sparingly to affected area two times daily as needed (left ankle.lower leg) (Patient taking differently: Apply topically as needed (Pt. has not used in past month 2019) Apply sparingly to affected area two times daily as needed (left ankle.lower leg)) 80 g 3       Allergies  No Known Allergies    Social History  Social History     Socioeconomic History     Marital status:      Spouse name:      Number of children: Not on file     Years of education: Not on file     Highest education level: Not on file   Occupational History     Not on file   Social Needs     Financial resource strain: Not on file     Food insecurity:     Worry: Not on file     Inability: Not on file     Transportation needs:     Medical: Not on file     Non-medical: Not on file   Tobacco Use     Smoking status: Former Smoker     Packs/day: 1.00     Years: 11.00     Pack years: 11.00     Start date: 10/15/1948     Last attempt to quit: 1960     Years since quittin.7     Smokeless tobacco: Never Used   Substance and Sexual Activity     Alcohol use: Yes     Comment: very little     Drug use: No     Sexual activity: Not Currently   Lifestyle     Physical activity:     Days per week: Not on file     Minutes per session: Not on file     Stress: Not on file    Relationships     Social connections:     Talks on phone: Not on file     Gets together: Not on file     Attends Cheondoism service: Not on file     Active member of club or organization: Not on file     Attends meetings of clubs or organizations: Not on file     Relationship status: Not on file     Intimate partner violence:     Fear of current or ex partner: Not on file     Emotionally abused: Not on file     Physically abused: Not on file     Forced sexual activity: Not on file   Other Topics Concern     Parent/sibling w/ CABG, MI or angioplasty before 65F 55M? Not Asked   Social History Narrative     since 4/17                     for 32 years                      Family History  Family History   Problem Relation Age of Onset     No Known Problems Mother      No Known Problems Father      Melanoma No family hx of      Skin Cancer No family hx of            ROS/MED HX    ENT/Pulmonary: Comment: Stable pulmonary nodules followed with imaging.     (+)BALWINDER risk factors hypertension, obese, tobacco use (Smoked 10 years on and off.  Quit at age 28. ), Past use , . .    Neurologic:  - neg neurologic ROS     Cardiovascular: Comment: Denies chest pain, SOB, palpitations, syncope, STERN, orthopnea, or PND    Rheumatic fever as a child.  Denies ever being told he has a murmur.     (+) hypertension----. : . . . :. . Previous cardiac testing date:results:date: results:ECG reviewed date: results: date: results:         (-) taking anticoagulants/antiplatelets   METS/Exercise Tolerance: Comment: Laundry is in basement.  Has a chairlift at home from when his wife was alive.  He does use the chair lift, bit reports he can go up and down the stairs.      >4 METS   Hematologic:  - neg hematologic  ROS       Musculoskeletal: Comment: Bilateral knee arthroplasty 1997 and 1998.    Bilateral hip osteoarthritis s/p steroid injection.   (+) arthritis,  -       GI/Hepatic: Comment: S/p umbilical hernia repair, 2008. - neg  "GI/hepatic ROS      (-) GERD   Renal/Genitourinary:     (+) BPH,    : Phismosis s/p circumcision 2016.   Endo:     (+) Obesity, .      Psychiatric:  - neg psychiatric ROS       Infectious Disease:  - neg infectious disease ROS       Malignancy:   (+) Malignancy History of Skin  Skin CA Remission status post Surgery,         Other:    (+) No chance of pregnancy H/O Chronic Pain,no other significant disability                  Temp: 98  F (36.7  C) Temp src: Oral BP: 132/69 Pulse: 84   Resp: 19 SpO2: 96 %         245 lbs 11.2 oz  5' 11\"   Body mass index is 34.27 kg/m .       Physical Exam  Constitutional: Awake, alert, cooperative, no apparent distress, and appears stated age.  Eyes: Pupils equal, round and reactive to light, extra ocular muscles intact, sclera clear, conjunctiva normal.  HENT: Normocephalic, oral pharynx with moist mucus membranes, good dentition. MP III, thick neck and TM <3FB. No goiter appreciated.   Respiratory: Clear to auscultation bilaterally, no crackles or wheezing.  Cardiovascular: Regular rate and rhythm, normal S1 and S2, and no murmur noted.  Carotids +2, no bruits. No edema. Palpable pulses to radial  DP and PT arteries.   GI:  Normal bowel sounds, soft and non-tender. Unable to adequately assess for hepatosplenomegaly given obese abdomen. No superficial masses noted.   Lymph/Hematologic: No cervical lymphadenopathy and no supraclavicular lymphadenopathy.  Genitourinary:  deferred  Skin: Warm and dry.    Musculoskeletal: Limited ROM of neck. There is no redness, warmth, or swelling of the joints. Gross motor strength is normal.    Neurologic: Awake, alert, oriented to name, place and time. Cranial nerves II-XII are grossly intact. Gait is slow and steady.  Uses a walker when out and about.    Neuropsychiatric: Calm, cooperative. Normal affect.     Labs: (personally reviewed)  Lab Results   Component Value Date    WBC 6.1 09/23/2019     Lab Results   Component Value Date    RBC 4.70 " 09/23/2019     Lab Results   Component Value Date    HGB 14.6 09/23/2019     Lab Results   Component Value Date    HCT 43.9 09/23/2019     Lab Results   Component Value Date    MCV 93 09/23/2019     Lab Results   Component Value Date    MCH 31.1 09/23/2019     Lab Results   Component Value Date    MCHC 33.3 09/23/2019     Lab Results   Component Value Date    RDW 12.9 09/23/2019     Lab Results   Component Value Date     09/23/2019       Last Comprehensive Metabolic Panel:  Sodium   Date Value Ref Range Status   09/23/2019 138 133 - 144 mmol/L Final     Potassium   Date Value Ref Range Status   09/23/2019 4.0 3.4 - 5.3 mmol/L Final     Chloride   Date Value Ref Range Status   09/23/2019 107 94 - 109 mmol/L Final     Carbon Dioxide   Date Value Ref Range Status   09/23/2019 27 20 - 32 mmol/L Final     Anion Gap   Date Value Ref Range Status   09/23/2019 5 3 - 14 mmol/L Final     Glucose   Date Value Ref Range Status   09/23/2019 81 70 - 99 mg/dL Final     Urea Nitrogen   Date Value Ref Range Status   09/23/2019 20 7 - 30 mg/dL Final     Creatinine   Date Value Ref Range Status   09/23/2019 1.20 0.66 - 1.25 mg/dL Final     GFR Estimate   Date Value Ref Range Status   09/23/2019 54 (L) >60 mL/min/[1.73_m2] Final     Comment:     Non  GFR Calc  Starting 12/18/2018, serum creatinine based estimated GFR (eGFR) will be   calculated using the Chronic Kidney Disease Epidemiology Collaboration   (CKD-EPI) equation.       Calcium   Date Value Ref Range Status   09/23/2019 8.9 8.5 - 10.1 mg/dL Final       EKG:   EKG 2016: SR 79 bpm with non-specific QRS widening and anterior fascicular block.    ASSESSMENT and PLAN  Fish Hong is a 88 year old male scheduled to undergo  Open Left Inguinal Hernia Repair; possible orchiectomy on 10/8/19 with Dr. Landaverde at [facility] under MAC and local anesthetic. .    He has the following specific operative considerations:   - METS:  >4. RCRI : No serious cardiac  risks.  0.4 % risk of major adverse cardiac event.  - BALWINDER # of risks 5/8 = high risk  - VTE risk:  1.8%  - Risk of PONV score = 1.  If > 2, anti-emetic intervention recommended.      #  Cardiology - denies known coronary artery disease.    - HTN well controlled on CCB.  Take amlodipine DOS.    - h/o Rheumatic fever as a child.  No murmur on exam and denies any cardiac symptoms.        #  Pulmonary - remote smoking, quit at age 28.    - pulmonary nodule(s) being followed with imagng:  CT of chest 4/29/19, stable.  #  Hematology - B12 deficiency, on replacement.   #  Renal - elevated creatinine in April 2019.  Will recheck today.   #  Endocrine - Obesity: Recommend careful positioning to prevent airway/ventilatory compromise, or tissue injury.      - HgbA1C 5.8 today.  #  Musculoskeletal - osteoarthritis of multiple joints.   s/p bilateral knee replacements in late 1990's. S/p recent steroid injection into hips recently.  Recommend careful  Positioning.     - Anesthesia considerations:  MP III, limited neck extension. Refer to PAC assessment in anesthesia records  - CBC and BMP today.  T& S per surgeon if thinks necessary.       Arrival time, NPO, shower and medication instructions provided by nursing staff today.  Preparing For Your Surgery handout given.  Patient was discussed with Dr Wallace.    JOE Vyas CNP  Preoperative Assessment Center  [facility]  Clinic and Surgery Center  Phone: [phone]  Fax: [phone]

## 2019-09-23 NOTE — ANESTHESIA PREPROCEDURE EVALUATION
Anesthesia Pre-Procedure Evaluation    Patient: Fish Hong   MRN:     [id number] Gender:   male   Age:    88 year old :      1931        Preoperative Diagnosis: * No surgery found *        Past Medical History:   Diagnosis Date     Hypertension      Rheumatic fever       Past Surgical History:   Procedure Laterality Date     C TOTAL KNEE ARTHROPLASTY      Bilateral     CIRCUMCISION N/A 2016    Procedure: CIRCUMCISION;  Surgeon: Elgin Rhodes MD;  Location: [facility] OR     ENT SURGERY      detached retina--bilateral     HERNIA REPAIR  11/3/2008    mesh repair umbilical hernia          Anesthesia Evaluation     . Pt has had prior anesthetic. Type: MAC and General    History of anesthetic complications     with knee arthroplasy woke up early.  Subsequent surgeries reports no issues.       ROS/MED HX    ENT/Pulmonary: Comment: Stable pulmonary nodules followed with imaging.     (+)BALWINDER risk factors hypertension, obese, tobacco use (Smoked 10 years on and off.  Quit at age 28. ), Past use , . .    Neurologic:  - neg neurologic ROS     Cardiovascular: Comment: Denies chest pain, SOB, palpitations, syncope, STERN, orthopnea, or PND    Rheumatic fever as a child.  Denies ever being told he has a murmur.     (+) hypertension----. : . . . :. . Previous cardiac testing date:results:date: results:ECG reviewed date: results: date: results:         (-) taking anticoagulants/antiplatelets   METS/Exercise Tolerance: Comment: Laundry is in basement.  Has a chairlift at home from when his wife was alive.  He does use the chair lift, bit reports he can go up and down the stairs.      >4 METS   Hematologic:  - neg hematologic  ROS       Musculoskeletal: Comment: Bilateral knee arthroplasty  and .    Bilateral hip osteoarthritis s/p steroid injection.   (+) arthritis,  -       GI/Hepatic: Comment: S/p umbilical hernia repair, . - neg GI/hepatic ROS      (-) GERD   Renal/Genitourinary:      (+) BPH,    : Phismosis s/p circumcision 2016.   Endo:     (+) Obesity, .      Psychiatric:  - neg psychiatric ROS       Infectious Disease:  - neg infectious disease ROS       Malignancy:   (+) Malignancy History of Skin  Skin CA Remission status post Surgery,         Other:    (+) No chance of pregnancy H/O Chronic Pain,no other significant disability                        PHYSICAL EXAM:   Mental Status/Neuro: A/A/O   Airway: Facies: Thick Neck  Mallampati: III  Mouth/Opening: Full  TM distance: < 6 cm  Neck ROM: Limited   Respiratory: Auscultation: CTAB     Resp. Rate: Normal     Resp. Effort: Normal      CV: Rhythm: Regular  Rate: Age appropriate  Heart: Normal Sounds  Edema: None   Comments: Multiple dental implants.      Dental: Details                LABS:  CBC:   Lab Results   Component Value Date    WBC 5.6 04/22/2019    WBC 8.1 04/09/2018    HGB 14.7 04/22/2019    HGB 13.8 04/09/2018    HCT 42.5 04/22/2019    HCT 41.4 04/09/2018     04/22/2019     04/09/2018     BMP:   Lab Results   Component Value Date     04/22/2019     04/09/2018    POTASSIUM 4.2 04/22/2019    POTASSIUM 3.7 04/09/2018    CHLORIDE 109 04/22/2019    CHLORIDE 106 04/09/2018    CO2 24 04/22/2019    CO2 26 04/09/2018    BUN 21 04/22/2019    BUN 23 04/09/2018    CR 1.29 (H) 04/22/2019    CR 1.09 04/09/2018    GLC 82 04/22/2019    GLC 92 04/09/2018     COAGS: No results found for: PTT, INR, FIBR  POC: No results found for: BGM, HCG, HCGS  OTHER:   Lab Results   Component Value Date    JEMIMA 9.2 04/22/2019    ALBUMIN 3.8 04/22/2019    PROTTOTAL 7.2 04/22/2019    ALT 18 04/22/2019    AST 20 04/22/2019    ALKPHOS 75 04/22/2019    BILITOTAL 0.5 04/22/2019        Preop Vitals    BP Readings from Last 3 Encounters:   09/12/19 (!) 142/74   09/05/19 138/84   08/29/19 125/74    Pulse Readings from Last 3 Encounters:   09/12/19 77   09/05/19 69   08/29/19 93      Resp Readings from Last 3 Encounters:   07/24/19 18   04/24/19  "16   04/22/19 22    SpO2 Readings from Last 3 Encounters:   09/12/19 96%   04/22/19 95%   10/15/18 99%      Temp Readings from Last 1 Encounters:   04/22/19 98.8  F (37.1  C)    Ht Readings from Last 1 Encounters:   09/12/19 1.81 m (5' 11.25\")      Wt Readings from Last 1 Encounters:   09/12/19 111.1 kg (244 lb 14.4 oz)    Estimated body mass index is 33.92 kg/m  as calculated from the following:    Height as of 9/12/19: 1.81 m (5' 11.25\").    Weight as of 9/12/19: 111.1 kg (244 lb 14.4 oz).     LDA:        JEFFRY FV AN PLAN NO PONV RULE       PAC Discussion and Assessment    ASA Classification: 2  Case is suitable for: [place]  Anesthetic techniques and relevant risks discussed: MAC with GA as backup  Invasive monitoring and risk discussed:   Types:   Possibility and Risk of blood transfusion discussed:   NPO instructions given:   Additional anesthetic preparation and risks discussed:   Needs early admission to pre-op area:   Other:     PAC Resident/NP Anesthesia Assessment:  Fish Hong is an 88-geneva-old male scheduled for Open Left Inguinal Hernia Repair; possible orchiectomy on 10/8/19 with Dr. Landaverde at [facility] under MAC and local anesthetic.  Mr. Hong was seen in new hernia consultation with Dr. Swanson on 9/12/19 for moderate to severe large left inguinal hernia.  Per Dr. Swanson's note, due to the size of the hernia (Hernia size is > 5cm) and the extent of the patient's concern regarding his symptoms, the patient would be a reasonable surgical candidate. The above procedure was recommended.       PROCEDURES    EKG 2016: SR 79 bpm with non-specific QRS widening and anterior fascicular block.    He has the following specific operative considerations:   - METS:  >4. RCRI : No serious cardiac risks.  0.4 % risk of major adverse cardiac event.  - BALWINDER # of risks 5/8 = high  - VTE risk:  1.8%  - Risk of PONV score = 1.  If > 2, anti-emetic intervention recommended.      #  Cardiology - denies known coronary artery " disease.    - HTN well controlled on CCB.  Take amlodipine DOS.    - h/o Rheumatic fever as a child.  No murmur on exam and denies any cardiac symptoms.        #  Pulmonary - remote smoking, quit at age 28.    - pulmonary nodule(s) being followed with imagng:  CT of chest 4/29/19, stable.  #  Hematology - B12 deficiency, on replacement. CBC today.   #  Endocrine - Obesity: Recommend careful positioning to prevent airway/ventilatory compromise, or tissue injury.    #  Musculoskeletal - osteoarthritis of multiple joints.  S/p bilateral knee replacements in late 1990's. S/p recent steroid injection into hips.  Recommend careful positioning.   #  Renal - elevated creatinine April 2019.  Will recheck BMP today.     - Anesthesia considerations:  MP III, limited neck extension. Refer to PAC assessment in anesthesia records  - T& S per surgeon if thinks necessary.   - Patient is optimized and is acceptable candidate for the proposed procedure.  No further diagnostic evaluation is needed.     Arrival time, NPO, shower and medication instructions provided by nursing staff today.  Preparing For Your Surgery handout given.  Patient was discussed with Dr Wallace.    Reviewed and Signed by PAC Mid-Level Provider/Resident  Mid-Level Provider/Resident: Micaela Bean APRN CNP  Date: 9/23/19  Time: 8:31    Attending Anesthesiologist Anesthesia Assessment:  STAFF:  Audreyy 88 y.o.  with LARGE left inguinal hernia.    Exam: No JVD.  METS > 4, and devoid of cardiac SSX's.  Has progressive arthritis with limited neck ROM, limited extension.  Mouth opens well, but M3 airway.  Most teeth are intact.   DISCUSSION: Patient experienced MAC + local for his circumcision in 2016,,,, went well.   I suggested he consider a SPINAL SAB for this surgery, and he seems amendable to that option. Defer to anesthesia team on DOS.   ---rcp      Reviewed and Signed by PAC Anesthesiologist  Anesthesiologist: rcp  Date: 9/23/2019  Time:  0840  Pass/Fail: Pass  Disposition:     PAC Pharmacist Assessment:        Pharmacist:   Date:   Time:    JOE Vyas CNP

## 2019-10-08 ENCOUNTER — ANESTHESIA (OUTPATIENT)
Dept: SURGERY | Facility: CLINIC | Age: 84
End: 2019-10-08
Payer: MEDICARE

## 2019-10-08 ENCOUNTER — ANESTHESIA EVENT (OUTPATIENT)
Dept: SURGERY | Facility: CLINIC | Age: 84
End: 2019-10-08
Payer: MEDICARE

## 2019-10-08 ENCOUNTER — HOSPITAL ENCOUNTER (OUTPATIENT)
Facility: CLINIC | Age: 84
Discharge: HOME | End: 2019-10-08
Attending: SURGERY | Admitting: SURGERY
Payer: MEDICARE

## 2019-10-08 VITALS
RESPIRATION RATE: 12 BRPM | BODY MASS INDEX: 33.73 KG/M2 | DIASTOLIC BLOOD PRESSURE: 72 MMHG | WEIGHT: 240.96 LBS | HEART RATE: 69 BPM | SYSTOLIC BLOOD PRESSURE: 127 MMHG | HEIGHT: 71 IN | OXYGEN SATURATION: 96 % | TEMPERATURE: 97.8 F

## 2019-10-08 DIAGNOSIS — K40.90 UNILATERAL INGUINAL HERNIA WITHOUT OBSTRUCTION OR GANGRENE, RECURRENCE NOT SPECIFIED: Primary | ICD-10-CM

## 2019-10-08 LAB — GLUCOSE BLDC GLUCOMTR-MCNC: 99 MG/DL (ref 70–99)

## 2019-10-08 PROCEDURE — 88305 TISSUE EXAM BY PATHOLOGIST: CPT | Performed by: SURGERY

## 2019-10-08 PROCEDURE — 25000132 ZZH RX MED GY IP 250 OP 250 PS 637: Mod: GY | Performed by: ANESTHESIOLOGY

## 2019-10-08 PROCEDURE — 25000128 H RX IP 250 OP 636: Performed by: PHYSICIAN ASSISTANT

## 2019-10-08 PROCEDURE — 25800030 ZZH RX IP 258 OP 636: Performed by: ANESTHESIOLOGY

## 2019-10-08 PROCEDURE — 25000128 H RX IP 250 OP 636: Performed by: NURSE ANESTHETIST, CERTIFIED REGISTERED

## 2019-10-08 PROCEDURE — 40000170 ZZH STATISTIC PRE-PROCEDURE ASSESSMENT II: Performed by: SURGERY

## 2019-10-08 PROCEDURE — 25800030 ZZH RX IP 258 OP 636: Performed by: NURSE ANESTHETIST, CERTIFIED REGISTERED

## 2019-10-08 PROCEDURE — 82962 GLUCOSE BLOOD TEST: CPT

## 2019-10-08 PROCEDURE — 71000027 ZZH RECOVERY PHASE 2 EACH 15 MINS: Performed by: SURGERY

## 2019-10-08 PROCEDURE — 93010 ELECTROCARDIOGRAM REPORT: CPT | Mod: 59 | Performed by: INTERNAL MEDICINE

## 2019-10-08 PROCEDURE — 27210794 ZZH OR GENERAL SUPPLY STERILE: Performed by: SURGERY

## 2019-10-08 PROCEDURE — 36000053 ZZH SURGERY LEVEL 2 EA 15 ADDTL MIN - UMMC: Performed by: SURGERY

## 2019-10-08 PROCEDURE — 40000065 ZZH STATISTIC EKG NON-CHARGEABLE

## 2019-10-08 PROCEDURE — 37000009 ZZH ANESTHESIA TECHNICAL FEE, EACH ADDTL 15 MIN: Performed by: SURGERY

## 2019-10-08 PROCEDURE — C1781 MESH (IMPLANTABLE): HCPCS | Performed by: SURGERY

## 2019-10-08 PROCEDURE — 36000051 ZZH SURGERY LEVEL 2 1ST 30 MIN - UMMC: Performed by: SURGERY

## 2019-10-08 PROCEDURE — 37000008 ZZH ANESTHESIA TECHNICAL FEE, 1ST 30 MIN: Performed by: SURGERY

## 2019-10-08 PROCEDURE — 25000125 ZZHC RX 250: Performed by: SURGERY

## 2019-10-08 DEVICE — MESH PROGRIP 4.7X3" PARIETEX LEFT TEM1208GL: Type: IMPLANTABLE DEVICE | Site: INGUINAL | Status: FUNCTIONAL

## 2019-10-08 RX ORDER — SODIUM CHLORIDE, SODIUM LACTATE, POTASSIUM CHLORIDE, CALCIUM CHLORIDE 600; 310; 30; 20 MG/100ML; MG/100ML; MG/100ML; MG/100ML
INJECTION, SOLUTION INTRAVENOUS CONTINUOUS
Status: DISCONTINUED | OUTPATIENT
Start: 2019-10-08 | End: 2019-10-08 | Stop reason: HOSPADM

## 2019-10-08 RX ORDER — OXYCODONE HYDROCHLORIDE 5 MG/1
5 TABLET ORAL
Status: DISCONTINUED | OUTPATIENT
Start: 2019-10-08 | End: 2019-10-08 | Stop reason: HOSPADM

## 2019-10-08 RX ORDER — ALBUTEROL SULFATE 0.83 MG/ML
2.5 SOLUTION RESPIRATORY (INHALATION) EVERY 4 HOURS PRN
Status: DISCONTINUED | OUTPATIENT
Start: 2019-10-08 | End: 2019-10-08 | Stop reason: HOSPADM

## 2019-10-08 RX ORDER — ACETAMINOPHEN 325 MG/1
650 TABLET ORAL EVERY 4 HOURS PRN
Qty: 50 TABLET | Refills: 0 | Status: SHIPPED | OUTPATIENT
Start: 2019-10-08 | End: 2020-07-09

## 2019-10-08 RX ORDER — FENTANYL CITRATE 50 UG/ML
25-50 INJECTION, SOLUTION INTRAMUSCULAR; INTRAVENOUS
Status: DISCONTINUED | OUTPATIENT
Start: 2019-10-08 | End: 2019-10-08 | Stop reason: HOSPADM

## 2019-10-08 RX ORDER — HYDROMORPHONE HYDROCHLORIDE 1 MG/ML
.3-.5 INJECTION, SOLUTION INTRAMUSCULAR; INTRAVENOUS; SUBCUTANEOUS EVERY 10 MIN PRN
Status: DISCONTINUED | OUTPATIENT
Start: 2019-10-08 | End: 2019-10-08 | Stop reason: HOSPADM

## 2019-10-08 RX ORDER — ACETAMINOPHEN 325 MG/1
975 TABLET ORAL
Status: COMPLETED | OUTPATIENT
Start: 2019-10-08 | End: 2019-10-08

## 2019-10-08 RX ORDER — ONDANSETRON 2 MG/ML
4 INJECTION INTRAMUSCULAR; INTRAVENOUS EVERY 30 MIN PRN
Status: DISCONTINUED | OUTPATIENT
Start: 2019-10-08 | End: 2019-10-08 | Stop reason: HOSPADM

## 2019-10-08 RX ORDER — LIDOCAINE HYDROCHLORIDE AND EPINEPHRINE 10; 10 MG/ML; UG/ML
INJECTION, SOLUTION INFILTRATION; PERINEURAL PRN
Status: DISCONTINUED | OUTPATIENT
Start: 2019-10-08 | End: 2019-10-08 | Stop reason: HOSPADM

## 2019-10-08 RX ORDER — NALOXONE HYDROCHLORIDE 0.4 MG/ML
.1-.4 INJECTION, SOLUTION INTRAMUSCULAR; INTRAVENOUS; SUBCUTANEOUS
Status: DISCONTINUED | OUTPATIENT
Start: 2019-10-08 | End: 2019-10-08 | Stop reason: HOSPADM

## 2019-10-08 RX ORDER — ACETAMINOPHEN 325 MG/1
650 TABLET ORAL
Status: DISCONTINUED | OUTPATIENT
Start: 2019-10-08 | End: 2019-10-08 | Stop reason: HOSPADM

## 2019-10-08 RX ORDER — DIMENHYDRINATE 50 MG/ML
25 INJECTION, SOLUTION INTRAMUSCULAR; INTRAVENOUS
Status: DISCONTINUED | OUTPATIENT
Start: 2019-10-08 | End: 2019-10-08 | Stop reason: HOSPADM

## 2019-10-08 RX ORDER — AMOXICILLIN 250 MG
1-2 CAPSULE ORAL 2 TIMES DAILY
Qty: 30 TABLET | Refills: 0 | Status: SHIPPED | OUTPATIENT
Start: 2019-10-08 | End: 2020-07-09

## 2019-10-08 RX ORDER — CEFAZOLIN SODIUM 1 G/3ML
1 INJECTION, POWDER, FOR SOLUTION INTRAMUSCULAR; INTRAVENOUS SEE ADMIN INSTRUCTIONS
Status: DISCONTINUED | OUTPATIENT
Start: 2019-10-08 | End: 2019-10-08 | Stop reason: HOSPADM

## 2019-10-08 RX ORDER — METOPROLOL TARTRATE 1 MG/ML
1-2 INJECTION, SOLUTION INTRAVENOUS EVERY 5 MIN PRN
Status: DISCONTINUED | OUTPATIENT
Start: 2019-10-08 | End: 2019-10-08 | Stop reason: HOSPADM

## 2019-10-08 RX ORDER — ONDANSETRON 4 MG/1
4 TABLET, ORALLY DISINTEGRATING ORAL EVERY 30 MIN PRN
Status: DISCONTINUED | OUTPATIENT
Start: 2019-10-08 | End: 2019-10-08 | Stop reason: HOSPADM

## 2019-10-08 RX ORDER — FENTANYL CITRATE 50 UG/ML
INJECTION, SOLUTION INTRAMUSCULAR; INTRAVENOUS PRN
Status: DISCONTINUED | OUTPATIENT
Start: 2019-10-08 | End: 2019-10-08

## 2019-10-08 RX ORDER — LIDOCAINE 40 MG/G
CREAM TOPICAL
Status: DISCONTINUED | OUTPATIENT
Start: 2019-10-08 | End: 2019-10-08 | Stop reason: HOSPADM

## 2019-10-08 RX ORDER — CEFAZOLIN SODIUM 2 G/100ML
2 INJECTION, SOLUTION INTRAVENOUS
Status: COMPLETED | OUTPATIENT
Start: 2019-10-08 | End: 2019-10-08

## 2019-10-08 RX ORDER — OXYCODONE HYDROCHLORIDE 5 MG/1
5-10 TABLET ORAL EVERY 4 HOURS PRN
Qty: 10 TABLET | Refills: 0 | Status: SHIPPED | OUTPATIENT
Start: 2019-10-08 | End: 2020-06-29

## 2019-10-08 RX ORDER — HYDRALAZINE HYDROCHLORIDE 20 MG/ML
2.5-5 INJECTION INTRAMUSCULAR; INTRAVENOUS EVERY 10 MIN PRN
Status: DISCONTINUED | OUTPATIENT
Start: 2019-10-08 | End: 2019-10-08 | Stop reason: HOSPADM

## 2019-10-08 RX ORDER — PROPOFOL 10 MG/ML
INJECTION, EMULSION INTRAVENOUS CONTINUOUS PRN
Status: DISCONTINUED | OUTPATIENT
Start: 2019-10-08 | End: 2019-10-08

## 2019-10-08 RX ORDER — PROPOFOL 10 MG/ML
INJECTION, EMULSION INTRAVENOUS PRN
Status: DISCONTINUED | OUTPATIENT
Start: 2019-10-08 | End: 2019-10-08

## 2019-10-08 RX ORDER — MEPERIDINE HYDROCHLORIDE 50 MG/ML
12.5 INJECTION INTRAMUSCULAR; INTRAVENOUS; SUBCUTANEOUS
Status: DISCONTINUED | OUTPATIENT
Start: 2019-10-08 | End: 2019-10-08 | Stop reason: HOSPADM

## 2019-10-08 RX ADMIN — ACETAMINOPHEN 975 MG: 325 TABLET, FILM COATED ORAL at 10:38

## 2019-10-08 RX ADMIN — PHENYLEPHRINE HYDROCHLORIDE 100 MCG: 10 INJECTION INTRAVENOUS at 12:46

## 2019-10-08 RX ADMIN — PHENYLEPHRINE HYDROCHLORIDE 100 MCG: 10 INJECTION INTRAVENOUS at 12:50

## 2019-10-08 RX ADMIN — PROPOFOL 30 MG: 10 INJECTION, EMULSION INTRAVENOUS at 12:15

## 2019-10-08 RX ADMIN — SODIUM CHLORIDE, POTASSIUM CHLORIDE, SODIUM LACTATE AND CALCIUM CHLORIDE: 600; 310; 30; 20 INJECTION, SOLUTION INTRAVENOUS at 12:00

## 2019-10-08 RX ADMIN — PHENYLEPHRINE HYDROCHLORIDE 100 MCG: 10 INJECTION INTRAVENOUS at 13:12

## 2019-10-08 RX ADMIN — PHENYLEPHRINE HYDROCHLORIDE 100 MCG: 10 INJECTION INTRAVENOUS at 13:16

## 2019-10-08 RX ADMIN — PHENYLEPHRINE HYDROCHLORIDE 100 MCG: 10 INJECTION INTRAVENOUS at 13:02

## 2019-10-08 RX ADMIN — PHENYLEPHRINE HYDROCHLORIDE 100 MCG: 10 INJECTION INTRAVENOUS at 13:08

## 2019-10-08 RX ADMIN — PROPOFOL: 10 INJECTION, EMULSION INTRAVENOUS at 13:00

## 2019-10-08 RX ADMIN — PROPOFOL 50 MCG/KG/MIN: 10 INJECTION, EMULSION INTRAVENOUS at 12:07

## 2019-10-08 RX ADMIN — FENTANYL CITRATE 50 MCG: 50 INJECTION, SOLUTION INTRAMUSCULAR; INTRAVENOUS at 12:41

## 2019-10-08 RX ADMIN — PHENYLEPHRINE HYDROCHLORIDE 100 MCG: 10 INJECTION INTRAVENOUS at 13:48

## 2019-10-08 RX ADMIN — CEFAZOLIN SODIUM 2 G: 2 INJECTION, SOLUTION INTRAVENOUS at 12:12

## 2019-10-08 RX ADMIN — PHENYLEPHRINE HYDROCHLORIDE 150 MCG: 10 INJECTION INTRAVENOUS at 13:24

## 2019-10-08 ASSESSMENT — MIFFLIN-ST. JEOR: SCORE: 1785.13

## 2019-10-08 ASSESSMENT — LIFESTYLE VARIABLES: TOBACCO_USE: 1

## 2019-10-08 NOTE — ANESTHESIA PREPROCEDURE EVALUATION
Anesthesia Pre-Procedure Evaluation    Patient: Fish Hong   MRN:     [id number] Gender:   male   Age:    88 year old :      1931        Preoperative Diagnosis: * No surgery found *        Past Medical History:   Diagnosis Date     Hypertension      Hypertension      Obesity      Osteoarthritis of both hips     s/p steroid injections     Phimosis      Pulmonary nodules      Rheumatic fever       Past Surgical History:   Procedure Laterality Date     AS TOTAL KNEE ARTHROPLASTY       C TOTAL KNEE ARTHROPLASTY      Bilateral     CIRCUMCISION N/A 2016    Procedure: CIRCUMCISION;  Surgeon: Elgin Rhodes MD;  Location: [facility] OR     circumcision       ENT SURGERY      detached retina--bilateral     HERNIA REPAIR  11/3/2008    mesh repair umbilical hernia          Anesthesia Evaluation     . Pt has had prior anesthetic. Type: MAC and General    History of anesthetic complications     with knee arthroplasy woke up early.  Subsequent surgeries reports no issues.       ROS/MED HX    ENT/Pulmonary: Comment: Stable pulmonary nodules followed with imaging.     (+)BALWINDER risk factors hypertension, obese, tobacco use (Smoked 10 years on and off.  Quit at age 28. ), Past use , . .    Neurologic:  - neg neurologic ROS     Cardiovascular: Comment: Denies chest pain, SOB, palpitations, syncope, STERN, orthopnea, or PND    Rheumatic fever as a child.  Denies ever being told he has a murmur.     (+) hypertension----. : . . . :. . Previous cardiac testing date:results:date: results:ECG reviewed date:10/8/19 results: date: results:         (-) taking anticoagulants/antiplatelets   METS/Exercise Tolerance: Comment: Laundry is in basement.  Has a chairlift at home from when his wife was alive.  He does use the chair lift, bit reports he can go up and down the stairs.      >4 METS   Hematologic:  - neg hematologic  ROS       Musculoskeletal: Comment: Bilateral knee arthroplasty  and .    Bilateral  hip osteoarthritis s/p steroid injection.   (+) arthritis,  -       GI/Hepatic: Comment: S/p umbilical hernia repair, 2008. - neg GI/hepatic ROS      (-) GERD   Renal/Genitourinary:     (+) BPH,    : Phismosis s/p circumcision 2016.   Endo:     (+) Obesity, .      Psychiatric:  - neg psychiatric ROS       Infectious Disease:  - neg infectious disease ROS       Malignancy:   (+) Malignancy History of Skin  Skin CA Remission status post Surgery,         Other:    (+) No chance of pregnancy H/O Chronic Pain,no other significant disability                        PHYSICAL EXAM:   Mental Status/Neuro: A/A/O   Airway: Facies: Thick Neck  Mallampati: III  Mouth/Opening: Full  TM distance: < 6 cm  Neck ROM: Limited   Respiratory: Auscultation: CTAB     Resp. Rate: Normal     Resp. Effort: Normal      CV: Rhythm: Regular  Rate: Age appropriate  Heart: Normal Sounds  Edema: None   Comments: Multiple dental implants.      Dental: Details                LABS:  CBC:   Lab Results   Component Value Date    WBC 6.1 09/23/2019    WBC 5.6 04/22/2019    HGB 14.6 09/23/2019    HGB 14.7 04/22/2019    HCT 43.9 09/23/2019    HCT 42.5 04/22/2019     (L) 09/23/2019     04/22/2019     BMP:   Lab Results   Component Value Date     09/23/2019     04/22/2019    POTASSIUM 4.0 09/23/2019    POTASSIUM 4.2 04/22/2019    CHLORIDE 107 09/23/2019    CHLORIDE 109 04/22/2019    CO2 27 09/23/2019    CO2 24 04/22/2019    BUN 20 09/23/2019    BUN 21 04/22/2019    CR 1.20 09/23/2019    CR 1.29 (H) 04/22/2019    GLC 81 09/23/2019    GLC 82 04/22/2019     COAGS: No results found for: PTT, INR, FIBR  POC: No results found for: BGM, HCG, HCGS  OTHER:   Lab Results   Component Value Date    A1C 5.8 (H) 09/23/2019    JEMIMA 8.9 09/23/2019    ALBUMIN 3.8 04/22/2019    PROTTOTAL 7.2 04/22/2019    ALT 18 04/22/2019    AST 20 04/22/2019    ALKPHOS 75 04/22/2019    BILITOTAL 0.5 04/22/2019        Preop Vitals    BP Readings from Last 3  "Encounters:   09/23/19 132/69   09/12/19 (!) 142/74   09/05/19 138/84    Pulse Readings from Last 3 Encounters:   09/23/19 84   09/12/19 77   09/05/19 69      Resp Readings from Last 3 Encounters:   09/23/19 19   07/24/19 18   04/24/19 16    SpO2 Readings from Last 3 Encounters:   09/23/19 96%   09/12/19 96%   04/22/19 95%      Temp Readings from Last 1 Encounters:   09/23/19 36.7  C (98  F) (Oral)    Ht Readings from Last 1 Encounters:   09/23/19 1.803 m (5' 11\")      Wt Readings from Last 1 Encounters:   09/23/19 111.4 kg (245 lb 11.2 oz)    Estimated body mass index is 34.27 kg/m  as calculated from the following:    Height as of 9/23/19: 1.803 m (5' 11\").    Weight as of 9/23/19: 111.4 kg (245 lb 11.2 oz).     LDA:        Assessment:   ASA SCORE: 2    H&P: History and physical reviewed and following examination; no interval change.   Smoking Status:  Non-Smoker/Unknown   NPO Status: NPO Appropriate     Plan:   Anes. Type:  MAC   Pre-Medication: Acetaminophen (no versed)   Induction:  N/a   Airway: Native Airway   Access/Monitoring: PIV   Maintenance: TIVA     Postop Plan:   Postop Pain: None  Postop Sedation/Airway: Not planned  Disposition: Outpatient     PONV Management:   Adult Risk Factors:, Non-Smoker   Prevention:, No Volatiles     CONSENT: Direct conversation   Plan and risks discussed with: Patient   Blood Products: Consent Deferred (Minimal Blood Loss)       Comments for Plan/Consent:  If muscle relaxant is needed for closure we may need to change to GA--pt consented                PAC Discussion and Assessment    ASA Classification: 2  Case is suitable for: [place]  Anesthetic techniques and relevant risks discussed: MAC with GA as backup  Invasive monitoring and risk discussed:   Types:   Possibility and Risk of blood transfusion discussed:   NPO instructions given:   Additional anesthetic preparation and risks discussed:   Needs early admission to pre-op area:   Other:     PAC Resident/NP " Anesthesia Assessment:  Fish Hong is an 88-geneva-old male scheduled for Open Left Inguinal Hernia Repair; possible orchiectomy on 10/8/19 with Dr. Landaverde at [facility] under MAC and local anesthetic.  Mr. Hong was seen in new hernia consultation with Dr. Swanson on 9/12/19 for moderate to severe large left inguinal hernia.  Per Dr. Swanson's note, due to the size of the hernia (Hernia size is > 5cm) and the extent of the patient's concern regarding his symptoms, the patient would be a reasonable surgical candidate. The above procedure was recommended.       PROCEDURES    EKG 2016: SR 79 bpm with non-specific QRS widening and anterior fascicular block.    He has the following specific operative considerations:   - METS:  >4. RCRI : No serious cardiac risks.  0.4 % risk of major adverse cardiac event.  - BALWINDER # of risks 5/8 = high  - VTE risk:  1.8%  - Risk of PONV score = 1.  If > 2, anti-emetic intervention recommended.      #  Cardiology - denies known coronary artery disease.    - HTN well controlled on CCB.  Take amlodipine DOS.    - h/o Rheumatic fever as a child.  No murmur on exam and denies any cardiac symptoms.        #  Pulmonary - remote smoking, quit at age 28.    - pulmonary nodule(s) being followed with imagng:  CT of chest 4/29/19, stable.  #  Hematology - B12 deficiency, on replacement. CBC today.   #  Endocrine - Obesity: Recommend careful positioning to prevent airway/ventilatory compromise, or tissue injury.    #  Musculoskeletal - osteoarthritis of multiple joints.  S/p bilateral knee replacements in late 1990's. S/p recent steroid injection into hips.  Recommend careful positioning.   #  Renal - elevated creatinine April 2019.  Will recheck BMP today.     - Anesthesia considerations:  MP III, limited neck extension. Refer to PAC assessment in anesthesia records  - T& S per surgeon if thinks necessary.   - Patient is optimized and is acceptable candidate for the proposed procedure.  No further  diagnostic evaluation is needed.     Arrival time, NPO, shower and medication instructions provided by nursing staff today.  Preparing For Your Surgery handout given.  Patient was discussed with Dr Wallace.    Reviewed and Signed by PAC Mid-Level Provider/Resident  Mid-Level Provider/Resident: Micaela DIAZ CNP  Date: 9/23/19  Time: 8:31    Attending Anesthesiologist Anesthesia Assessment:  STAFF:  Barrettsty 88 y.o.  with LARGE left inguinal hernia.    Exam: No JVD.  METS > 4, and devoid of cardiac SSX's.  Has progressive arthritis with limited neck ROM, limited extension.  Mouth opens well, but M3 airway.  Most teeth are intact.   DISCUSSION: Patient experienced MAC + local for his circumcision in 2016,,,, went well.   I suggested he consider a SPINAL SAB for this surgery, and he seems amendable to that option. Defer to anesthesia team on DOS.   ---rcp      Reviewed and Signed by PAC Anesthesiologist  Anesthesiologist: kimo  Date: 9/23/2019  Time: 0840  Pass/Fail: Pass  Disposition:     PAC Pharmacist Assessment:        Pharmacist:   Date:   Time:    Annette Palmer MD

## 2019-10-08 NOTE — DISCHARGE INSTRUCTIONS
[facility]  Same-Day Surgery   Adult Discharge Orders & Instructions     For 24 hours after surgery    1. Get plenty of rest.  A responsible adult must stay with you for at least 24 hours after you leave the hospital.   2. Do not drive or use heavy equipment.  If you have weakness or tingling, don't drive or use heavy equipment until this feeling goes away.  3. Do not drink alcohol.  4. Avoid strenuous or risky activities.  Ask for help when climbing stairs.   5. You may feel lightheaded.  IF so, sit for a few minutes before standing.  Have someone help you get up.   6. If you have nausea (feel sick to your stomach): Drink only clear liquids such as apple juice, ginger ale, broth or 7-Up.  Rest may also help.  Be sure to drink enough fluids.  Move to a regular diet as you feel able.  7. You may have a slight fever. Call the doctor if your fever is over 100 F (37.7 C) (taken under the tongue) or lasts longer than 24 hours.  8. You may have a dry mouth, a sore throat, muscle aches or trouble sleeping.  These should go away after 24 hours.  9. Do not make important or legal decisions.   Call your doctor for any of the followin.  Signs of infection (fever, growing tenderness at the surgery site, a large amount of drainage or bleeding, severe pain, foul-smelling drainage, redness, swelling).    2. It has been over 8 to 10 hours since surgery and you are still not able to urinate (pass water).    3.  Headache for over 24 hours.    4.  Numbness, tingling or weakness the day after surgery (if you had spinal anesthesia).  To contact a doctor, call Dr Landaverde's office at [phone] or:      [phone] and ask for the resident on call (answered 24 hours a day)      Emergency Department:    [facility]: [phone]       (TTY for hearing impaired: [phone])    [facility]: [phone]       (TTY for hearing impaired: [phone])

## 2019-10-08 NOTE — OP NOTE
"   [facility], [place]    Operative Note    Pre-operative diagnosis: Inguinal Hernia  Post-operative diagnosis Same  Procedure: Procedure(s):  Open Left Inguinal Hernia Repair  Left Orchiectomy  Surgeon: Surgeon(s) and Role:     * Antonio Landaverde MD - Primary     * [place] - Resident - Assisting  Anesthesia: Combined MAC with Local   Estimated blood loss: 10cc  Drains: None  Specimens:   ID Type Source Tests Collected by Time Destination   A : left testicle and hernia contents Tissue Testicle, Left SURGICAL PATHOLOGY EXAM Antonio Landaverde MD 10/8/2019  1:19 PM      Findings:   large omentum containing left indirect inguinal hernia.  Complications: None.  Implants:    Implant Name Type Inv. Item Serial No.  Lot No. LRB No. Used   MESH PROGRIP 4.7X3\" PARIETEX LEFT [id number] Mesh MESH PROGRIP 4.7X3\" PARIETEX LEFT [id number]  COVIDIEN [id number] Left 1        OPERATIVE INDICATIONS:  Fish Hong is a 88 year old year old @gender with a history of a pain in the left groins and testicle; there is a large left inguinal hernia.      After understanding the risks and benefits of proceeding with surgery, the patient has an indication for open left inguinal hernia repair with mesh and consented to undergo surgery.  I also consented Fish to undergo orchiectomy due to the presence of hydrocele in the left groin which has caused very significant pain; the left testicle would interfere also with a robust hernia repair.    I reviewed the risks of surgery with Fish Hong .    These include, but are not limited to, death, myocardial infarction, pneumonia, urinary tract infection, deep venous thrombosis with or without pulmonary embolus, abdominal infection from bowel injury or abscess, bowel obstruction, wound infection, and bleeding.    OPERATIVE DETAILS:  The patient was brought to the operating room and prepared in a routine fashion.  A timeout was performed prior to " surgery and documented by the nursing team.    Under the benefits of monitored anesthesia control, the patient was positioned supine. A field block was produced by raising skin wheals along the proposed skin incision, along a vertical line lateral to it, and along a horizontal line superior to it. Additional local anesthesia was injected during the procedure under the external oblique aponeurosis, at the internal ring, and as needed.    The skin crease incision was made with a knife ~1 fingerbreadth above and parallel to the inguinal ligament.  The incision was carried down to the aponeurosis of the external oblique, which was cleared bluntly and the external ring was exposed. Hemostasis was achieved in the wound. An incision was made in the midportion of the external oblique aponeurosis in the direction of its fibers. The ilioinguinal nerve was not identified, but there was a very large amount of omentum in the area of the spermatic cord.     Flaps of the external oblique were developed superiorly and inferiorly. There was a significant rupture of the internal ring.  The vas deferens was tiny.    The cord was identified. It was gently dissected free at the pubic tubercle and encircled with a Penrose drain. Attention was directed to the anteromedial aspect of the cord, where an indirect hernia sac was identified. The sac was carefully dissected free of the cord down to the level of the internal ring. The vas and testicular vessels were identified and protected from harm. The sac was opened and contents were reduced. A finger was passed into the peritoneal cavity and the floor of the inguinal canal assessed and found to be strong. The femoral canal was palpated and no hernia identified. The sac was twisted and suture ligated with 2-0 silk. Redundant sac was excised and submitted to pathology. The stump of the sac was checked for hemostasis and allowed to retract into the abdomen.    Attention then turned to the floor  of the canal, which appeared to be grossly weakened without a well-defined defect or sac. The left sided Parietex mesh was placed into the correct position starting at the pubic tubercle, running parallel to the inguinal ligament and encircling the cord at the internal ring. The keli-internal ring was slightly released by cutting the mesh with Amanda scissors.     Hemostasis was again checked. The Penrose drain was removed. The external oblique aponeurosis was closed with a running suture of 3-0 Vicryl. Omar's fascia was closed with interrupted 3-0 Vicryl. The skin was closed with a subcuticular stitch of 4-0 Monocryl. Dermabond was applied.    The patient tolerated the procedure well and was taken to the postanesthesia care unit in stable condition.    I was present for all critical components of the operation and all needle and sponge counts were correct x2 at the end of the procedure.    Antonio Landaverde MD  Surgery  [phone] (hospital )  [phone] (clinic nurses)

## 2019-10-08 NOTE — ANESTHESIA CARE TRANSFER NOTE
Patient: Fish Hong    Procedure(s):  Open Left Inguinal Hernia Repair  Left Orchiectomy    Diagnosis: Inguinal Hernia  Diagnosis Additional Information: No value filed.    Anesthesia Type:   MAC     Note:  Airway :Nasal Cannula  Patient transferred to:PACU  Comments: BNP low  meds x 1 fluids open for bolus, staff aware.  Pt awake, interacting with staff.    VSS. Handoff Report: Identifed the Patient, Identified the Reponsible Provider, Reviewed the pertinent medical history, Discussed the surgical course, Reviewed Intra-OP anesthesia mangement and issues during anesthesia, Set expectations for post-procedure period and Allowed opportunity for questions and acknowledgement of understanding      Vitals: (Last set prior to Anesthesia Care Transfer)    CRNA VITALS  10/8/2019 1311 - 10/8/2019 1351      10/8/2019             Pulse:  74    SpO2:  93 %                Electronically Signed By: JOE Sotelo CRNA  October 8, 2019  1:51 PM

## 2019-10-08 NOTE — BRIEF OP NOTE
[facility], [place]    Brief Operative Note    Pre-operative diagnosis: Inguinal Hernia  Post-operative diagnosis Same  Procedure: Procedure(s):  Open Left Inguinal Hernia Repair  Left Orchiectomy  Surgeon: Surgeon(s) and Role:     * Antonio Landaverde MD - Primary     * Rancho Urban - Resident - Assisting  Anesthesia: Combined MAC with Local   Estimated blood loss: 10cc  Drains: None  Specimens:   ID Type Source Tests Collected by Time Destination   A : left testicle and hernia contents Tissue Testicle, Left SURGICAL PATHOLOGY EXAM Antonio Landaverde MD 10/8/2019  1:19 PM      Findings:   large omentum containing left indirect inguinal hernia.  Complications: None.  Implants:    Implant Name Type Inv. Item Serial No.  Lot No. LRB No. Used   MESH PROGRIP 4.7X3&quot; PARIETEX LEFT [id number] Mesh MESH PROGRIP 4.7X3&quot; PARIETEX LEFT [id number]  [facility] Center[facility] [id number] Left 1

## 2019-10-08 NOTE — PROGRESS NOTES
3C Recovery:  Arrive @ 1345 from OR to 3C Room 5  Pt attached to monitor (see VS for details)  Pt placed from RA to 2L 02 NC  Pt denies SOB, difficulty breathing, chest pain/pressure, dizziness, light headed, nausea, vomiting, heart burn, abdominal pain/discomfort, fullness, numbness, tingling, sensation change, vision changes, ringing in ears, or other major forms of discomfort @ this time.   Pt hypotensive but asymptomatic   Dr. Palmer aware & @ bedside @ 1400  Pt attached to tele. Strip printed and placed in chart @ 1410.  Pt sit up. IV fluid stopped, and Pt on RA prior to moving from cart to chair.   Pt feet dangled prior to ambulating to chair.   NST assist pt dress and ambulate to chair @ 1440.  Family called to pt bedside. Hannah (daughter) 24hr care adult  Belongings returned to pt at this time. Pt states he has all belongings and is not missing anything. Daughter confirms this statement.   Oral fluid consumption promoted and provided  Snacks offered to pt   Tolerating food and liquids with no difficulty at this time.   Written discharge instructions provided and verbally instructed. Pt and family verbalize understanding of discharge materials. Numbers provided in case questions arise. Written consent provided by Dixie (daughter) stating that instructions have been reviewed. (see form for more details).   Pt denies SOB, difficulty breathing, chest pain/pressure, dizziness, light headed, nausea, vomiting, heart burn, abdominal pain/discomfort, fullness, numbness, tingling, sensation change, vision changes, ringing in ears, or other major forms of discomfort @ this time.   Pt states he is ready to go home.   Pharmacy states meds ready for  @ 1500  Surgeon @ pt bedside @ 1510 informing pt and family of discharge instructions and discussing events of procedure and plan of care. All questions answered @ this time.   Pt and family deny questions at this time.   Family sent down stairs to   prescriptions.   PIV removed.   Pt taken down to JLobby via wheelchair by NST @ 1525  Pt out of 3C @ 1525  In pt chart after he has left 3C to complete charting

## 2019-10-08 NOTE — ANESTHESIA POSTPROCEDURE EVALUATION
Anesthesia POST Procedure Evaluation    Patient: Fish Hong   MRN:     [id number] Gender:   male   Age:    88 year old :      1931        Preoperative Diagnosis: Inguinal Hernia   Procedure(s):  Open Left Inguinal Hernia Repair  Left Orchiectomy   Postop Comments: No value filed.       Anesthesia Type:  Not documented  MAC    Reportable Event: NO     PAIN: Uncomplicated   Sign Out status: Comfortable, Well controlled pain     PONV: No PONV   Sign Out status:  No Nausea or Vomiting     Neuro/Psych: Uneventful perioperative course   Sign Out Status: Preoperative baseline; Age appropriate mentation     Airway/Resp.: Uneventful perioperative course   Sign Out Status: Non labored breathing, age appropriate RR; Resp. Status within EXPECTED Parameters     CV: Uneventful perioperative course   Sign Out status: Appropriate BP and perfusion indices; Appropriate HR/Rhythm     Disposition:   Sign Out in:  PACU  Disposition:  Phase II; Home  Recovery Course: Uneventful  Follow-Up: Not required           Last Anesthesia Record Vitals:  CRNA VITALS  10/8/2019 1311 - 10/8/2019 1411      10/8/2019             Pulse:  74    SpO2:  93 %          Last PACU Vitals:  Vitals Value Taken Time   BP     Temp     Pulse     Resp     SpO2     Temp src     NIBP 88/50 10/8/2019  1:39 PM   Pulse 74 10/8/2019  1:40 PM   SpO2 93 % 10/8/2019  1:40 PM   Resp     Temp     Ht Rate 66 10/8/2019  1:39 PM   Temp 2 35.7  C (96.3  F) 10/8/2019  1:31 PM         Electronically Signed By: Annette Palmer MD, 2019, 2:28 PM

## 2019-10-08 NOTE — OP NOTE
[facility], [place]    Operative Note    Pre-operative diagnosis: Inguinal Hernia   Post-operative diagnosis Same   Procedure: Procedure(s):  Open Left Inguinal Hernia Repair  Left Orchiectomy   Surgeon: Surgeon(s) and Role:     * Antonio Landaverde MD - Primary     * Rancho Urban - Resident - Assisting   Anesthesia: Combined MAC with Local    Estimated blood loss: Less than 10 ml   Drains: None   Specimens: ID Type Source Tests Collected by Time Destination   A : left testicle and hernia contents Tissue Testicle, Left SURGICAL PATHOLOGY EXAM Antonio Landaverde MD 10/8/2019  1:19 PM       Findings: Omentum in left indirect hernia.   Complications: None.   Implants: Parietex progrip 12 cm x 8 cm         OPERATIVE INDICATIONS:  Fish Hong is a 88 year old year old male with a history of a left sided inguinal hernia.    After understanding the risks and benefits of proceeding with surgery, the patient has an indication for open left inguinal hernia repair with mesh and consented to undergo surgery.    I reviewed the risks of surgery with Fish Hong .    These include, but are not limited to, death, myocardial infarction, pneumonia, urinary tract infection, deep venous thrombosis with or without pulmonary embolus, abdominal infection from bowel injury or abscess, bowel obstruction, wound infection, and bleeding.    OPERATIVE DETAILS:  The patient was brought to the operating room and prepared in a routine fashion.  A timeout was performed prior to surgery and documented by the nursing team.    Under the benefits of monitored anesthesia control, the patient was positioned supine. A field block was produced by raising skin wheals along the proposed skin incision, along a vertical line lateral to it, and along a horizontal line superior to it. Additional local anesthesia was injected during the procedure under the external oblique aponeurosis, at the internal ring, and as  needed.    A 10cm skin crease incision was made with a knife ~2 fingerbreadth above and parallel to the inguinal ligament.  The incision was carried down to the aponeurosis of the external oblique, which was cleared bluntly and the external ring was exposed. Hemostasis was achieved in the wound. An incision was made in the midportion of the external oblique aponeurosis in the direction of its fibers. The ilioinguinal nerve was not identified. Flaps of the external oblique were developed superiorly and inferiorly.    The cord was identified along with a large hernia sac that was filled with omentum; no small bowel nor colon was identified.     The hernia sac and cord structures were gently dissected free at the pubic tubercle and retracted from the scrotum. The vas and testicular vessels were identified and retracted to remove the hernia en block with the left teste. The omentum and testicular vessels were divided and ligated with 2-0 silk x 4. Redundant sac/omentum/left teste were excised and submitted to pathology. The stump of the sac was checked for hemostasis and allowed to retract into the abdomen.    Attention then turned to the floor of the canal, which appeared to be grossly weakened. The left sided Parietex progrip mesh was placed into the correct position starting at the pubic tubercle, running parallel to the inguinal ligament. There was no cord to encircle.    Hemostasis was again checked. The external oblique aponeurosis was closed with a running suture of 3-0 Vicryl. Omar's fascia was closed with interrupted 3-0 Vicryl. The skin was closed with a subcuticular stitch of 4-0 Monocryl. Dermabond was applied.    The patient tolerated the procedure well and was taken to the postanesthesia care unit in stable condition.    I was present for all critical components of the operation and all needle and sponge counts were correct x2 at the end of the procedure.    Antonio Landaverde MD  Surgery  [phone]  (hospital )  [phone] (clinic nurses)

## 2019-10-08 NOTE — LETTER
"October 15, 2019      TO: Fish Meng  [address]  [facility] [place] [address]         Dear Mr. Fish Meng,    We received and reviewed your test results done on 10/08/2019.  You may receive more than one letter if we receive the results on multiple days.  Please share all lab and test results with your primary care provider and keep a copy for your own records.        Your test results are normal. Pathology from recent surgery with Dr. Landaverde.    If you have any questions, feel free contact us at the Call Center [phone].      Resulted Orders   Surgical pathology exam   Result Value Ref Range    Copath Report       Patient Name: FISH MENG  MR#: [id number]  Specimen #: [id number]  Collected: 10/8/2019  Received: 10/8/2019  Reported: 10/11/2019 21:11  Ordering Phy(s): [facility] LANDAVERDE    For improved result formatting, select 'View Enhanced Report Format' under   Linked Documents section.    SPECIMEN(S):  Left testicle and hernia contents    FINAL DIAGNOSIS:  Left testicle and hernia contents:  - Benign testis with atrophic features  - Benign hernia sac    I have personally reviewed all specimens and/or slides, including the   listed special stains, and used them  with my medical judgement to determine or confirm the final diagnosis.    Electronically signed out by:    Darrius Calzada M.D., [facility]    CLINICAL HISTORY:  Omentum-containing left indirect inguinal hernia and left orchiectomy    GROSS:  The specimen is received fresh with proper patient identification, labeled   \"left testicle and hernia  contents\".  The specimen consists of pink-tan 16.1 x 1.5 x 2.2 cm hernial   sac  and attached 18.1 x 14.1 x 1.5  cm fibrofatty yellow-tan omental tissue. The 53.9 gm hernial sac contains   attached spermatic cord and firm to  cystic intact tunica vaginalis, which is inked black. The surface of the   tunica vaginalis is smooth and  regular without any disruption and devoid of " nodularity. The tunica   vaginalis is opened to reveal clear  straw-colored fluid and white-tan testis measuring 3.4 x 2.6 x 1.3 cm. The   surface of the testis is regular  smooth and devoid of any nodularity. The testis is sectioned to reveal   orange tan unremarkable testicular  parenchyma without any mass or lesion. Representative sections are   submitted in cassettes A1 to A4.    Summary of Sections:  A1 - tunica vaginalis, on edge  A2 - omentum  A3-A4 - testis with spermatic cord (Dictated by: Lai Grimm MD resident   10/9/2019 06:22 PM)    MICROSCOPIC:  Microscopic examination was performed.    The technical component of this testing was completed at the [facility], with the professional component performed   at the [facility], [address] [place] [id number] ([phone])    CPT Codes:  A: 08497-JX7    COLLECTION SITE:  Client: [facility]  Location: [facility] ()               Sincerely,    Unruly Beal RN

## 2019-10-09 LAB — INTERPRETATION ECG - MUSE: NORMAL

## 2019-10-10 ENCOUNTER — TELEPHONE (OUTPATIENT)
Dept: SURGERY | Facility: CLINIC | Age: 84
End: 2019-10-10

## 2019-10-10 NOTE — TELEPHONE ENCOUNTER
"RN Post-Op/Post-Discharge Care Coordination Note    Mr. Fish Hong is a 88 year old male who underwent Open Left inguinal hernia repair on 10/08/2019 with  Dr. Landaverde.  Spoke with Patient.    Support  Patient able to care for self independently     Health Status  Fevers/chills: Patient denies any fever or chills.  Nausea/Vomiting: Patient denies nausea/vomiting.  Eating/drinking: Patient is able to eat and drink without any complaints.  Bowel habits: Patient reports no bowel movement since surgery.  Drains (ARMAND): N/A  Incisions: Patient denies any signs and symptoms of infection. and Patient denies any drainage. \"Bruising clear through my scrotal sac.\"  Wound closure:  Skin Sealant  Steri-strips  Pain: \"sore\"  New Medications:      Activity/Restrictions  Following recommendation of surgeon    Equipment  None    Pathology reviewed with patient:  N/A    Forms/Letters  No    All of his questions were answered including reviewing restrictions, and wound care.  He will call this office if he has any further questions and/or concerns.       In lieu of a post-op clinic patient that patient would like to be contacted in approximately 7-10 days via telephone.    A copy of this note was routed to the primary surgeon.      Whom and When to Call  Patient acknowledges understanding of how to manage any medication changes and   when to seek medical care.     Patient advised that if after hour medical concerns arise to please call [phone] and choose option 4 to speak to the physician on call.                 "

## 2019-10-11 LAB — COPATH REPORT: NORMAL

## 2019-10-17 ENCOUNTER — OFFICE VISIT (OUTPATIENT)
Dept: SURGERY | Facility: CLINIC | Age: 84
End: 2019-10-17
Payer: MEDICARE

## 2019-10-17 DIAGNOSIS — K40.90 UNILATERAL INGUINAL HERNIA WITHOUT OBSTRUCTION OR GANGRENE, RECURRENCE NOT SPECIFIED: Primary | ICD-10-CM

## 2019-10-17 ASSESSMENT — MIFFLIN-ST. JEOR: SCORE: 1788.93

## 2019-10-17 ASSESSMENT — PAIN SCALES - GENERAL: PAINLEVEL: MODERATE PAIN (5)

## 2019-10-17 NOTE — LETTER
"10/17/2019       RE: Fish Hong  [address]  [facility] [address]     Dear Colleague,    Thank you for referring your patient, Fish Hong, to the [facility] GENERAL SURGERY at [facility]. Please see a copy of my visit note below.    Patient underwent prior open left inguinal hernia surgery and this occurred 2 weeks ago.    Fish Hong overall has the following complaints:     He is doing well at home. Denies fevers or chills. He is tolerating a diet. He is having bowel movements. He feels like his incision is healing well. He complains of persistent left scrotal swelling that seems about the same lately. It is a little sore.    On exam:  BP (P) 106/65 (BP Location: Left arm, Patient Position: Sitting, Cuff Size: Adult Large)   Pulse (P) 96   Temp (P) 97  F (36.1  C) (Oral)   Ht (P) 1.803 m (5' 11\")   Wt (P) 109.7 kg (241 lb 12.8 oz)   SpO2 (P) 95%   BMI (P) 33.72 kg/m     Lung exam: breathing unlabored  Abdominal exam: soft; nontender; nondistended; incisions c/d/i  Left scrotum firm and minimally tender; no significant ecchymosis    Plan:  Post-op seroma - ice and elevation - expect improvement in weeks to months  Otherwise doing well, return to clinic prn    Rancho Urban, PGY7  Seen with Dr. Landaverde    Physician Attestation  I, Antonio Landaverde, saw and evaluated this patient as part of a shared visit.  I have reviewed and discussed with the advanced practice provider and/or resident their history, physical and plan.    I personally reviewed the vital signs, medications, labs and imaging.    My key history or physical exam findings: successful prior repair of left inguinal hernia.    Key management decisions made by me: f/u prn.    Antonio Landaverde MD  Date of Service (when I saw the patient): 10/17/19    "

## 2019-10-17 NOTE — PROGRESS NOTES
"Patient underwent prior open left inguinal hernia surgery and this occurred 2 weeks ago.    Fish Hong overall has the following complaints:     He is doing well at home. Denies fevers or chills. He is tolerating a diet. He is having bowel movements. He feels like his incision is healing well. He complains of persistent left scrotal swelling that seems about the same lately. It is a little sore.    On exam:  BP (P) 106/65 (BP Location: Left arm, Patient Position: Sitting, Cuff Size: Adult Large)   Pulse (P) 96   Temp (P) 97  F (36.1  C) (Oral)   Ht (P) 1.803 m (5' 11\")   Wt (P) 109.7 kg (241 lb 12.8 oz)   SpO2 (P) 95%   BMI (P) 33.72 kg/m    Lung exam: breathing unlabored  Abdominal exam: soft; nontender; nondistended; incisions c/d/i  Left scrotum firm and minimally tender; no significant ecchymosis    Plan:  Post-op seroma - ice and elevation - expect improvement in weeks to months  Otherwise doing well, return to clinic prn    Ranch[place], PGY7  Seen with Dr. Landaverde    Physician Attestation  I, Antonio Landaverde, saw and evaluated this patient as part of a shared visit.  I have reviewed and discussed with the advanced practice provider and/or resident their history, physical and plan.    I personally reviewed the vital signs, medications, labs and imaging.    My key history or physical exam findings: successful prior repair of left inguinal hernia.    Key management decisions made by me: f/u prn.    Antonio Landaverde MD  Date of Service (when I saw the patient): 10/17/19    "

## 2019-10-17 NOTE — NURSING NOTE
"Chief Complaint   Patient presents with     RECHECK     Hernia surgery follow up.       Vitals:    10/17/19 1005   BP: (P) 106/65   BP Location: (P) Left arm   Patient Position: (P) Sitting   Cuff Size: (P) Adult Large   Pulse: (P) 96   Temp: (P) 97  F (36.1  C)   TempSrc: (P) Oral   SpO2: (P) 95%   Weight: (P) 109.7 kg (241 lb 12.8 oz)   Height: (P) 1.803 m (5' 11\")       Body mass index is 33.72 kg/m  (pended).        COREY PACHECO, EMT    "

## 2019-11-21 ENCOUNTER — ANCILLARY PROCEDURE (OUTPATIENT)
Dept: ULTRASOUND IMAGING | Facility: CLINIC | Age: 84
End: 2019-11-21
Attending: SURGERY
Payer: MEDICARE

## 2019-11-21 ENCOUNTER — OFFICE VISIT (OUTPATIENT)
Dept: SURGERY | Facility: CLINIC | Age: 84
End: 2019-11-21
Payer: MEDICARE

## 2019-11-21 VITALS
SYSTOLIC BLOOD PRESSURE: 122 MMHG | HEIGHT: 71 IN | BODY MASS INDEX: 33.02 KG/M2 | WEIGHT: 235.9 LBS | HEART RATE: 68 BPM | DIASTOLIC BLOOD PRESSURE: 60 MMHG | OXYGEN SATURATION: 97 % | TEMPERATURE: 97.6 F

## 2019-11-21 DIAGNOSIS — K40.90 UNILATERAL INGUINAL HERNIA WITHOUT OBSTRUCTION OR GANGRENE, RECURRENCE NOT SPECIFIED: Primary | ICD-10-CM

## 2019-11-21 DIAGNOSIS — K40.90 UNILATERAL INGUINAL HERNIA WITHOUT OBSTRUCTION OR GANGRENE, RECURRENCE NOT SPECIFIED: ICD-10-CM

## 2019-11-21 ASSESSMENT — MIFFLIN-ST. JEOR: SCORE: 1762.17

## 2019-11-21 ASSESSMENT — PAIN SCALES - GENERAL: PAINLEVEL: NO PAIN (0)

## 2019-11-21 NOTE — LETTER
"11/21/2019       RE: Fish Hong  [address]  [facility] [address]     Dear Colleague,    Thank you for referring your patient, Fish Hong, to the [facility] GENERAL SURGERY at [facility]. Please see a copy of my visit note below.    Patient underwent prior 10/08/19 surgery and this occurred 7 weeks ago.    Fish Hong overall has the following complaints:    Mr. Fish Hong is an 88 year old male who presents to clinic today s/p left inguinal hernia repair without obstruction or gangrene on 10/08/19. In the post-operative period, he reports his left scrotum has become swollen with an area of firmness in the middle of it, which he reports is similar to how it was before his operation. He however denies experiencing pain in the site of previous hernia. Also denies scrotal discomfort on the left greater than the right. Denies loss or change in appetite, n/v, constipation, diarrhea, or dysuria in association with the left inguinal hernia repair. He does not have any symptoms associated with his right scrotum and does not have concerns about his right scrotum. He has no other concerns or questions at present time.    On exam:  /60 (BP Location: Left arm, Patient Position: Sitting, Cuff Size: Adult Large)   Pulse 68   Temp 97.6  F (36.4  C) (Oral)   Ht 1.803 m (5' 11\")   Wt 107 kg (235 lb 14.4 oz)   SpO2 97%   BMI 32.90 kg/m     Lung exam: breathing unlabored  Abdominal exam: soft; nontender; nondistended; incisions c/d/i  Genitourinary exam: Left scrotum is swollen to a diameter roughly 6-7cm, firm to palpation, non-tender, and small area of overlying mild erythema    Assessment/Plan:  Unilateral inguinal hernia without obstruction or gangrene, recurrence not specified   - US Left scrotum for further evaluation of fluid vs non-fluid collection; if patient can be scheduled today, we will follow-up later today, otherwise follow-up per completion " of ultrasound.  - Patient is free to resume usual activities with no restrictions    Orders Placed This Encounter   Procedures     US Abdomen Limited       Scribe Disclosure:   I, Deepakalicealejandro Gonzalezusrekha, am serving as a scribe; to document services personally performed by Dr. Landaverde- -based on data collection and the provider's statements to me.     Provider Disclosure:  I agree with above History, Review of Systems, Physical exam and Plan.  I have reviewed the content of the documentation and have edited it as needed. I have personally performed the services documented here and the documentation accurately represents those services and the decisions I have made.      Electronically signed by:    Physician Attestation  I, Antonio Landaverde, saw and evaluated this patient as part of a shared visit.  I have reviewed and discussed with the advanced practice provider and/or resident their history, physical and plan.    I personally reviewed the vital signs, medications, labs and imaging.    My key history or physical exam findings: had hernia repair with left orchiectomy; has bulge in left groin.  No obstructions.    Key management decisions made by me: U/S.  Would drain if fluid.  U/S report showed no fluid to drain.  F/u prn.    Antonio Landaverde MD  Date of Service (when I saw the patient): 11/21/19    Answers for HPI/ROS submitted by the patient on 11/21/2019   General Symptoms: No  Skin Symptoms: No  HENT Symptoms: No  EYE SYMPTOMS: No  HEART SYMPTOMS: No  LUNG SYMPTOMS: No  INTESTINAL SYMPTOMS: No  URINARY SYMPTOMS: No  REPRODUCTIVE SYMPTOMS: No  SKELETAL SYMPTOMS: No  BLOOD SYMPTOMS: No  NERVOUS SYSTEM SYMPTOMS: No  MENTAL HEALTH SYMPTOMS: No

## 2019-11-21 NOTE — NURSING NOTE
"Chief Complaint   Patient presents with     RECHECK     Follow up for lingual hernia.       Vitals:    11/21/19 0928   BP: 122/60   BP Location: Left arm   Patient Position: Sitting   Cuff Size: Adult Large   Pulse: 68   Temp: 97.6  F (36.4  C)   TempSrc: Oral   SpO2: 97%   Weight: 107 kg (235 lb 14.4 oz)   Height: 1.803 m (5' 11\")       Body mass index is 32.9 kg/m .            Annette Sy CMA    "

## 2020-01-31 ENCOUNTER — TELEPHONE (OUTPATIENT)
Dept: ORTHOPEDICS | Facility: CLINIC | Age: 85
End: 2020-01-31

## 2020-01-31 NOTE — TELEPHONE ENCOUNTER
[facility] Call Center    Phone Message    May a detailed message be left on voicemail: yes    Reason for Call: Other: Pt had a US-guided corticosteroid injection 8/29/19 seeking another injection for his right hip. He is having pain again. Please call back to schedule.     Action Taken: Message routed to:  [facility] ([facility] Sports Med

## 2020-01-31 NOTE — TELEPHONE ENCOUNTER
Called patient and scheduled him for an appointment for a repeat right hp US guided injection. He states that it is only for his right hip. He is scheduled for 2/13 at 7:30 am.

## 2020-02-03 ENCOUNTER — PRE VISIT (OUTPATIENT)
Dept: UROLOGY | Facility: CLINIC | Age: 85
End: 2020-02-03

## 2020-02-03 NOTE — TELEPHONE ENCOUNTER
Reason for Visit: follow up symptom check    Diagnosis: inguinal hernia    Orders/Procedures/Records: in system    Contact Patient: n/a    Rooming Requirements: normal      Teri Allan LPN  02/03/20  1:30 PM

## 2020-02-13 ENCOUNTER — OFFICE VISIT (OUTPATIENT)
Dept: ORTHOPEDICS | Facility: CLINIC | Age: 85
End: 2020-02-13
Payer: MEDICARE

## 2020-02-13 ENCOUNTER — ALLIED HEALTH/NURSE VISIT (OUTPATIENT)
Dept: UROLOGY | Facility: CLINIC | Age: 85
End: 2020-02-13
Payer: MEDICARE

## 2020-02-13 ENCOUNTER — OFFICE VISIT (OUTPATIENT)
Dept: UROLOGY | Facility: CLINIC | Age: 85
End: 2020-02-13
Payer: MEDICARE

## 2020-02-13 VITALS
DIASTOLIC BLOOD PRESSURE: 84 MMHG | SYSTOLIC BLOOD PRESSURE: 156 MMHG | WEIGHT: 235 LBS | HEIGHT: 71 IN | BODY MASS INDEX: 32.9 KG/M2 | HEART RATE: 60 BPM

## 2020-02-13 VITALS — HEIGHT: 71 IN | BODY MASS INDEX: 32.9 KG/M2

## 2020-02-13 DIAGNOSIS — N50.1 HEMATOCELE: Primary | ICD-10-CM

## 2020-02-13 DIAGNOSIS — M16.11 PRIMARY OSTEOARTHRITIS OF RIGHT HIP: Primary | ICD-10-CM

## 2020-02-13 RX ORDER — TRIAMCINOLONE ACETONIDE 40 MG/ML
40 INJECTION, SUSPENSION INTRA-ARTICULAR; INTRAMUSCULAR
Status: DISCONTINUED | OUTPATIENT
Start: 2020-02-13 | End: 2020-07-09

## 2020-02-13 RX ORDER — ROPIVACAINE HYDROCHLORIDE 5 MG/ML
3 INJECTION, SOLUTION EPIDURAL; INFILTRATION; PERINEURAL
Status: DISCONTINUED | OUTPATIENT
Start: 2020-02-13 | End: 2020-07-09

## 2020-02-13 RX ORDER — CEFAZOLIN SODIUM 2 G/50ML
2 SOLUTION INTRAVENOUS
Status: CANCELLED | OUTPATIENT
Start: 2020-02-13

## 2020-02-13 RX ORDER — CEFAZOLIN SODIUM 1 G/50ML
1 INJECTION, SOLUTION INTRAVENOUS SEE ADMIN INSTRUCTIONS
Status: CANCELLED | OUTPATIENT
Start: 2020-02-13

## 2020-02-13 RX ADMIN — ROPIVACAINE HYDROCHLORIDE 3 ML: 5 INJECTION, SOLUTION EPIDURAL; INFILTRATION; PERINEURAL at 09:05

## 2020-02-13 RX ADMIN — TRIAMCINOLONE ACETONIDE 40 MG: 40 INJECTION, SUSPENSION INTRA-ARTICULAR; INTRAMUSCULAR at 09:05

## 2020-02-13 ASSESSMENT — PAIN SCALES - GENERAL: PAINLEVEL: NO PAIN (0)

## 2020-02-13 ASSESSMENT — MIFFLIN-ST. JEOR: SCORE: 1758.08

## 2020-02-13 NOTE — LETTER
"2/13/2020       RE: Fish Hong  [address]  [facility] [address]     Dear Colleague,    Thank you for referring your patient, Fish Hong, to the [facility] UROLOGY AND INST FOR PROSTATE AND UROLOGIC CANCERS at [facility]. Please see a copy of my visit note below.    Fish Hong is a 88 year old male is status-post left orchiectomy and left inguinal hernia repair by Dr. Landaverde 10/2019.  He has had a taut left scrotal fluid collection since that surgery.  Dr. Landaverde advised observation.    Scrotal ultrasound done 11/21/19 that showed a complex left scrotal fluid collection consistent with hematoma.  I reviewed US images and report from this and from 9/5/19 scrotal ultrasound.  Looks like a hematoma with solid and fluid components.     ROS;  Heavy taut left scrotal fluid collection.  The size and bulk of this are bothersome to him.  Voiding without problems, no gross hematuria.    BP (!) 156/84   Pulse 60   Ht 1.803 m (5' 11\")   Wt 106.6 kg (235 lb)   BMI 32.78 kg/m       General: Alert, oriented, nad  Eyes: anicteric, EOMI.  Pulse: regular  Resps: normal, non-labored.  Abdomen:  nondistended.   exam shows a taut ovoid left scrotal fluid collection that does not communicate to the groin- this is very taut and consistent with hematoma rather than persisting hernia.      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    Plan  - schedule exploration of scrotum and evacuate hematoma.  Discussed there is a small chance of recurrence, but I think this is unlikely.  - discussed risks, benefits, and alternatives of this surgery.    Javad GALLAGHER    CC: Saima    15min visit, over 50% face to face in counseling/discussion of management of left scrotal fluid collection.           Again, thank you for allowing me to participate in the care of your patient.      Sincerely,    Yobani Valenzuela MD      "

## 2020-02-13 NOTE — PROGRESS NOTES
PROBLEM: Right hip OA    SUBJECTIVE: Mr. Hong returns for a hip injection for his sx of right hip OA.  His left hip is doing well.  Last right hip injection 8/29/2019.  Last 2 weeks have been particularly uncomfortable.  Currently dealing with urologic issues.    OBJECTIVE: X-rays reviewed.    ASSESSMENT: Right hip OA    PLAN: US-guided corticosteroid injection right hip    PROCEDURE: After discussing the indications, risks, and expected benefits, a formal consent was obtained. The acetabulum, femoral head, femoral neck and the anterior joint recess were identified by ultrasound.  Initially, 4 mL 0.5% ropivicaine  were injected along the injection path using US guidance.  Using sterile technique and an anterolateral approach, an ultrasound-guided corticosteroid injection was performed into the right femoroacetabular joint space injecting 7 mL 0.5%ropivicaine and 1 mL 40 mg/mL Kenalog with a 110 mm, 22 gauge needle.  US used to guide needle placement and verify proper needle position.  Images and video were recorded demonstrating proper needle position. The wound was dressed with a bandaid.  The procedure was well tolerated.  Follow-up prn.    Large Joint Injection/Arthocentesis: R hip joint  Date/Time: 2/13/2020 9:05 AM  Performed by: Nicho Sullivan MD  Authorized by: Nicho Sullivan MD     Indications:  Osteoarthritis  Needle Size:  21 G  Guidance: ultrasound    Approach:  Anterior  Location:  Hip      Site:  R hip joint  Medications:  40 mg triamcinolone 40 MG/ML; 3 mL ropivacaine 5 MG/ML  Outcome:  Tolerated well, no immediate complications  Procedure discussed: discussed risks, benefits, and alternatives    Consent Given by:  Patient  Timeout: timeout called immediately prior to procedure    Prep: patient was prepped and draped in usual sterile fashion     13 mL of ropivacaine used.  Scribed by Linda Hernandez, MS, LAT, ATC for Dr. Sullivan on 2/13/2020 at 9:06 AM, based on the provider s statements  to me.

## 2020-02-13 NOTE — PROGRESS NOTES
"Fish Hong is a 88 year old male is status-post left orchiectomy and left inguinal hernia repair by Dr. Landaverde 10/2019.  He has had a taut left scrotal fluid collection since that surgery.  Dr. Landaverde advised observation.    Scrotal ultrasound done 11/21/19 that showed a complex left scrotal fluid collection consistent with hematoma.  I reviewed US images and report from this and from 9/5/19 scrotal ultrasound.  Looks like a hematoma with solid and fluid components.     ROS;  Heavy taut left scrotal fluid collection.  The size and bulk of this are bothersome to him.  Voiding without problems, no gross hematuria.    BP (!) 156/84   Pulse 60   Ht 1.803 m (5' 11\")   Wt 106.6 kg (235 lb)   BMI 32.78 kg/m      General: Alert, oriented, nad  Eyes: anicteric, EOMI.  Pulse: regular  Resps: normal, non-labored.  Abdomen:  nondistended.   exam shows a taut ovoid left scrotal fluid collection that does not communicate to the groin- this is very taut and consistent with hematoma rather than persisting hernia.      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    Plan  - schedule exploration of scrotum and evacuate hematoma.  Discussed there is a small chance of recurrence, but I think this is unlikely.  - discussed risks, benefits, and alternatives of this surgery.    Javad GALLAGHER    CC: Saima    15min visit, over 50% face to face in counseling/discussion of management of left scrotal fluid collection.       "

## 2020-02-13 NOTE — NURSING NOTE
[address] [address]  Dept: [phone]  ______________________________________________________________________________    Patient: Fish Hong   : 1931   MRN: [id number]   2020    INVASIVE PROCEDURE SAFETY CHECKLIST    Date: 2020   Procedure: Right hip US guided kenalog injection  Patient Name: Fish Hong  MRN: [id number]  YOB: 1931    Action: Complete sections as appropriate. Any discrepancy results in a HARD COPY until resolved.     PRE PROCEDURE:  Patient ID verified with 2 identifiers (name and  or MRN): Yes  Procedure and site verified with patient/designee (when able): Yes  Accurate consent documentation in medical record: Yes  H&P (or appropriate assessment) documented in medical record: Yes  H&P must be up to 20 days prior to procedure and updates within 24 hours of procedure as applicable: NA  Relevant diagnostic and radiology test results appropriately labeled and displayed as applicable: Yes  Procedure site(s) marked with provider initials: NA    TIMEOUT:  Time-Out performed immediately prior to starting procedure, including verbal and active participation of all team members addressing the following:Yes  * Correct patient identify  * Confirmed that the correct side and site are marked  * An accurate procedure consent form  * Agreement on the procedure to be done  * Correct patient position  * Relevant images and results are properly labeled and appropriately displayed  * The need to administer antibiotics or fluids for irrigation purposes during the procedure as applicable   * Safety precautions based on patient history or medication use    DURING PROCEDURE: Verification of correct person, site, and procedures any time the responsibility for care of the patient is transferred to another member of the care team.       Prior to injection, verified patient identity using patient's  name and date of birth.  Due to injection administration, patient instructed to remain in clinic for 15 minutes  afterwards, and to report any adverse reaction to me immediately.    Joint injection was performed.      Drug Amount Wasted:  Yes: 7 mg/ml ropivacaine  Vial/Syringe: Single dose vial  Expiration Date:  03/22      Linda Hernandez, [facility]  February 13, 2020

## 2020-02-13 NOTE — NURSING NOTE
"Chief Complaint   Patient presents with     Consult     second opinion on hernia       Blood pressure (!) 156/84, pulse 60, height 1.803 m (5' 11\"), weight 106.6 kg (235 lb). Body mass index is 32.78 kg/m .    Patient Active Problem List   Diagnosis     Essential hypertension     Pulmonary nodules     Preventive measure     Atopic dermatitis, unspecified type     Spinal stenosis of lumbar region without neurogenic claudication     Greater trochanteric bursitis, right     Gastroesophageal reflux disease without esophagitis     History of smoking     Vitamin B12 deficiency (non anemic)       No Known Allergies    Current Outpatient Medications   Medication Sig Dispense Refill     acetaminophen (TYLENOL) 325 MG tablet Take 2 tablets (650 mg) by mouth every 4 hours as needed for mild pain 50 tablet 0     amLODIPine (NORVASC) 10 MG tablet Take 1 tablet (10 mg) by mouth daily (Patient not taking: Reported on 2/13/2020) 90 tablet 2     cyanocobalamin (VITAMIN B-12) 500 MCG tablet Take 1 tablet (500 mcg) by mouth daily (Patient not taking: Reported on 2/13/2020) 100 tablet 11     doxazosin (CARDURA) 4 MG tablet Take 1 tablet (4 mg) by mouth At Bedtime 90 tablet 2     fluticasone (FLONASE) 50 MCG/ACT nasal spray USE 1-2 SPRAYS INTO BOTH NOSTRILS DAILY (Patient taking differently: Spray 2 sprays into both nostrils every morning ) 16 mL 9     oxyCODONE (ROXICODONE) 5 MG tablet Take 1-2 tablets (5-10 mg) by mouth every 4 hours as needed for moderate to severe pain 10 tablet 0     senna-docusate (SENOKOT-S/PERICOLACE) 8.6-50 MG tablet Take 1-2 tablets by mouth 2 times daily (Patient not taking: Reported on 11/21/2019) 30 tablet 0     triamcinolone (KENALOG) 0.1 % external cream Apply sparingly to affected area two times daily as needed (left ankle.lower leg) (Patient taking differently: Apply topically as needed (Pt. has not used in past month 09/23/2019) Apply sparingly to affected area two times daily as needed (left " ankle.lower leg)) 80 g 3       Social History     Tobacco Use     Smoking status: Former Smoker     Packs/day: 1.00     Years: 11.00     Pack years: 11.00     Start date: 10/15/1948     Last attempt to quit: 1960     Years since quittin.1     Smokeless tobacco: Never Used   Substance Use Topics     Alcohol use: Yes     Comment: very little     Drug use: No       Teri Allan LPN  2020  8:53 AM

## 2020-02-13 NOTE — LETTER
2/13/2020      RE: Fish Hong  [address]  [facility] [address]       PROBLEM: Right hip OA    SUBJECTIVE: Mr. Hong returns for a hip injection for his sx of right hip OA.  His left hip is doing well.  Last right hip injection 8/29/2019.  Last 2 weeks have been particularly uncomfortable.  Currently dealing with urologic issues.    OBJECTIVE: X-rays reviewed.    ASSESSMENT: Right hip OA    PLAN: US-guided corticosteroid injection right hip    PROCEDURE: After discussing the indications, risks, and expected benefits, a formal consent was obtained. The acetabulum, femoral head, femoral neck and the anterior joint recess were identified by ultrasound.  Initially, 4 mL 0.5% ropivicaine  were injected along the injection path using US guidance.  Using sterile technique and an anterolateral approach, an ultrasound-guided corticosteroid injection was performed into the right femoroacetabular joint space injecting 7 mL 0.5%ropivicaine and 1 mL 40 mg/mL Kenalog with a 110 mm, 22 gauge needle.  US used to guide needle placement and verify proper needle position.  Images and video were recorded demonstrating proper needle position. The wound was dressed with a bandaid.  The procedure was well tolerated.  Follow-up prn.    Large Joint Injection/Arthocentesis: R hip joint  Date/Time: 2/13/2020 9:05 AM  Performed by: Nicho Sullivan MD  Authorized by: Nicho Sullivan MD     Indications:  Osteoarthritis  Needle Size:  21 G  Guidance: ultrasound    Approach:  Anterior  Location:  Hip      Site:  R hip joint  Medications:  40 mg triamcinolone 40 MG/ML; 3 mL ropivacaine 5 MG/ML  Outcome:  Tolerated well, no immediate complications  Procedure discussed: discussed risks, benefits, and alternatives    Consent Given by:  Patient  Timeout: timeout called immediately prior to procedure    Prep: patient was prepped and draped in usual sterile fashion     13 mL of ropivacaine used.  Scribed by Linda  MS Mary, LAT, ATC for Dr. Sullivan on 2/13/2020 at 9:06 AM, based on the provider s statements to me.          Nicho Sullivan MD

## 2020-02-14 NOTE — TELEPHONE ENCOUNTER
FUTURE VISIT INFORMATION      SURGERY INFORMATION:    Date: 5/12/20    Location: [facility] OR    Surgeon:  Yobani Valenzuela MD    Anesthesia Type:  General    Procedure: EXPLORE LEFT SCROTUM, EVACULATE HEMATOMA    Consult:  OV 2/13/20- Nicolas    RECORDS REQUESTED FROM:       [facility]    Pertinent Medical History: Pulmonary nodules, hypertension    Most recent EKG+ Tracing: 10/8/19

## 2020-02-14 NOTE — NURSING NOTE
Pre Op Teaching Flowsheet       Pre and Post op Patient Education  Relevant Diagnosis: Hematocele  Surgical procedure:  EXPLORE LEFT SCROTUM, EVACULATE HEMATOMA  Teaching Topic:  Pre and post op teaching  Person Involved in teaching: Yes    Motivation Level:  Asks Questions: Yes  Eager to Learn: Yes  Cooperative: Yes  Receptive (willing/able to accept information):  Yes    Patient demonstrates understanding of the following:  Date of surgery:  4/12/20  Location of surgery:  [facility]- 5th Floor  History and Physical and any other testing necessary prior to surgery: Yes  Required time line for completion of History and Physical and any pre-op testing: Yes    Patient demonstrates understanding of the following:  Pre-op bowel prep:  N/A  Pre-op showering/scrub information with PCMX Soap: Yes  Blood thinner medications discussed and when to stop (if applicable):  Yes      Infection Prevention:   Patient demonstrates understanding of the following:  Surgical procedure site care taught: Yes  Signs and symptoms of infection taught: Yes      Post-op follow-up:  Discussed how to contact the hospital, nurse, and clinic scheduling staff if necessary.    Instructional materials used/given/mailed:  [facility] Booklet, post op teaching sheet, Map, Soap, and arrival/location information.    Surgical instructions packet given to patient in office:  N/A    Follow up: Discussed arranging for someone to drive you home. ( No public transportation)  Someone needed to stay the first twenty hours after surgery: Yes

## 2020-04-27 ENCOUNTER — TELEPHONE (OUTPATIENT)
Dept: UROLOGY | Facility: CLINIC | Age: 85
End: 2020-04-27

## 2020-04-27 NOTE — TELEPHONE ENCOUNTER
Called to reschedule surgery with Dr Valenzuela from 5/12/20 @ [facility] OR to 7/14/20 @ [facility] OR due to patient preference and covid 19.

## 2020-04-28 ENCOUNTER — PRE VISIT (OUTPATIENT)
Dept: SURGERY | Facility: CLINIC | Age: 85
End: 2020-04-28

## 2020-06-12 ENCOUNTER — TELEPHONE (OUTPATIENT)
Dept: ORTHOPEDICS | Facility: CLINIC | Age: 85
End: 2020-06-12

## 2020-06-12 NOTE — TELEPHONE ENCOUNTER
[facility] Call Center    Phone Message    May a detailed message be left on voicemail: yes     Reason for Call: Other: The patient would like to get injections into both hip because they ae both in pain please review and follow up on this request asap thank you.     Action Taken: Message routed to:  [facility]o /[facility]  Travel Screening: Not Applicable

## 2020-06-12 NOTE — TELEPHONE ENCOUNTER
Returned call to patient and offered the next available appointment with Dr. Sullivan on 6/25. He accepted, appointment was scheduled and the building restrictions were reviewed. He had no further questions at this time.

## 2020-06-23 ENCOUNTER — TELEPHONE (OUTPATIENT)
Dept: ORTHOPEDICS | Facility: CLINIC | Age: 85
End: 2020-06-23

## 2020-06-25 ENCOUNTER — OFFICE VISIT (OUTPATIENT)
Dept: ORTHOPEDICS | Facility: CLINIC | Age: 85
End: 2020-06-25
Payer: MEDICARE

## 2020-06-25 DIAGNOSIS — Z11.59 ENCOUNTER FOR SCREENING FOR OTHER VIRAL DISEASES: Primary | ICD-10-CM

## 2020-06-25 DIAGNOSIS — M16.0 OSTEOARTHRITIS OF BOTH HIPS, UNSPECIFIED OSTEOARTHRITIS TYPE: Primary | ICD-10-CM

## 2020-06-25 RX ORDER — ROPIVACAINE HYDROCHLORIDE 5 MG/ML
20 INJECTION, SOLUTION EPIDURAL; INFILTRATION; PERINEURAL
Status: DISCONTINUED | OUTPATIENT
Start: 2020-06-25 | End: 2020-07-09

## 2020-06-25 RX ORDER — TRIAMCINOLONE ACETONIDE 40 MG/ML
40 INJECTION, SUSPENSION INTRA-ARTICULAR; INTRAMUSCULAR
Status: DISCONTINUED | OUTPATIENT
Start: 2020-06-25 | End: 2020-07-09

## 2020-06-25 RX ADMIN — ROPIVACAINE HYDROCHLORIDE 20 ML: 5 INJECTION, SOLUTION EPIDURAL; INFILTRATION; PERINEURAL at 11:12

## 2020-06-25 RX ADMIN — TRIAMCINOLONE ACETONIDE 40 MG: 40 INJECTION, SUSPENSION INTRA-ARTICULAR; INTRAMUSCULAR at 11:12

## 2020-06-25 NOTE — PROGRESS NOTES
PROBLEM: Bilateral hip OA     SUBJECTIVE: Mr. Hong returns for bilateral hip injections for OA. Last right hip injection 2/13/20 with only partial relief, didn't work as well as previous injection had. Left less symptomatic but also bothering him. Would be interested in meeting with [facility] surgeon to discuss his case.      OBJECTIVE: X-rays reviewed.     ASSESSMENT: Bialateral hip OA     PLAN: US-guided corticosteroid injection bilateral hips     PROCEDURE: After discussing the indications, risks, and expected benefits, a formal consent was obtained. The left acetabulum, femoral head, femoral neck and the anterior joint recess were identified by ultrasound.  Initially, 4 mL 0.5% ropivicaine  were injected along the injection path using US guidance.  Using sterile technique and an anterolateral approach, an ultrasound-guided corticosteroid injection was performed into the right femoroacetabular joint space injecting 7 mL 0.5%ropivicaine and 1 mL 40 mg/mL Kenalog with a 110 mm, 22 gauge needle.  US used to guide needle placement and verify proper needle position.  Images and video were recorded demonstrating proper needle position. The wound was dressed with a bandaid.     This entire procedure was then repeated for the right hip. Both procedures were well tolerated.  Follow-up prn. Orthopedic referral to hip replacement surgeon provided.     Right hip injection performed by Dr. Kessler.   Left hip injection performed by Dr. Samson.     Entire procedure supervised by Dr. Sullivan.     Bart Samson MD  Primary Care Sports Medicine Fellow     ATTENDING NOTE:  I have personally examined this patient, and have reviewed the clinical presentation and progress note, including the pertinent radiographs with  Dr. Trimble.  I agree with the treatment plan as outlined.  The plan was formulated with Dr. Trimble on the day the progress note was completed and personally edited by me where appropriate.  I  personally discussed the treatment plan with the patient.    I supervised the entirety of both injections.    Oscar Sullivan MD      Large Joint Injection/Arthocentesis: bilateral hip joint    Date/Time: 6/25/2020 11:12 AM  Performed by: Nicho Sullivan MD  Authorized by: Nicho Sullivan MD     Needle Size:  22 G  Guidance: ultrasound    Approach:  Anterior  Location:  Hip  Laterality:  Bilateral      Site:  Bilateral hip joint  Medications (Right):  40 mg triamcinolone 40 MG/ML; 20 mL ropivacaine 5 MG/ML   Medications (Right) comment:  8 of 20mg Ropivicaine used    Medications (Left):  40 mg triamcinolone 40 MG/ML; 20 mL ropivacaine 5 MG/ML   Medications (Left) comment:  8 of 20mg Ropivicaine used    Outcome:  Tolerated well, no immediate complications  Procedure discussed: discussed risks, benefits, and alternatives    Timeout: timeout called immediately prior to procedure    Prep: patient was prepped and draped in usual sterile fashion     Scribed by Rebecca Kaiser ATC for Dr. Sullivan on 6/25/20 at 11AM, based on the provider's statements to me.

## 2020-06-25 NOTE — LETTER
6/25/2020      RE: Fish Hong  [address]  [facility] [address]       PROBLEM: Bilateral hip OA     SUBJECTIVE: Mr. Hong returns for bilateral hip injections for OA. Last right hip injection 2/13/20 with only partial relief, didn't work as well as previous injection had. Left less symptomatic but also bothering him. Would be interested in meeting with [facility] surgeon to discuss his case.      OBJECTIVE: X-rays reviewed.     ASSESSMENT: Bialateral hip OA     PLAN: US-guided corticosteroid injection bilateral hips     PROCEDURE: After discussing the indications, risks, and expected benefits, a formal consent was obtained. The left acetabulum, femoral head, femoral neck and the anterior joint recess were identified by ultrasound.  Initially, 4 mL 0.5% ropivicaine  were injected along the injection path using US guidance.  Using sterile technique and an anterolateral approach, an ultrasound-guided corticosteroid injection was performed into the right femoroacetabular joint space injecting 7 mL 0.5%ropivicaine and 1 mL 40 mg/mL Kenalog with a 110 mm, 22 gauge needle.  US used to guide needle placement and verify proper needle position.  Images and video were recorded demonstrating proper needle position. The wound was dressed with a bandaid.     This entire procedure was then repeated for the right hip. Both procedures were well tolerated.  Follow-up prn. Orthopedic referral to hip replacement surgeon provided.     Right hip injection performed by Dr. Kessler.   Left hip injection performed by Dr. Samson.     Entire procedure supervised by Dr. Sullivan.     Bart Samson MD  Primary Care Sports Medicine Fellow     ATTENDING NOTE:  I have personally examined this patient, and have reviewed the clinical presentation and progress note, including the pertinent radiographs with  Dr. Trimble.  I agree with the treatment plan as outlined.  The plan was formulated with Dr. Trimble on the  day the progress note was completed and personally edited by me where appropriate.  I personally discussed the treatment plan with the patient.    I supervised the entirety of both injections.    Oscar Sullivan MD      Large Joint Injection/Arthocentesis: bilateral hip joint    Date/Time: 6/25/2020 11:12 AM  Performed by: Nicho Sullivan MD  Authorized by: Nicho Sullivan MD     Needle Size:  22 G  Guidance: ultrasound    Approach:  Anterior  Location:  Hip  Laterality:  Bilateral      Site:  Bilateral hip joint  Medications (Right):  40 mg triamcinolone 40 MG/ML; 20 mL ropivacaine 5 MG/ML   Medications (Right) comment:  8 of 20mg Ropivicaine used    Medications (Left):  40 mg triamcinolone 40 MG/ML; 20 mL ropivacaine 5 MG/ML   Medications (Left) comment:  8 of 20mg Ropivicaine used    Outcome:  Tolerated well, no immediate complications  Procedure discussed: discussed risks, benefits, and alternatives    Timeout: timeout called immediately prior to procedure    Prep: patient was prepped and draped in usual sterile fashion     Scribed by Rebecca Kaiser ATC for Dr. Sullivan on 6/25/20 at 11AM, based on the provider's statements to me.      Nicho Sullivan MD

## 2020-06-25 NOTE — NURSING NOTE
[address] [address]  Dept: [phone]  ______________________________________________________________________________    Patient: Fish Hong   : 1931   MRN: [id number]   2020    INVASIVE PROCEDURE SAFETY CHECKLIST    Date: 20   Procedure: Bilateral hip injections with Kenalog under ultrasound guidance  Patient Name: Fish Hong  MRN: [id number]  YOB: 1931    Action: Complete sections as appropriate. Any discrepancy results in a HARD COPY until resolved.     PRE PROCEDURE:  Patient ID verified with 2 identifiers (name and  or MRN): Yes  Procedure and site verified with patient/designee (when able): Yes  Accurate consent documentation in medical record: Yes  H&P (or appropriate assessment) documented in medical record: Yes  H&P must be up to 20 days prior to procedure and updates within 24 hours of procedure as applicable: NA  Relevant diagnostic and radiology test results appropriately labeled and displayed as applicable: Yes  Procedure site(s) marked with provider initials: NA    TIMEOUT:  Time-Out performed immediately prior to starting procedure, including verbal and active participation of all team members addressing the following:Yes  * Correct patient identify  * Confirmed that the correct side and site are marked  * An accurate procedure consent form  * Agreement on the procedure to be done  * Correct patient position  * Relevant images and results are properly labeled and appropriately displayed  * The need to administer antibiotics or fluids for irrigation purposes during the procedure as applicable   * Safety precautions based on patient history or medication use    DURING PROCEDURE: Verification of correct person, site, and procedures any time the responsibility for care of the patient is transferred to another member of the care team.       Prior to injection, verified patient  identity using patient's name and date of birth.  Due to injection administration, patient instructed to remain in clinic for 15 minutes  afterwards, and to report any adverse reaction to me immediately.    Joint injection was performed.      Drug Amount Wasted:  None.  Vial/Syringe: Single dose vial  Expiration Date:  11/21      Rebecca Kaiser, [facility]  June 25, 2020

## 2020-06-30 ENCOUNTER — VIRTUAL VISIT (OUTPATIENT)
Dept: FAMILY MEDICINE | Facility: CLINIC | Age: 85
End: 2020-06-30
Payer: MEDICARE

## 2020-06-30 DIAGNOSIS — J30.2 SEASONAL ALLERGIC RHINITIS, UNSPECIFIED TRIGGER: ICD-10-CM

## 2020-06-30 DIAGNOSIS — E53.8 VITAMIN B12 DEFICIENCY (NON ANEMIC): ICD-10-CM

## 2020-06-30 DIAGNOSIS — L20.9 ATOPIC DERMATITIS, UNSPECIFIED TYPE: ICD-10-CM

## 2020-06-30 DIAGNOSIS — I10 ESSENTIAL HYPERTENSION: Primary | ICD-10-CM

## 2020-06-30 PROCEDURE — 99442 ZZC PHYSICIAN TELEPHONE EVALUATION 11-20 MIN: CPT | Performed by: INTERNAL MEDICINE

## 2020-06-30 RX ORDER — AMLODIPINE BESYLATE 10 MG/1
10 TABLET ORAL DAILY
Qty: 90 TABLET | Refills: 4 | Status: SHIPPED | OUTPATIENT
Start: 2020-06-30 | End: 2021-06-10

## 2020-06-30 RX ORDER — DOXAZOSIN 4 MG/1
4 TABLET ORAL AT BEDTIME
Qty: 90 TABLET | Refills: 4 | Status: SHIPPED | OUTPATIENT
Start: 2020-06-30 | End: 2021-06-10

## 2020-06-30 RX ORDER — FLUTICASONE PROPIONATE 50 MCG
2 SPRAY, SUSPENSION (ML) NASAL EVERY MORNING
Qty: 18.2 ML | Refills: 11 | Status: SHIPPED | OUTPATIENT
Start: 2020-06-30 | End: 2021-06-15
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
## 2020-07-09 ENCOUNTER — ANESTHESIA EVENT (OUTPATIENT)
Dept: SURGERY | Facility: AMBULATORY SURGERY CENTER | Age: 85
End: 2020-07-09

## 2020-07-09 ENCOUNTER — OFFICE VISIT (OUTPATIENT)
Dept: SURGERY | Facility: CLINIC | Age: 85
End: 2020-07-09
Payer: MEDICARE

## 2020-07-09 VITALS
OXYGEN SATURATION: 97 % | HEIGHT: 71 IN | SYSTOLIC BLOOD PRESSURE: 124 MMHG | DIASTOLIC BLOOD PRESSURE: 77 MMHG | RESPIRATION RATE: 18 BRPM | WEIGHT: 243 LBS | TEMPERATURE: 98.2 F | HEART RATE: 82 BPM | BODY MASS INDEX: 34.02 KG/M2

## 2020-07-09 DIAGNOSIS — N50.1 HEMATOCELE: Primary | ICD-10-CM

## 2020-07-09 DIAGNOSIS — Z01.818 PREOP EXAMINATION: ICD-10-CM

## 2020-07-09 ASSESSMENT — LIFESTYLE VARIABLES: TOBACCO_USE: 1

## 2020-07-09 ASSESSMENT — MIFFLIN-ST. JEOR: SCORE: 1789.37

## 2020-07-09 ASSESSMENT — PAIN SCALES - GENERAL: PAINLEVEL: MILD PAIN (2)

## 2020-07-09 NOTE — PATIENT INSTRUCTIONS
Preparing for Your Surgery      Name:  Fish Hong   MRN:  [id number]   :  1931   Today's Date:  2020     Arriving for surgery:  Surgery date:  2020  Arrival time:  6:30 am    Restrictions due to COVID 19:  No visitors at the Surgery Center   parking is not available     Please come to:    [facility]  [address] [place] [id number]     Parking has been suspended due to the Covid 19 Crisis    Please arrive at the [facility] Entrance  Follow instructions for entering the building  You will be escorted to the 5th floor by a staff member       What can I eat or drink?    -  You may eat and drink normally until 8 hours before surgery. (Until Midnight )  -  You may have clear liquids up to 4 hours before surgery. (Until 4 am )  Examples of clear liquids:  Water  Clear broth  Juices (apple, white grape, white cranberry  and cider) without pulp  Noncarbonated, powder based beverages  (lemonade and Rivas-Aid)  Sodas (Sprite, 7-Up, ginger ale and seltzer)  Coffee or tea (without milk or cream)  Gatorade    --No alcohol for at least 24 hours before surgery    Which medicines can I take?    Hold aspirin for 7 days before surgery.   Hold multivitamins for 7 days before surgery.  Hold supplements for 7 days before surgery.  Hold Ibuprofen (Advil, Motrin) for 1 day before surgery--unless otherwise directed by surgeon.  Hold Naproxen (Aleve) for 4 days before surgery.    -  DO NOT take the following medications the day of surgery:  Vitamin  B12      -  PLEASE TAKE the following medications the day of surgery   Amlodipine  Fluticasone (flonase)      How do I prepare myself?  - Please take 2 showers before surgery using Scrubcare or Hibiclens soap.    Use this soap only from the neck to your toes.     Leave the soap on your skin for one minute--then rinse thoroughly.      You may use your own shampoo and conditioner; no other hair products.   -  Please remove all jewelry and body piercings.  - No lotions, deodorants or fragrance.  - No makeup or fingernail polish.   - Bring your ID and insurance card.    ALL PATIENTS ARE REQUIRED TO HAVE A RESPONSIBLE ADULT TO DRIVE AND BE IN ATTENDANCE WITH THEM FOR 24 HOURS FOLLOWING SURGERY       Questions or Concerns:    -For questions regarding the day of surgery please contact the Ambulatory Surgery Center at [phone].    -If you have health changes between today and your surgery please contact your surgeon.     For questions after surgery please call your surgeons office.

## 2020-07-09 NOTE — ANESTHESIA PREPROCEDURE EVALUATION
"Anesthesia Pre-Procedure Evaluation    Patient: Fish Hong   MRN:     [id number] Gender:   male   Age:    89 year old :      1931        Preoperative Diagnosis: Hematocele [N50.1]   Procedure(s):  EXPLORE LEFT SCROTUM, EVACULATE HEMATOMA     LABS:  CBC:   Lab Results   Component Value Date    WBC 6.1 2019    WBC 5.6 2019    HGB 14.6 2019    HGB 14.7 2019    HCT 43.9 2019    HCT 42.5 2019     (L) 2019     2019     BMP:   Lab Results   Component Value Date     2019     2019    POTASSIUM 4.0 2019    POTASSIUM 4.2 2019    CHLORIDE 107 2019    CHLORIDE 109 2019    CO2 27 2019    CO2 24 2019    BUN 20 2019    BUN 21 2019    CR 1.20 2019    CR 1.29 (H) 2019    GLC 81 2019    GLC 82 2019     COAGS: No results found for: PTT, INR, FIBR  POC:   Lab Results   Component Value Date    BGM 99 10/08/2019     OTHER:   Lab Results   Component Value Date    A1C 5.8 (H) 2019    JEMIMA 8.9 2019    ALBUMIN 3.8 2019    PROTTOTAL 7.2 2019    ALT 18 2019    AST 20 2019    ALKPHOS 75 2019    BILITOTAL 0.5 2019        Preop Vitals    BP Readings from Last 3 Encounters:   20 (!) 156/84   19 122/60   10/17/19 (P) 106/65    Pulse Readings from Last 3 Encounters:   20 60   19 68   10/17/19 (P) 96      Resp Readings from Last 3 Encounters:   10/08/19 12   19 19   19 18    SpO2 Readings from Last 3 Encounters:   19 97%   10/17/19 (P) 95%   10/08/19 96%      Temp Readings from Last 1 Encounters:   19 97.6  F (36.4  C) (Oral)    Ht Readings from Last 1 Encounters:   20 1.803 m (5' 11\")      Wt Readings from Last 1 Encounters:   20 106.6 kg (235 lb)    Estimated body mass index is 32.78 kg/m  as calculated from the following:    Height as of 20: 1.803 m (5' 11\").    " Weight as of 2/13/20: 106.6 kg (235 lb).     LDA:        Past Medical History:   Diagnosis Date     Hematocele      Hypertension      Obesity      Osteoarthritis of both hips     s/p steroid injections     Phimosis      Pulmonary nodules      Rheumatic fever 1948      Past Surgical History:   Procedure Laterality Date     AS TOTAL KNEE ARTHROPLASTY       C TOTAL KNEE ARTHROPLASTY      Bilateral     CIRCUMCISION N/A 7/13/2016    Procedure: CIRCUMCISION;  Surgeon: Elgin Rhodes MD;  Location: [facility] OR     circumcision       ENT SURGERY      detached retina--bilateral     HERNIA REPAIR  11/3/2008    mesh repair umbilical hernia     HERNIORRHAPHY INGUINAL Left 10/8/2019    Procedure: Open Left Inguinal Hernia Repair;  Surgeon: Antonio Landaverde MD;  Location: [facility] OR     ORCHIECTOMY INGUINAL N/A 10/8/2019    Procedure: Left Orchiectomy;  Surgeon: Antonio Landaverde MD;  Location: [facility] OR      No Known Allergies     Anesthesia Evaluation     . Pt has had prior anesthetic. Type: MAC and General    History of anesthetic complications    1997 with knee arthroplasy woke up early.  Subsequent surgeries reports no issues.       ROS/MED HX    ENT/Pulmonary: Comment: Stable pulmonary nodules followed with imaging.     (+)BALWINDER risk factors hypertension, obese, tobacco use (Smoked 10 years on and off.  Quit at age 28. ), Past use , . .    Neurologic:  - neg neurologic ROS     Cardiovascular: Comment: Denies chest pain, SOB, palpitations, syncope, STERN, orthopnea, or PND    Rheumatic fever as a child.  Denies ever being told he has a murmur.     (+) hypertension----. : . . . :. . Previous cardiac testing date:results:date: results:ECG reviewed date:10/8/19 results:Sinus rhythm with Premature supraventricular complexes  Incomplete right bundle branch block  Left anterior fascicular block  Left ventricular hypertrophy with repolarization abnormality date: results:         (-) taking anticoagulants/antiplatelets    METS/Exercise Tolerance: Comment: Laundry is in basement.  Has a chairlift at home from when his wife was alive.  He does use the chair lift, bit reports he can go up and down the stairs.      4 - Raking leaves, gardening   Hematologic:  - neg hematologic  ROS       Musculoskeletal: Comment: Bilateral knee arthroplasty 1997 and 1998.    Bilateral hip osteoarthritis s/p steroid injection.   (+) arthritis,  other musculoskeletal- chronic hips and back pain      GI/Hepatic: Comment: S/p umbilical hernia repair, 2008.       (-) GERD   Renal/Genitourinary:     (+) chronic renal disease, type: CRI, Pt does not require dialysis, Pt has no history of transplant, BPH, Other Renal/ Genitourinary, Phismosis s/p circumcision 2016       Endo:     (+) Obesity, .      Psychiatric:  - neg psychiatric ROS       Infectious Disease:  - neg infectious disease ROS       Malignancy:   (+) Malignancy History of Skin  Skin CA Remission status post Surgery,         Other:    (+) No chance of pregnancy C-spine cleared: N/A, H/O Chronic Pain,no other significant disability                        PHYSICAL EXAM:   Mental Status/Neuro: A/A/O; Age Appropriate   Airway: Facies: Feasible  Mallampati: III  Mouth/Opening: Full  TM distance: > 6 cm  Neck ROM: Limited   Respiratory: Auscultation: CTAB     Resp. Rate: Normal     Resp. Effort: Normal      CV: Rhythm: Regular  Heart: Normal Sounds  Edema: None   Comments: Multiple dental implants     Dental: Details                Assessment:   ASA SCORE: 3            Plan:   Anes. Type:  General   Pre-Medication: None   Induction:  IV (Standard)   Airway: LMA   Access/Monitoring: PIV   Maintenance: Balanced     Postop Plan:   Postop Pain: Opioids  Postop Sedation/Airway: Not planned  Disposition: Outpatient     PONV Management:   Adult Risk Factors:, Postop Opioids   Prevention: Ondansetron, Propofol     CONSENT: Direct conversation   Plan and risks discussed with: Patient   Blood Products: Consent  Deferred (Minimal Blood Loss)                PAC Discussion and Assessment    ASA Classification: 3  Case is suitable for: ASC  Anesthetic techniques and relevant risks discussed: GA  Invasive monitoring and risk discussed: No  Types:   Possibility and Risk of blood transfusion discussed: No  NPO instructions given:   Additional anesthetic preparation and risks discussed:   Needs early admission to pre-op area:   Other:     PAC Resident/NP Anesthesia Assessment:  Fish Hong is a 89 year old male scheduled to undergo EXPLORE LEFT SCROTUM, EVACULATE HEMATOMA with Dr. Valenzuela on 7/14/20. He has the following specific operative considerations:   - RCRI : No serious cardiac risks.   - VTE risk: 1.8%  - BALWINDER # of risks 4/8 = Intermediate risk  - Risk of PONV score = 2.  If > 2, anti-emetic intervention recommended.    --Left hematocele after left orchiectomy and left inguinal hernia repair. Above procedure now planned.  --Last airway considerations noted: MP III, limited neck extension. BMI 34. No change.  --HTN, well controlled. Will take amlodipine on DOS. Cardura at HS. Denies cardiac symptoms. h/o rheumatic fever as a child. No murmur and no known cardiac disease. All testing above. Able to walk distance and go up and down stairs.   --Remote smoking, quit at age 28. Pulmonary nodule(s) being followed with imaging: CT of chest 4/29/19, stable.  --Hematology - B12 deficiency, on replacement.  --CRI Cr 1.29. Will update labs on DOS.  --Last Hgb A1C 5.8  --OA of multiple joints. s/p bilateral knee replacements in late 1990's. Chronic back and hip pain but able to lay flat. Will require careful positioning.   --Very hard of hearing.       Patient was reviewed by  Dr Cueva.      Reviewed and Signed by PAC Mid-Level Provider/Resident  Mid-Level Provider/Resident: JOE Rowe, CNS  Date: 7/9/20  Time: 7:47am    Attending Anesthesiologist Anesthesia Assessment:        Anesthesiologist:   Date:   Time:    Pass/Fail:   Disposition:     PAC Pharmacist Assessment:        Pharmacist:   Date:   Time:    JOE Dodson CNS

## 2020-07-09 NOTE — H&P
Pre-Operative H & P     CC:  Preoperative exam to assess for increased cardiopulmonary risk while undergoing surgery and anesthesia.    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 Valenzuela on 7/14/20 at [facility]. History is obtained from the patient and medical records.    Patient who underwent left orchiectomy and left inguinal hernia repair on 10/8/2019 by Dr. Landaverde. Since that time he has had a taut left scrotal fluid collection. Initially this was observed but did not resolve. A scrotal US showed a complex left scrotal fluid collection consistent with hematoma. He was referred to Dr. Valenzuela and was counseled for above procedure.    Patient's history is otherwise significant for HTN, history of rheumatic fever, pulmonary nodule being followed, Vit B12 deficiency, CRI and OA. He has significant hearing loss.    This person was last seen in person by the PAC team on 9/23/19.     Past Medical History  Past Medical History:   Diagnosis Date     Hematocele      Hypertension      Obesity      Osteoarthritis of both hips     s/p steroid injections     Phimosis      Pulmonary nodules      Rheumatic fever 1948       Past Surgical History  Past Surgical History:   Procedure Laterality Date     AS TOTAL KNEE ARTHROPLASTY       C TOTAL KNEE ARTHROPLASTY      Bilateral     CIRCUMCISION N/A 7/13/2016    Procedure: CIRCUMCISION;  Surgeon: Elgin Rhodes MD;  Location: [facility] OR     circumcision       ENT SURGERY      detached retina--bilateral     HERNIA REPAIR  11/3/2008    mesh repair umbilical hernia     HERNIORRHAPHY INGUINAL Left 10/8/2019    Procedure: Open Left Inguinal Hernia Repair;  Surgeon: Antonio Landaverde MD;  Location: [facility] OR     ORCHIECTOMY INGUINAL N/A 10/8/2019     Procedure: Left Orchiectomy;  Surgeon: Antonio Landaverde MD;  Location: [facility] OR       Hx of Blood transfusions/reactions: Denies.      Hx of abnormal bleeding or anti-platelet use: Denies.     Menstrual history: No LMP for male patient.    Personal or FH with difficulty with Anesthesia:  Reports waking up too early during a knee surgery years ago. No recent issues.    Prior to Admission Medications  Current Outpatient Medications   Medication Sig Dispense Refill     amLODIPine (NORVASC) 10 MG tablet Take 1 tablet (10 mg) by mouth daily (Patient taking differently: Take 10 mg by mouth every morning ) 90 tablet 4     cyanocobalamin (VITAMIN B-12) 500 MCG tablet Take 1 tablet (500 mcg) by mouth daily (Patient taking differently: Take 500 mcg by mouth every morning ) 100 tablet 11     doxazosin (CARDURA) 4 MG tablet Take 1 tablet (4 mg) by mouth At Bedtime 90 tablet 4     fluticasone (FLONASE) 50 MCG/ACT nasal spray Spray 2 sprays into both nostrils every morning 18.2 mL 11     triamcinolone (KENALOG) 0.1 % external cream Apply sparingly to affected area two times daily as needed (left ankle.lower leg) (Patient taking differently: Apply topically as needed (Pt. has not used in past month 09/23/2019) Apply sparingly to affected area two times daily as needed (left ankle.lower leg)) 80 g 3       Allergies  No Known Allergies    Social History  Social History     Socioeconomic History     Marital status:      Spouse name:      Number of children: Not on file     Years of education: Not on file     Highest education level: Not on file   Occupational History     Not on file   Social Needs     Financial resource strain: Not on file     Food insecurity     Worry: Not on file     Inability: Not on file     Transportation needs     Medical: Not on file     Non-medical: Not on file   Tobacco Use     Smoking status: Former Smoker     Packs/day: 1.00     Years: 11.00     Pack years: 11.00     Start date:  10/15/1948     Last attempt to quit: 1960     Years since quittin.5     Smokeless tobacco: Never Used   Substance and Sexual Activity     Alcohol use: Yes     Comment: very little     Drug use: No     Sexual activity: Not Currently   Lifestyle     Physical activity     Days per week: Not on file     Minutes per session: Not on file     Stress: Not on file   Relationships     Social connections     Talks on phone: Not on file     Gets together: Not on file     Attends Taoist service: Not on file     Active member of club or organization: Not on file     Attends meetings of clubs or organizations: Not on file     Relationship status: Not on file     Intimate partner violence     Fear of current or ex partner: Not on file     Emotionally abused: Not on file     Physically abused: Not on file     Forced sexual activity: Not on file   Other Topics Concern     Parent/sibling w/ CABG, MI or angioplasty before 65F 55M? Not Asked   Social History Narrative     since                      for 32 years                      Family History  Family History   Problem Relation Age of Onset     No Known Problems Mother      No Known Problems Father      Melanoma No family hx of      Skin Cancer No family hx of        Personally reviewed      LABS:  CBC:   Lab Results   Component Value Date    WBC 6.1 2019    WBC 5.6 2019    HGB 14.6 2019    HGB 14.7 2019    HCT 43.9 2019    HCT 42.5 2019     (L) 2019     2019     BMP:   Lab Results   Component Value Date     2019     2019    POTASSIUM 4.0 2019    POTASSIUM 4.2 2019    CHLORIDE 107 2019    CHLORIDE 109 2019    CO2 27 2019    CO2 24 2019    BUN 20 2019    BUN 21 2019    CR 1.20 2019    CR 1.29 (H) 2019    GLC 81 2019    GLC 82 2019     COAGS: No results found for: PTT, INR, FIBR  POC:   Lab  "Results   Component Value Date    BGM 99 10/08/2019     OTHER:   Lab Results   Component Value Date    A1C 5.8 (H) 09/23/2019    JEMIMA 8.9 09/23/2019    ALBUMIN 3.8 04/22/2019    PROTTOTAL 7.2 04/22/2019    ALT 18 04/22/2019    AST 20 04/22/2019    ALKPHOS 75 04/22/2019    BILITOTAL 0.5 04/22/2019        Preop Vitals    BP Readings from Last 3 Encounters:   02/13/20 (!) 156/84   11/21/19 122/60   10/17/19 (P) 106/65    Pulse Readings from Last 3 Encounters:   02/13/20 60   11/21/19 68   10/17/19 (P) 96      Resp Readings from Last 3 Encounters:   10/08/19 12   09/23/19 19   07/24/19 18    SpO2 Readings from Last 3 Encounters:   11/21/19 97%   10/17/19 (P) 95%   10/08/19 96%      Temp Readings from Last 1 Encounters:   11/21/19 97.6  F (36.4  C) (Oral)    Ht Readings from Last 1 Encounters:   02/13/20 1.803 m (5' 11\")      Wt Readings from Last 1 Encounters:   02/13/20 106.6 kg (235 lb)    Estimated body mass index is 32.78 kg/m  as calculated from the following:    Height as of 2/13/20: 1.803 m (5' 11\").    Weight as of 2/13/20: 106.6 kg (235 lb).     ROS/MED HX    The complete review of systems is negative other than noted in the HPI or here.   ENT/Pulmonary: Comment: Stable pulmonary nodules followed with imaging.     (+)BALWINDER risk factors hypertension, obese, tobacco use (Smoked 10 years on and off.  Quit at age 28. ), Past use , . .    Neurologic:  - neg neurologic ROS     Cardiovascular: Comment: Denies chest pain, SOB, palpitations, syncope, STERN, orthopnea, or PND    Rheumatic fever as a child.  Denies ever being told he has a murmur.     (+) hypertension----. : . . . :. . Previous cardiac testing   METS/Exercise Tolerance: Comment: Laundry is in basement.  Has a chairlift at home from when his wife was alive.  He does use the chair lift, bit reports he can go up and down the stairs.      >4 METS   Hematologic:  - neg hematologic  ROS       Musculoskeletal: Comment: Bilateral knee arthroplasty 1997 and " "1998.    Bilateral hip osteoarthritis s/p steroid injection.   (+) arthritis,  -       GI/Hepatic: Comment: S/p umbilical hernia repair, 2008.       (-) GERD   Renal/Genitourinary:     (+) BPH, Other Renal/ Genitourinary, Phismosis s/p circumcision 2016       Endo:     (+) Obesity, .      Psychiatric:  - neg psychiatric ROS       Infectious Disease:  - neg infectious disease ROS       Malignancy:   (+) Malignancy History of Skin  Skin CA Remission status post Surgery,         Other:    (+) No chance of pregnancy C-spine cleared: N/A, H/O Chronic Pain,no other significant disability            PHYSICAL EXAM:   Mental Status/Neuro: A/A/O; Age Appropriate   Airway: Facies: Feasible  Mallampati: III  Mouth/Opening: Full  TM distance: > 6 cm  Neck ROM: Limited   Respiratory: Auscultation: CTAB     Resp. Rate: Normal     Resp. Effort: Normal      CV: Rhythm: Regular  Heart: Normal Sounds  Edema: None   Comments:      Temp: 98.2  F (36.8  C) Temp src: Oral BP: 124/77 Pulse: 82   Resp: 18 SpO2: 97 %         243 lbs 0 oz  5' 11\"   Body mass index is 33.89 kg/m .       Physical Exam  Constitutional: Awake, alert, cooperative, no apparent distress, and appears stated age.  Eyes: Pupils equal, round and reactive to light, extra ocular muscles intact, sclera clear, conjunctiva normal. Glasses on.  HENT: Normocephalic, oral pharynx with moist mucus membranes, good dentition. No goiter appreciated.   Respiratory: Clear to auscultation bilaterally, few crackles in right upper lobe. No wheezing. No cough or obvious dyspnea.  Cardiovascular: Regular rate and rhythm, normal S1 and S2, and no murmur noted.  Carotids +2, no bruits. No edema. Palpable pulses to radial  DP and PT arteries.   GI: Normal bowel sounds, soft, non-distended, non-tender, no masses palpated. Difficult exam due to obese abdomen.  Lymph/Hematologic: No cervical lymphadenopathy and no supraclavicular lymphadenopathy.  Genitourinary: Deferred.   Skin: Warm and dry.  "   Musculoskeletal: Limited ROM of neck. There is no redness, warmth, or swelling of the joints. Gross motor strength is normal.    Neurologic: Awake, alert, oriented to name, place and time. Cranial nerves II-XII are grossly intact. Gait is mildly irregular. Has walker today for distance.    Neuropsychiatric: Calm, cooperative. Normal affect.     EKG: Personally reviewed 10/8/19 results:Sinus rhythm with Premature supraventricular complexes  Incomplete right bundle branch block  Left anterior fascicular block  Left ventricular hypertrophy with repolarization abnormality  11/21/19 US testicle/scrotum  Impression:  1.  Interval removal of the left testicle. There is an accumulation  within the scrotal sac with a complex cystic component and an  echogenic component as detailed above. Findings are likely to  represent postoperative change. No active blood flow identified.  2.  Unremarkable ultrasound of the right testicle.  3.  Enlarged right epididymis with a cystic and solid component as  detailed above.    4/29/29 CT Chest                                                                   IMPRESSION:  1. The previously described right lower lobe pulmonary nodule is now  densely calcified and as such represents a benign calcified granuloma.  Multiple additional calcified pulmonary granulomas. No suspicious  nodules or masses.  2. Heterogeneity in the T4-7 vertebral bodies, including prominent  lucency in the T6 vertebral body, possibly vertebral body hemangiomas.  If clinically indicated, thoracic MRI with contrast could be performed  for further evaluation.    Imaging reviewed by this provider    Outside records reviewed from: Care Everywhere    ASSESSMENT and PLAN  Fish Hong is a 89 year old male scheduled to undergo EXPLORE LEFT SCROTUM, EVACULATE HEMATOMA with Dr. Valenzuela on 7/14/20. He has the following specific operative considerations:   - RCRI : No serious cardiac risks.   - Anesthesia considerations:   Refer to PAC assessment in anesthesia records  - VTE risk: 1.8%  - BALWINDER # of risks 4/8 = Intermediate risk  - Risk of PONV score = 2.  If > 2, anti-emetic intervention recommended.    --Left hematocele after left orchiectomy and left inguinal hernia repair. Above procedure now planned.  --Last airway considerations noted: MP III, limited neck extension. BMI 34. No change.  --HTN, well controlled. Will take amlodipine on DOS. Cardura at HS. Denies cardiac symptoms. h/o rheumatic fever as a child. No murmur and no known cardiac disease. All testing above. Able to walk distance and go up and down stairs.   --Remote smoking, quit at age 28. Pulmonary nodule(s) being followed with imaging: CT of chest 4/29/19, stable.  --Hematology - B12 deficiency, on replacement.  --CRI Cr 1.29. Will update labs on DOS.  --Last Hgb A1C 5.8  --OA of multiple joints. s/p bilateral knee replacements in late 1990's. Chronic back and hip pain but able to lay flat. Will require careful positioning.   --Very hard of hearing.     Arrival time, NPO, shower and medication instructions provided by nursing staff today. Preparing For Your Surgery handout given.    Patient was reviewed by  Dr Cueva.    JOE Dodson CNS  Preoperative Assessment Center  [facility]  [facility]inic and Surgery Center  Phone: [phone]  Fax: [phone]

## 2020-07-11 DIAGNOSIS — Z11.59 ENCOUNTER FOR SCREENING FOR OTHER VIRAL DISEASES: ICD-10-CM

## 2020-07-11 PROCEDURE — U0003 INFECTIOUS AGENT DETECTION BY NUCLEIC ACID (DNA OR RNA); SEVERE ACUTE RESPIRATORY SYNDROME CORONAVIRUS 2 (SARS-COV-2) (CORONAVIRUS DISEASE [COVID-19]), AMPLIFIED PROBE TECHNIQUE, MAKING USE OF HIGH THROUGHPUT TECHNOLOGIES AS DESCRIBED BY CMS-2020-01-R: HCPCS | Performed by: UROLOGY

## 2020-07-12 LAB
SARS-COV-2 RNA SPEC QL NAA+PROBE: NOT DETECTED
SPECIMEN SOURCE: NORMAL

## 2020-07-14 ENCOUNTER — HOSPITAL ENCOUNTER (OUTPATIENT)
Facility: AMBULATORY SURGERY CENTER | Age: 85
End: 2020-07-14
Attending: UROLOGY
Payer: MEDICARE

## 2020-07-14 ENCOUNTER — ANESTHESIA (OUTPATIENT)
Dept: SURGERY | Facility: AMBULATORY SURGERY CENTER | Age: 85
End: 2020-07-14

## 2020-07-14 ENCOUNTER — TELEPHONE (OUTPATIENT)
Dept: SURGERY | Facility: CLINIC | Age: 85
End: 2020-07-14

## 2020-07-14 VITALS
RESPIRATION RATE: 13 BRPM | SYSTOLIC BLOOD PRESSURE: 113 MMHG | DIASTOLIC BLOOD PRESSURE: 76 MMHG | TEMPERATURE: 98 F | HEART RATE: 76 BPM | OXYGEN SATURATION: 95 %

## 2020-07-14 DIAGNOSIS — N50.1 HEMATOCELE: ICD-10-CM

## 2020-07-14 LAB
CREAT SERPL-MCNC: 1.23 MG/DL (ref 0.66–1.25)
GFR SERPL CREATININE-BSD FRML MDRD: 52 ML/MIN/{1.73_M2}
HGB BLD-MCNC: 13.9 G/DL (ref 13.3–17.7)
PLATELET # BLD AUTO: 177 10E9/L (ref 150–450)
POTASSIUM SERPL-SCNC: 3.9 MMOL/L (ref 3.4–5.3)

## 2020-07-14 PROCEDURE — 88307 TISSUE EXAM BY PATHOLOGIST: CPT | Performed by: UROLOGY

## 2020-07-14 RX ORDER — SODIUM CHLORIDE, SODIUM LACTATE, POTASSIUM CHLORIDE, CALCIUM CHLORIDE 600; 310; 30; 20 MG/100ML; MG/100ML; MG/100ML; MG/100ML
INJECTION, SOLUTION INTRAVENOUS CONTINUOUS
Status: DISCONTINUED | OUTPATIENT
Start: 2020-07-14 | End: 2020-07-15 | Stop reason: HOSPADM

## 2020-07-14 RX ORDER — ONDANSETRON 4 MG/1
4 TABLET, ORALLY DISINTEGRATING ORAL EVERY 30 MIN PRN
Status: DISCONTINUED | OUTPATIENT
Start: 2020-07-14 | End: 2020-07-15 | Stop reason: HOSPADM

## 2020-07-14 RX ORDER — LIDOCAINE HYDROCHLORIDE 20 MG/ML
INJECTION, SOLUTION INFILTRATION; PERINEURAL PRN
Status: DISCONTINUED | OUTPATIENT
Start: 2020-07-14 | End: 2020-07-14

## 2020-07-14 RX ORDER — FENTANYL CITRATE 50 UG/ML
INJECTION, SOLUTION INTRAMUSCULAR; INTRAVENOUS PRN
Status: DISCONTINUED | OUTPATIENT
Start: 2020-07-14 | End: 2020-07-14

## 2020-07-14 RX ORDER — CEFAZOLIN SODIUM 1 G/50ML
1 SOLUTION INTRAVENOUS SEE ADMIN INSTRUCTIONS
Status: DISCONTINUED | OUTPATIENT
Start: 2020-07-14 | End: 2020-07-14 | Stop reason: HOSPADM

## 2020-07-14 RX ORDER — ONDANSETRON 2 MG/ML
4 INJECTION INTRAMUSCULAR; INTRAVENOUS EVERY 30 MIN PRN
Status: DISCONTINUED | OUTPATIENT
Start: 2020-07-14 | End: 2020-07-15 | Stop reason: HOSPADM

## 2020-07-14 RX ORDER — LIDOCAINE 40 MG/G
CREAM TOPICAL
Status: DISCONTINUED | OUTPATIENT
Start: 2020-07-14 | End: 2020-07-14 | Stop reason: HOSPADM

## 2020-07-14 RX ORDER — OXYCODONE HYDROCHLORIDE 5 MG/1
5 TABLET ORAL EVERY 4 HOURS PRN
Status: DISCONTINUED | OUTPATIENT
Start: 2020-07-14 | End: 2020-07-15 | Stop reason: HOSPADM

## 2020-07-14 RX ORDER — CEFAZOLIN SODIUM 2 G/50ML
2 SOLUTION INTRAVENOUS
Status: COMPLETED | OUTPATIENT
Start: 2020-07-14 | End: 2020-07-14

## 2020-07-14 RX ORDER — ONDANSETRON 2 MG/ML
INJECTION INTRAMUSCULAR; INTRAVENOUS PRN
Status: DISCONTINUED | OUTPATIENT
Start: 2020-07-14 | End: 2020-07-14

## 2020-07-14 RX ORDER — OXYCODONE HYDROCHLORIDE 5 MG/1
5 TABLET ORAL EVERY 6 HOURS PRN
Qty: 12 TABLET | Refills: 0 | Status: SHIPPED | OUTPATIENT
Start: 2020-07-14 | End: 2020-07-17

## 2020-07-14 RX ORDER — SODIUM CHLORIDE, SODIUM LACTATE, POTASSIUM CHLORIDE, CALCIUM CHLORIDE 600; 310; 30; 20 MG/100ML; MG/100ML; MG/100ML; MG/100ML
INJECTION, SOLUTION INTRAVENOUS CONTINUOUS
Status: DISCONTINUED | OUTPATIENT
Start: 2020-07-14 | End: 2020-07-14 | Stop reason: HOSPADM

## 2020-07-14 RX ORDER — DEXAMETHASONE SODIUM PHOSPHATE 4 MG/ML
INJECTION, SOLUTION INTRA-ARTICULAR; INTRALESIONAL; INTRAMUSCULAR; INTRAVENOUS; SOFT TISSUE PRN
Status: DISCONTINUED | OUTPATIENT
Start: 2020-07-14 | End: 2020-07-14

## 2020-07-14 RX ORDER — BUPIVACAINE HYDROCHLORIDE 5 MG/ML
INJECTION, SOLUTION PERINEURAL PRN
Status: DISCONTINUED | OUTPATIENT
Start: 2020-07-14 | End: 2020-07-14 | Stop reason: HOSPADM

## 2020-07-14 RX ORDER — NALOXONE HYDROCHLORIDE 0.4 MG/ML
.1-.4 INJECTION, SOLUTION INTRAMUSCULAR; INTRAVENOUS; SUBCUTANEOUS
Status: DISCONTINUED | OUTPATIENT
Start: 2020-07-14 | End: 2020-07-15 | Stop reason: HOSPADM

## 2020-07-14 RX ORDER — PROPOFOL 10 MG/ML
INJECTION, EMULSION INTRAVENOUS CONTINUOUS PRN
Status: DISCONTINUED | OUTPATIENT
Start: 2020-07-14 | End: 2020-07-14

## 2020-07-14 RX ORDER — MEPERIDINE HYDROCHLORIDE 25 MG/ML
12.5 INJECTION INTRAMUSCULAR; INTRAVENOUS; SUBCUTANEOUS
Status: DISCONTINUED | OUTPATIENT
Start: 2020-07-14 | End: 2020-07-15 | Stop reason: HOSPADM

## 2020-07-14 RX ORDER — EPHEDRINE SULFATE 50 MG/ML
INJECTION, SOLUTION INTRAMUSCULAR; INTRAVENOUS; SUBCUTANEOUS PRN
Status: DISCONTINUED | OUTPATIENT
Start: 2020-07-14 | End: 2020-07-14

## 2020-07-14 RX ORDER — PROPOFOL 10 MG/ML
INJECTION, EMULSION INTRAVENOUS PRN
Status: DISCONTINUED | OUTPATIENT
Start: 2020-07-14 | End: 2020-07-14

## 2020-07-14 RX ADMIN — ONDANSETRON 4 MG: 2 INJECTION INTRAMUSCULAR; INTRAVENOUS at 08:16

## 2020-07-14 RX ADMIN — EPHEDRINE SULFATE 5 MG: 50 INJECTION, SOLUTION INTRAMUSCULAR; INTRAVENOUS; SUBCUTANEOUS at 08:26

## 2020-07-14 RX ADMIN — PROPOFOL 180 MG: 10 INJECTION, EMULSION INTRAVENOUS at 08:10

## 2020-07-14 RX ADMIN — FENTANYL CITRATE 25 MCG: 50 INJECTION, SOLUTION INTRAMUSCULAR; INTRAVENOUS at 08:47

## 2020-07-14 RX ADMIN — Medication 100 MCG: at 08:29

## 2020-07-14 RX ADMIN — LIDOCAINE HYDROCHLORIDE 100 MG: 20 INJECTION, SOLUTION INFILTRATION; PERINEURAL at 08:10

## 2020-07-14 RX ADMIN — SODIUM CHLORIDE, SODIUM LACTATE, POTASSIUM CHLORIDE, CALCIUM CHLORIDE: 600; 310; 30; 20 INJECTION, SOLUTION INTRAVENOUS at 08:07

## 2020-07-14 RX ADMIN — PROPOFOL 20 MG: 10 INJECTION, EMULSION INTRAVENOUS at 09:51

## 2020-07-14 RX ADMIN — FENTANYL CITRATE 25 MCG: 50 INJECTION, SOLUTION INTRAMUSCULAR; INTRAVENOUS at 08:07

## 2020-07-14 RX ADMIN — OXYCODONE HYDROCHLORIDE 5 MG: 5 TABLET ORAL at 10:19

## 2020-07-14 RX ADMIN — EPHEDRINE SULFATE 5 MG: 50 INJECTION, SOLUTION INTRAMUSCULAR; INTRAVENOUS; SUBCUTANEOUS at 08:29

## 2020-07-14 RX ADMIN — CEFAZOLIN SODIUM 2 G: 2 SOLUTION INTRAVENOUS at 08:16

## 2020-07-14 RX ADMIN — Medication 100 MCG: at 08:44

## 2020-07-14 RX ADMIN — PROPOFOL 20 MG: 10 INJECTION, EMULSION INTRAVENOUS at 08:15

## 2020-07-14 RX ADMIN — PROPOFOL 100 MCG/KG/MIN: 10 INJECTION, EMULSION INTRAVENOUS at 08:11

## 2020-07-14 RX ADMIN — EPHEDRINE SULFATE 5 MG: 50 INJECTION, SOLUTION INTRAMUSCULAR; INTRAVENOUS; SUBCUTANEOUS at 08:44

## 2020-07-14 RX ADMIN — DEXAMETHASONE SODIUM PHOSPHATE 4 MG: 4 INJECTION, SOLUTION INTRA-ARTICULAR; INTRALESIONAL; INTRAMUSCULAR; INTRAVENOUS; SOFT TISSUE at 08:16

## 2020-07-14 RX ADMIN — FENTANYL CITRATE 25 MCG: 50 INJECTION, SOLUTION INTRAMUSCULAR; INTRAVENOUS at 09:29

## 2020-07-14 NOTE — ANESTHESIA CARE TRANSFER NOTE
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    Anesthesia Type:   General     Note:  Airway :Face Mask  Patient transferred to:PACU  Handoff Report: Identifed the Patient, Identified the Reponsible Provider, Reviewed the pertinent medical history, Discussed the surgical course, Reviewed Intra-OP anesthesia mangement and issues during anesthesia, Set expectations for post-procedure period and Allowed opportunity for questions and acknowledgement of understanding      Vitals: (Last set prior to Anesthesia Care Transfer)    CRNA VITALS  7/14/2020 0933 - 7/14/2020 1015      7/14/2020             Pulse:  87    SpO2:  95 %                Electronically Signed By: JOE Mclean CRNA  July 14, 2020  10:15 AM

## 2020-07-14 NOTE — ANESTHESIA POSTPROCEDURE EVALUATION
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     Anesthesia Type: General       Disposition: Outpatient   Postop Pain Control: Uneventful            Sign Out: Well controlled pain   PONV: No   Neuro/Psych: Uneventful            Sign Out: Acceptable/Baseline neuro status   Airway/Respiratory: Uneventful            Sign Out: Acceptable/Baseline resp. status   CV/Hemodynamics: Uneventful            Sign Out: Acceptable CV status   Other NRE: NONE   DID A NON-ROUTINE EVENT OCCUR? No         Last Anesthesia Record Vitals:  CRNA VITALS  2020 0933 - 2020 1033      2020             Pulse:  87    SpO2:  95 %          Last PACU Vitals:  Vitals Value Taken Time   /76 2020 10:30 AM   Temp 36.7  C (98  F) 2020 10:15 AM   Pulse 76 2020 10:30 AM   Resp 16 2020 10:39 AM   SpO2 90 % 2020 10:38 AM   Temp src     NIBP     Pulse     SpO2     Resp     Temp     Ht Rate     Temp 2     Vitals shown include unvalidated device data.      Electronically Signed By: Parris Edward MD, 2020, 12:59 PM

## 2020-07-14 NOTE — DISCHARGE INSTRUCTIONS
[facility]  Home Care Following Anesthesia  For 24 hours after surgery:  1. Get plenty of rest.  A responsible adult must stay with you for at least 24 hours after you leave the surgery center.  2. Do not drive or use heavy equipment.  If you have weakness or tingling, don't drive or use heavy equipment until this feeling goes away.   3. Do not drink alcohol.   4. Avoid strenuous or risky activities.  Ask for help when climbing stairs.  5. You may feel lightheaded.  IF so, sit for a few minutes before standing.  Have someone help you get up.   6. If you have nausea (feel sick to your stomach): Drink only clear liquids such as apple juice, ginger ale, broth or 7-Up.  Rest may also help.  Be sure to drink enough fluids.  Move to a regular diet as you feel able.   7. You may have a slight fever.  Call the doctor if your fever is over 100 F (37.7 C) (taken under the tongue) or lasts longer than 24 hours.  8. You may have a dry mouth, a sore throat, muscle aches or trouble sleeping. These should go away after 24 hours.  9. Do not make important or legal decisions.               Tips for taking pain medications  To get the best pain relief possible, remember these points:    Take pain medications as directed, before pain becomes severe.    Pain medication can upset your stomach: taking it with food may help.    Constipation is a common side effect of pain medication. Drink plenty of  fluids.    Eat foods high in fiber. Take a stool softener if recommended by your doctor or pharmacist.    Do not drink alcohol, drive or operate machinery while taking pain medications.    Ask about other ways to control pain, such as with heat, ice or relaxation.    Tylenol/Acetaminophen Consumption  To help encourage the safe use of acetaminophen, the makers of TYLENOL  have lowered the maximum daily dose for single-ingredient Extra Strength TYLENOL  (acetaminophen) products sold in the [place] from 8  pills per day (4,000 mg) to 6 pills per day (3,000 mg). The dosing interval has also changed from 2 pills every 4-6 hours to 2 pills every 6 hours.    If you feel your pain relief is insufficient, you may take Tylenol/Acetaminophen in addition to your narcotic pain medication.     Be careful not to exceed 3,000 mg of Tylenol/Acetaminophen in a 24 hour period from all sources.    If you are taking extra strength Tylenol/acetaminophen (500 mg), the maximum dose is 6 tablets in 24 hours.    If you are taking regular strength acetaminophen (325 mg), the maximum dose is 9 tablets in 24 hours.    Call a doctor for any of the followin. Signs of infection (fever, growing tenderness at the surgery site, a large amount of drainage or bleeding, severe pain, foul-smelling drainage, redness, swelling).  2. It has been over 8 to 10 hours since surgery and you are still not able to urinate (pass water).  3. Headache for over 24 hours.  4. Numbness, tingling or weakness the day after surgery (if you had spinal anesthesia).  5. Signs of Covid-19 infection (temperature over 100 degrees, shortness of breath, cough, loss of taste/smell, generalized body aches, persistent headache, chills, sore throat, nausea/vomiting/diarrhea)  Your doctor is:  Dr. Yobani Valenzuela, Prostate and Urology: [phone]                    Or dial [phone] and ask for the resident on call for:  Prostate Urology  For emergency care, call the:  [facility]:  [phone] (TTY for hearing impaired: [phone])

## 2020-07-14 NOTE — OP NOTE
"OPERATIVE NOTE    PREOPERATIVE [id number]: Left hematoma  POSTOPERATIVE DIAGNOSIS: Left lipomatous scrotal mass- suspect omentum.    PROCEDURES PERFORMED:   1. Left scrotal exploration, excision of scrotal mass    STAFF SURGEON: Yobani Valenzuela  RESIDENT(S): Yuri Slaughter MD  ANESTHESIA: General  ESTIMATED BLOOD LOSS: <2 mL.   IV FLUIDS: see dictated anesthesia record  COMPLICATIONS: None.   SPECIMEN:    Left scrotal mass    SIGNIFICANT FINDINGS:   Left scrotal mass    BRIEF OPERATIVE INDICATIONS: \"Fish Hong is a 88 year old male is status-post left orchiectomy and left inguinal hernia repair by Dr. Landaverde 10/2019.  He has had a taut left scrotal fluid collection since that surgery.  Appeared to be hematoma/hc on exam and scrotal ultrasound.  Dr. Landaverde advised observation.     Scrotal ultrasound done 11/21/19 that showed a complex left scrotal fluid collection consistent with hematoma.  I reviewed US images and report from this and from 9/5/19 scrotal ultrasound.  Looks like a hematoma with solid and fluid components.  Pt has not had significant decrease in size of the left scrotal mass.  The cord structures feel thin and this collection feels encapsulated and taut in the left hemiscrotum, most consistent with chronic scrotal hematoma on exam today.    Procedure in Detail: After informed consent was obtained, the patient was taken to the operating room and placed supine. Boots were applied, preoperative antibiotics were administered IV, and the genitals were prepped and draped in the supine position. A timeout was performed with the operating room staff.    The case was begun by marking a midline raphe incision immediately inferior to the penoscrotal junction. The skin incised sharply and was opened full-thickness through the dartos muscle exposing the left scrotal mass and what appeared to be the tunica vaginalis ( or encapsulated scar tissue layer).  Aspiration of this mass with an 18ga needle revealed " "no fluid or blood that could be aspirated.  Excision of the \"rind\" of the mass showed fatty tissue with scant fluid. There was no appreciable drainable fluid collection, the contents of the rind appeared to be viable fat tissue. The mass in its rind was delivered through the median raphe incision. The mass had some attachments circumferentially to the inner surface of the dartos. It was dissected off the inner surface of the scrotal skin using cautery and hemostats.  There were some fat \"strands\" that extended down from the area of the left external inguinal ring, likely omentum or abdomenal fat that was not reduced at the time of his recent inguinal hernia repair. Vasculature that was encountered was tied off with 2-0 Vicryl ties. The mass was eventually able to be freed from its attachments and sent to pathology. Irrigation was performed to ensure no active bleeding. Hemostatis was achieved with cautery and stick-ties of 2-0 Vicryl.   A 10F round ARMAND drain was placed in the dependent portion of the left scrotum, secured with a nylon drain stitch, and the incision was closed in layers.  Dartos muscle was re-approximated using a running 2-0 Vicryl in 2 layers. Skin is closed using 4-0 monocryl in a running horizontal mattress fashion.    Bacitracin, fluffs, and a supporter were applied. The patient was awoken from anesthesia without complication and transported to recovery in stable condition.     Post operative:  -Scrotal support for 1 week.  -Remove drain when output <30cc/8hr ( likely tomorrow)  -Follow up with Dr. Valenzuela in ~2 weeks for pathology review.    I was present and scrubbed for the entire procedure.  Yobani Valenzuela MD  Urology Staff       "

## 2020-07-14 NOTE — BRIEF OP NOTE
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  Complications: None.  Implants: * No implants in log *

## 2020-07-15 ENCOUNTER — PRE VISIT (OUTPATIENT)
Dept: UROLOGY | Facility: CLINIC | Age: 85
End: 2020-07-15

## 2020-07-15 NOTE — TELEPHONE ENCOUNTER
Telephone Encounter  Author: Becky Stevenson MD, MD    Date: 8:03 PM; 7/14/2020  Patient Name: Fish Hong  MRN: [id number]    Paged by  that patient's daughter was calling regarding bleeding.     Jessie reports things have been going well since surgery. Patient does not have pain or fever, is tolerating PO intake and has been resting comfortably. When he got up to urinate they noticed some bright red saturation of the scrotal fluff. The incision is dripping a small amount. There is 75 ccs in the drain    I discussed with the patient and his daughter that while a phone conversation does not replace a physical exam, his signs and symptoms do not seem acutely concerning.  Offered evaluation in the ED but the patient wishes to manage at home.  Questions solicited & answered.  Strict return precautions given, including but not limited to: fevers > 101.4, chills, an inability to keep food or fluids down, pain not controlled with oral medications, increasing drainage for incision, or an inability to urinate.    Becky Stevenson MD  PGY2 Urology  --    8:03 PM; 7/14/2020

## 2020-07-15 NOTE — TELEPHONE ENCOUNTER
Reason for Visit: post operative check up S/P left scrotal exploration, excision of scrotal mass    Diagnosis: scrotal mass    Orders/Procedures/Records: in system    Contact Patient: n/a    Rooming Requirements: normal      Teri Allan LPN  07/15/20  1:47 PM

## 2020-07-17 LAB — COPATH REPORT: NORMAL

## 2020-07-30 ENCOUNTER — OFFICE VISIT (OUTPATIENT)
Dept: UROLOGY | Facility: CLINIC | Age: 85
End: 2020-07-30
Payer: MEDICARE

## 2020-07-30 VITALS — WEIGHT: 240 LBS | BODY MASS INDEX: 33.6 KG/M2 | HEIGHT: 71 IN

## 2020-07-30 DIAGNOSIS — N50.1 HEMATOCELE: ICD-10-CM

## 2020-07-30 DIAGNOSIS — Z98.890 POSTOPERATIVE STATE: Primary | ICD-10-CM

## 2020-07-30 ASSESSMENT — MIFFLIN-ST. JEOR: SCORE: 1775.76

## 2020-07-30 ASSESSMENT — PAIN SCALES - GENERAL: PAINLEVEL: NO PAIN (0)

## 2020-07-30 NOTE — LETTER
"7/30/2020       RE: Fish Hong  [address]  [facility] [address]     Dear Colleague,    Thank you for referring your patient, Fish Hong, to the [facility] UROLOGY AND INST FOR PROSTATE AND UROLOGIC CANCERS at [facility]. Please see a copy of my visit note below.    Urology Post-op Visit    PROCEDURE: Left scrotal exploration, excision of scrotal mass  SURGEON: Dr. Yobani Valenzuela  DATE: 7/14/2020    HPI: Mr. Fish Hong is an 89 year old male who is 2 weeks s/p left scrotal exploration and excision of scrotal mass with Dr. Valenzuela for a history of scrotal hematoma following left orchiectomy and left inguinal hernia repair by Dr. Landaverde in 10/2019.    ARMAND drain removed at home by patient's daughter on POD#2.     He reports that the incision is healing well. Denies bleeding or drainage.     No pain.    Urinating without difficulty.    Eating and drinking normally.     He believes the surgery was successful. Mild residual swelling in the left scrotum but much improved from prior when it was between the size of a baseball and softball per his report.     PEx  Ht 1.803 m (5' 11\")   Wt 108.9 kg (240 lb)   BMI 33.47 kg/m    GEN: well-appearing male in NAD  RESP: no increased respiratory effort  ABD: non-distended  : midline raphe incision is c/d/i, healing well with sutures intact; small residual fluid collection/mass in the left hemiscrotum that does not seem to communicate with the inguinal canal and is non-tender to palpation. No tautness of the scrotal skin or overlying erythema.    LAB  Surgical Pathology   7/14/2020  SPECIMEN(S):   Left scrotum fatty mass     FINAL DIAGNOSIS:   A. Left scrotum, fatty mass, excision:   - Fibroconnective tissue with hematoma, necrosis and surgical site changes   - Negative for malignancy       A/P   89 year old male 2 weeks s/p left scrotal exploration and excision of scrotal mass.   -Reviewed pathology with " patient which shows fibroconnective tissue/hematoma/necrosis; negative for malignancy.   -Midline scrotal incision healing well without evidence for infection.   -Small residual fluid collection/mass in the left scrotum, though significantly smaller than pre-op per patient. He has no pain and is doing well with no concerns.     Follow up with urology as needed. He has our number if scrotal swelling recurs.      Shanell Silva PA-C  Department of Urology

## 2020-07-30 NOTE — NURSING NOTE
Chief Complaint   Patient presents with     RECHECK     post operative check up S/P left scrotal exploration, excision of scrotal mass       Amanda Cordon MA

## 2020-07-30 NOTE — PROGRESS NOTES
"Urology Post-op Visit    PROCEDURE: Left scrotal exploration, excision of scrotal mass  SURGEON: Dr. Yobani Valenzuela  DATE: 7/14/2020    HPI: Mr. Fish Hong is an 89 year old male who is 2 weeks s/p left scrotal exploration and excision of scrotal mass with Dr. Valenzuela for a history of scrotal hematoma following left orchiectomy and left inguinal hernia repair by Dr. Landaverde in 10/2019.    ARMAND drain removed at home by patient's daughter on POD#2.     He reports that the incision is healing well. Denies bleeding or drainage.     No pain.    Urinating without difficulty.    Eating and drinking normally.     He believes the surgery was successful. Mild residual swelling in the left scrotum but much improved from prior when it was between the size of a baseball and softball per his report.     PEx  Ht 1.803 m (5' 11\")   Wt 108.9 kg (240 lb)   BMI 33.47 kg/m    GEN: well-appearing male in NAD  RESP: no increased respiratory effort  ABD: non-distended  : midline raphe incision is c/d/i, healing well with sutures intact; small residual fluid collection/mass in the left hemiscrotum that does not seem to communicate with the inguinal canal and is non-tender to palpation. No tautness of the scrotal skin or overlying erythema.    LAB  Surgical Pathology   7/14/2020  SPECIMEN(S):   Left scrotum fatty mass     FINAL DIAGNOSIS:   A. Left scrotum, fatty mass, excision:   - Fibroconnective tissue with hematoma, necrosis and surgical site changes   - Negative for malignancy       A/P   89 year old male 2 weeks s/p left scrotal exploration and excision of scrotal mass.   -Reviewed pathology with patient which shows fibroconnective tissue/hematoma/necrosis; negative for malignancy.   -Midline scrotal incision healing well without evidence for infection.   -Small residual fluid collection/mass in the left scrotum, though significantly smaller than pre-op per patient. He has no pain and is doing well with no concerns.     Follow " up with urology as needed. He has our number if scrotal swelling recurs.      NORI ZaldivarC  Department of Urology

## 2020-08-26 NOTE — TELEPHONE ENCOUNTER
RECORDS RECEIVED FROM: Ref Dr Nicho Sullivan [facility]/ Right Hip Arthritis/Medicare   DATE RECEIVED: Sep 14, 2020     NOTES STATUS DETAILS   OFFICE NOTE from referring provider Internal    OFFICE NOTE from other specialist N/A    DISCHARGE SUMMARY from hospital N/A    DISCHARGE REPORT from the ER N/A    OPERATIVE REPORT N/A    MEDICATION LIST Internal    IMPLANT RECORD/STICKER N/A    LABS     CBC/DIFF N/A    CULTURES N/A    INJECTIONS DONE IN RADIOLOGY N/A    MRI N/A    CT SCAN N/A    XRAYS (IMAGES & REPORTS) N/A    TUMOR     PATHOLOGY  Slides & report N/A    08/26/20   11:25 AM   Complete  Chelsea Martinez, MATTHEW

## 2020-09-14 ENCOUNTER — PREP FOR PROCEDURE (OUTPATIENT)
Dept: ORTHOPEDICS | Facility: CLINIC | Age: 85
End: 2020-09-14

## 2020-09-14 ENCOUNTER — PRE VISIT (OUTPATIENT)
Dept: ORTHOPEDICS | Facility: CLINIC | Age: 85
End: 2020-09-14

## 2020-09-14 ENCOUNTER — ANCILLARY PROCEDURE (OUTPATIENT)
Dept: GENERAL RADIOLOGY | Facility: CLINIC | Age: 85
End: 2020-09-14
Attending: ORTHOPAEDIC SURGERY
Payer: MEDICARE

## 2020-09-14 ENCOUNTER — OFFICE VISIT (OUTPATIENT)
Dept: ORTHOPEDICS | Facility: CLINIC | Age: 85
End: 2020-09-14
Payer: MEDICARE

## 2020-09-14 VITALS — WEIGHT: 245.4 LBS | HEIGHT: 71 IN | BODY MASS INDEX: 34.35 KG/M2

## 2020-09-14 DIAGNOSIS — M16.9 DEGENERATIVE JOINT DISEASE (DJD) OF HIP: ICD-10-CM

## 2020-09-14 DIAGNOSIS — M70.61 GREATER TROCHANTERIC BURSITIS, RIGHT: ICD-10-CM

## 2020-09-14 DIAGNOSIS — M25.551 HIP PAIN, RIGHT: ICD-10-CM

## 2020-09-14 DIAGNOSIS — M70.60 GREATER TROCHANTERIC BURSITIS: Primary | ICD-10-CM

## 2020-09-14 DIAGNOSIS — M70.61 GREATER TROCHANTERIC BURSITIS, RIGHT: Primary | ICD-10-CM

## 2020-09-14 DIAGNOSIS — M16.12 PRIMARY OSTEOARTHRITIS OF LEFT HIP: Primary | ICD-10-CM

## 2020-09-14 ASSESSMENT — MIFFLIN-ST. JEOR: SCORE: 1798.13

## 2020-09-14 NOTE — NURSING NOTE
Teaching Flowsheet   Relevant Diagnosis: Right total hip Arthroplasty  Teaching Topic: Pre op teaching.     Person(s) involved in teaching:   Patient     Motivation Level:  Asks Questions: Yes  Eager to Learn: Yes  Cooperative: Yes  Receptive (willing/able to accept information): Yes  Any cultural factors/Sikhism beliefs that may influence understanding or compliance? No       Patient demonstrates understanding of the following:  Reason for the appointment, diagnosis and treatment plan: Yes  Knowledge of proper use of medications and conditions for which they are ordered (with special attention to potential side effects or drug interactions): Yes  Which situations necessitate calling provider and whom to contact: Yes       Teaching Concerns Addressed: RN discussed all aspects of pre op surgery with patient. Advised patient to sign up for total joint replacement class. Told patient to see his dentist for any cleaning dental work prior to surgery. Told patient he will have to be on ABX prior to dental work lifetime. Audra will call patient with PAC and surgery dates. Patient has no questions.       Proper use and care of meds (medical equip, care aids, etc.): Yes  Nutritional needs and diet plan: Yes  Pain management techniques: Yes  Wound Care: Yes  How and/when to access community resources: Yes     Instructional Materials Used/Given: Pre op packet and anti bacterial soap.     Time spent with patient: 15 minutes.

## 2020-09-14 NOTE — NURSING NOTE
"Chief Complaint   Patient presents with     Consult     Pt. states that he is here today for Bilat. Hip Osteoarthritis, Right greater than Left. Referring: Nicho Sullivan MD       89 year old  4/11/1931    Ht 1.8 m (5' 10.87\")   Wt 111.3 kg (245 lb 6.4 oz)   BMI 34.36 kg/m        [facility] [address] [place] [address]    No Known Allergies    Current Outpatient Medications   Medication     amLODIPine (NORVASC) 10 MG tablet     cyanocobalamin (VITAMIN B-12) 500 MCG tablet     doxazosin (CARDURA) 4 MG tablet     fluticasone (FLONASE) 50 MCG/ACT nasal spray     Naproxen Sodium (ALEVE PO)     triamcinolone (KENALOG) 0.1 % external cream     No current facility-administered medications for this visit.                          "

## 2020-09-14 NOTE — LETTER
9/14/2020         RE: Fish Hong  [address]  [facility] [address]        Dear Colleague,    Thank you for referring your patient, Fish Hong, to the [facility] ORTH[facility]. Please see a copy of my visit note below.        [facility]  Orthopaedic Surgery, Joint Replacement Consultation  by Eyad Gonzalez M.D.    Fish Hong MRN# [id number]   Age: 89 year old YOB: 1931     Requesting physician: Herman Soto            Assessment and Plan:   Assessment:  Severe end-stage right degenerative hip disease.  He may also have some element of lumbar spondylosis.     Plan:  I have advised proceeding with right total hip arthroplasty surgery.  He is an excellent candidate and is likely to benefit greatly.  Will improve his ability to remain independently ambulatory and living independently as well.  He lives alone presently.  Postoperatively he expects to have his daughters stay with him and I advised that he arrange for at least 2 weeks of assistance.  He would like to avoid a transitional care unit postoperatively.  Despite his good health, given his age, he may require a postoperative stay longer than 23 hours.    1. He will attend our joint replacement class and I advised that his daughters attend as well.  2. PACs clinic visit for preoperative anesthesia evaluation  3. Preoperative H&P with his local primary physician.  4. Patient is already had dental evaluation within the last 6 months and no problems with his oral health are noted.  5. We will set a surgical date for right total hip arthroplasty, tier 4, 2 hours operative time.  Anticipate posterior approach total hip arthroplasty given his abdominal hernia.           History of Present Illness:   89 year old male  chief complaint    Patient has had progressive discomfort of vomitings right hip joint is undergone sterile steroid injections by Dr. Sullivan which have at times  "been of benefit and other times have not helped him.  He currently is using a walker.  In the morning his difficulty standing and erect posture.  He also has back pain.  He describes the pain is mostly on the lateral and posterior lateral aspect of the buttock and hip joint.  No prior surgeries on his right hip.    No history of recent injury.  He has pain with activities.    Prior knee replacements have been functioning well and he has no complaints.    Background history:  DX:  1. Degenerative arthritis of hips and knees    TREATMENTS:  1. Right and left total knee arthroplasty (Dr. Maik Burroughs), [facility]           Physical Exam:     EXAMINATION pertinent findings:   PSYCH: Pleasant, healthy-appearing, alert, oriented x3, cooperative. Normal mood and affect.  VITAL SIGNS: Height 1.8 m (5' 10.87\"), weight 111.3 kg (245 lb 6.4 oz)..  Reviewed nursing intake notes.   Body mass index is 34.36 kg/m .  RESP: non labored breathing   ABD: benign, soft, non-tender, no acute peritoneal findings.  Large abdominal umbilical hernia noted   SKIN: grossly normal   LYMPHATIC: grossly normal, no adenopathy, no extremity edema  NEURO: grossly normal , no motor deficits  VASCULAR: satisfactory perfusion of all extremities   MUSCULOSKELETAL:   Markedly antalgic gait.  Difficulty standing erect.  Marked pain with rotation of the right hip joint.  Flexion to 90 degrees in full extension.  Rotation limited by pain.  Left hip has full range of motion without pain.  Bilateral knees have 0 to 110 degrees of flexion without effusion or evidence of laxity.                  Data:   All laboratory data reviewed  All imaging studies reviewed by me    Radiographs are notable for severe degenerative disease of longstanding nature involving his right hip joint.    Eyad Gonzalez MD  [employer] [facility] Professor  Oncology and Adult Reconstructive Surgery  Dept Orthopaedic Surgery, [facility]  [phone] office, [phone] " pager  [email or web address]      DATA for DOCUMENTATION:         Past Medical History:     Patient Active Problem List   Diagnosis     Essential hypertension     Pulmonary nodules     Preventive measure     Atopic dermatitis, unspecified type     Spinal stenosis of lumbar region without neurogenic claudication     Greater trochanteric bursitis, right     Gastroesophageal reflux disease without esophagitis     History of smoking     Vitamin B12 deficiency (non anemic)     Hematocele     Seasonal allergic rhinitis, unspecified trigger     Past Medical History:   Diagnosis Date     Hematocele      Hypertension      Obesity      Osteoarthritis of both hips     s/p steroid injections     Phimosis      Pulmonary nodules      Rheumatic fever 1948       Also see scanned health assessment forms.       Past Surgical History:     Past Surgical History:   Procedure Laterality Date     AS TOTAL KNEE ARTHROPLASTY       C TOTAL KNEE ARTHROPLASTY      Bilateral     CIRCUMCISION N/A 7/13/2016    Procedure: CIRCUMCISION;  Surgeon: Elgin Rhodes MD;  Location: [facility] OR     circumcision       ENT SURGERY      detached retina--bilateral     EXPLORE SCROTUM Left 7/14/2020    Procedure: EXPLORE LEFT SCROTUM, EVACULATE HEMATOMA, excision of scrotal mass;  Surgeon: Yobani Valenzuela MD;  Location: [facility] OR     HERNIA REPAIR  11/3/2008    mesh repair umbilical hernia     HERNIORRHAPHY INGUINAL Left 10/8/2019    Procedure: Open Left Inguinal Hernia Repair;  Surgeon: Antonio Landaverde MD;  Location: [facility] OR     ORCHIECTOMY INGUINAL N/A 10/8/2019    Procedure: Left Orchiectomy;  Surgeon: Antonio Landaverde MD;  Location: [facility] OR            Social History:     Social History     Socioeconomic History     Marital status:      Spouse name:      Number of children: Not on file     Years of education: Not on file     Highest education level: Not on file   Occupational History     Not on file   Social Needs     Financial  resource strain: Not on file     Food insecurity     Worry: Not on file     Inability: Not on file     Transportation needs     Medical: Not on file     Non-medical: Not on file   Tobacco Use     Smoking status: Former Smoker     Packs/day: 1.00     Years: 11.00     Pack years: 11.00     Start date: 10/15/1948     Last attempt to quit: 1960     Years since quittin.7     Smokeless tobacco: Never Used   Substance and Sexual Activity     Alcohol use: Yes     Comment: very little     Drug use: No     Sexual activity: Not Currently   Lifestyle     Physical activity     Days per week: Not on file     Minutes per session: Not on file     Stress: Not on file   Relationships     Social connections     Talks on phone: Not on file     Gets together: Not on file     Attends Mosque service: Not on file     Active member of club or organization: Not on file     Attends meetings of clubs or organizations: Not on file     Relationship status: Not on file     Intimate partner violence     Fear of current or ex partner: Not on file     Emotionally abused: Not on file     Physically abused: Not on file     Forced sexual activity: Not on file   Other Topics Concern     Parent/sibling w/ CABG, MI or angioplasty before 65F 55M? Not Asked   Social History Narrative     since                      for 32 years                           Family History:       Family History   Problem Relation Age of Onset     No Known Problems Mother      No Known Problems Father      Melanoma No family hx of      Skin Cancer No family hx of             Medications:     Current Outpatient Medications   Medication Sig     amLODIPine (NORVASC) 10 MG tablet Take 1 tablet (10 mg) by mouth daily (Patient taking differently: Take 10 mg by mouth every morning )     cyanocobalamin (VITAMIN B-12) 500 MCG tablet Take 1 tablet (500 mcg) by mouth daily (Patient taking differently: Take 500 mcg by mouth every morning )     doxazosin  (CARDURA) 4 MG tablet Take 1 tablet (4 mg) by mouth At Bedtime     fluticasone (FLONASE) 50 MCG/ACT nasal spray Spray 2 sprays into both nostrils every morning     Naproxen Sodium (ALEVE PO)      triamcinolone (KENALOG) 0.1 % external cream Apply sparingly to affected area two times daily as needed (left ankle.lower leg) (Patient taking differently: Apply topically as needed (Pt. has not used in past month 09/23/2019) Apply sparingly to affected area two times daily as needed (left ankle.lower leg))     No current facility-administered medications for this visit.               Review of Systems:   A comprehensive 10 point review of systems (constitutional, ENT, cardiac, peripheral vascular, lymphatic, respiratory, GI, , Musculoskeletal, skin, Neurological) was performed and found to be negative except as described in this note.     See intake form completed by patient

## 2020-09-14 NOTE — PROGRESS NOTES
[facility]  Orthopaedic Surgery, Joint Replacement Consultation  by Eyad Gonzalez M.D.    Fish Hong MRN# [id number]   Age: 89 year old YOB: 1931     Requesting physician: Herman Soto            Assessment and Plan:   Assessment:  Severe end-stage right degenerative hip disease.  He may also have some element of lumbar spondylosis.     Plan:  I have advised proceeding with right total hip arthroplasty surgery.  He is an excellent candidate and is likely to benefit greatly.  Will improve his ability to remain independently ambulatory and living independently as well.  He lives alone presently.  Postoperatively he expects to have his daughters stay with him and I advised that he arrange for at least 2 weeks of assistance.  He would like to avoid a transitional care unit postoperatively.  Despite his good health, given his age, he may require a postoperative stay longer than 23 hours.    1. He will attend our joint replacement class and I advised that his daughters attend as well.  2. PACs clinic visit for preoperative anesthesia evaluation  3. Preoperative H&P with his local primary physician.  4. Patient is already had dental evaluation within the last 6 months and no problems with his oral health are noted.  5. We will set a surgical date for right total hip arthroplasty, tier 4, 2 hours operative time.  Anticipate posterior approach total hip arthroplasty given his abdominal hernia.           History of Present Illness:   89 year old male  chief complaint    Patient has had progressive discomfort of vomitings right hip joint is undergone sterile steroid injections by Dr. Sullivan which have at times been of benefit and other times have not helped him.  He currently is using a walker.  In the morning his difficulty standing and erect posture.  He also has back pain.  He describes the pain is mostly on the lateral and posterior lateral aspect of the buttock  "and hip joint.  No prior surgeries on his right hip.    No history of recent injury.  He has pain with activities.    Prior knee replacements have been functioning well and he has no complaints.    Background history:  DX:  1. Degenerative arthritis of hips and knees    TREATMENTS:  1. Right and left total knee arthroplasty (Dr. Maik Burroughs), [facility]           Physical Exam:     EXAMINATION pertinent findings:   PSYCH: Pleasant, healthy-appearing, alert, oriented x3, cooperative. Normal mood and affect.  VITAL SIGNS: Height 1.8 m (5' 10.87\"), weight 111.3 kg (245 lb 6.4 oz)..  Reviewed nursing intake notes.   Body mass index is 34.36 kg/m .  RESP: non labored breathing   ABD: benign, soft, non-tender, no acute peritoneal findings.  Large abdominal umbilical hernia noted   SKIN: grossly normal   LYMPHATIC: grossly normal, no adenopathy, no extremity edema  NEURO: grossly normal , no motor deficits  VASCULAR: satisfactory perfusion of all extremities   MUSCULOSKELETAL:   Markedly antalgic gait.  Difficulty standing erect.  Marked pain with rotation of the right hip joint.  Flexion to 90 degrees in full extension.  Rotation limited by pain.  Left hip has full range of motion without pain.  Bilateral knees have 0 to 110 degrees of flexion without effusion or evidence of laxity.                  Data:   All laboratory data reviewed  All imaging studies reviewed by me    Radiographs are notable for severe degenerative disease of longstanding nature involving his right hip joint.    MD Ingrid Powers Family Professor  Oncology and Adult Reconstructive Surgery  Dept Orthopaedic Surgery, [facility]  [phone] office, [phone] pager  [email or web address]      DATA for DOCUMENTATION:         Past Medical History:     Patient Active Problem List   Diagnosis     Essential hypertension     Pulmonary nodules     Preventive measure     Atopic dermatitis, unspecified type     Spinal stenosis of lumbar region " without neurogenic claudication     Greater trochanteric bursitis, right     Gastroesophageal reflux disease without esophagitis     History of smoking     Vitamin B12 deficiency (non anemic)     Hematocele     Seasonal allergic rhinitis, unspecified trigger     Past Medical History:   Diagnosis Date     Hematocele      Hypertension      Obesity      Osteoarthritis of both hips     s/p steroid injections     Phimosis      Pulmonary nodules      Rheumatic fever 1948       Also see scanned health assessment forms.       Past Surgical History:     Past Surgical History:   Procedure Laterality Date     AS TOTAL KNEE ARTHROPLASTY       C TOTAL KNEE ARTHROPLASTY      Bilateral     CIRCUMCISION N/A 7/13/2016    Procedure: CIRCUMCISION;  Surgeon: Elgin Rhodes MD;  [facility]: [facility] OR     circumcision       ENT SURGERY      detached retina--bilateral     EXPLORE SCROTUM Left 7/14/2020    Procedure: EXPLORE LEFT SCROTUM, EVACULATE HEMATOMA, excision of scrotal mass;  Surgeon: Yobani Valenzuela MD;  [facility]     HERNIA REPAIR  11/3/2008    mesh repair umbilical hernia     HERNIORRHAPHY INGUINAL Left 10/8/2019    Procedure: Open Left Inguinal Hernia Repair;  Surgeon: Antonio Landaverde MD;  [facility] OR     ORCHIECTOMY INGUINAL N/A 10/8/2019    Procedure: Left Orchiectomy;  Surgeon: Antonio Landaverde MD;  [facility] OR            Social History:     Social History     Socioeconomic History     Marital status:      Spouse name:      Number of children: Not on file     Years of education: Not on file     Highest education level: Not on file   Occupational History     Not on file   Social Needs     Financial resource strain: Not on file     Food insecurity     Worry: Not on file     Inability: Not on file     Transportation needs     Medical: Not on file     Non-medical: Not on file   Tobacco Use     Smoking status: Former Smoker     Packs/day: 1.00     Years: 11.00     Pack years: 11.00      Start date: 10/15/1948     Last attempt to quit: 1960     Years since quittin.7     Smokeless tobacco: Never Used   Substance and Sexual Activity     Alcohol use: Yes     Comment: very little     Drug use: No     Sexual activity: Not Currently   Lifestyle     Physical activity     Days per week: Not on file     Minutes per session: Not on file     Stress: Not on file   Relationships     Social connections     Talks on phone: Not on file     Gets together: Not on file     Attends [employer] service: Not on file     Active member of club or organization: Not on file     Attends meetings of clubs or organizations: Not on file     Relationship status: Not on file     Intimate partner violence     Fear of current or ex partner: Not on file     Emotionally abused: Not on file     Physically abused: Not on file     Forced sexual activity: Not on file   Other Topics Concern     Parent/sibling w/ CABG, MI or angioplasty before 65F 55M? Not Asked   Social History Narrative     since                      for 32 years                           Family History:       Family History   Problem Relation Age of Onset     No Known Problems Mother      No Known Problems Father      Melanoma No family hx of      Skin Cancer No family hx of             Medications:     Current Outpatient Medications   Medication Sig     amLODIPine (NORVASC) 10 MG tablet Take 1 tablet (10 mg) by mouth daily (Patient taking differently: Take 10 mg by mouth every morning )     cyanocobalamin (VITAMIN B-12) 500 MCG tablet Take 1 tablet (500 mcg) by mouth daily (Patient taking differently: Take 500 mcg by mouth every morning )     doxazosin (CARDURA) 4 MG tablet Take 1 tablet (4 mg) by mouth At Bedtime     fluticasone (FLONASE) 50 MCG/ACT nasal spray Spray 2 sprays into both nostrils every morning     Naproxen Sodium (ALEVE PO)      triamcinolone (KENALOG) 0.1 % external cream Apply sparingly to affected area two times daily  as needed (left ankle.lower leg) (Patient taking differently: Apply topically as needed (Pt. has not used in past month 09/23/2019) Apply sparingly to affected area two times daily as needed (left ankle.lower leg))     No current facility-administered medications for this visit.               Review of Systems:   A comprehensive 10 point review of systems (constitutional, ENT, cardiac, peripheral vascular, lymphatic, respiratory, GI, , Musculoskeletal, skin, Neurological) was performed and found to be negative except as described in this note.     See intake form completed by patient

## 2020-09-16 ENCOUNTER — TELEPHONE (OUTPATIENT)
Dept: ORTHOPEDICS | Facility: CLINIC | Age: 85
End: 2020-09-16

## 2020-09-16 ENCOUNTER — DOCUMENTATION ONLY (OUTPATIENT)
Dept: EDUCATION SERVICES | Facility: CLINIC | Age: 85
End: 2020-09-16

## 2020-09-16 NOTE — TELEPHONE ENCOUNTER
RN called [facility], reporting patient's completion of total joint class.    Ajith Martinez RN      Phone Message    May a detailed message be left on voicemail: yes     Reason for Call: Other: Patient was told to let Ren know that he watched the video on hip replacement and needs a call back for next step     Action Taken: Message routed to:  [facility]    Travel Screening: Not Applicable

## 2020-10-27 ENCOUNTER — TELEPHONE (OUTPATIENT)
Dept: ORTHOPEDICS | Facility: CLINIC | Age: 85
End: 2020-10-27

## 2020-10-27 NOTE — TELEPHONE ENCOUNTER
RN called patient. Told patient to reach out to Audra for surgery dates. Patient expressed understanding.    Ajith Martinez RN       Health Call Center    Phone Message    May a detailed message be left on voicemail: yes     Reason for Call: Other: Pt calling in asking to speak with Ren to discuss getting scheduled for surgery with Dr. Gonzalez, please call Pt back     Action Taken: Message routed to:  [facility]    Travel Screening: Not Applicable

## 2020-10-29 ENCOUNTER — TELEPHONE (OUTPATIENT)
Dept: ORTHOPEDICS | Facility: CLINIC | Age: 85
End: 2020-10-29

## 2020-10-29 PROBLEM — M16.9 DEGENERATIVE JOINT DISEASE (DJD) OF HIP: Status: ACTIVE | Noted: 2020-10-29

## 2020-10-30 DIAGNOSIS — Z11.59 ENCOUNTER FOR SCREENING FOR OTHER VIRAL DISEASES: Primary | ICD-10-CM

## 2020-10-30 NOTE — TELEPHONE ENCOUNTER
FUTURE VISIT INFORMATION      SURGERY INFORMATION:    Date: 1/26/20    [facility]    Surgeon:  Eyad Gonzalez MD    Anesthesia Type:  general    Procedure: Right total hip arthroplasty    Consult: ov 9/14    RECORDS REQUESTED FROM:       Primary Care Provider: Herman Jarrett MD- [facility]    Pertinent Medical History: Pulmonary nodules, hypertension    Most recent EKG+ Tracing: 10/8/19

## 2021-01-04 ENCOUNTER — OFFICE VISIT (OUTPATIENT)
Dept: SURGERY | Facility: CLINIC | Age: 86
End: 2021-01-04
Payer: MEDICARE

## 2021-01-04 ENCOUNTER — ANESTHESIA EVENT (OUTPATIENT)
Dept: SURGERY | Facility: CLINIC | Age: 86
End: 2021-01-04

## 2021-01-04 ENCOUNTER — PRE VISIT (OUTPATIENT)
Dept: SURGERY | Facility: CLINIC | Age: 86
End: 2021-01-04

## 2021-01-04 VITALS
DIASTOLIC BLOOD PRESSURE: 81 MMHG | OXYGEN SATURATION: 96 % | HEIGHT: 71 IN | SYSTOLIC BLOOD PRESSURE: 145 MMHG | RESPIRATION RATE: 15 BRPM | HEART RATE: 85 BPM | BODY MASS INDEX: 35.56 KG/M2 | WEIGHT: 254 LBS

## 2021-01-04 DIAGNOSIS — Z01.818 PREOP EXAMINATION: ICD-10-CM

## 2021-01-04 DIAGNOSIS — M16.0 PRIMARY OSTEOARTHRITIS OF BOTH HIPS: ICD-10-CM

## 2021-01-04 DIAGNOSIS — Z01.818 PREOP EXAMINATION: Primary | ICD-10-CM

## 2021-01-04 LAB
ANION GAP SERPL CALCULATED.3IONS-SCNC: 5 MMOL/L (ref 3–14)
BUN SERPL-MCNC: 18 MG/DL (ref 7–30)
CALCIUM SERPL-MCNC: 9.3 MG/DL (ref 8.5–10.1)
CHLORIDE SERPL-SCNC: 110 MMOL/L (ref 94–109)
CO2 SERPL-SCNC: 27 MMOL/L (ref 20–32)
CREAT SERPL-MCNC: 1.28 MG/DL (ref 0.66–1.25)
ERYTHROCYTE [DISTWIDTH] IN BLOOD BY AUTOMATED COUNT: 12.8 % (ref 10–15)
GFR SERPL CREATININE-BSD FRML MDRD: 49 ML/MIN/{1.73_M2}
GLUCOSE SERPL-MCNC: 97 MG/DL (ref 70–99)
HCT VFR BLD AUTO: 43.7 % (ref 40–53)
HGB BLD-MCNC: 14.5 G/DL (ref 13.3–17.7)
INR PPP: 1.11 (ref 0.86–1.14)
MCH RBC QN AUTO: 30 PG (ref 26.5–33)
MCHC RBC AUTO-ENTMCNC: 33.2 G/DL (ref 31.5–36.5)
MCV RBC AUTO: 90 FL (ref 78–100)
NT-PROBNP SERPL-MCNC: 250 PG/ML (ref 0–450)
PLATELET # BLD AUTO: 166 10E9/L (ref 150–450)
POTASSIUM SERPL-SCNC: 4.2 MMOL/L (ref 3.4–5.3)
RBC # BLD AUTO: 4.84 10E12/L (ref 4.4–5.9)
SODIUM SERPL-SCNC: 142 MMOL/L (ref 133–144)
WBC # BLD AUTO: 5.7 10E9/L (ref 4–11)

## 2021-01-04 PROCEDURE — 86900 BLOOD TYPING SEROLOGIC ABO: CPT | Performed by: PATHOLOGY

## 2021-01-04 PROCEDURE — 86901 BLOOD TYPING SEROLOGIC RH(D): CPT | Performed by: PATHOLOGY

## 2021-01-04 PROCEDURE — 86850 RBC ANTIBODY SCREEN: CPT | Performed by: PATHOLOGY

## 2021-01-04 PROCEDURE — 36415 COLL VENOUS BLD VENIPUNCTURE: CPT | Performed by: PATHOLOGY

## 2021-01-04 PROCEDURE — 85610 PROTHROMBIN TIME: CPT | Performed by: PATHOLOGY

## 2021-01-04 PROCEDURE — 83880 ASSAY OF NATRIURETIC PEPTIDE: CPT | Mod: GZ | Performed by: PATHOLOGY

## 2021-01-04 PROCEDURE — 80048 BASIC METABOLIC PNL TOTAL CA: CPT | Performed by: PATHOLOGY

## 2021-01-04 PROCEDURE — 99203 OFFICE O/P NEW LOW 30 MIN: CPT | Performed by: PHYSICIAN ASSISTANT

## 2021-01-04 PROCEDURE — 85027 COMPLETE CBC AUTOMATED: CPT | Performed by: PATHOLOGY

## 2021-01-04 RX ORDER — GABAPENTIN 300 MG/1
300 CAPSULE ORAL ONCE
Status: CANCELLED | OUTPATIENT
Start: 2021-01-04 | End: 2021-01-04

## 2021-01-04 RX ORDER — ACETAMINOPHEN 325 MG/1
975 TABLET ORAL ONCE
Status: CANCELLED | OUTPATIENT
Start: 2021-01-04 | End: 2021-01-04

## 2021-01-04 ASSESSMENT — LIFESTYLE VARIABLES: TOBACCO_USE: 1

## 2021-01-04 ASSESSMENT — ENCOUNTER SYMPTOMS: SEIZURES: 0

## 2021-01-04 ASSESSMENT — MIFFLIN-ST. JEOR: SCORE: 1836.09

## 2021-01-04 ASSESSMENT — PAIN SCALES - GENERAL: PAINLEVEL: MODERATE PAIN (4)

## 2021-01-04 NOTE — PATIENT INSTRUCTIONS
Preparing for Your Surgery      Name:  Fish Hong   MRN:  [id number]   :  1931   Today's Date:  2021       Arriving for surgery:  Surgery date:  21  Arrival time:  9:15 am    Restrictions due to COVID 19:  No visitors are allowed at this time.    [employer] parking is available for anyone with mobility limitations or disabilities.  ([place]  24 hours/ 7 days a week; [facility]  7 am- 3:30 pm, Mon- Fri)    Please come to:     [facility] [address]  [address] [place] [address]    -Enter through the [facility] entrance. If you prefer, park your car in the Green Lot.    -Proceed to the 3rd floor, check in at the Adult Surgery Waiting Lounge. [phone]    If an escort is needed stop at the Information Desk in the lobby. Inform the information person that you are here for surgery. An escort to the Adult Surgery Waiting Lounge will be provided.        What can I eat or drink?  -  You may eat and drink normally for up to 8 hours before your surgery. (Until 3:45 am)  -  You may have clear liquids until 2 hours before surgery. (Until 9:15 am)    Examples of clear liquids:  Water  Clear broth  Juices (apple, white grape, white cranberry  and cider) without pulp  Noncarbonated, powder based beverages  (lemonade and Rivas-Aid)  Sodas (Sprite, 7-Up, ginger ale and seltzer)  Coffee or tea (without milk or cream)  Gatorade    -  No Alcohol for at least 24 hours before surgery     Which medicines can I take?    Hold Aspirin for 7 days before surgery.   Hold Multivitamins for 7 days before surgery.  Hold Supplements for 7 days before surgery.  Hold Ibuprofen (Advil, Motrin) for 1 day before surgery--unless otherwise directed by surgeon.  **Hold Naproxen (Aleve) for 4 days before surgery - take your last dose on 21.**    -  PLEASE TAKE these medications the day of surgery:  Amlodipine (Norvasc)  Fluticasone (Flonase)      How do I prepare  myself?  - Please take 2 showers before surgery using Scrubcare or Hibiclens soap.    Use this soap only from the neck to your toes.     Leave the soap on your skin for one minute--then rinse thoroughly.      You may use your own shampoo and conditioner; no other hair products.   - Please remove all jewelry and body piercings.  - No lotions, deodorants or fragrance.  - Bring your ID and insurance card.    - All patients are required to have a Covid-19 test within 4 days of surgery/procedure.      -Patients will be contacted by the [facility] scheduling team within 1 week of surgery to make an appointment.      - Patients may call the Scheduling team at [phone] if they have not been scheduled within 4 days of  surgery.      ALL PATIENTS GOING HOME THE SAME DAY OF SURGERY ARE REQUIRED TO HAVE A RESPONSIBLE ADULT TO DRIVE AND BE IN ATTENDANCE WITH THEM FOR 24 HOURS FOLLOWING SURGERY     Questions or Concerns:    - For any questions regarding the day of surgery or your hospital stay, please contact the Pre Admission Nursing Office at [phone].       - If you have health changes between today and your surgery please call your surgeon.       For questions after surgery please call your surgeons office.           Enhanced Recovery After Surgery     This is a team effort, including you, to get you back on your feet, eating and drinking normally and out of the hospital as quickly as possible.  The goals are:    1) NO INFECTIONS and   2) RETURN TO NORMAL DIET    How can we achieve these goals?  1) STAY ACTIVE: Walk every day before your surgery; try to increase the amount every day.  Walk after surgery as much as you can-the nurses will help you.  Walking speeds healing and gets you home quicker, you heal better at home and have less risk of infection.     2) INCENTIVE SPIROMETER: Practice your incentive spirometer 4 times per day with 5 repetitions each time.  Using the incentive spirometer can strengthen  your muscles between your ribs and help you have a strong cough after surgery.  A more effective cough can help prevent problems with your lungs.    3) STAY HYDRATED: Drink clear liquids up until 2 hours before your surgery. We would like you to purchase a drink such as Gatorade or Ensure Clear (not the milkshake type).  Drink this before bedtime and on the way into the hospital, drink between 8-10 ounces or until you feel hydrated.  Keeping well hydrated leads to your veins being plump, you wake up faster, and you are less likely to be nauseated. Start drinking water as soon as you can after surgery and advance to clear liquids and food as tolerated.  IV fluids contain salt, drinking fluids will minimize the amount of IV fluids you need and decrease the amount of salt you get.    The most common reason for the patient to be readmitted is dehydration. Staying hydrated after you go home from the hospital is very important.  Ensure or Ensure Clear are good options to keep you hydrated.     4) PAIN MANAGEMENT: If we minimize the amount of opioids and narcotics, and use regional blocks (which numb the area where your surgery is) along with oral pain medications; you will have less side effects of nausea and constipation. Narcotics can slow down your bowels and cause you to stay in the hospital longer.     Our goal is to keep you comfortable; eating and drinking normally and back home safely.

## 2021-01-04 NOTE — ANESTHESIA PREPROCEDURE EVALUATION
"Anesthesia Pre-Procedure Evaluation    Patient: Fish Hong   MRN:     [id number] Gender:   male   Age:    89 year old :      1931        Preoperative Diagnosis: * No surgery found *        LABS:  CBC:   Lab Results   Component Value Date    WBC 6.1 2019    WBC 5.6 2019    HGB 13.9 2020    HGB 14.6 2019    HCT 43.9 2019    HCT 42.5 2019     2020     (L) 2019     BMP:   Lab Results   Component Value Date     2019     2019    POTASSIUM 3.9 2020    POTASSIUM 4.0 2019    CHLORIDE 107 2019    CHLORIDE 109 2019    CO2 27 2019    CO2 24 2019    BUN 20 2019    BUN 21 2019    CR 1.23 2020    CR 1.20 2019    GLC 81 2019    GLC 82 2019     COAGS: No results found for: PTT, INR, FIBR  POC:   Lab Results   Component Value Date    BGM 99 10/08/2019     OTHER:   Lab Results   Component Value Date    A1C 5.8 (H) 2019    JEMIMA 8.9 2019    ALBUMIN 3.8 2019    PROTTOTAL 7.2 2019    ALT 18 2019    AST 20 2019    ALKPHOS 75 2019    BILITOTAL 0.5 2019        Preop Vitals    BP Readings from Last 3 Encounters:   21 (!) 145/81   20 113/76   20 124/77    Pulse Readings from Last 3 Encounters:   21 85   20 76   20 82      Resp Readings from Last 3 Encounters:   21 15   20 13   20 18    SpO2 Readings from Last 3 Encounters:   21 96%   20 95%   20 97%      Temp Readings from Last 1 Encounters:   20 98  F (36.7  C)    Ht Readings from Last 1 Encounters:   21 1.798 m (5' 10.8\")      Wt Readings from Last 1 Encounters:   21 115.2 kg (254 lb)    Estimated body mass index is 35.63 kg/m  as calculated from the following:    Height as of this encounter: 1.798 m (5' 10.8\").    Weight as of this encounter: 115.2 kg (254 lb). "     LDA:  Closed/Suction Drain Left Other (Comment) Bulb 10 Hebrew (Active)   Number of days: 174        Past Medical History:   Diagnosis Date     Hematocele      Hypertension      Obesity      Osteoarthritis of both hips     s/p steroid injections     Phimosis      Pulmonary nodules      Rheumatic fever 1948      Past Surgical History:   Procedure Laterality Date     AS TOTAL KNEE ARTHROPLASTY       C TOTAL KNEE ARTHROPLASTY      Bilateral     CIRCUMCISION N/A 7/13/2016    Procedure: CIRCUMCISION;  Surgeon: Elgin Rhodes MD;  Location[facility] OR     circumcision       ENT SURGERY      detached retina--bilateral     EXPLORE SCROTUM Left 7/14/2020    Procedure: EXPLORE LEFT SCROTUM, EVACULATE HEMATOMA, excision of scrotal mass;  Surgeon: Yobani Valenzuela MD;  [facility]     HERNIA REPAIR  11/3/2008    mesh repair umbilical hernia     HERNIORRHAPHY INGUINAL Left 10/8/2019    Procedure: Open Left Inguinal Hernia Repair;  Surgeon: Antonio Landaverde MD;  [facility]     ORCHIECTOMY INGUINAL N/A 10/8/2019    Procedure: Left Orchiectomy;  Surgeon: Antonio Landaverde MD;  [facility] OR      No Known Allergies     Anesthesia Evaluation     . Pt has had prior anesthetic.     No history of anesthetic complications          ROS/MED HX    ENT/Pulmonary:     (+)tobacco use, Past use 11 pk yr hx, quit 1960 packs/day  , . .   (-) asthma and sleep apnea   Neurologic:  - neg neurologic ROS    (-) seizures, CVA and Neuropathy   Cardiovascular: Comment: Hx rheumatic fever    (+) hypertension----. : . . . :. . Previous cardiac testing date:results:date: results:ECG reviewed date:10/8/19 results: date: results:          METS/Exercise Tolerance: Comment: Able to walk one block with walker. Activity significantly limited due to hip & back pain. 3 - Able to walk 1-2 blocks without stopping   Hematologic: Comments: B12 deficiency    (+) Anemia, -     (-) history of blood clots and History of Transfusion    Musculoskeletal: Comment: B/L hip OA, right worse than left    S/P B/L knee arthroplasties (1997, 1998)          GI/Hepatic: Comment: Mild GERD, untreated    Ventral hernia    S/p umbilical hernia repair    (+) GERD      (-) liver disease   Renal/Genitourinary: Comment: Creatinine 1.23, GFR 52 on 7/14/20    S/P circumcision in 2016    S/P left orchiectomy and left inguinal hernia repair on 10/8/2019, complicated by complex left lipomatous scrotal mass (suspect omentum) that was resected on 7/14/20 .      (+) chronic renal disease, type: CRI, Pt does not require dialysis, BPH,       Endo: Comment: Had B/L hip steroid injections    (+) Obesity, .   (-) Type II DM   Psychiatric:  - neg psychiatric ROS       Infectious Disease:  - neg infectious disease ROS       Malignancy:   (+) Malignancy History of Skin  Skin CA Remission status post Surgery, RLE skin cancer        Other:    (+) H/O Chronic Pain,                       PHYSICAL EXAM:   Mental Status/Neuro: A/A/O; Age Appropriate   Airway: Facies: Feasible  Mallampati: III  Mouth/Opening: Full  TM distance: > 6 cm  Neck ROM: Limited   Respiratory: Auscultation: CTAB     Resp. Rate: Normal     Resp. Effort: Normal      CV: Rhythm: Regular  Rate: Age appropriate  Heart: Normal Sounds  Edema: None   Comments:      Dental: Normal Dentition                JZG FV AN PLAN NO PONV RULE       PAC Discussion and Assessment    ASA Classification: 3  Case is suitable for: [facility]  Anesthetic techniques and relevant risks discussed:   Invasive monitoring and risk discussed: No  Types:   Possibility and Risk of blood transfusion discussed: No  NPO instructions given:   Additional anesthetic preparation and risks discussed:   Needs early admission to pre-op area:   Other:     PAC Resident/NP Anesthesia Assessment:  Fish Hong is a 89 year old male scheduled to undergo Right total hip arthroplasty with Eyad Gonzalez MD on 1/26/21 at [facility] for treatment of Degenerative joint disease (DJD) of hip.       Pt has had prior anesthetic.     No history of anesthetic complications    He has the following specific operative considerations:   # BALWINDER 5/8 = high risk  # VTE risk: 0.5%  # Risk of PONV score = 2.  If > 2, anti-emetic intervention recommended.  # Anesthesia considerations:  Refer to PAC assessment in anesthesia records      CARDIAC: METS 3,  Able to walk one block with walker. Activity significantly limited due to hip & back pain.     # RCRI : No serious cardiac risks.  0.4% risk of major adverse cardiac event.     #  HTN, managed with Norvasc & Cardura     #  Hx rheumatic fever, no valvular complications       #  BNP today:  250    PULMONARY:     # Former smoker, 11 pk yr hx, quit 1960    # Pulmonary nodules, under surveillance    # No asthma or inhaler use    GI:     # denies GERD      # Ventral hernia    # s/p umbilical hernia repair    /RENAL:     # Creatinine 1.28, GFR 49 today    # S/P circumcision in 2016    # S/P left orchiectomy and left inguinal hernia repair on 10/8/2019, complicated by complex left lipomatous scrotal mass (suspect omentum) that was resected on 7/14/20.     ENDO: BMI 34    # No DM    HEME:   # Anemia, B12 deficiency. Hgb today 14.5    ORTHO:     # Limited extension ROM of neck    # denies TMJ    # s/p B/L knee arthroplasties (1997, 1998)    Enhanced recovery pathway initiated.    Patient is optimized and is acceptable candidate for the proposed procedure. No further diagnostic evaluation is needed.          Reviewed and Signed by PAC Mid-Level Provider/Resident  Mid-Level Provider/Resident: Sonja King PA-C  Date: 1/4/21  Time: 1156    Attending Anesthesiologist Anesthesia Assessment:        Anesthesiologist:   Date:   Time:   Pass/Fail:   Disposition:     PAC Pharmacist Assessment:        Pharmacist:   Date:   Time:    Sonja King PA-C

## 2021-01-04 NOTE — H&P
Pre-Operative H & P     CC:  Preoperative exam to assess for increased cardiopulmonary risk while undergoing surgery and anesthesia.    Date of Encounter: 1/4/2021  Primary Care Physician:  Herman Jarrett  Reason for Visit: Degenerative joint disease (DJD) of hip       HPI    Fish Hong is a 88 y/o male who presents for pre-operative H&P in preparation for Right total hip arthroplasty with Eyad Gonzalez MD on 1/26/21 at [facility] for treatment of Degenerative joint disease (DJD) of hip.       Mr. Hong has had progressive discomfort of the right hip joint. He has undergone sterile steroid injections by Dr. Sullivan which have at times been of benefit and other times have not helped him.  He currently is using a walker.  In the morning his difficulty standing and erect posture.  He also has back pain.  He describes the pain is mostly on the lateral and posterior lateral aspect of the buttock and hip joint.  No prior surgeries on his right hip. Significant comorbidities include HTN, history of rheumatic fever, pulmonary nodule being followed, Vit B12 deficiency, CRI, OA, s/p B/L knee arthroplasties, s/p left orchiectomy and left inguinal hernia repair in 2019, BPH. He has significant hearing loss. He now presents for the above procedure.      History was obtained from patient & chart review.    Past Medical History  Past Medical History:   Diagnosis Date     Hematocele      Hypertension      Obesity      Osteoarthritis of both hips     s/p steroid injections     Phimosis      Pulmonary nodules      Rheumatic fever 1948       Past Surgical History  Past Surgical History:   Procedure Laterality Date     AS TOTAL KNEE ARTHROPLASTY       C TOTAL KNEE ARTHROPLASTY      Bilateral     CIRCUMCISION N/A 7/13/2016    Procedure: CIRCUMCISION;  Surgeon: Elgin Rhodes MD;  Location: [facility] OR     circumcision       ENT SURGERY      detached retina--bilateral     EXPLORE  SCROTUM Left 7/14/2020    Procedure: EXPLORE LEFT SCROTUM, EVACULATE HEMATOMA, excision of scrotal mass;  Surgeon: Yobani Valenzuela MD;  Location: [facility] OR     HERNIA REPAIR  11/3/2008    mesh repair umbilical hernia     HERNIORRHAPHY INGUINAL Left 10/8/2019    Procedure: Open Left Inguinal Hernia Repair;  Surgeon: Antonio Landaverde MD;  Location: [facility] OR     ORCHIECTOMY INGUINAL N/A 10/8/2019    Procedure: Left Orchiectomy;  Surgeon: Antonio Landaverde MD;  Location: [facility] OR       Hx of Blood transfusions/reactions: denies     Hx of abnormal bleeding or anti-platelet use: denies    Menstrual history: No LMP for male patient.:      Steroid use in the last year: denies    Personal or FH with difficulty with Anesthesia:  denies    Prior to Admission Medications  Current Outpatient Medications   Medication Sig Dispense Refill     amLODIPine (NORVASC) 10 MG tablet Take 1 tablet (10 mg) by mouth daily (Patient taking differently: Take 10 mg by mouth every morning ) 90 tablet 4     cyanocobalamin (VITAMIN B-12) 500 MCG tablet Take 1 tablet (500 mcg) by mouth daily (Patient taking differently: Take 500 mcg by mouth every morning ) 100 tablet 11     doxazosin (CARDURA) 4 MG tablet Take 1 tablet (4 mg) by mouth At Bedtime 90 tablet 4     fluticasone (FLONASE) 50 MCG/ACT nasal spray Spray 2 sprays into both nostrils every morning 18.2 mL 11     Naproxen Sodium (ALEVE PO)        triamcinolone (KENALOG) 0.1 % external cream Apply sparingly to affected area two times daily as needed (left ankle.lower leg) (Patient taking differently: Apply topically as needed (Pt. has not used in past month 09/23/2019) Apply sparingly to affected area two times daily as needed (left ankle.lower leg)) 80 g 3       Allergies  No Known Allergies    Social History  Social History     Socioeconomic History     Marital status:      Spouse name:      Number of children: Not on file     Years of education: Not on file     Highest  education level: Not on file   Occupational History     Not on file   Social Needs     Financial resource strain: Not on file     Food insecurity     Worry: Not on file     Inability: Not on file     Transportation needs     Medical: Not on file     Non-medical: Not on file   Tobacco Use     Smoking status: Former Smoker     Packs/day: 1.00     Years: 11.00     Pack years: 11.00     Start date: 10/15/1948     Quit date: 1960     Years since quittin.0     Smokeless tobacco: Never Used   Substance and Sexual Activity     Alcohol use: Yes     Comment: very little     Drug use: No     Sexual activity: Not Currently   Lifestyle     Physical activity     Days per week: Not on file     Minutes per session: Not on file     Stress: Not on file   Relationships     Social connections     Talks on phone: Not on file     Gets together: Not on file     Attends Bahai service: Not on file     Active member of club or organization: Not on file     Attends meetings of clubs or organizations: Not on file     Relationship status: Not on file     Intimate partner violence     Fear of current or ex partner: Not on file     Emotionally abused: Not on file     Physically abused: Not on file     Forced sexual activity: Not on file   Other Topics Concern     Parent/sibling w/ CABG, MI or angioplasty before 65F 55M? Not Asked   Social History Narrative     since                      for 32 years                      Family History  Family History   Problem Relation Age of Onset     No Known Problems Mother      No Known Problems Father      Melanoma No family hx of      Skin Cancer No family hx of      Anesthesia Reaction No family hx of      Cardiovascular No family hx of      Deep Vein Thrombosis (DVT) No family hx of            Preop Vitals    BP Readings from Last 3 Encounters:   21 (!) 145/81   20 113/76   20 124/77    Pulse Readings from Last 3 Encounters:   21 85   20 76  "  07/09/20 82      Resp Readings from Last 3 Encounters:   01/04/21 15   07/14/20 13   07/09/20 18    SpO2 Readings from Last 3 Encounters:   01/04/21 96%   07/14/20 95%   07/09/20 97%      Temp Readings from Last 1 Encounters:   07/14/20 98  F (36.7  C)    Ht Readings from Last 1 Encounters:   01/04/21 1.798 m (5' 10.8\")      Wt Readings from Last 1 Encounters:   01/04/21 115.2 kg (254 lb)    Estimated body mass index is 35.63 kg/m  as calculated from the following:    Height as of this encounter: 1.798 m (5' 10.8\").    Weight as of this encounter: 115.2 kg (254 lb).       ROS/MED HX    ENT/Pulmonary:     (+)tobacco use, Past use 11 pk yr hx, quit 1960 packs/day  , . .   (-) asthma and sleep apnea   Neurologic:  - neg neurologic ROS    (-) seizures, CVA and Neuropathy   Cardiovascular: Comment: Hx rheumatic fever    (+) hypertension----. : . . . :. . Previous cardiac testing date:results:date: results:ECG reviewed date:10/8/19 results: date: results:          METS/Exercise Tolerance: Comment: Able to walk one block with walker. Activity significantly limited due to hip & back pain. 3 - Able to walk 1-2 blocks without stopping   Hematologic: Comments: B12 deficiency    (+) Anemia, -     (-) history of blood clots and History of Transfusion   Musculoskeletal: Comment: B/L hip OA, right worse than left    S/P B/L knee arthroplasties (1997, 1998)          GI/Hepatic: Comment: Mild GERD, untreated    Ventral hernia    S/p umbilical hernia repair    (+) GERD      (-) liver disease   Renal/Genitourinary: Comment: Creatinine 1.23, GFR 52 on 7/14/20    S/P circumcision in 2016    S/P left orchiectomy and left inguinal hernia repair on 10/8/2019, complicated by complex left lipomatous scrotal mass (suspect omentum) that was resected on 7/14/20 .      (+) chronic renal disease, type: CRI, Pt does not require dialysis, BPH,       Endo: Comment: Had B/L hip steroid injections    (+) Obesity, .   (-) Type II DM   Psychiatric:  - " "neg psychiatric ROS       Infectious Disease:  - neg infectious disease ROS       Malignancy:   (+) Malignancy History of Skin  Skin CA Remission status post Surgery, RLE skin cancer        Other:    (+) H/O Chronic Pain,                       PHYSICAL EXAM:   Mental Status/Neuro: A/A/O; Age Appropriate   Airway: Facies: Feasible  Mallampati: III  Mouth/Opening: Full  TM distance: > 6 cm  Neck ROM: Limited   Respiratory: Auscultation: CTAB     Resp. Rate: Normal     Resp. Effort: Normal      CV: Rhythm: Regular  Rate: Age appropriate  Heart: Normal Sounds  Edema: None   Comments:      Dental: Normal Dentition                  BP: (!) 145/81 Pulse: 85   Resp: 15 SpO2: 96 %         254 lbs 0 oz  5' 10.8\"   Body mass index is 35.63 kg/m .       Physical Exam  Constitutional: Awake, alert, cooperative, no apparent distress, and appears stated age.  Eyes: Pupils equal, round and reactive to light, extra ocular muscles intact, sclera clear, conjunctiva normal.  HENT: Normocephalic, oral pharynx with moist mucus membranes, fair dentition. No goiter appreciated. No removable dental hardware.  Respiratory: Clear to auscultation bilaterally, no crackles or wheezing. No SOB when supine.  Cardiovascular: Regular rate and rhythm, normal S1 and S2, and no murmur noted.  Carotids +2, no bruits. No edema. Palpable pulses to radial, DP and PT arteries.   GI: Normal bowel sounds, soft, obese, non-tender, no masses palpated.  Ventral hernia noted.  Lymph/Hematologic: No cervical lymphadenopathy and no supraclavicular lymphadenopathy.  Genitourinary:  deferred  Skin: Warm and dry.  No rashes.   Musculoskeletal: limited extension ROM of neck. There is no redness, warmth, or swelling of the joints. Gross motor strength is normal.    Neurologic: Awake, alert, oriented to name, place and time. Cranial nerves II-XII are grossly intact. Gait is severely antalgic. Ambulates from chair to exam table, seats self, lies supine and sits back up " w/o assistance.  Neuropsychiatric: Calm, cooperative. Normal affect. Pleasant. Answers questions appropriately, follows commands w/o difficulty.        PRIOR LABS/DIAGNOSTIC STUDIES:    All labs and imaging personally reviewed      XR PELVIS & HIP 9/14/20   FINDINGS: Single frontal view of both hips and frog leg lateral view   of the right hip was obtained. Joint space narrowing in both hips,   much greater on the right. Spurring at the femoral head and neck   junction of both hips. Mild spurring at the pubic symphysis.   Degenerative disc disease in the visualized lower lumbar spine.                                                                         IMPRESSION: Osteoarthrosis in both hips, much greater on the right.      CT CHEST W/O CONTRAST, 4/29/2019   IMPRESSION:    1. The previously described right lower lobe pulmonary nodule is now   densely calcified and as such represents a benign calcified granuloma.   Multiple additional calcified pulmonary granulomas. No suspicious   nodules or masses.   2. Heterogeneity in the T4-7 vertebral bodies, including prominent   lucency in the T6 vertebral body, possibly vertebral body hemangiomas.   If clinically indicated, thoracic MRI with contrast could be performed   for further evaluation.      EKG 10/8/19   Sinus rhythm with Premature supraventricular complexes   Incomplete right bundle branch block   Left anterior fascicular block   Left ventricular hypertrophy with repolarization abnormality   Abnormal ECG   When compared with ECG of 26-JUN-1998 08:42,   Premature supraventricular complexes are now Present   Left anterior fascicular block is now Present   T wave amplitude has increased in Inferior leads   25mm/s 10mm/mV 150Hz 9.0.7 12SL 241 PAULETTE: 1   Confirmed   Ventricular rate 91 bpm       LABS:  CBC:   Lab Results   Component Value Date    WBC 6.1 09/23/2019    WBC 5.6 04/22/2019    HGB 13.9 07/14/2020    HGB 14.6 09/23/2019    HCT 43.9 09/23/2019    HCT 42.5  04/22/2019     07/14/2020     (L) 09/23/2019     BMP:   Lab Results   Component Value Date     09/23/2019     04/22/2019    POTASSIUM 3.9 07/14/2020    POTASSIUM 4.0 09/23/2019    CHLORIDE 107 09/23/2019    CHLORIDE 109 04/22/2019    CO2 27 09/23/2019    CO2 24 04/22/2019    BUN 20 09/23/2019    BUN 21 04/22/2019    CR 1.23 07/14/2020    CR 1.20 09/23/2019    GLC 81 09/23/2019    GLC 82 04/22/2019     COAGS: No results found for: PTT, INR, FIBR  POC:   Lab Results   Component Value Date    BGM 99 10/08/2019     OTHER:   Lab Results   Component Value Date    A1C 5.8 (H) 09/23/2019    JEMIMA 8.9 09/23/2019    ALBUMIN 3.8 04/22/2019    PROTTOTAL 7.2 04/22/2019    ALT 18 04/22/2019    AST 20 04/22/2019    ALKPHOS 75 04/22/2019    BILITOTAL 0.5 04/22/2019          Labs today: (personally reviewed)  BMP, CBC, BNP, INR, T&S    COVID19 testing scheduled on 1/23/21    N-Terminal Pro Bnp 0 - 450 pg/mL 250      Sodium 133 - 144 mmol/L 142      Potassium 3.4 - 5.3 mmol/L 4.2     Chloride 94 - 109 mmol/L 110High      Carbon Dioxide 20 - 32 mmol/L 27     Anion Gap 3 - 14 mmol/L 5     Glucose 70 - 99 mg/dL 97     Urea Nitrogen 7 - 30 mg/dL 18     Creatinine 0.66 - 1.25 mg/dL 1.28High      GFR Estimate >60 mL/min/ 49Low     Comment: Non  GFR Calc   Starting 12/18/2018, serum creatinine based estimated GFR (eGFR) will be   calculated using the Chronic Kidney Disease Epidemiology Collaboration   (CKD-EPI) equation.     GFR Estimate If Black >60 mL/min/ 57Low     Comment:  GFR Calc   Starting 12/18/2018, serum creatinine based estimated GFR (eGFR) will be   calculated using the Chronic Kidney Disease Epidemiology Collaboration   (CKD-EPI) equation.     Calcium 8.5 - 10.1 mg/dL 9.3        WBC 4.0 - 11.0 10e9/L 5.7      RBC Count 4.4 - 5.9 10e12/L 4.84     Hemoglobin 13.3 - 17.7 g/dL 14.5     Hematocrit 40.0 - 53.0 % 43.7     MCV 78 - 100 fl 90     MCH 26.5 - 33.0 pg 30.0     MCHC  31.5 - 36.5 g/dL 33.2     RDW 10.0 - 15.0 % 12.8     Platelet Count 150 - 450 10e9/L 166        INR 0.86 - 1.14 1.11        ASSESSMENT and PLAN  Fish Hong is a 89 year old male scheduled to undergo Right total hip arthroplasty with Eyad Gonzalez MD on 1/26/21 at [facility] for treatment of Degenerative joint disease (DJD) of hip.       Pt has had prior anesthetic.     No history of anesthetic complications    He has the following specific operative considerations:   # BALWINDER 5/8 = high risk  # VTE risk: 0.5%  # Risk of PONV score = 2.  If > 2, anti-emetic intervention recommended.  # Anesthesia considerations:  Refer to PAC assessment in anesthesia records      CARDIAC: METS 3,  Able to walk one block with walker. Activity significantly limited due to hip & back pain.     # RCRI : No serious cardiac risks.  0.4% risk of major adverse cardiac event.     #  HTN, managed with Norvasc & Cardura     #  Hx rheumatic fever, no valvular complications       #  BNP today:  250    PULMONARY:     # Former smoker, 11 pk yr hx, quit 1960    # Pulmonary nodules, under surveillance    # No asthma or inhaler use    GI:     # denies GERD      # Ventral hernia    # s/p umbilical hernia repair    /RENAL:     # Creatinine 1.28, GFR 49 today    # S/P circumcision in 2016    # S/P left orchiectomy and left inguinal hernia repair on 10/8/2019, complicated by complex left lipomatous scrotal mass (suspect omentum) that was resected on 7/14/20.     ENDO: BMI 34    # No DM    HEME:   # Anemia, B12 deficiency. Hgb today 14.5    ORTHO:     # Limited extension ROM of neck    # denies TMJ    # s/p B/L knee arthroplasties (1997, 1998)    Enhanced recovery pathway initiated.    Patient is optimized and is acceptable candidate for the proposed procedure. No further diagnostic evaluation is needed.      Arrival time, NPO, shower and medication instructions provided by nursing staff today.  Preparing  For Your Surgery handout given.      Sonja King PA-C  Preoperative Assessment Center  [facility]  Cl[facility] and [facility] Center  Phone: [phone]  Fax: [phone]

## 2021-01-20 NOTE — PROGRESS NOTES
SURGERY PLAN (PRE-OP PLAN)    Brief: Lateral Position // Ancef // TXA // Cocktail // Posterior Approach    Plan: Posterior Right ISAI (Abdominal hernia anteriorly)    Background:      Patient Position (indicated by x):  X  Lateral decubitus, Wixson hip positioner   x  Regular OR table   x  Aguilar catheter   X  Revision ISAI drape with plastic side bags for leg   X  Blue U drape x2   Blue and white stockinet   Coban   Ioban      ISAI Requests (indicated by x):    Echo BiMetric stems   X  Biomet TAPERLOC ingrowth stem      Biomet Integral cemented stem   X  Biomet RB cup, 32 heads     G7 cup      Biomet Bipolar cup      Specimens and cultures (indicated by x):      Tissue cultures, aerobic and anaerobic without gram stain      Frozen section      pathology specimens - fresh      pathology specimens - formalin      Augustine Humphrey DO  Adult Joint Reconstruction Fellow  Dept Orthopaedic Surgery, [facility]  [phone] Pager [phone] Office

## 2021-01-22 DIAGNOSIS — L20.9 ATOPIC DERMATITIS, UNSPECIFIED TYPE: ICD-10-CM

## 2021-01-22 RX ORDER — TRIAMCINOLONE ACETONIDE 1 MG/G
CREAM TOPICAL
Qty: 80 G | Refills: 0 | Status: SHIPPED | OUTPATIENT
Start: 2021-01-22 | End: 2021-03-10

## 2021-01-23 ENCOUNTER — OFFICE VISIT (OUTPATIENT)
Dept: LAB | Facility: CLINIC | Age: 86
End: 2021-01-23
Payer: MEDICARE

## 2021-01-23 DIAGNOSIS — Z11.59 ENCOUNTER FOR SCREENING FOR OTHER VIRAL DISEASES: ICD-10-CM

## 2021-01-23 LAB
LABORATORY COMMENT REPORT: NORMAL
SARS-COV-2 RNA RESP QL NAA+PROBE: NEGATIVE
SARS-COV-2 RNA RESP QL NAA+PROBE: NORMAL
SPECIMEN SOURCE: NORMAL
SPECIMEN SOURCE: NORMAL

## 2021-01-23 PROCEDURE — U0005 INFEC AGEN DETEC AMPLI PROBE: HCPCS | Performed by: PATHOLOGY

## 2021-01-23 PROCEDURE — U0003 INFECTIOUS AGENT DETECTION BY NUCLEIC ACID (DNA OR RNA); SEVERE ACUTE RESPIRATORY SYNDROME CORONAVIRUS 2 (SARS-COV-2) (CORONAVIRUS DISEASE [COVID-19]), AMPLIFIED PROBE TECHNIQUE, MAKING USE OF HIGH THROUGHPUT TECHNOLOGIES AS DESCRIBED BY CMS-2020-01-R: HCPCS | Performed by: PATHOLOGY

## 2021-01-25 ENCOUNTER — ANESTHESIA EVENT (OUTPATIENT)
Dept: SURGERY | Facility: CLINIC | Age: 86
End: 2021-01-25
Payer: MEDICARE

## 2021-01-26 ENCOUNTER — APPOINTMENT (OUTPATIENT)
Dept: GENERAL RADIOLOGY | Facility: CLINIC | Age: 86
End: 2021-01-26
Attending: STUDENT IN AN ORGANIZED HEALTH CARE EDUCATION/TRAINING PROGRAM
Payer: MEDICARE

## 2021-01-26 ENCOUNTER — HOSPITAL ENCOUNTER (OUTPATIENT)
Facility: CLINIC | Age: 86
Discharge: HOME | End: 2021-01-27
Attending: ORTHOPAEDIC SURGERY | Admitting: ORTHOPAEDIC SURGERY
Payer: MEDICARE

## 2021-01-26 ENCOUNTER — RESULTS ONLY (OUTPATIENT)
Dept: ADMINISTRATIVE | Facility: CLINIC | Age: 86
End: 2021-01-26

## 2021-01-26 ENCOUNTER — ANESTHESIA (OUTPATIENT)
Dept: SURGERY | Facility: CLINIC | Age: 86
End: 2021-01-26
Payer: MEDICARE

## 2021-01-26 ENCOUNTER — APPOINTMENT (OUTPATIENT)
Dept: GENERAL RADIOLOGY | Facility: CLINIC | Age: 86
End: 2021-01-26
Attending: ORTHOPAEDIC SURGERY
Payer: MEDICARE

## 2021-01-26 DIAGNOSIS — M16.9 DEGENERATIVE JOINT DISEASE (DJD) OF HIP: ICD-10-CM

## 2021-01-26 DIAGNOSIS — Z96.641 HISTORY OF TOTAL RIGHT HIP ARTHROPLASTY: Primary | ICD-10-CM

## 2021-01-26 DIAGNOSIS — I10 ESSENTIAL HYPERTENSION: ICD-10-CM

## 2021-01-26 DIAGNOSIS — M16.11 PRIMARY OSTEOARTHRITIS OF RIGHT HIP: ICD-10-CM

## 2021-01-26 LAB
ABO + RH BLD: NORMAL
ABO + RH BLD: NORMAL
BLD GP AB SCN SERPL QL: NORMAL
BLOOD BANK CMNT PATIENT-IMP: NORMAL
BLOOD BANK CMNT PATIENT-IMP: NORMAL
GLUCOSE BLDC GLUCOMTR-MCNC: 101 MG/DL (ref 70–99)
INTERPRETATION ECG - MUSE: NORMAL
SPECIMEN EXP DATE BLD: NORMAL

## 2021-01-26 PROCEDURE — 999N000063 XR PELVIS PORT 1/2 VW

## 2021-01-26 PROCEDURE — 250N000009 HC RX 250: Performed by: NURSE ANESTHETIST, CERTIFIED REGISTERED

## 2021-01-26 PROCEDURE — 250N000011 HC RX IP 250 OP 636: Performed by: NURSE ANESTHETIST, CERTIFIED REGISTERED

## 2021-01-26 PROCEDURE — C1713 ANCHOR/SCREW BN/BN,TIS/BN: HCPCS | Performed by: ORTHOPAEDIC SURGERY

## 2021-01-26 PROCEDURE — 272N000001 HC OR GENERAL SUPPLY STERILE: Performed by: ORTHOPAEDIC SURGERY

## 2021-01-26 PROCEDURE — 360N000077 HC SURGERY LEVEL 4, PER MIN: Performed by: ORTHOPAEDIC SURGERY

## 2021-01-26 PROCEDURE — 72170 X-RAY EXAM OF PELVIS: CPT | Mod: 26 | Performed by: RADIOLOGY

## 2021-01-26 PROCEDURE — 258N000003 HC RX IP 258 OP 636: Performed by: NURSE ANESTHETIST, CERTIFIED REGISTERED

## 2021-01-26 PROCEDURE — 258N000003 HC RX IP 258 OP 636: Performed by: ORTHOPAEDIC SURGERY

## 2021-01-26 PROCEDURE — 999N001017 HC STATISTIC GLUCOSE BY METER IP

## 2021-01-26 PROCEDURE — 93010 ELECTROCARDIOGRAM REPORT: CPT | Mod: 59 | Performed by: INTERNAL MEDICINE

## 2021-01-26 PROCEDURE — 999N000054 HC STATISTIC EKG NON-CHARGEABLE

## 2021-01-26 PROCEDURE — C1776 JOINT DEVICE (IMPLANTABLE): HCPCS | Performed by: ORTHOPAEDIC SURGERY

## 2021-01-26 PROCEDURE — 258N000003 HC RX IP 258 OP 636: Performed by: STUDENT IN AN ORGANIZED HEALTH CARE EDUCATION/TRAINING PROGRAM

## 2021-01-26 PROCEDURE — 250N000013 HC RX MED GY IP 250 OP 250 PS 637: Mod: GY | Performed by: ORTHOPAEDIC SURGERY

## 2021-01-26 PROCEDURE — 99214 OFFICE O/P EST MOD 30 MIN: CPT | Performed by: INTERNAL MEDICINE

## 2021-01-26 PROCEDURE — 250N000009 HC RX 250: Performed by: ORTHOPAEDIC SURGERY

## 2021-01-26 PROCEDURE — 250N000013 HC RX MED GY IP 250 OP 250 PS 637: Mod: GY | Performed by: STUDENT IN AN ORGANIZED HEALTH CARE EDUCATION/TRAINING PROGRAM

## 2021-01-26 PROCEDURE — 250N000011 HC RX IP 250 OP 636: Performed by: ORTHOPAEDIC SURGERY

## 2021-01-26 PROCEDURE — 710N000010 HC RECOVERY PHASE 1, LEVEL 2, PER MIN: Performed by: ORTHOPAEDIC SURGERY

## 2021-01-26 PROCEDURE — 999N000065 XR PELVIS AD HIP PORTABLE RIGHT 1 VIEW

## 2021-01-26 PROCEDURE — 999N000141 HC STATISTIC PRE-PROCEDURE NURSING ASSESSMENT: Performed by: ORTHOPAEDIC SURGERY

## 2021-01-26 PROCEDURE — 250N000025 HC SEVOFLURANE, PER MIN: Performed by: ORTHOPAEDIC SURGERY

## 2021-01-26 PROCEDURE — 99207 PR NO CHARGE LOS: CPT | Performed by: STUDENT IN AN ORGANIZED HEALTH CARE EDUCATION/TRAINING PROGRAM

## 2021-01-26 PROCEDURE — 370N000017 HC ANESTHESIA TECHNICAL FEE, PER MIN: Performed by: ORTHOPAEDIC SURGERY

## 2021-01-26 PROCEDURE — 99207 PR CDG-CODE CATEGORY CHANGED: CPT | Performed by: INTERNAL MEDICINE

## 2021-01-26 PROCEDURE — 27130 TOTAL HIP ARTHROPLASTY: CPT | Mod: RT | Performed by: ORTHOPAEDIC SURGERY

## 2021-01-26 PROCEDURE — 250N000013 HC RX MED GY IP 250 OP 250 PS 637: Mod: GY | Performed by: PHYSICIAN ASSISTANT

## 2021-01-26 DEVICE — IMPLANTABLE DEVICE: Type: IMPLANTABLE DEVICE | Site: HIP | Status: FUNCTIONAL

## 2021-01-26 DEVICE — IMP HEAD FEMORAL BIOM MOD 36MM -3  11-363661: Type: IMPLANTABLE DEVICE | Site: HIP | Status: FUNCTIONAL

## 2021-01-26 DEVICE — IMPLANTABLE DEVICE
Type: IMPLANTABLE DEVICE | Site: HIP | Status: FUNCTIONAL
Brand: TAPERLOC COMPLETE PRIMARY FEMORAL

## 2021-01-26 RX ORDER — FAMOTIDINE 20 MG/1
20 TABLET, FILM COATED ORAL EVERY 24 HOURS
Status: DISCONTINUED | OUTPATIENT
Start: 2021-01-26 | End: 2021-01-27 | Stop reason: HOSPADM

## 2021-01-26 RX ORDER — NALOXONE HYDROCHLORIDE 0.4 MG/ML
0.2 INJECTION, SOLUTION INTRAMUSCULAR; INTRAVENOUS; SUBCUTANEOUS
Status: DISCONTINUED | OUTPATIENT
Start: 2021-01-26 | End: 2021-01-27 | Stop reason: HOSPADM

## 2021-01-26 RX ORDER — CEFAZOLIN SODIUM 2 G/100ML
2 INJECTION, SOLUTION INTRAVENOUS
Status: COMPLETED | OUTPATIENT
Start: 2021-01-26 | End: 2021-01-26

## 2021-01-26 RX ORDER — ONDANSETRON 2 MG/ML
4 INJECTION INTRAMUSCULAR; INTRAVENOUS EVERY 30 MIN PRN
Status: DISCONTINUED | OUTPATIENT
Start: 2021-01-26 | End: 2021-01-26 | Stop reason: HOSPADM

## 2021-01-26 RX ORDER — ASPIRIN 81 MG/1
162 TABLET ORAL 2 TIMES DAILY
Status: DISCONTINUED | OUTPATIENT
Start: 2021-01-26 | End: 2021-01-27

## 2021-01-26 RX ORDER — ACETAMINOPHEN 325 MG/1
975 TABLET ORAL EVERY 8 HOURS
Status: DISCONTINUED | OUTPATIENT
Start: 2021-01-26 | End: 2021-01-27 | Stop reason: HOSPADM

## 2021-01-26 RX ORDER — HYDROMORPHONE HYDROCHLORIDE 1 MG/ML
.3-.5 INJECTION, SOLUTION INTRAMUSCULAR; INTRAVENOUS; SUBCUTANEOUS EVERY 5 MIN PRN
Status: DISCONTINUED | OUTPATIENT
Start: 2021-01-26 | End: 2021-01-26 | Stop reason: HOSPADM

## 2021-01-26 RX ORDER — LIDOCAINE HYDROCHLORIDE 20 MG/ML
INJECTION, SOLUTION INFILTRATION; PERINEURAL PRN
Status: DISCONTINUED | OUTPATIENT
Start: 2021-01-26 | End: 2021-01-26

## 2021-01-26 RX ORDER — ACETAMINOPHEN 325 MG/1
975 TABLET ORAL ONCE
Status: COMPLETED | OUTPATIENT
Start: 2021-01-26 | End: 2021-01-26

## 2021-01-26 RX ORDER — FENTANYL CITRATE-0.9 % NACL/PF 10 MCG/ML
PLASTIC BAG, INJECTION (ML) INTRAVENOUS PRN
Status: DISCONTINUED | OUTPATIENT
Start: 2021-01-26 | End: 2021-01-26

## 2021-01-26 RX ORDER — AMOXICILLIN 250 MG
1 CAPSULE ORAL 2 TIMES DAILY
Status: DISCONTINUED | OUTPATIENT
Start: 2021-01-26 | End: 2021-01-27 | Stop reason: HOSPADM

## 2021-01-26 RX ORDER — HYDROMORPHONE HCL IN WATER/PF 6 MG/30 ML
0.4 PATIENT CONTROLLED ANALGESIA SYRINGE INTRAVENOUS
Status: DISCONTINUED | OUTPATIENT
Start: 2021-01-26 | End: 2021-01-27 | Stop reason: HOSPADM

## 2021-01-26 RX ORDER — EPHEDRINE SULFATE 50 MG/ML
INJECTION, SOLUTION INTRAMUSCULAR; INTRAVENOUS; SUBCUTANEOUS PRN
Status: DISCONTINUED | OUTPATIENT
Start: 2021-01-26 | End: 2021-01-26

## 2021-01-26 RX ORDER — CELECOXIB 100 MG/1
100 CAPSULE ORAL 2 TIMES DAILY
Status: DISCONTINUED | OUTPATIENT
Start: 2021-01-26 | End: 2021-01-27 | Stop reason: HOSPADM

## 2021-01-26 RX ORDER — DEXAMETHASONE SODIUM PHOSPHATE 4 MG/ML
INJECTION, SOLUTION INTRA-ARTICULAR; INTRALESIONAL; INTRAMUSCULAR; INTRAVENOUS; SOFT TISSUE PRN
Status: DISCONTINUED | OUTPATIENT
Start: 2021-01-26 | End: 2021-01-26

## 2021-01-26 RX ORDER — ACETAMINOPHEN 325 MG/1
975 TABLET ORAL ONCE
Status: DISCONTINUED | OUTPATIENT
Start: 2021-01-26 | End: 2021-01-26

## 2021-01-26 RX ORDER — ACETAMINOPHEN 325 MG/1
650 TABLET ORAL EVERY 4 HOURS PRN
Qty: 100 TABLET | Refills: 0 | Status: SHIPPED | OUTPATIENT
Start: 2021-01-26 | End: 2021-01-27

## 2021-01-26 RX ORDER — CELECOXIB 200 MG/1
400 CAPSULE ORAL ONCE
Status: COMPLETED | OUTPATIENT
Start: 2021-01-26 | End: 2021-01-26

## 2021-01-26 RX ORDER — OXYCODONE HYDROCHLORIDE 10 MG/1
10 TABLET ORAL EVERY 4 HOURS PRN
Status: DISCONTINUED | OUTPATIENT
Start: 2021-01-26 | End: 2021-01-27 | Stop reason: HOSPADM

## 2021-01-26 RX ORDER — OXYCODONE HYDROCHLORIDE 5 MG/1
5-10 TABLET ORAL
Qty: 30 TABLET | Refills: 0 | Status: SHIPPED | OUTPATIENT
Start: 2021-01-26 | End: 2021-01-27

## 2021-01-26 RX ORDER — BISACODYL 10 MG
10 SUPPOSITORY, RECTAL RECTAL DAILY PRN
Status: DISCONTINUED | OUTPATIENT
Start: 2021-01-26 | End: 2021-01-27 | Stop reason: HOSPADM

## 2021-01-26 RX ORDER — HYDROXYZINE HYDROCHLORIDE 10 MG/1
10 TABLET, FILM COATED ORAL EVERY 6 HOURS PRN
Qty: 30 TABLET | Refills: 0 | Status: SHIPPED | OUTPATIENT
Start: 2021-01-26 | End: 2021-03-10

## 2021-01-26 RX ORDER — HYDROMORPHONE HCL IN WATER/PF 6 MG/30 ML
0.2 PATIENT CONTROLLED ANALGESIA SYRINGE INTRAVENOUS
Status: DISCONTINUED | OUTPATIENT
Start: 2021-01-26 | End: 2021-01-27 | Stop reason: HOSPADM

## 2021-01-26 RX ORDER — ASPIRIN 81 MG/1
162 TABLET ORAL 2 TIMES DAILY
Qty: 60 TABLET | Refills: 0 | Status: SHIPPED | OUTPATIENT
Start: 2021-01-26 | End: 2021-01-27

## 2021-01-26 RX ORDER — LABETALOL HYDROCHLORIDE 5 MG/ML
10 INJECTION, SOLUTION INTRAVENOUS
Status: DISCONTINUED | OUTPATIENT
Start: 2021-01-26 | End: 2021-01-26 | Stop reason: HOSPADM

## 2021-01-26 RX ORDER — DOCUSATE SODIUM 100 MG/1
100 CAPSULE, LIQUID FILLED ORAL 2 TIMES DAILY
Status: DISCONTINUED | OUTPATIENT
Start: 2021-01-26 | End: 2021-01-27 | Stop reason: HOSPADM

## 2021-01-26 RX ORDER — PROCHLORPERAZINE MALEATE 5 MG
5 TABLET ORAL EVERY 6 HOURS PRN
Status: DISCONTINUED | OUTPATIENT
Start: 2021-01-26 | End: 2021-01-27 | Stop reason: HOSPADM

## 2021-01-26 RX ORDER — OXYCODONE HYDROCHLORIDE 5 MG/1
5 TABLET ORAL
Status: DISCONTINUED | OUTPATIENT
Start: 2021-01-26 | End: 2021-01-27 | Stop reason: HOSPADM

## 2021-01-26 RX ORDER — LIDOCAINE 40 MG/G
CREAM TOPICAL
Status: DISCONTINUED | OUTPATIENT
Start: 2021-01-26 | End: 2021-01-27 | Stop reason: HOSPADM

## 2021-01-26 RX ORDER — SODIUM CHLORIDE, SODIUM LACTATE, POTASSIUM CHLORIDE, CALCIUM CHLORIDE 600; 310; 30; 20 MG/100ML; MG/100ML; MG/100ML; MG/100ML
INJECTION, SOLUTION INTRAVENOUS CONTINUOUS PRN
Status: DISCONTINUED | OUTPATIENT
Start: 2021-01-26 | End: 2021-01-26

## 2021-01-26 RX ORDER — ONDANSETRON 4 MG/1
4 TABLET, ORALLY DISINTEGRATING ORAL EVERY 6 HOURS PRN
Status: DISCONTINUED | OUTPATIENT
Start: 2021-01-26 | End: 2021-01-27 | Stop reason: HOSPADM

## 2021-01-26 RX ORDER — POLYETHYLENE GLYCOL 3350 17 G/17G
1 POWDER, FOR SOLUTION ORAL DAILY
Qty: 7 PACKET | Refills: 0 | Status: SHIPPED | OUTPATIENT
Start: 2021-01-26 | End: 2021-01-27

## 2021-01-26 RX ORDER — NALOXONE HYDROCHLORIDE 0.4 MG/ML
0.4 INJECTION, SOLUTION INTRAMUSCULAR; INTRAVENOUS; SUBCUTANEOUS
Status: DISCONTINUED | OUTPATIENT
Start: 2021-01-26 | End: 2021-01-27 | Stop reason: HOSPADM

## 2021-01-26 RX ORDER — AMOXICILLIN 250 MG
1-2 CAPSULE ORAL 2 TIMES DAILY
Qty: 30 TABLET | Refills: 0 | Status: SHIPPED | OUTPATIENT
Start: 2021-01-26 | End: 2021-01-27

## 2021-01-26 RX ORDER — GABAPENTIN 100 MG/1
100 CAPSULE ORAL AT BEDTIME
Status: DISCONTINUED | OUTPATIENT
Start: 2021-01-26 | End: 2021-01-27 | Stop reason: HOSPADM

## 2021-01-26 RX ORDER — FENTANYL CITRATE 50 UG/ML
25-50 INJECTION, SOLUTION INTRAMUSCULAR; INTRAVENOUS
Status: DISCONTINUED | OUTPATIENT
Start: 2021-01-26 | End: 2021-01-26 | Stop reason: HOSPADM

## 2021-01-26 RX ORDER — SODIUM CHLORIDE, SODIUM LACTATE, POTASSIUM CHLORIDE, CALCIUM CHLORIDE 600; 310; 30; 20 MG/100ML; MG/100ML; MG/100ML; MG/100ML
INJECTION, SOLUTION INTRAVENOUS CONTINUOUS
Status: DISCONTINUED | OUTPATIENT
Start: 2021-01-26 | End: 2021-01-26 | Stop reason: HOSPADM

## 2021-01-26 RX ORDER — CEFAZOLIN SODIUM 2 G/100ML
2 INJECTION, SOLUTION INTRAVENOUS EVERY 8 HOURS
Status: COMPLETED | OUTPATIENT
Start: 2021-01-27 | End: 2021-01-27

## 2021-01-26 RX ORDER — TRANEXAMIC ACID 650 MG/1
1950 TABLET ORAL ONCE
Status: COMPLETED | OUTPATIENT
Start: 2021-01-26 | End: 2021-01-26

## 2021-01-26 RX ORDER — ACETAMINOPHEN 325 MG/1
650 TABLET ORAL EVERY 4 HOURS PRN
Status: DISCONTINUED | OUTPATIENT
Start: 2021-01-29 | End: 2021-01-27 | Stop reason: HOSPADM

## 2021-01-26 RX ORDER — ONDANSETRON 2 MG/ML
4 INJECTION INTRAMUSCULAR; INTRAVENOUS EVERY 6 HOURS PRN
Status: DISCONTINUED | OUTPATIENT
Start: 2021-01-26 | End: 2021-01-27 | Stop reason: HOSPADM

## 2021-01-26 RX ORDER — MAGNESIUM HYDROXIDE 1200 MG/15ML
LIQUID ORAL PRN
Status: DISCONTINUED | OUTPATIENT
Start: 2021-01-26 | End: 2021-01-26 | Stop reason: HOSPADM

## 2021-01-26 RX ORDER — FENTANYL CITRATE 50 UG/ML
INJECTION, SOLUTION INTRAMUSCULAR; INTRAVENOUS PRN
Status: DISCONTINUED | OUTPATIENT
Start: 2021-01-26 | End: 2021-01-26

## 2021-01-26 RX ORDER — SODIUM CHLORIDE, SODIUM LACTATE, POTASSIUM CHLORIDE, CALCIUM CHLORIDE 600; 310; 30; 20 MG/100ML; MG/100ML; MG/100ML; MG/100ML
INJECTION, SOLUTION INTRAVENOUS CONTINUOUS
Status: DISCONTINUED | OUTPATIENT
Start: 2021-01-26 | End: 2021-01-27 | Stop reason: HOSPADM

## 2021-01-26 RX ORDER — CEFAZOLIN SODIUM 1 G/3ML
1 INJECTION, POWDER, FOR SOLUTION INTRAMUSCULAR; INTRAVENOUS SEE ADMIN INSTRUCTIONS
Status: DISCONTINUED | OUTPATIENT
Start: 2021-01-26 | End: 2021-01-26 | Stop reason: HOSPADM

## 2021-01-26 RX ORDER — LIDOCAINE 40 MG/G
CREAM TOPICAL
Status: DISCONTINUED | OUTPATIENT
Start: 2021-01-26 | End: 2021-01-26 | Stop reason: HOSPADM

## 2021-01-26 RX ORDER — ONDANSETRON 4 MG/1
4 TABLET, ORALLY DISINTEGRATING ORAL EVERY 30 MIN PRN
Status: DISCONTINUED | OUTPATIENT
Start: 2021-01-26 | End: 2021-01-26 | Stop reason: HOSPADM

## 2021-01-26 RX ORDER — ONDANSETRON 2 MG/ML
INJECTION INTRAMUSCULAR; INTRAVENOUS PRN
Status: DISCONTINUED | OUTPATIENT
Start: 2021-01-26 | End: 2021-01-26

## 2021-01-26 RX ORDER — POLYETHYLENE GLYCOL 3350 17 G/17G
17 POWDER, FOR SOLUTION ORAL DAILY
Status: DISCONTINUED | OUTPATIENT
Start: 2021-01-27 | End: 2021-01-27 | Stop reason: HOSPADM

## 2021-01-26 RX ORDER — GABAPENTIN 300 MG/1
300 CAPSULE ORAL ONCE
Status: COMPLETED | OUTPATIENT
Start: 2021-01-26 | End: 2021-01-26

## 2021-01-26 RX ORDER — PROPOFOL 10 MG/ML
INJECTION, EMULSION INTRAVENOUS PRN
Status: DISCONTINUED | OUTPATIENT
Start: 2021-01-26 | End: 2021-01-26

## 2021-01-26 RX ADMIN — ROCURONIUM BROMIDE 50 MG: 10 INJECTION INTRAVENOUS at 13:28

## 2021-01-26 RX ADMIN — ACETAMINOPHEN 975 MG: 325 TABLET, FILM COATED ORAL at 19:53

## 2021-01-26 RX ADMIN — CELECOXIB 400 MG: 200 CAPSULE ORAL at 12:41

## 2021-01-26 RX ADMIN — FENTANYL CITRATE 50 MCG: 50 INJECTION, SOLUTION INTRAMUSCULAR; INTRAVENOUS at 14:10

## 2021-01-26 RX ADMIN — PHENYLEPHRINE HYDROCHLORIDE 100 MCG: 10 INJECTION INTRAVENOUS at 14:19

## 2021-01-26 RX ADMIN — DOCUSATE SODIUM AND SENNOSIDES 1 TABLET: 8.6; 5 TABLET ORAL at 19:08

## 2021-01-26 RX ADMIN — GABAPENTIN 100 MG: 100 CAPSULE ORAL at 22:12

## 2021-01-26 RX ADMIN — TRANEXAMIC ACID 1950 MG: 650 TABLET ORAL at 12:58

## 2021-01-26 RX ADMIN — ROCURONIUM BROMIDE 20 MG: 10 INJECTION INTRAVENOUS at 14:14

## 2021-01-26 RX ADMIN — DEXAMETHASONE SODIUM PHOSPHATE 4 MG: 4 INJECTION, SOLUTION INTRAMUSCULAR; INTRAVENOUS at 13:56

## 2021-01-26 RX ADMIN — Medication 5 MG: at 14:19

## 2021-01-26 RX ADMIN — PHENYLEPHRINE HYDROCHLORIDE 0.2 MCG/KG/MIN: 10 INJECTION INTRAVENOUS at 15:26

## 2021-01-26 RX ADMIN — CELECOXIB 100 MG: 100 CAPSULE ORAL at 20:28

## 2021-01-26 RX ADMIN — ACETAMINOPHEN 975 MG: 325 TABLET, FILM COATED ORAL at 12:40

## 2021-01-26 RX ADMIN — HYDROMORPHONE HYDROCHLORIDE 0.3 MG: 1 INJECTION, SOLUTION INTRAMUSCULAR; INTRAVENOUS; SUBCUTANEOUS at 15:49

## 2021-01-26 RX ADMIN — DOCUSATE SODIUM 100 MG: 100 CAPSULE, LIQUID FILLED ORAL at 19:08

## 2021-01-26 RX ADMIN — ROCURONIUM BROMIDE 10 MG: 10 INJECTION INTRAVENOUS at 15:05

## 2021-01-26 RX ADMIN — ONDANSETRON 4 MG: 2 INJECTION INTRAMUSCULAR; INTRAVENOUS at 15:38

## 2021-01-26 RX ADMIN — SODIUM CHLORIDE, POTASSIUM CHLORIDE, SODIUM LACTATE AND CALCIUM CHLORIDE: 600; 310; 30; 20 INJECTION, SOLUTION INTRAVENOUS at 19:15

## 2021-01-26 RX ADMIN — HYDROMORPHONE HYDROCHLORIDE 0.25 MG: 1 INJECTION, SOLUTION INTRAMUSCULAR; INTRAVENOUS; SUBCUTANEOUS at 16:51

## 2021-01-26 RX ADMIN — LIDOCAINE HYDROCHLORIDE 100 MG: 20 INJECTION, SOLUTION INFILTRATION; PERINEURAL at 13:28

## 2021-01-26 RX ADMIN — PHENYLEPHRINE HYDROCHLORIDE 200 MCG: 10 INJECTION INTRAVENOUS at 13:37

## 2021-01-26 RX ADMIN — Medication 5 MG: at 14:16

## 2021-01-26 RX ADMIN — FAMOTIDINE 20 MG: 20 TABLET ORAL at 19:08

## 2021-01-26 RX ADMIN — HYDROMORPHONE HYDROCHLORIDE 0.2 MG: 1 INJECTION, SOLUTION INTRAMUSCULAR; INTRAVENOUS; SUBCUTANEOUS at 15:43

## 2021-01-26 RX ADMIN — ASPIRIN 162 MG: 81 TABLET, COATED ORAL at 19:53

## 2021-01-26 RX ADMIN — GABAPENTIN 300 MG: 300 CAPSULE ORAL at 12:41

## 2021-01-26 RX ADMIN — PHENYLEPHRINE HYDROCHLORIDE 100 MCG: 10 INJECTION INTRAVENOUS at 14:13

## 2021-01-26 RX ADMIN — SUGAMMADEX 250 MG: 100 INJECTION, SOLUTION INTRAVENOUS at 16:48

## 2021-01-26 RX ADMIN — Medication 10 MG: at 16:38

## 2021-01-26 RX ADMIN — Medication 5 MG: at 14:22

## 2021-01-26 RX ADMIN — SODIUM CHLORIDE, POTASSIUM CHLORIDE, SODIUM LACTATE AND CALCIUM CHLORIDE: 600; 310; 30; 20 INJECTION, SOLUTION INTRAVENOUS at 13:20

## 2021-01-26 RX ADMIN — HYDROMORPHONE HYDROCHLORIDE 0.25 MG: 1 INJECTION, SOLUTION INTRAMUSCULAR; INTRAVENOUS; SUBCUTANEOUS at 17:00

## 2021-01-26 RX ADMIN — PROPOFOL 300 MG: 10 INJECTION, EMULSION INTRAVENOUS at 13:28

## 2021-01-26 RX ADMIN — Medication 10 MG: at 16:41

## 2021-01-26 RX ADMIN — PHENYLEPHRINE HYDROCHLORIDE 200 MCG: 10 INJECTION INTRAVENOUS at 13:43

## 2021-01-26 RX ADMIN — ROCURONIUM BROMIDE 20 MG: 10 INJECTION INTRAVENOUS at 14:40

## 2021-01-26 RX ADMIN — CEFAZOLIN 1 G: 1 INJECTION, POWDER, FOR SOLUTION INTRAMUSCULAR; INTRAVENOUS at 15:50

## 2021-01-26 RX ADMIN — SODIUM CHLORIDE, POTASSIUM CHLORIDE, SODIUM LACTATE AND CALCIUM CHLORIDE: 600; 310; 30; 20 INJECTION, SOLUTION INTRAVENOUS at 15:48

## 2021-01-26 RX ADMIN — Medication 5 MG: at 15:29

## 2021-01-26 RX ADMIN — CEFAZOLIN 2 G: 10 INJECTION, POWDER, FOR SOLUTION INTRAVENOUS at 13:55

## 2021-01-26 ASSESSMENT — ACTIVITIES OF DAILY LIVING (ADL): FALL_HISTORY_WITHIN_LAST_SIX_MONTHS: NO

## 2021-01-26 ASSESSMENT — MIFFLIN-ST. JEOR: SCORE: 1807.13

## 2021-01-26 ASSESSMENT — ENCOUNTER SYMPTOMS: DYSRHYTHMIAS: 1

## 2021-01-26 NOTE — OR NURSING
Dr Cuevas notified of EKG completion. EKG shows atrial fibrillation. This is new compared to previous EKG's and is not listed on past medical history. She will complete further review. Currently holding IV an pre medications for surgery until given the okay from Anesthesia.

## 2021-01-26 NOTE — OR NURSING
PACU to Inpatient Nursing Handoff    Patient Fish Hong is a 89 year old male who speaks English.   Procedure Procedure(s):  Right total hip arthroplasty   Surgeon(s) Primary: Eyad Gonzalez MD  Fellow - Assisting: Augustine Humphrey MD     No Known Allergies    Isolation  [unfilled]     Past Medical History   has a past medical history of Hematocele, Hypertension, Obesity, Osteoarthritis of both hips, Phimosis, Pulmonary nodules, and Rheumatic fever (1948).    Anesthesia General   Dermatome Level     Preop Meds acetaminophen (Tylenol) - time given: 1240  celecoxib (Celebrex) - time given: 1240  gabapentin (Neurontin) - time given: 1240  TXA 1950mg PO - time given: 1240   Nerve block Not applicable   Intraop Meds dexamethasone (Decadron)  fentanyl (Sublimaze): 50 mcg total  hydromorphone (Dilaudid): 1 mg total  ondansetron (Zofran): last given at 1538   Local Meds Yes - Local Cocktail (morphine, ropivacaine, epinephrine, Toradol)   Antibiotics cefazolin (Ancef) - last given at 1550     Pain Patient Currently in Pain: yes   PACU meds  Not applicable   PCA / epidural No   Capnography Respiratory Monitoring (EtCO2): 38 mmHg  Integrated Pulmonary Index (IPI): 10   Telemetry ECG Rhythm: Sinus arrhythmia   Inpatient Telemetry Monitor Ordered? No        Labs Glucose Lab Results   Component Value Date    GLC 97 01/04/2021       Hgb Lab Results   Component Value Date    HGB 14.5 01/04/2021       INR Lab Results   Component Value Date    INR 1.11 01/04/2021      PACU Imaging Completed     Wound/Incision Incision/Surgical Site 07/13/16 Penis (Active)   Number of days: 1658       Incision/Surgical Site 10/08/19 Left Abdomen (Active)   Number of days: 476       Incision/Surgical Site 07/14/20 Left Scrotum (Active)   Number of days: 196       Incision/Surgical Site 01/26/21 Right Hip (Active)   Incision Assessment WDL 01/26/21 1704   Closure Adhesive strip(s);Sutures;Liquid bandage 01/26/21 1704   Dressing Intervention  Clean, dry, intact 01/26/21 1704   Number of days: 0      CMS        Equipment ice pack, abductor pillow and CAPNO   Other LDA       IV Access Peripheral IV 01/26/21 Right;Posterior Lower forearm (Active)   Site Assessment [facility] 01/26/21 1704   Line Status Saline locked 01/26/21 1310   Dressing Intervention New dressing  01/26/21 1310   Phlebitis Scale 0-->no symptoms 01/26/21 1310   Infiltration Scale 0 01/26/21 1310   Number of days: 0       Peripheral IV 01/26/21 Left Wrist (Active)   Site Assessment [facility] 01/26/21 1704   Number of days: 0      Blood Products Not applicable  mL   Intake/Output Date 01/26/21 0700 - 01/27/21 0659   Shift 6896-3147 3551-1079 8565-4526 24 Hour Total   INTAKE   I.V. 500 1000  1500   Shift Total(mL/kg) 500(4.46) 1000(8.93)  1500(13.39)   OUTPUT   Shift Total(mL/kg)       Weight (kg) 112 112 112 112      Drains / Aguilar Closed/Suction Drain Left Other (Comment) Bulb 10 Lao (Active)   Number of days: 196      Time of void PreOp Void Prior to Procedure: 1255 (01/26/21 1323)    PostOp Urine Occurrence: 1 (01/26/21 1255)    Diapered? No   Bladder Scan     PO    tolerating sips, water and ice chips     Vitals    B/P: 114/61  T: 97.5  F (36.4  C)    Temp src: Axillary  P:  Pulse: 81 (01/26/21 1745)          R: 16  O2:  SpO2: 95 %    O2 Device: Simple face mask (01/26/21 1715)    Oxygen Delivery: 5 LPM (01/26/21 1715)         Family/support present     Patient belongings     Patient transported on bed   DC meds/scripts (obs/outpt) Yes, scripts to be signed tomorrow   Inpatient Pain Meds Released? Yes       Special needs/considerations None   Tasks needing completion None       Rony Mccormack, RN  ASCOM [id number]

## 2021-01-26 NOTE — CONSULTS
Cardiology Consult    Date of Service: 1/26/2021    ASSESSMENT:   89 year-old male with PMHx significant for HTN and OA, presenting for R total hip arthroplasty, found on EKG to have atrial fibrillation vs sinus rhythm with PACs. Pt appears to be hemodynamically and clinically stable, and not in an unstable arrythmia. Prior EKGs with findings of PVCs, but atrial fibrillation indeed appears new. As such, it is okay to proceed with surgery via Orthopedics, but will need further management and evaluation in post-operative setting. CHADS-VASc of 3 (age, HTN) warrants discussion about anticoagulation in setting of afib.     RECOMMEND:    # Atrial fibrillation, paroxysmal  - Okay to proceed with surgery  - Cardiology will continue to follow pt in post-operative setting    REASON FOR CONSULT: new-onset atrial fibrillation, pre-op consultation    History of Present Illness   Fish Hong is a 89 year old male with PMHx significant for HTN, OA (s/p bilateral total knee arthroplasty, steroid injections of hips bilaterally), presenting to [facility] on 1/26/2021 for scheduled right total hip arthroplasty. Past surgical history is notable for left inguinal hernia repair 10/2019 and left scrotal hematoma s/p evacuation and orchiectomy in 7/2020. Cardiology is consulted for new-onset atrial fibrillation that was observed while Mr. Hong was undergoing pre-operative preparations.     Discussed with Dr. Johnson (Cardiology Fellow PGY5) and Dr. Ivy (Cardiology Attending).  Patricia Crum MS4    PAST MEDICAL HISTORY:  Past Medical History:   Diagnosis Date     Hematocele      Hypertension      Obesity      Osteoarthritis of both hips     s/p steroid injections     Phimosis      Pulmonary nodules      Rheumatic fever 1948     CURRENT MEDICATIONS:  Current Outpatient Medications   Medication Sig Dispense Refill     acetaminophen (TYLENOL) 325 MG tablet Take 2 tablets (650 mg) by mouth every 4 hours as needed for other (mild  pain) 100 tablet 0     aspirin 81 MG EC tablet Take 2 tablets (162 mg) by mouth 2 times daily 60 tablet 0     hydrOXYzine (ATARAX) 10 MG tablet Take 1 tablet (10 mg) by mouth every 6 hours as needed for itching or anxiety (with pain, moderate pain) 30 tablet 0     magnesium hydroxide (MILK OF MAGNESIA) 400 MG/5ML suspension Take 15 mLs by mouth 2 times daily 355 mL 0     oxyCODONE (ROXICODONE) 5 MG tablet Take 1-2 tablets (5-10 mg) by mouth every 3 hours as needed for pain (Moderate to Severe) 30 tablet 0     polyethylene glycol (MIRALAX) 17 g packet Take 17 g by mouth daily 7 packet 0     senna-docusate (SENOKOT-S/PERICOLACE) 8.6-50 MG tablet Take 1-2 tablets by mouth 2 times daily Take while on oral narcotics to prevent or treat constipation. 30 tablet 0     PAST SURGICAL HISTORY:  Past Surgical History:   Procedure Laterality Date     AS TOTAL KNEE ARTHROPLASTY       C TOTAL KNEE ARTHROPLASTY      Bilateral     CIRCUMCISION N/A 7/13/2016    Procedure: CIRCUMCISION;  Surgeon: Elgin Rhodes MD;  Location: [facility] OR     circumcision       ENT SURGERY      detached retina--bilateral     EXPLORE SCROTUM Left 7/14/2020    Procedure: EXPLORE LEFT SCROTUM, EVACULATE HEMATOMA, excision of scrotal mass;  Surgeon: Yobani Valenzuela MD;  Location: [facility] OR     HERNIA REPAIR  11/3/2008    mesh repair umbilical hernia     HERNIORRHAPHY INGUINAL Left 10/8/2019    Procedure: Open Left Inguinal Hernia Repair;  Surgeon: Antonio Landaverde MD;  Location: [facility] OR     ORCHIECTOMY INGUINAL N/A 10/8/2019    Procedure: Left Orchiectomy;  Surgeon: Antonio Landaverde MD;  Location: [facility] OR     ALLERGIES   No Known Allergies    FAMILY HISTORY:  Family History   Problem Relation Age of Onset     No Known Problems Mother      No Known Problems Father      Melanoma No family hx of      Skin Cancer No family hx of      Anesthesia Reaction No family hx of      Cardiovascular No family hx of      Deep Vein Thrombosis (DVT) No family hx  of      SOCIAL HISTORY:  Social History     Socioeconomic History     Marital status:      Spouse name:      Number of children: None     Years of education: None     Highest education level: None   Occupational History     None   Social Needs     Financial resource strain: None     Food insecurity     Worry: None     Inability: None     Transportation needs     Medical: None     Non-medical: None   Tobacco Use     Smoking status: Former Smoker     Packs/day: 1.00     Years: 11.00     Pack years: 11.00     Start date: 10/15/1948     Quit date: 1960     Years since quittin.1     Smokeless tobacco: Never Used   Substance and Sexual Activity     Alcohol use: Yes     Comment: very little     Drug use: No     Sexual activity: Not Currently   Lifestyle     Physical activity     Days per week: None     Minutes per session: None     Stress: None   Relationships     Social connections     Talks on phone: None     Gets together: None     Attends Jainism service: None     Active member of club or organization: None     Attends meetings of clubs or organizations: None     Relationship status: None     Intimate partner violence     Fear of current or ex partner: None     Emotionally abused: None     Physically abused: None     Forced sexual activity: None   Other Topics Concern     Parent/sibling w/ CABG, MI or angioplasty before 65F 55M? Not Asked   Social History Narrative     since                      for 32 years                    ROS and HPI not directly obtained from patient.     Review of Systems:   Constitutional: No fever, chills, or sweats. No weight gain/loss   ENT: No visual disturbance, ear ache, epistaxis, sore throat  Allergies/Immunologic: Negative.   Respiratory: See HPI  Cardiovascular: See HPI  GI: No nausea, vomiting, hematemesis, melena, or hematochezia  : No urinary frequency, dysuria, or hematuria  Integument: Negative  Psychiatric: Negative  Neuro:  Negative  Endocrinology: Negative   Musculoskeletal: Negative    Physical Exam   Temp: 97.5  F (36.4  C) Temp src: Axillary BP: (!) 72/34     Resp: 16 SpO2: 96 % O2 Device: None (Room air)    Vital Signs with Ranges  Temp:  [97.5  F (36.4  C)-98  F (36.7  C)] 97.5  F (36.4  C)  Resp:  [16] 16  BP: ()/(34-94) 72/34  SpO2:  [96 %] 96 %  246 lbs 14.64 oz    No physical exam performed    Data   Data reviewed today:  I personally reviewed imaging, EKGs, labs.  No lab results found in last 7 days.    Recent Results (from the past 24 hour(s))   XR Pelvis Port 1/2 Views    Narrative    EXAM: XR PELVIS PORT 1/2 VW  1/26/2021 3:47 PM      HISTORY: intra op imaging    COMPARISON: 9/14/2020    FINDINGS: Portable intraoperative AP view of the pelvis.    Total right hip arthroplasty in progress. Lateral soft tissue defect.  Acetabular cup in place.       Impression    IMPRESSION: Total right hip arthroplasty in progress.    DOMI SNOW MD (Joe)

## 2021-01-26 NOTE — CONSULTS
Virtual consult to review ECG prior to orthopedic surgery. No billing.     ECG from 1/26/21 11:19 am showed afib with RBBB, HR 76 bpm.    Recommendation: Afib rate controlled, this is not an unstable rhythm and does not preclude surgery.     Parth Ivy MD    Cardiology

## 2021-01-26 NOTE — BRIEF OP NOTE
[facility]     Brief Operative Note    Pre-operative diagnosis: Degenerative joint disease (DJD) of hip [M16.9]  Post-operative diagnosis Same as pre-operative diagnosis    Procedure: Procedure(s):  Right total hip arthroplasty  Surgeon: Surgeon(s) and Role:     * Eyad Gonzalez MD - Primary     * Augustine Humphrey MD - Fellow - Assisting  Anesthesia: General   Estimated blood loss: 100 ml  Drains: None  Specimens: * No specimens in log *  Findings:   None.  Complications: None.  Implants:   Implant Name Type Inv. Item Serial No.  Lot No. LRB No. Used Action   60mm acetabular shell    BIOMET [id number] Right 1 Implanted   35 head high wall    BI[employer]ET [id number] Right 1 Implanted   femoral coated stem    [employer]ET [id number] Right 1 Implanted   -3mm neck    BIOMET [id number] Right 1 Implanted       Assessment: Fish Hong is a 89 year old male s/p right total hip arthroplasty on 1/26/2021 with Dr. Gonzalez.     Plan:  Ortho primary  Activity: Up with assist.  Weight bearing status: WBAT   Antibiotics: Ancef x 24 hours.  Diet: Begin with clear fluids and progress diet as tolerated.  DVT prophylaxis: ASA and mechanical while in the hospital, discharge on ASA x 4 weeks.  We will follow up internal medicine and cardiology recommendations regarding new onset atrial fibrillation and initiating blood thinning medications  Bracing/Splinting: Hip abductor pillow at night  Wound Care: Tegaderm x7 days  Pain management: transition from IV to orals as tolerated.   X-rays: AP pelvis and lateral right hip x-ray in PACU  Physical Therapy:  ROM, ADL's.  Occupational Therapy: ADL's.  Labs: Trend Hgb on POD #1, 2, 3 .   Consults: PT, OT.  Internal medicine and cardiology  Follow-up: Clinic scheduled Dr. Gonzalez on 2/22/2021    Disposition: Pending progress with therapies, pain control on orals, and medical stability, anticipate discharge to home versus TCU on POD #1-2.    ROXI Franklin MD  01/26/2021  Orthopaedic Surgery Resident, PGY-4

## 2021-01-26 NOTE — CONSULTS
HOSPITALIST INITIAL CONSULT NOTE    Referring Provider:  Eyad Gonzalez MD      Reason for Consult         History of Present Illness     Fish Hong is 89 year old year old male with hx of HTN, history of rheumatic fever, pulmonary nodule, Vit B12 deficiency, CRI, OA, s/p B/L knee arthroplasties, s/p left orchiectomy, left inguinal hernia repair in 2019, BPH, significant hearing loss is being admitted s/p Right ISAI on 01/26/2021     ml LR 1500 ml     Currently reports pain is controlled. Denies any nausea, vomiting ,chest pain, shortness of breath, lightheadedness or dizziness.           Past Medical History     Past Medical History:   Diagnosis Date     Hematocele      Hypertension      Obesity      Osteoarthritis of both hips     s/p steroid injections     Phimosis      Pulmonary nodules      Rheumatic fever 1948          Past Surgical History     Past Surgical History:   Procedure Laterality Date     AS TOTAL KNEE ARTHROPLASTY       C TOTAL KNEE ARTHROPLASTY      Bilateral     CIRCUMCISION N/A 7/13/2016    Procedure: CIRCUMCISION;  Surgeon: Elgin Rhodes MD;  Location: [facility] OR     circumcision       ENT SURGERY      detached retina--bilateral     EXPLORE SCROTUM Left 7/14/2020    Procedure: EXPLORE LEFT SCROTUM, EVACULATE HEMATOMA, excision of scrotal mass;  Surgeon: Yobani Valenzuela MD;  Location: [facility] OR     HERNIA REPAIR  11/3/2008    mesh repair umbilical hernia     HERNIORRHAPHY INGUINAL Left 10/8/2019    Procedure: Open Left Inguinal Hernia Repair;  Surgeon: Antonio Landaverde MD;  Location: [facility] OR     ORCHIECTOMY INGUINAL N/A 10/8/2019    Procedure: Left Orchiectomy;  Surgeon: Antonio Landaverde MD;  Location: [facility] OR          Medications     All his current medications are reviewed in current medication section of [facility].  Home medications are reviewed.  Current Facility-Administered Medications   Medication     [START ON 1/29/2021] acetaminophen (TYLENOL) tablet 650 mg      acetaminophen (TYLENOL) tablet 975 mg     celecoxib (celeBREX) capsule 100 mg     fentaNYL (PF) (SUBLIMAZE) injection 25-50 mcg     gabapentin (NEURONTIN) capsule 100 mg     HYDROmorphone (PF) (DILAUDID) injection 0.2 mg    Or     HYDROmorphone (PF) (DILAUDID) injection 0.4 mg     HYDROmorphone (PF) (DILAUDID) injection 0.3-0.5 mg     labetalol (NORMODYNE/TRANDATE) injection 10 mg     lactated ringers infusion     naloxone (NARCAN) injection 0.2 mg     naloxone (NARCAN) injection 0.2 mg     naloxone (NARCAN) injection 0.4 mg     naloxone (NARCAN) injection 0.4 mg     ondansetron (ZOFRAN-ODT) ODT tab 4 mg    Or     ondansetron (ZOFRAN) injection 4 mg     oxyCODONE (ROXICODONE) tablet 5 mg    Or     oxyCODONE IR (ROXICODONE) tablet 10 mg     ropivacaine (NAROPIN) 300 mg, ketorolac (TORADOL) 30 mg, EPINEPHrine (ADRENALIN) 0.6 mg in sodium chloride 0.9 % 100 mL (ORTHO CONNOR STANDARD DOSE)     ropivacaine (NAROPIN) 300 mg, ketorolac (TORADOL) 30 mg, EPINEPHrine (ADRENALIN) 0.6 mg in sodium chloride 0.9 % 100 mL (ORTHO CONNOR STANDARD DOSE)     sodium chloride 0.9% (bottle) irrigation        Allergies      No Known Allergies     Family History     Family History   Problem Relation Age of Onset     No Known Problems Mother      No Known Problems Father      Melanoma No family hx of      Skin Cancer No family hx of      Anesthesia Reaction No family hx of      Cardiovascular No family hx of      Deep Vein Thrombosis (DVT) No family hx of           Social History     Social History     Socioeconomic History     Marital status:      Spouse name:      Number of children: Not on file     Years of education: Not on file     Highest education level: Not on file   Occupational History     Not on file   Social Needs     Financial resource strain: Not on file     Food insecurity     Worry: Not on file     Inability: Not on file     Transportation needs     Medical: Not on file     Non-medical: Not on file   Tobacco Use      "Smoking status: Former Smoker     Packs/day: 1.00     Years: 11.00     Pack years: 11.00     Start date: 10/15/1948     Quit date: 1960     Years since quittin.1     Smokeless tobacco: Never Used   Substance and Sexual Activity     Alcohol use: Yes     Comment: very little     Drug use: No     Sexual activity: Not Currently   Lifestyle     Physical activity     Days per week: Not on file     Minutes per session: Not on file     Stress: Not on file   Relationships     Social connections     Talks on phone: Not on file     Gets together: Not on file     Attends Restoration service: Not on file     Active member of club or organization: Not on file     Attends meetings of clubs or organizations: Not on file     Relationship status: Not on file     Intimate partner violence     Fear of current or ex partner: Not on file     Emotionally abused: Not on file     Physically abused: Not on file     Forced sexual activity: Not on file   Other Topics Concern     Parent/sibling w/ CABG, MI or angioplasty before 65F 55M? Not Asked   Social History Narrative     since                      for 32 years                       Review of Systems   A 10 point review of systems was taken and largely negative except for that which was stated above.      Physical Exam       Vital signs:    Blood pressure (!) 72/34, temperature 97.5  F (36.4  C), temperature source Axillary, resp. rate 16, height 1.803 m (5' 11\"), weight 112 kg (246 lb 14.6 oz), SpO2 96 %.  Estimated body mass index is 34.44 kg/m  as calculated from the following:    Height as of this encounter: 1.803 m (5' 11\").    Weight as of this encounter: 112 kg (246 lb 14.6 oz).      Intake/Output Summary (Last 24 hours) at 2021 1726  Last data filed at 2021 1548  Gross per 24 hour   Intake 1500 ml   Output --   Net 1500 ml      Constitutional: Laying in bed in no acute distress  Eye: No icterus, no pallor  Mouth/ENT:   Cardiovascular: S1, S2 " normal  Respiratory: B/L CTA  GI: Soft, Distended, NT, BS+  : No milligan's catheter  Neurology: Alert, awake, and oriented. No tremors  Psych:   MSK: Right hip dressing in place  Integumentary:   Heme/Lymph/Imm:        Laboratory and Imaging Studies     Laboratory and Imaging studies reviewed in the results review section of Epic. Pertinent studies are as below:    CMPNo lab results found in last 7 days.  CBCNo lab results found in last 7 days.  INRNo lab results found in last 7 days.  Arterial Blood GasNo lab results found in last 7 days.       Impression/Recommendations       89 year old year old male with hx of HTN, history of rheumatic fever, pulmonary nodule, Vit B12 deficiency, CRI, OA, s/p B/L knee arthroplasties, s/p left orchiectomy, left inguinal hernia repair in 2019, BPH, significant hearing loss is being admitted s/p Right ISAI on 01/26/2021    # s/p RIght ISAI on 01/26/2021 :  On Analgesics, and dressing in place  - Wound cares, Dressings, Surgical pain management, DVT Prophylaxis  per primary team.   - Monitor anemia, hemodynamics, Input/Output  - Monitor Capnogrphy  - Encourage Incentive spirometry  - Laxatives for constipation prophylaxis    # Hx of HTN: PTA on Amlodipine, and Doxazosin  BP soft in PACU.   - Hold Amlodipine, and Doxazosin now. Reassess in AM    # RENAL/:   Likely has CKD, baseline creatinine around 1.2-1.3  Creatinine level 1.28 on 01/04/2021  - Renal dose medications  - Avoid Nephrotoxins  - Strict I and O  - Avoid hypotension/hypovolemia  - BMP in AM    # Genitourinary  # S/P circumcision in 2016  # S/P left orchiectomy and left inguinal hernia repair on 10/8/2019, complicated by complex left lipomatous scrotal mass (suspect omentum) that was resected on 7/14/20.  -  BMP in AM    #  Hx rheumatic fever: no valvular complications      # Former smoker, 11 pk yr hx, quit 1960  # Pulmonary nodules, under surveillance  - Monitor with pulse oximetery/capnography    # Ventral hernia  #  s/p umbilical hernia repair  Distended belly, but benign exam otherwise      # Hx of B12 deficiency:  Hgb  14.5 on 01/04/2021     # s/p B/L knee arthroplasties (1997, 1998)               Donavon Lira  Internal Medicine/Hospitalist  [facility]  [phone]

## 2021-01-26 NOTE — OP NOTE
OPERATION REPORT     DATE OF OPERATION: 2021     Patient: Fish Hong  MRN: [id number]  : 1931    SURGEON: Eyad Gonzalez MD.     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     BACKGROUND:  Patient has had progressive discomfort of right hip joint. Has undergone sterile steroid injections by Dr. Sullivan which have at times been of benefit and other times have not helped him.  He currently is using a walker.  In the morning his difficulty standing and erect posture.  He also has back pain.  He describes the pain is mostly on the lateral and posterior lateral aspect of the buttock and hip joint.  No prior surgeries on his right hip.    INDICATIONS: End-stage osteoarthritis, right hip, bone-on-bone, grade 4 changes indicated on preoperative x-ray.  Patient has failed nonoperative treatment strategies.    OPERATIVE FINDINGS: Bone-on-bone the arthritis of the right hip.  Significant deformity of the femoral head associated with osteoarthritis.  Significant osteophyte formation around the acetabulum indicative of progressive osteoarthritis.    OPERATIVE PROCEDURE:   The patient was seen in the pre-operative area; informed consent was obtained.  The right hip was appropriately marked by the patient and the operating surgeon.  The patient was brought to the operating room and transferred to the OR bed.  The patient was positioned supine with appropriate padding and a safety  strap.  General anesthesia was induced.  Patient was then positioned in a lateral decubitus position using a expressor softwareon hip positioning system, all bony prominences were padded, all all rough surfaces of the positioner were padded.  The patient's right lower extremity was prepped and draped in the standard fashion.  At this time a surgical safety timeout was performed involving the operating room nurse, anesthesia team, scrub tech, and operating surgeon.   10 Centimeter incision was made centered over the trochanteric ridge.  This was carried down through the IT band.  The short external rotators were visualized and put on stretch my internal rotation.  These were released intact.  The capsule was exposed in its entirety with blunt Hohmann retractors.  This was released from the the posterior of the liver of the proximal femur.  The hip was dislocated.  The lesser trochanter was palpated.  Our neck cut was estimated and marked using Bovie.  Neck cut was performed.  The head was removed and measured.  This was her starting reamer size.  Retractors were placed around the acetabulum.  The fovea was visualized by removing soft tissue with Bovie and curette.  The transverse acetabular ligament was visualized and used as reference.  Our first reamer was used to medialize 1 to 2 mm.  Next reamer 2 mm larger was used to expand the circumference of our acetabular position.  The reamer was noted to be catching a fair amount of bone circumferentially.  At this time it was chosen to go up 1 mm as were approaching our target size.  This final reamer was removed.  Petechial hemorrhage was noted circumferentially around her acetabulum.  It was estimated that approximately 3 mm of bone had been removed from our starting position, we were not completely flush with the base of the fovea, subchondral bone that was exposed circumferentially. Our cup was chosen 1 mm larger than her last reamer size.  This was placed using the transverse  acetabular ligament as a reference position.  The cup was noted to seat flush against her acetabular surface.  It Was noted to be well covered and is no further stability was needed to be achieved with screw fixation.  The proximal femur was then elevated.  The soft tissues laterally against the neck remnant were freed.  Box osteotome was utilized.  Trephine was utilized.  Broaching was carried out sequentially to the size listed below.  We trialed a high offset neutral as well as a -3 head.  The -3 provided the proximal soft tissue tension, leg lengths, stability with range of motion.  Flatplate x-ray was utilized to verify appropriate position of the acetabulum, leg lengths, broached position.  The trial components were all removed. High wall liner was placed posterior superior with the final liner.  The femoral component was placed and again trialed with -3 head.  Exam was unchanged from prior findings.  The hip tolerated maximal extension with external rotation and no impingement or instability.  And also was noted to be able to be flexed to 90 degrees with internal rotation to beyond 45 degrees and no subluxation or instability noted.  The final cup was then impacted.  Wound closure was done in layers beginning with #1 Ethibond repairing the capsule to the posterior greater trochanter and soft tissues.  The conjoined tendon as well as the piriformis were also reapproximated using #1 Ethibond suture. Closure continued with 0 vicryl buried interrupted sutures for the IT band, 0 Vicryl for deep fat closure, 2-0 Vicryl for subdermal layer and a running subcuticular 3-0 monocryl. Exofin and a sterile dressing were applied. The patient was transferred to the hospital bed. The patient had a palpable dorsalis pedis pulse prior to leaving the operating room.  The patient's distal motor sensory exam was repeated and unchanged from prior findings.    POSTOPERATIVE PLAN:     Activity: Up with assist.  Weight bearing  status: WBAT   Antibiotics: Ancef x 24 hours.  Diet: Begin with clear fluids and progress diet as tolerated.  DVT prophylaxis: ASA and mechanical while in the hospital, discharge on ASA x 4 weeks.  We will follow up internal medicine and cardiology recommendations regarding new onset atrial fibrillation and initiating blood thinning medications  Bracing/Splinting: Hip abductor pillow at night  Wound Care: Tegaderm x7 days  Pain management: transition from IV to orals as tolerated.   X-rays: AP pelvis and lateral right hip x-ray in PACU  Physical Therapy:  ROM, ADL's.  Occupational Therapy: ADL's.  Labs: Trend Hgb on POD #1, 2, 3 .   Consults: PT, OT.  Internal medicine and cardiology (for phillip-op afib)  Follow-up: Clinic scheduled Dr. Gonzalez on 2/22/2021     Disposition: Pending progress with therapies, pain control on orals, and medical stability, anticipate discharge to home versus TCU on POD #1-2.       FINAL IMPLANTS: Velasquez / [employer]      60 mm RB Cup    36mm HW Liner    20x160 TaperLoc High offset Stem    36mm -3mm Head CoCr    Implant Name Type Inv. Item Serial No.  Lot No. LRB No. Used Action   60mm acetabular shell    BIOMET [id number] Right 1 Implanted   35 head high wall    BIOMET [id number] Right 1 Implanted   femoral coated stem    BIOMET [id number] Right 1 Implanted   -3mm neck    BIOMET [id number] Right 1 Implanted       ATTESTATION: Dr. Gonzalez was present at all critical portions of this case and immediately available at all times during the duration of the case.     Augustine Humphrey DO  Adult Joint Reconstruction Fellow  Dept Orthopaedic Surgery, [facility]  [phone] Pager [phone] Office          Attending MD (Dr. Eyad Gonzalez) Attestation:  I was present during the key portions of the procedure and I was immediately available for the entire procedure between opening and closing.    MD Ingrid Powers Family Professor  Oncology and Adult Reconstructive Surgery  Dept Orthopaedic  Surgery, [facility]

## 2021-01-26 NOTE — OR NURSING
Dr Cuevas reviewed EKG with the Cardiology fellow and attending. They believe rhythm is unchanged from previous EKG. Okay to proceed to OR.

## 2021-01-26 NOTE — PROGRESS NOTES
CRNA started Fluid bolus when patient came to PACU at 1704 due to a low BP of 72/34.  Bolus complete for a total of 200ml.

## 2021-01-26 NOTE — ANESTHESIA PROCEDURE NOTES
Airway   Date/Time: 1/26/2021 1:32 PM   Patient location during procedure: OR  Staff -   CRNA: Leonidas Veloz APRN CRNA  Performed By: CRNA    Consent for Airway   Urgency: elective    Indications and Patient Condition  Indications for airway management: phillip-procedural  Mask difficulty assessment: 1 - vent by mask    Final Airway Details  Final airway type: endotracheal airway  Successful airway:ETT - single  Endotracheal Airway Details   ETT size (mm): 7.5  Cuffed: yes  Successful intubation technique: video laryngoscopy  Grade View of Cords: 1  Measured from: gums/teeth  Secured at (cm): 22  Secured with: silk tape  Bite block used: Soft    Post intubation assessment   Placement verified by: capnometry and equal breath sounds   Number of attempts at approach: 1  Secured with:silk tape  Ease of procedure: easy  Dentition: Intact and Unchanged

## 2021-01-26 NOTE — ANESTHESIA CARE TRANSFER NOTE
Patient: Fish Hong    Procedure(s):  Right total hip arthroplasty    Diagnosis: Degenerative joint disease (DJD) of hip [M16.9]  Diagnosis Additional Information: No value filed.    Anesthesia Type:   General     Note:    Oropharynx: spontaneously breathing  Level of Consciousness: awake  Oxygen Supplementation: face mask    Independent Airway: airway patency satisfactory and stable  Dentition: dentition unchanged  Vital Signs Stable: post-procedure vital signs reviewed and stable  Report to RN Given: handoff report given  Patient transferred to: PACU    Handoff Report: Identifed the Patient, Identified the Reponsible Provider, Reviewed the pertinent medical history, Discussed the surgical course, Reviewed Intra-OP anesthesia mangement and issues during anesthesia, Set expectations for post-procedure period and Allowed opportunity for questions and acknowledgement of understanding      Vitals: (Last set prior to Anesthesia Care Transfer)  CRNA VITALS  1/26/2021 1629 - 1/26/2021 1712      1/26/2021             Resp Rate (observed):  (!) 1        Electronically Signed By: JOE Trejo CRNA  January 26, 2021  5:12 PM

## 2021-01-26 NOTE — ANESTHESIA PREPROCEDURE EVALUATION
Anesthesia Pre-Procedure Evaluation    Patient: Fish Hong   MRN: [id number] : 1931        Preoperative Diagnosis: Degenerative joint disease (DJD) of hip [M16.9]   Procedure : Procedure(s):  Right total hip arthroplasty     Past Medical History:   Diagnosis Date     Hematocele      Hypertension      Obesity      Osteoarthritis of both hips     s/p steroid injections     Phimosis      Pulmonary nodules      Rheumatic fever       Past Surgical History:   Procedure Laterality Date     AS TOTAL KNEE ARTHROPLASTY       C TOTAL KNEE ARTHROPLASTY      Bilateral     CIRCUMCISION N/A 2016    Procedure: CIRCUMCISION;  Surgeon: Elgin Rhodes MD;  Location: [facility] OR     circumcision       ENT SURGERY      detached retina--bilateral     EXPLORE SCROTUM Left 2020    Procedure: EXPLORE LEFT SCROTUM, EVACULATE HEMATOMA, excision of scrotal mass;  Surgeon: Yobani Valenzuela MD;  Location: [facility] OR     HERNIA REPAIR  11/3/2008    mesh repair umbilical hernia     HERNIORRHAPHY INGUINAL Left 10/8/2019    Procedure: Open Left Inguinal Hernia Repair;  Surgeon: Antonio Landaverde MD;  Location: [facility] OR     ORCHIECTOMY INGUINAL N/A 10/8/2019    Procedure: Left Orchiectomy;  Surgeon: Antonio Landaverde MD;  Location: [facility] OR      No Known Allergies   Social History     Tobacco Use     Smoking status: Former Smoker     Packs/day: 1.00     Years: 11.00     Pack years: 11.00     Start date: 10/15/1948     Quit date: 1960     Years since quittin.1     Smokeless tobacco: Never Used   Substance Use Topics     Alcohol use: Yes     Comment: very little      Wt Readings from Last 1 Encounters:   21 115.2 kg (254 lb)        Anesthesia Evaluation            ROS/MED HX  ENT/Pulmonary:       Neurologic:       Cardiovascular: Comment: Irregular heart beats reviewed by the cardiologist on call and does not think that's its A fibrillation. Believes its PACs.     They will follow him post operatively for  risk assessment.    (+) hypertension-----dysrhythmias, Other, Irregular Heartbeat/Palpitations,     METS/Exercise Tolerance:     Hematologic:       Musculoskeletal:       GI/Hepatic:     (+) GERD,     Renal/Genitourinary:       Endo:     (+) Obesity,     Psychiatric/Substance Use:       Infectious Disease:       Malignancy:       Other:               OUTSIDE LABS:  CBC:   Lab Results   Component Value Date    WBC 5.7 01/04/2021    WBC 6.1 09/23/2019    HGB 14.5 01/04/2021    HGB 13.9 07/14/2020    HCT 43.7 01/04/2021    HCT 43.9 09/23/2019     01/04/2021     07/14/2020     BMP:   Lab Results   Component Value Date     01/04/2021     09/23/2019    POTASSIUM 4.2 01/04/2021    POTASSIUM 3.9 07/14/2020    CHLORIDE 110 (H) 01/04/2021    CHLORIDE 107 09/23/2019    CO2 27 01/04/2021    CO2 27 09/23/2019    BUN 18 01/04/2021    BUN 20 09/23/2019    CR 1.28 (H) 01/04/2021    CR 1.23 07/14/2020    GLC 97 01/04/2021    GLC 81 09/23/2019     COAGS:   Lab Results   Component Value Date    INR 1.11 01/04/2021     POC:   Lab Results   Component Value Date    BGM 99 10/08/2019     HEPATIC:   Lab Results   Component Value Date    ALBUMIN 3.8 04/22/2019    PROTTOTAL 7.2 04/22/2019    ALT 18 04/22/2019    AST 20 04/22/2019    ALKPHOS 75 04/22/2019    BILITOTAL 0.5 04/22/2019     OTHER:   Lab Results   Component Value Date    A1C 5.8 (H) 09/23/2019    JEMIAM 9.3 01/04/2021       Anesthesia Plan     History & Physical Review      ASA Status:  3.   Plan for General with Intravenous induction. Device: ETT Maintenance will be Balanced.            Consents  Anesthesia Plan(s) and associated risks, benefits, and realistic alternatives discussed.    Questions answered and patient/representative(s) expressed understanding.    Discussed with:  Patient.             Postoperative Care            Luz Shaw MD

## 2021-01-27 ENCOUNTER — APPOINTMENT (OUTPATIENT)
Dept: PHYSICAL THERAPY | Facility: CLINIC | Age: 86
End: 2021-01-27
Attending: ORTHOPAEDIC SURGERY
Payer: MEDICARE

## 2021-01-27 ENCOUNTER — APPOINTMENT (OUTPATIENT)
Dept: CARDIOLOGY | Facility: CLINIC | Age: 86
End: 2021-01-27
Attending: INTERNAL MEDICINE
Payer: MEDICARE

## 2021-01-27 ENCOUNTER — APPOINTMENT (OUTPATIENT)
Dept: OCCUPATIONAL THERAPY | Facility: CLINIC | Age: 86
End: 2021-01-27
Attending: STUDENT IN AN ORGANIZED HEALTH CARE EDUCATION/TRAINING PROGRAM
Payer: MEDICARE

## 2021-01-27 VITALS
WEIGHT: 246.91 LBS | DIASTOLIC BLOOD PRESSURE: 64 MMHG | HEART RATE: 80 BPM | SYSTOLIC BLOOD PRESSURE: 123 MMHG | BODY MASS INDEX: 34.57 KG/M2 | RESPIRATION RATE: 19 BRPM | OXYGEN SATURATION: 96 % | HEIGHT: 71 IN | TEMPERATURE: 96.5 F

## 2021-01-27 LAB
ABO + RH BLD: NORMAL
ABO + RH BLD: NORMAL
ANION GAP SERPL CALCULATED.3IONS-SCNC: 7 MMOL/L (ref 3–14)
BLD GP AB SCN SERPL QL: NORMAL
BLOOD BANK CMNT PATIENT-IMP: NORMAL
BUN SERPL-MCNC: 24 MG/DL (ref 7–30)
CALCIUM SERPL-MCNC: 8.3 MG/DL (ref 8.5–10.1)
CHLORIDE SERPL-SCNC: 105 MMOL/L (ref 94–109)
CO2 SERPL-SCNC: 23 MMOL/L (ref 20–32)
CREAT SERPL-MCNC: 1.28 MG/DL (ref 0.66–1.25)
GFR SERPL CREATININE-BSD FRML MDRD: 49 ML/MIN/{1.73_M2}
GLUCOSE SERPL-MCNC: 149 MG/DL (ref 70–99)
HGB BLD-MCNC: 12 G/DL (ref 13.3–17.7)
MAGNESIUM SERPL-MCNC: 2 MG/DL (ref 1.6–2.3)
POTASSIUM SERPL-SCNC: 4.2 MMOL/L (ref 3.4–5.3)
SODIUM SERPL-SCNC: 135 MMOL/L (ref 133–144)
SPECIMEN EXP DATE BLD: NORMAL
TROPONIN I SERPL-MCNC: <0.015 UG/L (ref 0–0.04)

## 2021-01-27 PROCEDURE — 97530 THERAPEUTIC ACTIVITIES: CPT | Mod: GP | Performed by: PHYSICAL THERAPIST

## 2021-01-27 PROCEDURE — 250N000013 HC RX MED GY IP 250 OP 250 PS 637: Mod: GY | Performed by: STUDENT IN AN ORGANIZED HEALTH CARE EDUCATION/TRAINING PROGRAM

## 2021-01-27 PROCEDURE — 97535 SELF CARE MNGMENT TRAINING: CPT | Mod: GO | Performed by: OCCUPATIONAL THERAPIST

## 2021-01-27 PROCEDURE — 97161 PT EVAL LOW COMPLEX 20 MIN: CPT | Mod: GP | Performed by: PHYSICAL THERAPIST

## 2021-01-27 PROCEDURE — 84484 ASSAY OF TROPONIN QUANT: CPT | Performed by: STUDENT IN AN ORGANIZED HEALTH CARE EDUCATION/TRAINING PROGRAM

## 2021-01-27 PROCEDURE — 97110 THERAPEUTIC EXERCISES: CPT | Mod: GP | Performed by: PHYSICAL THERAPIST

## 2021-01-27 PROCEDURE — 99207 PR CDG-CODE CATEGORY CHANGED: CPT | Performed by: INTERNAL MEDICINE

## 2021-01-27 PROCEDURE — 97165 OT EVAL LOW COMPLEX 30 MIN: CPT | Mod: GO | Performed by: OCCUPATIONAL THERAPIST

## 2021-01-27 PROCEDURE — 93010 ELECTROCARDIOGRAM REPORT: CPT | Mod: 76 | Performed by: INTERNAL MEDICINE

## 2021-01-27 PROCEDURE — 86850 RBC ANTIBODY SCREEN: CPT | Performed by: ORTHOPAEDIC SURGERY

## 2021-01-27 PROCEDURE — 83735 ASSAY OF MAGNESIUM: CPT | Performed by: INTERNAL MEDICINE

## 2021-01-27 PROCEDURE — 97116 GAIT TRAINING THERAPY: CPT | Mod: GP | Performed by: PHYSICAL THERAPIST

## 2021-01-27 PROCEDURE — 86901 BLOOD TYPING SEROLOGIC RH(D): CPT | Performed by: ORTHOPAEDIC SURGERY

## 2021-01-27 PROCEDURE — 99214 OFFICE O/P EST MOD 30 MIN: CPT | Performed by: INTERNAL MEDICINE

## 2021-01-27 PROCEDURE — 80048 BASIC METABOLIC PNL TOTAL CA: CPT | Performed by: STUDENT IN AN ORGANIZED HEALTH CARE EDUCATION/TRAINING PROGRAM

## 2021-01-27 PROCEDURE — 97530 THERAPEUTIC ACTIVITIES: CPT | Mod: GO | Performed by: OCCUPATIONAL THERAPIST

## 2021-01-27 PROCEDURE — 36415 COLL VENOUS BLD VENIPUNCTURE: CPT | Performed by: STUDENT IN AN ORGANIZED HEALTH CARE EDUCATION/TRAINING PROGRAM

## 2021-01-27 PROCEDURE — 93306 TTE W/DOPPLER COMPLETE: CPT | Mod: 26 | Performed by: INTERNAL MEDICINE

## 2021-01-27 PROCEDURE — 86900 BLOOD TYPING SEROLOGIC ABO: CPT | Performed by: ORTHOPAEDIC SURGERY

## 2021-01-27 PROCEDURE — 93005 ELECTROCARDIOGRAM TRACING: CPT

## 2021-01-27 PROCEDURE — 250N000011 HC RX IP 250 OP 636: Performed by: STUDENT IN AN ORGANIZED HEALTH CARE EDUCATION/TRAINING PROGRAM

## 2021-01-27 PROCEDURE — 85018 HEMOGLOBIN: CPT | Performed by: STUDENT IN AN ORGANIZED HEALTH CARE EDUCATION/TRAINING PROGRAM

## 2021-01-27 PROCEDURE — 250N000013 HC RX MED GY IP 250 OP 250 PS 637: Mod: GY | Performed by: CLINICAL NURSE SPECIALIST

## 2021-01-27 PROCEDURE — 999N000208 ECHOCARDIOGRAM COMPLETE

## 2021-01-27 PROCEDURE — 255N000002 HC RX 255 OP 636: Performed by: INTERNAL MEDICINE

## 2021-01-27 PROCEDURE — 83735 ASSAY OF MAGNESIUM: CPT | Performed by: STUDENT IN AN ORGANIZED HEALTH CARE EDUCATION/TRAINING PROGRAM

## 2021-01-27 RX ORDER — SIMETHICONE 80 MG
80 TABLET,CHEWABLE ORAL EVERY 6 HOURS PRN
Status: DISCONTINUED | OUTPATIENT
Start: 2021-01-27 | End: 2021-01-27 | Stop reason: HOSPADM

## 2021-01-27 RX ORDER — POLYETHYLENE GLYCOL 3350 17 G/17G
1 POWDER, FOR SOLUTION ORAL DAILY
Qty: 7 PACKET | Refills: 0 | Status: SHIPPED | OUTPATIENT
Start: 2021-01-27 | End: 2021-03-10

## 2021-01-27 RX ORDER — OXYCODONE HYDROCHLORIDE 5 MG/1
5 TABLET ORAL EVERY 4 HOURS PRN
Qty: 40 TABLET | Refills: 0 | Status: SHIPPED | OUTPATIENT
Start: 2021-01-27 | End: 2021-03-10

## 2021-01-27 RX ORDER — ASPIRIN 81 MG/1
162 TABLET ORAL 2 TIMES DAILY
Qty: 60 TABLET | Refills: 0 | Status: SHIPPED | OUTPATIENT
Start: 2021-01-27 | End: 2021-03-10

## 2021-01-27 RX ORDER — AMOXICILLIN 250 MG
1-2 CAPSULE ORAL 2 TIMES DAILY
Qty: 30 TABLET | Refills: 0 | Status: SHIPPED | OUTPATIENT
Start: 2021-01-27 | End: 2021-03-10

## 2021-01-27 RX ORDER — ACETAMINOPHEN 325 MG/1
650 TABLET ORAL EVERY 4 HOURS PRN
Qty: 100 TABLET | Refills: 0 | Status: SHIPPED | OUTPATIENT
Start: 2021-01-27

## 2021-01-27 RX ADMIN — OXYCODONE HYDROCHLORIDE 5 MG: 5 TABLET ORAL at 15:54

## 2021-01-27 RX ADMIN — BENZOCAINE AND MENTHOL 1 LOZENGE: 15; 3.6 LOZENGE ORAL at 15:54

## 2021-01-27 RX ADMIN — ACETAMINOPHEN 975 MG: 325 TABLET, FILM COATED ORAL at 12:29

## 2021-01-27 RX ADMIN — CEFAZOLIN SODIUM 2 G: 2 INJECTION, SOLUTION INTRAVENOUS at 08:01

## 2021-01-27 RX ADMIN — ONDANSETRON 4 MG: 4 TABLET, ORALLY DISINTEGRATING ORAL at 04:26

## 2021-01-27 RX ADMIN — ASPIRIN 162 MG: 81 TABLET, COATED ORAL at 08:01

## 2021-01-27 RX ADMIN — BENZOCAINE AND MENTHOL 1 LOZENGE: 15; 3.6 LOZENGE ORAL at 08:01

## 2021-01-27 RX ADMIN — BENZOCAINE AND MENTHOL 1 LOZENGE: 15; 3.6 LOZENGE ORAL at 04:26

## 2021-01-27 RX ADMIN — CELECOXIB 100 MG: 100 CAPSULE ORAL at 08:01

## 2021-01-27 RX ADMIN — ACETAMINOPHEN 975 MG: 325 TABLET, FILM COATED ORAL at 04:26

## 2021-01-27 RX ADMIN — OXYCODONE HYDROCHLORIDE 5 MG: 5 TABLET ORAL at 08:00

## 2021-01-27 RX ADMIN — OXYCODONE HYDROCHLORIDE 5 MG: 5 TABLET ORAL at 11:00

## 2021-01-27 RX ADMIN — HUMAN ALBUMIN MICROSPHERES AND PERFLUTREN 5 ML: 10; .22 INJECTION, SOLUTION INTRAVENOUS at 14:30

## 2021-01-27 RX ADMIN — APIXABAN 2.5 MG: 2.5 TABLET, FILM COATED ORAL at 12:29

## 2021-01-27 RX ADMIN — POLYETHYLENE GLYCOL 3350 17 G: 17 POWDER, FOR SOLUTION ORAL at 08:01

## 2021-01-27 RX ADMIN — DOCUSATE SODIUM AND SENNOSIDES 1 TABLET: 8.6; 5 TABLET ORAL at 08:01

## 2021-01-27 RX ADMIN — CEFAZOLIN SODIUM 2 G: 2 INJECTION, SOLUTION INTRAVENOUS at 00:34

## 2021-01-27 RX ADMIN — DOCUSATE SODIUM 100 MG: 100 CAPSULE, LIQUID FILLED ORAL at 08:01

## 2021-01-27 NOTE — PROGRESS NOTES
Orthopaedic Surgery Progress Note 01/27/2021    E: Right hip arthroplasty yesterday.  No acute events overnight.    S: Pain well controlled. Denies numbness or tingling.  Some nausea overnight but now improved.  Tolerating diet voiding and passing gas.  Discussing plan to work with physical therapy with possible discharge today.  Plan of care reviewed and questions answered    O:  Temp: 95.5  F (35.3  C) Temp src: Oral BP: 113/63 Pulse: 84   Resp: 25 SpO2: 95 % O2 Device: Nasal cannula Oxygen Delivery: 3 LPM    Exam:  Gen: No acute distress, resting comfortably in bed.  Resp: Non-labored breathing  Cardiovascular: Distal extremities warm and well perfused     Musculoskeletal:    RLE: Dressing with mild strikethrough.  Fires TA/Gastroc/EHL/FHL with 5/5 strength. SILT in femoral, sural, saphenous, deep peroneal, superficial peroneal, and tibial nerve distributions. Dorsalis pedis and posterior tibial arteries 2+ and foot wwp with BCR.    Recent Labs   Lab 01/27/21  0554   HGB 12.0*       Assessment: Fish Hong is a 89 year old male s/p right total hip arthroplasty on 1/26/2021 with Dr. Gonzalez.      Plan:  Ortho primary  Activity: Up with assist.  Weight bearing status: WBAT   Antibiotics: Ancef x 24 hours.  Diet: Begin with clear fluids and progress diet as tolerated.  DVT prophylaxis: ASA and mechanical while in the hospital, discharge on ASA x 4 weeks.  We will follow up internal medicine and cardiology recommendations regarding new onset atrial fibrillation and initiating blood thinning medications  Bracing/Splinting: Hip abductor pillow at night  Wound Care: Tegaderm x7 days  Pain management: transition from IV to orals as tolerated.   X-rays: AP pelvis and lateral right hip x-ray in PACU  Physical Therapy:  ROM, ADL's.  Occupational Therapy: ADL's.  Labs: Trend Hgb on POD #1, 2, 3 .   Consults: PT, OT.  Internal medicine and cardiology  Follow-up: Clinic scheduled Dr. Gonzalez on 2/22/2021     Disposition:  Pending progress with therapies, pain control on orals, and medical stability, anticipate discharge to home versus TCU on POD #1-2.    ROXI Franklin MD 01/27/2021  Orthopaedic Surgery Resident, PGY-4  Pager: [phone]    For questions about this patient, please attempt to contact me at my pager prior to contacting the orthopaedics resident on call. Thank you!

## 2021-01-27 NOTE — PROGRESS NOTES
[facility] Care  Spoke with patient's dtr Jessie to discuss plans for HC. Patient to be discharged home today and has agreed to have [facility] follow with RN and PT. Patient care support center processing referral. Patient verbalized understanding that initial visit is scheduled for 1/28 or 1/29. Patient has 24 hour phone number for [facility] for any questions or concerns.    Paola Shepard RN   [facility] Care Liaison   [phone]

## 2021-01-27 NOTE — PLAN OF CARE
"  Pt arrived to 8A around 1900. VSS on 3LPM. Pt belonging at bedside, phone given to pt. Pt A&Ox4, drsg CDI, skin assessment intact ex: incision. Telemetry applied.     VS:   /60 (BP Location: Left arm)   Pulse 54   Temp 95.5  F (35.3  C) (Oral)   Resp 15   Ht 1.803 m (5' 11\")   Wt 112 kg (246 lb 14.6 oz)   SpO2 94%   BMI 34.44 kg/m    On 3LPM O2, denies CP or SOB   Telemetry- unchanged from previous to surgery. Followed by cardiology. Occasional PACs, Sinus Arrhythmia    Output:   Pt voided 100ml dark rj odorous urine in urinal, Pt Due to void Bladder scan- PVR 108ml    Lungs LS clear equal bilaterally    Activity:   Pt has not been OOB, pt able to help turn himself in bed.   Adductor pillow in place between legs. Walker   Skin: Intact ex: incision, ecchymotic extremities   Preventative Mepilex on Coxxyx    Pain:   Denies, ice pack applied. Scheduled tylenol and gabapentin given    Neuro/CMS:   A&Ox4, CMS intact denies N/T    Dressing(s):   CDI    Diet:   Advance as tolerated: Tolerating water and a few crackers this evening. Denies Nausea.    LDA:   PIV R forearm infusing- TKO   PIV L wrist SL    Equipment:   Adductor pillow, CAPNO, O2, PCDs,    Plan:   Continue POC, call light within reach. Monitoring Telemetry and increase therapies    Additional Info:          "

## 2021-01-27 NOTE — PROGRESS NOTES
Talked to Cardiology EP on Call, EKG pre op likely A. Fib.   [id number] Score for Atrial Fibrillation Stroke Risk: 3.     Recommendations: (Cardiology):   Rate controlled. No B blocker.   Anticoagulation: Apixaban (defer dosing to Primary team- titrate to full dose when safe from surgical stand point).   Follow-up with Cardiology in 1-2 months.     Ortho team updated.     Scott Sky MD   Hospitalist (Internal Medicine)

## 2021-01-27 NOTE — DISCHARGE SUMMARY
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  [M16.9]           Procedures:   Surgery: Right total hip arthroplasty  Date: 1/26/2021          Discharge Medications:     Current Discharge Medication List      START taking these medications    Details   acetaminophen (TYLENOL) 325 MG tablet Take 2 tablets (650 mg) by mouth every 4 hours as needed for other (mild pain)  Qty: 100 tablet, Refills: 0    Associated Diagnoses: History of total right hip arthroplasty      aspirin 81 MG EC tablet Take 2 tablets (162 mg) by mouth 2 times daily  Qty: 60 tablet, Refills: 0    Associated Diagnoses: History of total right hip arthroplasty      hydrOXYzine (ATARAX) 10 MG tablet Take 1 tablet (10 mg) by mouth every 6 hours as needed for itching or anxiety (with pain, moderate pain)  Qty: 30 tablet, Refills: 0    Associated Diagnoses: History of total right hip arthroplasty      magnesium hydroxide (MILK OF MAGNESIA) 400 MG/5ML suspension Take 15 mLs by mouth 2 times daily  Qty: 355 mL, Refills: 0    Associated Diagnoses: History of total right hip arthroplasty      oxyCODONE (ROXICODONE) 5 MG tablet Take 1-2 tablets (5-10 mg) by mouth every 3 hours as needed for pain (Moderate to Severe)  Qty: 30 tablet, Refills: 0    Associated Diagnoses: History of total right hip arthroplasty      polyethylene glycol (MIRALAX) 17 g packet Take 17 g by mouth daily  Qty: 7 packet, Refills: 0    Associated Diagnoses: History of total right hip arthroplasty      senna-docusate (SENOKOT-S/PERICOLACE) 8.6-50 MG tablet Take 1-2 tablets by mouth 2 times daily Take while on oral narcotics to prevent or treat  constipation.  Qty: 30 tablet, Refills: 0    Comments: While taking narcotics  Associated Diagnoses: History of total right hip arthroplasty         CONTINUE these medications which have NOT CHANGED    Details   amLODIPine (NORVASC) 10 MG tablet Take 1 tablet (10 mg) by mouth daily  Qty: 90 tablet, Refills: 4    Associated Diagnoses: Essential hypertension      doxazosin (CARDURA) 4 MG tablet Take 1 tablet (4 mg) by mouth At Bedtime  Qty: 90 tablet, Refills: 4    Associated Diagnoses: Essential hypertension      cyanocobalamin (VITAMIN B-12) 500 MCG tablet Take 1 tablet (500 mcg) by mouth daily  Qty: 100 tablet, Refills: 11    Associated Diagnoses: Vitamin B12 deficiency (non anemic)      fluticasone (FLONASE) 50 MCG/ACT nasal spray Spray 2 sprays into both nostrils every morning  Qty: 18.2 mL, Refills: 11      Naproxen Sodium (ALEVE PO)       triamcinolone (KENALOG) 0.1 % external cream Apply sparingly to affected area two times daily as needed (left ankle.lower leg)  Qty: 80 g, Refills: 0    Associated Diagnoses: Atopic dermatitis, unspecified type                   Consultations:   Consultation during this admission received from cardiology and internal medicine          Brief History of Illness:   89-year-old male who had progressive discomfort of his right hip joint.  He had undergone steroid injections by Dr. Sullivan which had provided benefit but were not giving long-term relief.  He was walking with a walker.  He described the pain is mostly in the lateral and posterior aspect of his buttock and hip joint.  Imaging showed grade 4 osteoarthritic changes to his right hip.  After discussing the risks and benefits of a total hip arthroplasty the patient elected to proceed.  Plan for inpatient hospitalization was discussed.           Hospital Course:   The patient was admitted to the hospital after the above listed procedure.  Hospital course was uneventful.       Patient worked with PT and OT beginning POD#1.   "On POD#1, patient was tolerating a regular diet, voiding on own, had pain well controlled on oral pain medications and was felt to be medically stable for d/c to home with assistance of family.    Exam at time of Discharge: See progress note from day of discharge    DVT prophylaxis: ASA x4 weeks  Blood Transfusion: none          Discharge Instructions and Follow-Up:     Discharge Procedure Orders   Reason for your hospital stay   Order Comments: Post Op Total Hip     Contact Surgeon Team   Order Comments: You may experience symptoms that require follow-up before your scheduled appointment. Refer to the \"[employer] Tool\" for instructions on when to contact your Surgeon Team if you are concerned about pain control, blood clots, constipation, or if you are unable to urinate.     Urgent Care   Order Comments: If you are not able to reach your Surgeon Team and you need immediate care, go to the Orthopedic Walk-in Clinic or Urgent Care at your Surgeon's office.  Do NOT go to the Emergency Room unless you have shortness of breath, chest pain, or other signs of a medical emergency.     Call 911   Order Comments: Call 911 immediately if you experience sudden-onset chest pain, arm weakness/numbness, slurred speech, or shortness of breath     Breathing exercises   Order Comments: Perform breathing exercises using your Incentive Spirometer 10 times per hour while awake for 2 weeks.     Fever Management   Order Comments: A low grade fever can be expected after surgery.  Use acetaminophen (TYLENOL) as needed for fever management.  Contact your Surgeon Team if you have a fever greater than 101.5 F, chills, and/or night sweats.     Constipation management   Order Comments: Constipation (hard, dry bowel movements) is expected after surgery due to the combination of being less active, the anesthetic, and the opioid pain medication.  You can do the following to help reduce constipation:  ~  FLUIDS:  Drink clear liquids (water or " Gatorade), or juice (apple/prune).  ~  DIET:  Eat a fiber rich diet.    ~  ACTIVITY:  Get up and move around several times a day.  Increase your activity as you are able.  MEDICATIONS:  Reduce the risk of constipation by starting medications before you are constipated.  You can take Miralax   (1 packet as directed) and/or a stool softener (Senokot 1-2 tablets 1-2 times a day).  If you already have constipation and these medications are not working, you can get magnesium citrate and use as directed.  If you continue to have constipation you can try an over the counter suppository or enema.  Call your Surgeon Team if it has been greater than 3 days since your last bowel movement.     Reduced Urine Output   Order Comments: Changes in the amount of fluids you drank before and after surgery may result in problems urinating.  It is important to stay well-hydrated after surgery and drink plenty of water. If it has been greater than 8 hours since you have urinated despite drinking plenty of water, call your Surgeon Team.     Exercises to prevent blood clots   Order Comments: Unless otherwise directed by your Surgeon team, perform the following exercises at least three times per day for the first four weeks after surgery to prevent blood clots in your legs: 1) Point and flex your feet (Ankle Pumps), 2) Move your ankle around in big circles, 3) Wiggle your toes, 4) Walk, even for short distances, several times a day, will help decrease the risk of blood clots.     Pain after Surgery   Order Comments: Pain after surgery is normal and expected.  You will   have some amount of pain for several weeks after surgery.  Your pain will improve with time.  There are several things you can do to help reduce your pain including: rest, ice, elevation, and using pain medications as needed. Contact your Surgeon Team if you have pain that persists or worsens after surgery despite rest, ice, elevation, and taking your medication(s) as  prescribed. Contact your Surgeon Team if you have new numbness, tingling, or weakness in your operative extremity.     Swelling after Surgery   Order Comments: Swelling and/or bruising of the surgical extremity is common and may persist for several months after surgery. In addition to frequent icing and elevation, gentle compressive support with an ACE wrap or tubigrip may help with swelling. Apply compression regularly, removing at least twice daily to perform skin checks. Contact your Surgeon Team if your swelling increases and is NOT associated with an increase in your activity level, or if your swelling increases and is associated with redness and pain.     Cold therapy   Order Comments: Ice can be used to control swelling and discomfort after surgery. Place a thin towel over your operative site and apply the ice pack overtop. Leave ice pack in place for 20 minutes, then remove for 20 minutes. Repeat this 20 minutes on/20 minutes off routine as often as tolerated.     acetaminophen (TYLENOL) Instructions   Order Comments: You were discharged with acetaminophen (TYLENOL) for pain management after surgery. Acetaminophen most effectively manages pain symptoms when it is taken on a schedule without missing doses (every four, six, or eight hours). Your Provider will prescribe a safe daily dose between 3000 - 4000 mg.  Do NOT exceed this daily dose. Most patients use acetaminophen for pain control for the first four weeks after surgery.  You can wean from this medication as your pain decreases.     NSAID Instructions   Order Comments: You were discharged with an anti-inflammatory medication for pain management to use in combination with acetaminophen (TYLENOL) and the narcotic pain medication.  Take this medication exactly as directed.  You should only take one anti-inflammatory at a time.  Some common anti-inflammatories include: ibuprofen (ADVIL, MOTRIN), naproxen (ALEVE, NAPROSYN), celecoxib (CELEBREX), meloxicam  "(MOBIC), ketorolac (TORADOL).  Take this medication with food and water.     Opioids - Tapering Instructions   Order Comments: In the first three days following surgery, your symptoms may warrant use of the narcotic pain medication every four to six hours as prescribed. This is normal. As your pain symptoms improve, focus your efforts on decreasing (tapering) use of narcotic medications. The most successful tapering strategy is to first, decrease the number of tablets you take every 4-6 hours to the minimum prescribed. Then, increase the amount of time between doses.  For example:  First, taper to   or 1 tablet every 4-6 hours.  Then, taper to   or 1 tablet every 6-8 hours.  Then, taper to   or 1 tablet every 8-10 hours.  Then, taper to   or 1 tablet every 10-12 hours.  Then, taper to   or 1 tablet at bedtime.  The bedtime dose can help with comfort during sleep and is typically the last dose to be discontinued after surgery.     Follow Up Care   Order Comments: Follow-up with your Surgeon Team in 4 weeks for wound check.     Anticoagulation - aspirin   Order Comments: Take the aspirin as prescribed for a total of four weeks after surgery.  This is given to help minimize your risk of blood clot.     LOWER Extremity Elevation   Order Comments: Swelling is expected for several months after surgery. This type of swelling is usually associated with gravity and activity, and can be improved with elevation.   The best way to do this is to get your \"toes above your nose\" by laying down and placing several pillows lengthwise under your calf and heel. When elevating your leg keep your knee completely straight. Perform this elevation as often as possible especially for the first two weeks after surgery.     Opioid Instructions (Greater than or equal to 65 years)   Order Comments: You were discharged with an opioid medication (hydromorphone, oxycodone, hydrocodone, or tramadol). This medication should only be taken for " "breakthrough pain that is not controlled with acetaminophen (TYLENOL). If you rate your pain less than 3 you do not need this medication.  Pain rating 0-3:  You do not need this medication  Pain rating 4-6:  Take 1/2 tablet every 4-6 hours as needed  Pain rating 7-10:  Take 1 tablet every 4-6 hours as needed  Do not exceed 4 tablets per day     Activity - Total Hip Arthroplasty   Order Comments: Refer to the [facility] \"Your Guide to Total Joint Replacement\" for recommendations on activities and Exercises.     Order Specific Question Answer Comments   Is discharge order? Yes      Return to Driving   Order Comments: Driving is NOT permitted until directed by your provider. Under no circumstance are you permitted to drive while using narcotic pain medications.     Wound care   Order Comments: You have a clean dressing on your surgical wound. Dressing change instructions as follows: remove your dressing in 7 days days, and leave incision open to air. Contact your Surgeon Team if you have increased redness, warmth around the surgical wound, and/or drainage from the surgical wound.     NO Tub bathing   Order Comments: Tub bathing, swimming, or any other activities that will cause your incision to be submerged in water should be avoided until allowed by your Surgeon.     No flexion past 90 degrees   Order Comments: No bending at waist past 90 degrees.     No internal rotation   Order Comments: No pivoting on your operative leg.     No adduction past midline   Order Comments: No crossing your operative leg.     Weight bearing as tolerated   Order Comments: Weight bearing as tolerated on your operative extremity.     Shower with wound/dressing covered   Order Comments: You must COVER your dressing or incision with saran wrap (or any other non-permeable covering) to allow the incision to remain dry while showering.  You may shower 7 days after surgery as long as the surgical wound stays dry. Continue to cover your " dressing or incision for showering until your first office visit.  You are strictly prohibited from soaking   or submerging the surgical wound underwater.     Regular Diet Adult     Order Specific Question Answer Comments   Is discharge order? Yes               Discharge Disposition:     Discharged to home      The attending for this inpatient hospitalization was MD Todd Hwang W. Cody Sessions, MD  PGY-4, Orthopaedic Surgery

## 2021-01-27 NOTE — PLAN OF CARE
"Vitals: /64 (BP Location: Left arm)   Pulse 80   Temp 96.5  F (35.8  C) (Oral)   Resp 19   Ht 1.803 m (5' 11\")   Wt 112 kg (246 lb 14.6 oz)   SpO2 96%   BMI 34.44 kg/m    Lung sounds CTA. Denies CP, SOB.    Output: Able to void without difficulty. Up to bathroom with assistance.  Last BM: 1/26 prior to surgery.   Activity: CGA with walker/gaitbelt.    Skin: Intact ex incision   Pain: Mild pain. Refused medications when offered.    CMS/Neuro: Intact. A&O x 4.   Dressing: CDI   Diet: Reg, thin. X1 episode of emesis. Antiemetic and cepacol administered and effective.    LDA: L PIV- SL. R PIV infusing TKO NS. R hip incision CDI.   Equipment: IV pole, capno, walker, gaitbelt, personal belongings   Plan/Add'l info: Able to make needs known. Call light within reach. Continue with POC.  Discharge plan: TBD       "

## 2021-01-27 NOTE — PLAN OF CARE
DISCHARGE SUMMARY    Pt discharging to: home with PT, DANIELA RN  Transportation: wheelchair  AVS given and discussed: yes, signed by pt  Medications given: yes, pt signed for meds  Belongings returned: yes  Comments: pt discharge education done with daughter present, no questions

## 2021-01-27 NOTE — PLAN OF CARE
Hip dressing CDI, cms intact. No nausea, tolerated reg diet. Assist of 1 & walker to bathroom. Pain manageable. 13:00 echo called to say they were on their way. Still no word on if pt will discharge.

## 2021-01-27 NOTE — ANESTHESIA POSTPROCEDURE EVALUATION
Patient: Fish Hong    Procedure(s):  Right total hip arthroplasty    Diagnosis:Degenerative joint disease (DJD) of hip [M16.9]  Diagnosis Additional Information: No value filed.    Anesthesia Type:  General    Note:  Disposition: Admission   Postop Pain Control: Uneventful            Sign Out: Well controlled pain   PONV: No   Neuro/Psych: Uneventful            Sign Out: Acceptable/Baseline neuro status   Airway/Respiratory: Uneventful            Sign Out: Acceptable/Baseline resp. status   CV/Hemodynamics: Uneventful            Sign Out: Acceptable CV status   Other NRE: NONE   DID A NON-ROUTINE EVENT OCCUR?          Last vitals:  Vitals:    01/26/21 1745 01/26/21 1800 01/26/21 1815   BP: 114/61 109/56 110/65   Pulse: 81 85 87   Resp: 16 11 15   Temp:  36.5  C (97.7  F)    SpO2: 95% 96% 94%       Electronically Signed By: Yazmin Truong MD  January 26, 2021  6:43 PM

## 2021-01-27 NOTE — PROGRESS NOTES
89 year old male with PMHx of HTN and OA admitted for R. Total athroplasty. Was found to be in afib on pre op EKG. Underwent surgery and did well. EKG today reveals junctional rhtyhm vs atrial fibrillation. Patient is currently rate controlled without medication and is overall doing well.    We recommend starting PO Apixaban 5mg BID(CrCl:60) for stroke prevention in a patient with afib and CHADSVASC:3. Additionally, we agree with follow up echo, which we will follow. Cardiology follow up in 1-2 months.    We will sign off after TTE is completed.    Sabrina Parker MD  Cardiology Fellow  PGY5

## 2021-01-27 NOTE — PROGRESS NOTES
01/27/21 1000   Quick Adds   Type of Visit Initial Occupational Therapy Evaluation   Living Environment   People in home alone  (3 daughters live down the road. )   Current Living Arrangements house   Transportation Anticipated family or friend will provide   Living Environment Comments high toilet; tub shower with shower chair with grab bars.   Self-Care   Usual Activity Tolerance moderate   Current Activity Tolerance fair   Equipment Currently Used at Home walker, rolling   Activity/Exercise/Self-Care Comment Patient independent in self-cares/mobility at baseline.   Disability/Function   Hearing Difficulty or Deaf yes   Patient's preferred means of communication English speaker with hearing loss, no speech problems.   Describe hearing loss bilateral hearing loss   Dressing/Bathing Difficulty no   Fall history within last six months no   General Information   Onset of Illness/Injury or Date of Surgery 01/26/21   Referring Physician Dr. Gonzalez   Patient/Family Therapy Goal Statement (OT) Return home to baseline   Additional Occupational Profile Info/Pertinent History of Current Problem 89 year old male with PMHx of HTN and OA admitted for R. Total athroplasty. Was found to be in afib on pre op EKG.   Existing Precautions/Restrictions no hip IR;no hip ADD past midline;90 degree hip flexion;no pivoting or twisting   Limitations/Impairments hearing   Right Lower Extremity (Weight-bearing Status) weight-bearing as tolerated (WBAT)   Cognitive Status Examination   Orientation Status orientation to person, place and time   Follows Commands repetition of directions required   Safety Deficit impulsivity;judgment   Transfers   Transfers sit-stand transfer;toilet transfer;wheelchair transfer;shower transfer   Sit-Stand Transfer   Sit-Stand [place] (Transfers) supervision   Assistive Device (Sit-Stand Transfers) walker, standard   Shower Transfer   Type (Shower Transfer) stand-sit;sit-stand;lateral   [place] Level  (Shower Transfer) minimum assist (75% patient effort);contact guard;verbal cues   Assistive Device (Shower Transfer) tub transfer bench;shower chair;grab bar, tub rail;walker, standard   Toilet Transfer   Type (Toilet Transfer) sit-stand;stand-sit   [place] Level (Toilet Transfer) supervision   Assistive Device (Toilet Transfer) commode chair;grab bars/safety frame;walker, standard   Wheelchair Transfer   Type (Wheelchair Transfer) sit-stand;stand-sit   [place] Level (Wheelchair Transfer) verbal cues   Assistive Device (Wheelchair Transfer) walker, standard   Activities of Daily Living   BADL Assessment/Intervention toileting;lower body dressing;upper body dressing   Upper Body Dressing Assessment/Training   [place] Level (Upper Body Dressing) supervision   Position (Upper Body Dressing) supported sitting   Lower Body Dressing Assessment/Training   [place] Level (Lower Body Dressing) supervision   Assistive Devices (Lower Body Dressing) reacher;sock-aid   Position (Lower Body Dressing) supported sitting   Toileting   [place] Level (Toileting) supervision   Assistive Devices (Toileting) commode chair;grab bar, toilet   Clinical Impression   Criteria for Skilled Therapeutic Interventions Met (OT) yes   OT Diagnosis Impaired self-cares/mobility   OT Problem List-Impairments impacting ADL post-surgical precautions;strength;cognition   Assessment of Occupational Performance 1-3 Performance Deficits   Identified Performance Deficits bathing, toileting, dressing   Planned Therapy Interventions (OT) ADL retraining   Clinical Decision Making Complexity (OT) low complexity   Therapy Frequency (OT) Daily   Predicted Duration of Therapy 2-3 days   Risk & Benefits of therapy have been explained patient;care plan/treatment goals reviewed   OT Discharge Planning    OT Discharge Recommendation (DC Rec) home with home care occupational therapy;Transitional Care Facility   OT Rationale for DC Rec Patient is  below baseline and at this point would benefit from TCU. If patient progresses, could go home with home OT, 24 hour supervision, and home health for bathing.    OT Brief overview of current status  Patient is SBA/CGA for transfers but displays impulsivity and requires verbal cues for walker safety and safe transfers. Patient is SBA for dressing and toileting but displayed unawareness at poor toilet hygiene.   Patient demonstrated difficulty with tub transfer, would be unsafe to complete independently.  Recommend tub transfer bench.  (Simultaneous filing. User may not have seen previous data.)   Total Evaluation Time (Minutes)   Total Evaluation Time (Minutes) 8

## 2021-01-27 NOTE — PROGRESS NOTES
Care Management Discharge Note    Addendum:  His RNCC spoke with daughter Jessie regarding DC orders. Jessie mooney she will be with her dad 24/7, she is always their when he baths  And patient has shower with shower chair. Will transport patient home when ready for DC.          Discharge Date: 01/27/21       Discharge Disposition:  Home with daughter    Discharge Services:  Home PT/F/U RN    Discharge DME:  N/A    Discharge Transportation: daughter    Private pay costs discussed: Not applicable    PAS Confirmation Code:    Patient/family educated on Medicare website which has current facility and service quality ratings:      Education Provided on the Discharge Plan:  yes  Persons Notified of Discharge Plans: patient  Patient/Family in Agreement with the Plan:  yes    Handoff Referral Completed: Yes    Additional Information:  This RNCC spoke with patient to discuss discharge planning, patient doesn't appear to have any complex needs during admission or at discharge and is progressing with mobility. Plan is for patient to return to previous living situation and would benefit from further skilled home physical therapy for strengthening and increase independence with all functional mobility/gait and d follow up RN visit.  Patient vinicio daughter will provide transport, providedchoices for home care patient will  Go with [facility] for home services.    Orders placed in EPIC and referral sent to [facility].      Ashia MOLINAN RN CCM  RN Care Coordinator [address] [place] [email or web address]   Office: ([phone]   Pager: [phone]    To contact Weekend RNCC, dial [phone] and enter job code [phone] at prompt. This pager can not be contacted by text page or outside line.

## 2021-01-27 NOTE — PROGRESS NOTES
[facility]     Medicine Progress Note - Hospitalist Service       Date of Admission:  1/26/2021  Assessment & Plan         89 year old year old male with hx of HTN, history of rheumatic fever, pulmonary nodule, Vit B12 deficiency, CRI, OA, s/p B/L knee arthroplasties, s/p left orchiectomy, left inguinal hernia repair in 2019, BPH, significant hearing loss is being admitted s/p Right ISAI on 01/26/2021     # s/p RIght ISAI on 01/26/2021 :  Doing well.     - Wound cares, Dressings, Surgical pain management, DVT Prophylaxis  per primary team.   - Monitor anemia (transfuse for hgb <7.0), hemodynamics- stable, Input/Output  - Encourage Incentive spirometry  - Laxatives for constipation prophylaxis      # Atrial fibrillation, new diagnosis:   # hx of PVCs.   #  Hx rheumatic fever: no valvular complications      Was found to be in afib on pre op EKG. Underwent surgery and did well. EKG POD 1 reveals junctional rhtyhm vs atrial fibrillation. Patient is currently rate controlled without medication and is overall doing well.  Cardiology consulted.   Cardiology recommend starting PO Apixaban 5mg BID(CrCl:60) for stroke prevention in a patient with afib and CHADSVASC:3. Additionally, we agree with follow up echo, which we will follow. Cardiology follow up in 1-2 months.    Patient and daughter Jessie updated.      # Hx of HTN: PTA on Amlodipine, and Doxazosin  - resume at discharge as bp better, stable.      # RENAL/:   Likely has CKD, baseline creatinine around 1.2-1.3  Creatinine level 1.28 on 01/04/2021  - Renal dose medications  - Avoid Nephrotoxins  - Strict I and O  - Avoid hypotension/hypovolemia    Stable.      # Genitourinary  # S/P circumcision in 2016  # S/P left orchiectomy and left inguinal hernia repair on 10/8/2019, complicated by complex left lipomatous scrotal mass (suspect omentum) that was resected on 7/14/20.       # Former smoker, 11 pk yr hx, quit 1960  #  Pulmonary nodules, under surveillance  - outpatient follow-up.      # Ventral hernia  # s/p umbilical hernia repair  - stable. Benign abd exam.       # Hx of B12 deficiency:      # s/p B/L knee arthroplasties (1997, 1998)     Rest per primary.           The patient's care was discussed with the Bedside Nurse, Care Coordinator/, Patient, Patient's Family and Primary team.    Scott Sky MD  Hospitalist Service  [facility]   Contact information available via [facility] Paging/Directory    ______________________________________________________________________    Interval History   Interval events reviewed.   States doing well  Denies fever or chills.   No cough or cp or sob.   No LH or dizziness.   No NV or pain abdomen.   No new sensory or motor complaint.   No new rash.    No other new or acute medical concern      Data reviewed today: I reviewed all medications, new labs and imaging results over the last 24 hours. I personally reviewed no images or EKG's today.    Physical Exam   Vital Signs: Temp: 96.5  F (35.8  C) Temp src: Oral BP: 123/64 Pulse: 80   Resp: 19 SpO2: 96 % O2 Device: Nasal cannula Oxygen Delivery: 3 LPM  Weight: 246 lbs 14.64 oz    General: alert, interactive, NAD  HEENT: AT/NC, Moist MM  Neck: Supple. JVD not appreciated.   Respi/Chest: Non labored, CTA BL.  CVS/Heart: S1S2 irregular  GI/Abd: Soft, non tender, distended, BS +  MSK/Extremities: Distally warm, well perfused.     Neuro: AO x 4.   Psychiatry: Stable mood.       Data   Recent Labs   Lab 01/27/21  0554   HGB 12.0*      POTASSIUM 4.2   CHLORIDE 105   CO2 23   BUN 24   CR 1.28*   ANIONGAP 7   JEMIMA 8.3*   *   TROPI <0.015     Recent Results (from the past 24 hour(s))   XR Pelvis w Hip Port Right 1 View    Narrative    EXAM: XR PELVIS AD HIP PORTABLE RIGHT 1 VIEW  LOCATION: [facility]  DATE/TIME: 1/26/2021 5:28 PM    INDICATION: Postoperative.  COMPARISON:  2021.      Impression    IMPRESSION:     Completed right total hip arthroplasty without immediate postoperative complication. No evidence of fracture. Normal joint alignment. Components appear well seated. No new or significant other abnormality in the pelvis.   Echo Complete    Narrative    [id number]  [id number]  [id number]  [id number]^BA^CAIN           [facility]  Echocardiography Laboratory  [address] [place] [address]     Name: ATIF MENG  MRN: [id number]  : 1931  Study Date: 2021 02:02 PM  Age: 89 yrs  Gender: Male  Patient Location: [facility]  Reason For Study: Atrial Fibrillation  Ordering Physician: CAIN COSME  Performed By: Hannah Li RDCS     BSA: 2.3 m2  Height: 71 in  Weight: 246 lb  HR: 78  BP: 123/64 mmHg  _____________________________________________________________________________  __        Procedure  Echocardiogram with two-dimensional, color and spectral Doppler performed.  Contrast Optison. Technically difficult study.Extremely poor acoustic windows.  Optison (NDC #3011-2277-30) given intravenously. Patient was given 5 ml  mixture of 3 ml Optison and 6 ml saline. 4 ml wasted.  _____________________________________________________________________________  __        Interpretation Summary  Sinus rhythm during study.  Technically difficult study.Extremely poor acoustic windows.  Global and regional left ventricular function is normal with an EF of 55-60%.  Right ventricular wall thickness is normal.  Right ventricular systolic pressure is 34mmHg above the right atrial pressure.  IVC diameter and respiratory changes fall into an intermediate range  suggesting an RA pressure of 8 mmHg.  No pericardial effusion is present.  There is no prior study for direct comparison.  _____________________________________________________________________________  __        Left Ventricle  Global and regional left ventricular function is  normal with an EF of 55-60%.  Left ventricular wall thickness is normal. Left ventricular size is normal.  Grade I or early diastolic dysfunction. No regional wall motion abnormalities  are seen.     Right Ventricle  Right ventricular wall thickness is normal.     Atria  Both atria appear normal. The atrial septum is intact as assessed by color  Doppler .     Mitral Valve  The mitral valve is normal.        Aortic Valve  The valve leaflets are not well visualized. On Doppler interrogation, there is  no significant stenosis or regurgitation.     Tricuspid Valve  The valve leaflets are not well visualized. Trace tricuspid insufficiency is  present. Right ventricular systolic pressure is 34mmHg above the right atrial  pressure.     Pulmonic Valve  The valve leaflets are not well visualized. On Doppler interrogation, there is  no significant stenosis or regurgitation.     Vessels  The thoracic aorta is normal. The pulmonary artery cannot be assessed.  Ascending aorta 4 cm. IVC diameter and respiratory changes fall into an  intermediate range suggesting an RA pressure of 8 mmHg.     Pericardium  No pericardial effusion is present.        Compared to Previous Study  There is no prior study for direct comparison.  _____________________________________________________________________________  __  MMode/2D Measurements & Calculations     IVSd: 1.2 cm  LVIDd: 3.1 cm  LVIDs: 2.2 cm  LVPWd: 1.3 cm  FS: 30.8 %  LV mass(C)d: 124.6 grams  LV mass(C)dI: 54.1 grams/m2  asc Aorta Diam: 4.0 cm  LVOT diam: 2.2 cm  LVOT area: 3.7 cm2  RWT: 0.81        Doppler Measurements & Calculations  MV E max mary lou: 90.3 cm/sec  MV A max mary lou: 88.8 cm/sec  MV E/A: 1.0  MV dec time: 0.21 sec  TR max mary lou: 290.5 cm/sec  TR max P.8 mmHg  E/E' avg: 15.6  Lateral E/e': 13.1  Medial E/e': 18.2        _____________________________________________________________________________  __        Report approved by: Deric Terrazas 2021 02:48 PM         Medications     lactated ringers 10 mL/hr at 01/27/21 0000       acetaminophen  975 mg Oral Q8H     apixaban ANTICOAGULANT  2.5 mg Oral BID     celecoxib  100 mg Oral BID     docusate sodium  100 mg Oral BID     famotidine  20 mg Oral Q24H    Or     famotidine  20 mg Intravenous Q24H     gabapentin  100 mg Oral At Bedtime     polyethylene glycol  17 g Oral Daily     senna-docusate  1 tablet Oral BID

## 2021-01-28 ENCOUNTER — PATIENT OUTREACH (OUTPATIENT)
Dept: CARE COORDINATION | Facility: CLINIC | Age: 86
End: 2021-01-28

## 2021-01-28 LAB
INTERPRETATION ECG - MUSE: NORMAL

## 2021-01-28 NOTE — PROGRESS NOTES
89 year old male with PMHx of HTN and OA admitted for R. Total athroplasty. Was found to be in afib on pre op EKG. Underwent surgery and did well. EKG on 1/28 revealed junctional rhtyhm vs atrial fibrillation. Patient is currently rate controlled without medication and was discharged without rate control medication    TTE was normal with an EF:55-60% and no WMA. We recommended PO apixaban 5mg BID for anticoagulation(CrCL:60) for stroke prevention in a patient with afib and CHADSVASC:3. This will help decrease risk of stroke.     We will sign off     Staffed with Dr. Catie Parker MD  Cardiology Fellow  PGY5

## 2021-01-28 NOTE — PROGRESS NOTES
Clinic Care Coordination Contact  [facility]/Voicemail    Referral Source: IP Report  Clinical Data: Care Coordinator Outreach    Chart Review: Referral from a discharge report.  [facility] 1/26/21-1/27/21  IP for Post Op Total Hip   Medication Changes- Yes    Outreach attempted x 1.  Could not leave a message on patient's voicemail with call back information, the phone line was busy.    Plan: Care Coordinator will try to reach patient again in 1-2 business days.    MAYE Talbert  Clinic Care Coordination  [facility] Clinics: Irena Bryan, [place], Women's, and MOA  Phone: [phone]

## 2021-01-28 NOTE — PLAN OF CARE
Occupational Therapy Discharge Summary    Reason for therapy discharge:    Discharged to home with home therapy.    Progress towards therapy goal(s). See goals on Care Plan in [facility] electronic health record for goal details.  Goals not met.  Barriers to achieving goals:   discharge from facility.    Therapy recommendation(s):    TCU was recommended by OT, however per chart patient discharged home. Would recommend  home OT, 24 hour supervision, and home health for bathing.

## 2021-01-29 ENCOUNTER — DOCUMENTATION ONLY (OUTPATIENT)
Dept: ORTHOPEDICS | Facility: CLINIC | Age: 86
End: 2021-01-29

## 2021-01-29 ENCOUNTER — DOCUMENTATION ONLY (OUTPATIENT)
Dept: CARE COORDINATION | Facility: CLINIC | Age: 86
End: 2021-01-29

## 2021-01-29 ENCOUNTER — PATIENT OUTREACH (OUTPATIENT)
Dept: NURSING | Facility: CLINIC | Age: 86
End: 2021-01-29
Payer: MEDICARE

## 2021-01-29 NOTE — PROGRESS NOTES
Clinic Care Coordination Contact  Community Health Worker Initial Outreach    CHW Initial Information Gathering:  Referral Source: IP Report    Chart Review: Referral from a discharge report.  [facility] 1/26/21-1/27/21  IP for Post Op Total Hip   Medication Changes- Yes      Patient accepts CC: No, Enrolled in home care. Patient will be sent Care Coordination introduction letter for future reference.     Plan: Care Coordinator will send care coordination introduction letter with care coordinator contact information and explanation of care coordination services via mail. Care Coordinator will do no further outreaches at this time.    Brook Sun, MAYE  Clinic Care Coordination  [facility] Clinics: Irena Bryan, Mary [place], Women's, and MOA  Phone: [phone]

## 2021-01-29 NOTE — PROGRESS NOTES
RN called and spoke with Shanell. Gave her verbal orders and discussed the Eliquis and Asa.    Ajith Martinez RN

## 2021-01-29 NOTE — PROGRESS NOTES
[facility] now requests orders and shares plan of care/discharge summaries for some patients through [employer].  Please REPLY TO THIS MESSAGE OR ROUTE BACK TO THE AUTHOR in order to give authorization for orders when needed.  This is considered a verbal order, you will still receive a faxed copy of orders for signature.  Thank you for your assistance in improving collaboration for our patients.      Please verify medication: Patient was newly started on and is taking Eliquis 2.5 mg twice daily and Aspirin 162 mg twice daily. Family is wondering if he she be taking both of these as discharge summary indicates?    ORDERS being requested:    Skilled nursing 2 week 1, 1 week 3, 3 PRN for disease and symptom management, post op teaching and assessment, pain management, medication teaching and assessment, wound assessment.  PT to evaluate and treat to include strengthining, ROM, ambulation, balance, stairs, and HEP.  OT to evaluate and treat to include HSE, adls, BR and shower safety, DME needs: bed rail.     Surgical dressing to remain in place until POD day 7, then ok to remove and cover with light gauze and tape until ortho f/u appt. No soaking or scrubbing.     Shanell Zamora RN Admission Clinician  [facility]  [phone]

## 2021-01-29 NOTE — LETTER
[place] CARE COORDINATION  [address]  [facility] [address]  January 29, 2021      Fish Hong  [address]  [facility] [facility] [address]      Dear Fish,    I am a clinic community health worker who [facility] I wanted to thank you for spending the time to talk with me.  Below is a description of clinic care coordination and how I can further assist you.      The clinic care coordination team is made up of a registered nurse,  and community health worker who understand the health care system. The goal of clinic care coordination is to help you manage your health and improve access to the health care system in the most efficient manner. The team can assist you in meeting your health care goals by providing education, coordinating services, strengthening the communication among your providers and supporting you with any resource needs.    Please feel free to contact me at [phone] with any questions or concerns. We are focused on providing you with the highest-quality healthcare experience possible and that all starts with you.     Sincerely,     MAYE Talbert  Clinic Care Coordination  [facility]: [facility], [place], [place], Women's, and MOA  Phone: [phone]

## 2021-01-29 NOTE — PROGRESS NOTES
[facility] now requests orders and shares plan of care/discharge summaries for some patients through [employer].  Please REPLY TO THIS MESSAGE OR ROUTE BACK TO THE AUTHOR in order to give authorization for orders when needed.  This is considered a verbal order, you will still receive a faxed copy of orders for signature.  Thank you for your assistance in improving collaboration for our patients.    Dr. Jarrett-  Patient just had a Right total hip arthroplasty (1/26). He and his family are eager for him to get the COVID vaccine. They are wondering if this is safe for him to do this soon or do you think he should wait until recovered from his recent surgery?   The family asked the ortho surgeon and he did not advise either way, he was not sure.      Shanell Zamora RN Admission Clinician  [facility]  [phone]

## 2021-02-01 ENCOUNTER — TELEPHONE (OUTPATIENT)
Dept: INTERNAL MEDICINE | Facility: CLINIC | Age: 86
End: 2021-02-01

## 2021-02-05 ENCOUNTER — TELEPHONE (OUTPATIENT)
Dept: ORTHOPEDICS | Facility: CLINIC | Age: 86
End: 2021-02-05

## 2021-02-05 NOTE — TELEPHONE ENCOUNTER
[facility]    Phone Message    May a detailed message be left on voicemail: yes     Reason for Call: Other: Needing verbal orders 2x wk 3 weeks for ADL and IADL     Action Taken: Message routed to:  [facility] Ortho    Travel Screening: Not Applicable

## 2021-02-09 ENCOUNTER — IMMUNIZATION (OUTPATIENT)
Dept: NURSING | Facility: CLINIC | Age: 86
End: 2021-02-09
Payer: MEDICARE

## 2021-02-09 PROCEDURE — 91300 PR COVID VAC PFIZER DIL RECON 30 MCG/0.3 ML IM: CPT

## 2021-02-09 PROCEDURE — 0001A PR COVID VAC PFIZER DIL RECON 30 MCG/0.3 ML IM: CPT

## 2021-02-10 ENCOUNTER — TELEPHONE (OUTPATIENT)
Dept: ORTHOPEDICS | Facility: CLINIC | Age: 86
End: 2021-02-10

## 2021-02-10 NOTE — TELEPHONE ENCOUNTER
[facility] Call Center    Phone Message    May a detailed message be left on voicemail: yes     Reason for Call: Medication Question or concern regarding medication   Prescription Clarification  Name of Medication: Eliquis  Prescribing Provider: Dr. Gonzalez   Pharmacy:    What on the order needs clarification? Pt's daughter Jessie is calling saying pt had a ISAI with Dr. Gonzalez on 01/26/21 and pt was given Eliquis for a 30 day supply.      Is pt to continue taking this?  Does he need a refill?  Pt was scheduled to f/u in Cardiology on 03/02/21 but will run out of this medication before then.    Please call Jessie to discuss this          Action Taken: Message routed to:  [facility] Ortho    Travel Screening: Not Applicable

## 2021-02-11 NOTE — TELEPHONE ENCOUNTER
After receiving message below,  This RN called daughter Jessie after speaking with Dr. Gonzalez.  Jessie confirmed that her dad had not been on Eliquis preop.  This RN discussed that the Eliquis can stop after the ordered 30 day supply.  Jessie discussed the Cardiology appointment in March, this RN shared that if the Cardiologist wanted to restart the Eliquis, that would be discussed at the appointment.  Jessie was in agreement with this plan of care.

## 2021-02-16 DIAGNOSIS — Z96.641 STATUS POST RIGHT HIP REPLACEMENT: Primary | ICD-10-CM

## 2021-02-22 ENCOUNTER — ANCILLARY PROCEDURE (OUTPATIENT)
Dept: GENERAL RADIOLOGY | Facility: CLINIC | Age: 86
End: 2021-02-22
Attending: ORTHOPAEDIC SURGERY
Payer: MEDICARE

## 2021-02-22 ENCOUNTER — OFFICE VISIT (OUTPATIENT)
Dept: ORTHOPEDICS | Facility: CLINIC | Age: 86
End: 2021-02-22
Payer: MEDICARE

## 2021-02-22 DIAGNOSIS — Z96.641 STATUS POST RIGHT HIP REPLACEMENT: Primary | ICD-10-CM

## 2021-02-22 DIAGNOSIS — Z96.641 STATUS POST RIGHT HIP REPLACEMENT: ICD-10-CM

## 2021-02-22 PROCEDURE — 99024 POSTOP FOLLOW-UP VISIT: CPT | Performed by: ORTHOPAEDIC SURGERY

## 2021-02-22 PROCEDURE — 73502 X-RAY EXAM HIP UNI 2-3 VIEWS: CPT | Mod: RT | Performed by: RADIOLOGY

## 2021-02-22 NOTE — NURSING NOTE
Chief Complaint   Patient presents with     Surgical Followup     4wk POP F/u w/Same day XR, Right total hip arthroplasty - Right DOS: 01/26/2021       89 year old  4/11/1931         [facility] [address] [place] [address]            No Known Allergies        Current Outpatient Medications   Medication     acetaminophen (TYLENOL) 325 MG tablet     amLODIPine (NORVASC) 10 MG tablet     apixaban ANTICOAGULANT (ELIQUIS) 2.5 MG tablet     cyanocobalamin (VITAMIN B-12) 500 MCG tablet     doxazosin (CARDURA) 4 MG tablet     fluticasone (FLONASE) 50 MCG/ACT nasal spray     triamcinolone (KENALOG) 0.1 % external cream     aspirin 81 MG EC tablet     hydrOXYzine (ATARAX) 10 MG tablet     magnesium hydroxide (MILK OF MAGNESIA) 400 MG/5ML suspension     oxyCODONE (ROXICODONE) 5 MG tablet     polyethylene glycol (MIRALAX) 17 g packet     senna-docusate (SENOKOT-S/PERICOLACE) 8.6-50 MG tablet     No current facility-administered medications for this visit.

## 2021-02-22 NOTE — LETTER
2/22/2021         RE: Fish Hong  [address]  [place] [address]        Dear Colleague,    Thank you for referring your patient, Fish Hong, to the [facility] [place]. Please see a copy of my visit note below.        [facility]  Orthopaedic Surgery, Joint Replacement Consultation  by Eyad Gonzalez M.D.    Fish Hong MRN# [id number]   Age: 89 year old YOB: 1931     Requesting physician: Herman Soto     Background history:  DX:  1. Degenerative arthritis of hips and knees    TREATMENTS:  1. Right and left total knee arthroplasty (Dr. Maik Burroughs), [facility]  2. 1/26/2021, R ISAI (Carlos) [facility]        Postoperative hip replacement follow-up clinic visit    Fish Hong is doing well after last surgery listed above.    Preoperative hip pain is   completely resolved    Analgesic medication: none    EXAM:  Incision healing, clean and dry.  Pain free hip motion.  Range of motion limits not tested given early post-op status.  Leg/ankle edema: none  Femoral, peroneal and tibial nerve function: normal  Gait: No pain with weightbearing but has some balance issues.  Level pelvis when standing.    Intraoperative cultures: none     IMAGING:  Radiographs demonstrate the hip implant in satisfactory position without evidence of any complication.    IMPRESSION:  Excellent outcome, to date, after hip replacement.  No complications evident.     PLAN:    Continue range of motion exercises and stretching.    Hip abductor and quadriceps strengthening exercises.    Weight bearing status: as tolerated    Discontinue DVT prophylaxis meds (i.e., warfarin or ASA) unless required for another indication.    Refill of: none needed    Patient given instructions re: prophylactic antibiotic recommendations during dental work.    Next follow-up visit at 1 year after surgery or sooner as needed.       Eyad Gonzalez M.D.                     Ingrid Godwin Professor  Oncology and Adult Reconstructive Surgery  Dept Orthopaedic Surgery, [facility]  [phone] office  [email or web address]    HOOS Hip Dysfunction & Osteoarthritis Outcome Questionnaire    No flowsheet data found.

## 2021-02-22 NOTE — PROGRESS NOTES
[facility]  Orthopaedic Surgery, Joint Replacement Consultation  by Eyad Gonzalez M.D.    Fish Hong MRN# [id number]   Age: 89 year old YOB: 1931     Requesting physician: Herman Soto     Background history:  DX:  1. Degenerative arthritis of hips and knees    TREATMENTS:  1. Right and left total knee arthroplasty (Dr. Maik Burroughs), [facility]  2. 1/26/2021, R ISAI (Carlos) [facility]        Postoperative hip replacement follow-up clinic visit    Fish Hong is doing well after last surgery listed above.    Preoperative hip pain is   completely resolved    Analgesic medication: none    EXAM:  Incision healing, clean and dry.  Pain free hip motion.  Range of motion limits not tested given early post-op status.  Leg/ankle edema: none  Femoral, peroneal and tibial nerve function: normal  Gait: No pain with weightbearing but has some balance issues.  Level pelvis when standing.    Intraoperative cultures: none     IMAGING:  Radiographs demonstrate the hip implant in satisfactory position without evidence of any complication.    IMPRESSION:  Excellent outcome, to date, after hip replacement.  No complications evident.     PLAN:    Continue range of motion exercises and stretching.    Hip abductor and quadriceps strengthening exercises.    Weight bearing status: as tolerated    Discontinue DVT prophylaxis meds (i.e., warfarin or ASA) unless required for another indication.    Refill of: none needed    Patient given instructions re: prophylactic antibiotic recommendations during dental work.    Next follow-up visit at 1 year after surgery or sooner as needed.       Eyad Gonzalez M.D.                    Ingrid Godwin Professor  Oncology and Adult Reconstructive Surgery  Dept Orthopaedic Surgery, [facility]  [phone] office  [email or web address]    HOOS Hip Dysfunction & Osteoarthritis Outcome Questionnaire    No flowsheet data found.

## 2021-03-02 ENCOUNTER — IMMUNIZATION (OUTPATIENT)
Dept: NURSING | Facility: CLINIC | Age: 86
End: 2021-03-02
Attending: FAMILY MEDICINE
Payer: MEDICARE

## 2021-03-02 PROCEDURE — 91300 PR COVID VAC PFIZER DIL RECON 30 MCG/0.3 ML IM: CPT

## 2021-03-02 PROCEDURE — 0002A PR COVID VAC PFIZER DIL RECON 30 MCG/0.3 ML IM: CPT

## 2021-03-10 ENCOUNTER — OFFICE VISIT (OUTPATIENT)
Dept: CARDIOLOGY | Facility: CLINIC | Age: 86
End: 2021-03-10
Attending: INTERNAL MEDICINE
Payer: MEDICARE

## 2021-03-10 VITALS
DIASTOLIC BLOOD PRESSURE: 75 MMHG | WEIGHT: 237 LBS | HEIGHT: 71 IN | OXYGEN SATURATION: 98 % | HEART RATE: 98 BPM | SYSTOLIC BLOOD PRESSURE: 128 MMHG | BODY MASS INDEX: 33.18 KG/M2

## 2021-03-10 DIAGNOSIS — I48.91 ATRIAL FIBRILLATION, UNSPECIFIED TYPE (H): Primary | ICD-10-CM

## 2021-03-10 PROCEDURE — G0463 HOSPITAL OUTPT CLINIC VISIT: HCPCS | Mod: 25

## 2021-03-10 PROCEDURE — 93005 ELECTROCARDIOGRAM TRACING: CPT

## 2021-03-10 PROCEDURE — 99203 OFFICE O/P NEW LOW 30 MIN: CPT | Performed by: INTERNAL MEDICINE

## 2021-03-10 ASSESSMENT — MIFFLIN-ST. JEOR: SCORE: 1762.15

## 2021-03-10 ASSESSMENT — PAIN SCALES - GENERAL: PAINLEVEL: NO PAIN (0)

## 2021-03-10 NOTE — NURSING NOTE
Chief Complaint   Patient presents with     Follow Up     AF found preoperatively and underwent R hip arthroplasty. Rate controlled without medication, Eliquis started.        Vitals were taken, medications reconciled and EKG performed.    Ken Mendoza CMA    2:17 PM

## 2021-03-10 NOTE — PATIENT INSTRUCTIONS
You were seen in Electrophysiology today by: Dr Bueno    Plan:     Medication Changes:     Stop Eliquis    Follow up visit:  As needed      Your Care Team:  EP Cardiology   Telephone Number     Leonie Tyler RN [phone]     For scheduling appts or procedures:    Aileen Solis   [phone]   For the Device Clinic (Pacemakers, ICDs, Loop Recorders)    During business hours: [phone]  After business hours:   [phone]- select option 4 and ask for job code [phone].       Cardiovascular Clinic:   [address] [place] [address]. [place] [address]      As always, Thank you for trusting us with your health care needs!

## 2021-03-10 NOTE — LETTER
3/10/2021      RE: Fish Hong  [address]  [facility] [address]       Dear Colleague,    Thank you for the opportunity to participate in the care of your patient, Fish Hong, at the [facility] [place] at [facility]. Please see a copy of my visit note below.    Electrophysiology Clinic Note  HPI:   Mr. Hong is an 89 year old male who has a past medical history significant for HTN, rheumatic fever, and OA s/p right total hip arthroplasty.    He presented for a right total hip arthroplasty on 11/26/2020 and there was concern for possible AF on his ECG. He was cleared to proceed with surgery, but recommended for follow-up afterwards. He is recovering well post hip replacement.  He has no prior cardiac history. Review of ECGs show SR with some PACs/PVCs but no AF identified. An echo from 11/2020 showed LVEF 55-60%, normal RV size/function, and no significant valvular abnormalities.   He reports feeling well. He continues to be recovering well after his hip surgery. He denies any chest pain/pressures, dizziness, lightheadedness, worsening shortness of breath, leg/ankle swelling, PND, orthopnea, palpitations, or syncopal symptoms. He denies any prior cardiac history including no history of arrhythmias.  Presenting 12 lead ECG shows NSR Vent Rate 81 bpm,  ms,  ms, QTc 434 ms. Current cardiac medications include: Norvasc.     PAST MEDICAL HISTORY:  Past Medical History:   Diagnosis Date     Hematocele      Hypertension      Obesity      Osteoarthritis of both hips     s/p steroid injections     Phimosis      Pulmonary nodules      Rheumatic fever 1948       CURRENT MEDICATIONS:  Current Outpatient Medications   Medication Sig Dispense Refill     acetaminophen (TYLENOL) 325 MG tablet Take 2 tablets (650 mg) by mouth every 4 hours as needed for other (mild pain) (Patient taking differently: Take 500 mg by mouth every 4 hours as  needed for other (mild pain) ) 100 tablet 0     amLODIPine (NORVASC) 10 MG tablet Take 1 tablet (10 mg) by mouth daily (Patient taking differently: Take 10 mg by mouth every morning ) 90 tablet 4     apixaban ANTICOAGULANT (ELIQUIS) 2.5 MG tablet Take 1 tablet (2.5 mg) by mouth 2 times daily 60 tablet 0     aspirin 81 MG EC tablet Take 2 tablets (162 mg) by mouth 2 times daily (Patient not taking: Reported on 2/22/2021) 60 tablet 0     cyanocobalamin (VITAMIN B-12) 500 MCG tablet Take 1 tablet (500 mcg) by mouth daily (Patient taking differently: Take 500 mcg by mouth every morning ) 100 tablet 11     doxazosin (CARDURA) 4 MG tablet Take 1 tablet (4 mg) by mouth At Bedtime 90 tablet 4     fluticasone (FLONASE) 50 MCG/ACT nasal spray Spray 2 sprays into both nostrils every morning 18.2 mL 11     hydrOXYzine (ATARAX) 10 MG tablet Take 1 tablet (10 mg) by mouth every 6 hours as needed for itching or anxiety (with pain, moderate pain) (Patient not taking: Reported on 2/22/2021) 30 tablet 0     magnesium hydroxide (MILK OF MAGNESIA) 400 MG/5ML suspension Take 15 mLs by mouth 2 times daily (Patient not taking: Reported on 2/22/2021) 355 mL 0     oxyCODONE (ROXICODONE) 5 MG tablet Take 1 tablet (5 mg) by mouth every 4 hours as needed (Patient not taking: Reported on 2/22/2021) 40 tablet 0     polyethylene glycol (MIRALAX) 17 g packet Take 17 g by mouth daily (Patient not taking: Reported on 2/22/2021) 7 packet 0     senna-docusate (SENOKOT-S/PERICOLACE) 8.6-50 MG tablet Take 1-2 tablets by mouth 2 times daily Take while on oral narcotics to prevent or treat constipation. (Patient not taking: Reported on 2/22/2021) 30 tablet 0     triamcinolone (KENALOG) 0.1 % external cream Apply sparingly to affected area two times daily as needed (left ankle.lower leg) 80 g 0       PAST SURGICAL HISTORY:  Past Surgical History:   Procedure Laterality Date     ARTHROPLASTY HIP Right 1/26/2021    Procedure: Right total hip arthroplasty;   "Surgeon: Eyad Gonzalez MD;  [facility]     AS TOTAL KNEE ARTHROPLASTY       C TOTAL KNEE ARTHROPLASTY      Bilateral     CIRCUMCISION N/A 2016    Procedure: CIRCUMCISION;  Surgeon: Elgin Rhodes MD;  [facility]     circumcision       ENT SURGERY      detached retina--bilateral     EXPLORE SCROTUM Left 2020    Procedure: EXPLORE LEFT SCROTUM, EVACULATE HEMATOMA, excision of scrotal mass;  Surgeon: Yobani Valenzuela MD;  [facility]     HERNIA REPAIR  11/3/2008    mesh repair umbilical hernia     HERNIORRHAPHY INGUINAL Left 10/8/2019    Procedure: Open Left Inguinal Hernia Repair;  Surgeon: Antonio Landaverde MD;  [facility]     ORCHIECTOMY INGUINAL N/A 10/8/2019    Procedure: Left Orchiectomy;  Surgeon: Antonio Landaverde MD;  [facility]       ALLERGIES:   No Known Allergies    FAMILY HISTORY:  Family History   Problem Relation Age of Onset     No Known Problems Mother      No Known Problems Father      Melanoma No family hx of      Skin Cancer No family hx of      Anesthesia Reaction No family hx of      Cardiovascular No family hx of      Deep Vein Thrombosis (DVT) No family hx of        SOCIAL HISTORY:  Social History     Tobacco Use     Smoking status: Former Smoker     Packs/day: 1.00     Years: 11.00     Pack years: 11.00     Start date: 10/15/1948     Quit date: 1960     Years since quittin.2     Smokeless tobacco: Never Used   Substance Use Topics     Alcohol use: Yes     Comment: very little     Drug use: No       ROS:   A comprehensive 10 point review of systems negative other than as mentioned in HPI.  Exam:  /75 (BP Location: Right arm, Patient Position: Chair, Cuff Size: Adult Large)   Pulse 98   Ht 1.803 m (5' 11\")   Wt 107.5 kg (237 lb)   SpO2 98%   BMI 33.05 kg/m    GENERAL APPEARANCE: alert and no distress  HEENT: MMM. PERRLA.  NECK: supple.   RESPIRATORY: lungs clear to auscultation - no rales, rhonchi or wheezes, no use of " accessory muscles, no retractions, respirations are unlabored, normal respiratory rate  CARDIOVASCULAR: regular rhythm, S1/S2, no murmur.   ABDOMEN: soft, NT, ND, +BS.  EXTREMITIES: peripheral pulses normal, no edema  NEURO: alert and oriented to person/place/time, normal speech, gait and affect  SKIN: no ecchymoses, no rashes  PSYCH: normal affect, cooperative    Labs:  Reviewed.     Testing/Procedures:    1/27/21 ECHOCARDIOGRAM:   Interpretation Summary  Sinus rhythm during study.  Technically difficult study.Extremely poor acoustic windows.  Global and regional left ventricular function is normal with an EF of 55-60%.  Right ventricular wall thickness is normal.  Right ventricular systolic pressure is 34mmHg above the right atrial pressure.  IVC diameter and respiratory changes fall into an intermediate range  suggesting an RA pressure of 8 mmHg.  No pericardial effusion is present.  There is no prior study for direct comparison.      Assessment and Plan:   Mr. Hong is an 89 year old male who has a past medical history significant for HTN, rheumatic fever, and OA s/p right total hip arthroplasty.He presented for a right total hip arthroplasty on 11/26/2020 and there was concern for possible AF on his ECG. Review of ECGs show SR with some PACs/PVCs but no AF identified. An echo from 11/2020 showed LVEF 55-60%, normal RV size/function, and no significant valvular abnormalities. He denies any prior cardiac history including no history of arrhythmias. No EP intervention required.       The patient states understanding and is agreeable with plan.   This patient was seen and evaluated with Dr. Bueno. The above note reflects our joint assessment and plan.   JOE Luna CNP  Pager: [phone]    EP STAFF NOTE  I have seen and examined the patient as part of a shared visit with VI Luna NP.  I agree with the note above. I reviewed today's vital signs and medications. I have reviewed and discussed with the  advanced practice provider their physical examination, assessment, and plan   Briefly, referred for suspicion of AF on EKG before recent surgery  My key history/exam findings are: RRR.   The key management decisions made by me: no AF on EKGs, small P waves and PACs made it look like AF, no further intervention needed from EP standpoint..    Richard Bueno MD [facility]  Cardiology - Electrophysiology

## 2021-03-10 NOTE — PROGRESS NOTES
Electrophysiology Clinic Note  HPI:   Mr. Hong is an 89 year old male who has a past medical history significant for HTN, rheumatic fever, and OA s/p right total hip arthroplasty.    He presented for a right total hip arthroplasty on 11/26/2020 and there was concern for possible AF on his ECG. He was cleared to proceed with surgery, but recommended for follow-up afterwards. He is recovering well post hip replacement.  He has no prior cardiac history. Review of ECGs show SR with some PACs/PVCs but no AF identified. An echo from 11/2020 showed LVEF 55-60%, normal RV size/function, and no significant valvular abnormalities.   He reports feeling well. He continues to be recovering well after his hip surgery. He denies any chest pain/pressures, dizziness, lightheadedness, worsening shortness of breath, leg/ankle swelling, PND, orthopnea, palpitations, or syncopal symptoms. He denies any prior cardiac history including no history of arrhythmias.  Presenting 12 lead ECG shows NSR Vent Rate 81 bpm,  ms,  ms, QTc 434 ms. Current cardiac medications include: Norvasc.     PAST MEDICAL HISTORY:  Past Medical History:   Diagnosis Date     Hematocele      Hypertension      Obesity      Osteoarthritis of both hips     s/p steroid injections     Phimosis      Pulmonary nodules      Rheumatic fever 1948       CURRENT MEDICATIONS:  Current Outpatient Medications   Medication Sig Dispense Refill     acetaminophen (TYLENOL) 325 MG tablet Take 2 tablets (650 mg) by mouth every 4 hours as needed for other (mild pain) (Patient taking differently: Take 500 mg by mouth every 4 hours as needed for other (mild pain) ) 100 tablet 0     amLODIPine (NORVASC) 10 MG tablet Take 1 tablet (10 mg) by mouth daily (Patient taking differently: Take 10 mg by mouth every morning ) 90 tablet 4     apixaban ANTICOAGULANT (ELIQUIS) 2.5 MG tablet Take 1 tablet (2.5 mg) by mouth 2 times daily 60 tablet 0     aspirin 81 MG EC tablet Take 2 tablets  (162 mg) by mouth 2 times daily (Patient not taking: Reported on 2/22/2021) 60 tablet 0     cyanocobalamin (VITAMIN B-12) 500 MCG tablet Take 1 tablet (500 mcg) by mouth daily (Patient taking differently: Take 500 mcg by mouth every morning ) 100 tablet 11     doxazosin (CARDURA) 4 MG tablet Take 1 tablet (4 mg) by mouth At Bedtime 90 tablet 4     fluticasone (FLONASE) 50 MCG/ACT nasal spray Spray 2 sprays into both nostrils every morning 18.2 mL 11     hydrOXYzine (ATARAX) 10 MG tablet Take 1 tablet (10 mg) by mouth every 6 hours as needed for itching or anxiety (with pain, moderate pain) (Patient not taking: Reported on 2/22/2021) 30 tablet 0     magnesium hydroxide (MILK OF MAGNESIA) 400 MG/5ML suspension Take 15 mLs by mouth 2 times daily (Patient not taking: Reported on 2/22/2021) 355 mL 0     oxyCODONE (ROXICODONE) 5 MG tablet Take 1 tablet (5 mg) by mouth every 4 hours as needed (Patient not taking: Reported on 2/22/2021) 40 tablet 0     polyethylene glycol (MIRALAX) 17 g packet Take 17 g by mouth daily (Patient not taking: Reported on 2/22/2021) 7 packet 0     senna-docusate (SENOKOT-S/PERICOLACE) 8.6-50 MG tablet Take 1-2 tablets by mouth 2 times daily Take while on oral narcotics to prevent or treat constipation. (Patient not taking: Reported on 2/22/2021) 30 tablet 0     triamcinolone (KENALOG) 0.1 % external cream Apply sparingly to affected area two times daily as needed (left ankle.lower leg) 80 g 0       PAST SURGICAL HISTORY:  Past Surgical History:   Procedure Laterality Date     ARTHROPLASTY HIP Right 1/26/2021    Procedure: Right total hip arthroplasty;  Surgeon: Eyad Gnozalez MD;  [facility]     AS TOTAL KNEE ARTHROPLASTY       C TOTAL KNEE ARTHROPLASTY      Bilateral     CIRCUMCISION N/A 7/13/2016    Procedure: CIRCUMCISION;  Surgeon: Elgin Rhodes MD;  [facility]     circumcision       ENT SURGERY      detached retina--bilateral     EXPLORE SCROTUM Left 7/14/2020     "Procedure: EXPLORE LEFT SCROTUM, EVACULATE HEMATOMA, excision of scrotal mass;  Surgeon: Yobani Valenzuela MD;  [facility]     HERNIA REPAIR  11/3/2008    mesh repair umbilical hernia     HERNIORRHAPHY INGUINAL Left 10/8/2019    Procedure: Open Left Inguinal Hernia Repair;  Surgeon: Antonio Landaverde MD;  [facility]     ORCHIECTOMY INGUINAL N/A 10/8/2019    Procedure: Left Orchiectomy;  Surgeon: Antonio Landaverde MD;  [facility]       ALLERGIES:   No Known Allergies    FAMILY HISTORY:  Family History   Problem Relation Age of Onset     No Known Problems Mother      No Known Problems Father      Melanoma No family hx of      Skin Cancer No family hx of      Anesthesia Reaction No family hx of      Cardiovascular No family hx of      Deep Vein Thrombosis (DVT) No family hx of        SOCIAL HISTORY:  Social History     Tobacco Use     Smoking status: Former Smoker     Packs/day: 1.00     Years: 11.00     Pack years: 11.00     Start date: 10/15/1948     Quit date: 1960     Years since quittin.2     Smokeless tobacco: Never Used   Substance Use Topics     Alcohol use: Yes     Comment: very little     Drug use: No       ROS:   A comprehensive 10 point review of systems negative other than as mentioned in HPI.  Exam:  /75 (BP Location: Right arm, Patient Position: Chair, Cuff Size: Adult Large)   Pulse 98   Ht 1.803 m (5' 11\")   Wt 107.5 kg (237 lb)   SpO2 98%   BMI 33.05 kg/m    GENERAL APPEARANCE: alert and no distress  HEENT: MMM. PERRLA.  NECK: supple.   RESPIRATORY: lungs clear to auscultation - no rales, rhonchi or wheezes, no use of accessory muscles, no retractions, respirations are unlabored, normal respiratory rate  CARDIOVASCULAR: regular rhythm, S1/S2, no murmur.   ABDOMEN: soft, NT, ND, +BS.  EXTREMITIES: peripheral pulses normal, no edema  NEURO: alert and oriented to person/place/time, normal speech, gait and affect  SKIN: no ecchymoses, no rashes  PSYCH: normal " affect, cooperative    Labs:  Reviewed.     Testing/Procedures:    1/27/21 ECHOCARDIOGRAM:   Interpretation Summary  Sinus rhythm during study.  Technically difficult study.Extremely poor acoustic windows.  Global and regional left ventricular function is normal with an EF of 55-60%.  Right ventricular wall thickness is normal.  Right ventricular systolic pressure is 34mmHg above the right atrial pressure.  IVC diameter and respiratory changes fall into an intermediate range  suggesting an RA pressure of 8 mmHg.  No pericardial effusion is present.  There is no prior study for direct comparison.      Assessment and Plan:   Mr. Hong is an 89 year old male who has a past medical history significant for HTN, rheumatic fever, and OA s/p right total hip arthroplasty.He presented for a right total hip arthroplasty on 11/26/2020 and there was concern for possible AF on his ECG. Review of ECGs show SR with some PACs/PVCs but no AF identified. An echo from 11/2020 showed LVEF 55-60%, normal RV size/function, and no significant valvular abnormalities. He denies any prior cardiac history including no history of arrhythmias. No EP intervention required.       The patient states understanding and is agreeable with plan.   This patient was seen and evaluated with Dr. Bueno. The above note reflects our joint assessment and plan.   JOE Luna CNP  Pager: [phone]    EP STAFF NOTE  I have seen and examined the patient as part of a shared visit with VI Luna NP.  I agree with the note above. I reviewed today's vital signs and medications. I have reviewed and discussed with the advanced practice provider their physical examination, assessment, and plan   Briefly, referred for suspicion of AF on EKG before recent surgery  My key history/exam findings are: RRR.   The key management decisions made by me: no AF on EKGs, small P waves and PACs made it look like AF, no further intervention needed from EP  standpoint..    Richard Bueno MD [facility]  Cardiology - Electrophysiology

## 2021-03-11 LAB — INTERPRETATION ECG - MUSE: NORMAL

## 2021-03-15 ENCOUNTER — TELEPHONE (OUTPATIENT)
Dept: ORTHOPEDICS | Facility: CLINIC | Age: 86
End: 2021-03-15

## 2021-03-15 DIAGNOSIS — Z96.641 STATUS POST RIGHT HIP REPLACEMENT: Primary | ICD-10-CM

## 2021-03-15 RX ORDER — AMOXICILLIN 500 MG/1
CAPSULE ORAL
Qty: 4 CAPSULE | Refills: 3 | Status: SHIPPED | OUTPATIENT
Start: 2021-03-15 | End: 2021-06-10

## 2021-03-15 NOTE — TELEPHONE ENCOUNTER
[facility] Call Center    Phone Message    May a detailed message be left on voicemail: yes     Reason for Call: Other: Patient has a dental appt needs antibotics      Action Taken: Message routed to:  [facility]o    Travel Screening: Not Applicable

## 2021-03-15 NOTE — TELEPHONE ENCOUNTER
Returned call to patient.   Discussed with Fish that he should wait at least 3 months, ideally 6 months before he has any dental cleanings or procedures, this is what Dr. Gonzalez recommends.  This RN discussed that I will send the antibiotic prescription to the [facility] in [place] so it is ready when he needs it.    Fish verbalized understanding of the plan of care.    Hannah Frausto, BSN, RN  RN Care Coordinator, Dr. Carlos UMANA [facility]

## 2021-04-22 ASSESSMENT — ACTIVITIES OF DAILY LIVING (ADL)
ADL_MEAN: .76
ADL_SUM: 13
ADL_SUBSCALE_SCORE: 80.88

## 2021-06-10 ENCOUNTER — OFFICE VISIT (OUTPATIENT)
Dept: INTERNAL MEDICINE | Facility: CLINIC | Age: 86
End: 2021-06-10
Payer: MEDICARE

## 2021-06-10 VITALS
WEIGHT: 241 LBS | OXYGEN SATURATION: 97 % | BODY MASS INDEX: 33.61 KG/M2 | SYSTOLIC BLOOD PRESSURE: 124 MMHG | HEART RATE: 75 BPM | DIASTOLIC BLOOD PRESSURE: 72 MMHG | TEMPERATURE: 98.1 F

## 2021-06-10 DIAGNOSIS — L20.9 ATOPIC DERMATITIS, UNSPECIFIED TYPE: ICD-10-CM

## 2021-06-10 DIAGNOSIS — E53.8 VITAMIN B12 DEFICIENCY (NON ANEMIC): ICD-10-CM

## 2021-06-10 DIAGNOSIS — B35.6 TINEA CRURIS: ICD-10-CM

## 2021-06-10 DIAGNOSIS — Z96.641 STATUS POST RIGHT HIP REPLACEMENT: ICD-10-CM

## 2021-06-10 DIAGNOSIS — I10 ESSENTIAL HYPERTENSION: Primary | ICD-10-CM

## 2021-06-10 PROCEDURE — 99214 OFFICE O/P EST MOD 30 MIN: CPT | Performed by: INTERNAL MEDICINE

## 2021-06-10 RX ORDER — DOXAZOSIN 4 MG/1
4 TABLET ORAL AT BEDTIME
Qty: 90 TABLET | Refills: 4 | Status: SHIPPED | OUTPATIENT
Start: 2021-06-10 | End: 2022-06-15

## 2021-06-10 RX ORDER — KETOCONAZOLE 20 MG/G
CREAM TOPICAL DAILY
Qty: 30 G | Refills: 3 | Status: SHIPPED | OUTPATIENT
Start: 2021-06-10 | End: 2021-10-15

## 2021-06-10 RX ORDER — AMOXICILLIN 500 MG/1
CAPSULE ORAL
Qty: 12 CAPSULE | Refills: 3 | Status: SHIPPED | OUTPATIENT
Start: 2021-06-10 | End: 2022-06-15

## 2021-06-10 RX ORDER — AMLODIPINE BESYLATE 10 MG/1
10 TABLET ORAL DAILY
Qty: 90 TABLET | Refills: 4 | Status: SHIPPED | OUTPATIENT
Start: 2021-06-10 | End: 2022-06-15

## 2021-06-10 RX ORDER — TRIAMCINOLONE ACETONIDE 1 MG/G
CREAM TOPICAL
Qty: 80 G | Refills: 0 | COMMUNITY
Start: 2021-06-10 | End: 2021-06-10

## 2021-06-10 RX ORDER — DOXAZOSIN 4 MG/1
4 TABLET ORAL AT BEDTIME
Qty: 90 TABLET | Refills: 4 | Status: SHIPPED | OUTPATIENT
Start: 2021-06-10 | End: 2021-06-10

## 2021-06-10 RX ORDER — AMLODIPINE BESYLATE 10 MG/1
10 TABLET ORAL DAILY
Qty: 90 TABLET | Refills: 4 | Status: SHIPPED | OUTPATIENT
Start: 2021-06-10 | End: 2021-06-10

## 2021-06-10 RX ORDER — TRIAMCINOLONE ACETONIDE 1 MG/G
CREAM TOPICAL
Qty: 80 G | Refills: 1 | Status: SHIPPED | OUTPATIENT
Start: 2021-06-10 | End: 2021-07-16

## 2021-06-10 NOTE — PROGRESS NOTES
"    Assessment & Plan     Essential hypertension  Good control. Same meds  - amLODIPine (NORVASC) 10 MG tablet  Dispense: 90 tablet; Refill: 4  - doxazosin (CARDURA) 4 MG tablet  Dispense: 90 tablet; Refill: 4    Atopic dermatitis, unspecified type  refilled  - triamcinolone (KENALOG) 0.1 % external cream  Dispense: 80 g; Refill: 1    Tinea cruris   Rx ordered  - ketoconazole (NIZORAL) 2 % external cream  Dispense: 30 g; Refill: 3    Vitamin B12 deficiency (non anemic)  continue    Status post right hip replacement  ordered  - amoxicillin (AMOXIL) 500 MG capsule  Dispense: 12 capsule; Refill: 3               BMI:   Estimated body mass index is 33.61 kg/m  as calculated from the following:    Height as of 3/10/21: 1.803 m (5' 11\").    Weight as of this encounter: 109.3 kg (241 lb).   Weight management plan: Discussed healthy diet and exercise guidelines    Patient Instructions                             I refilled the triamcinolone cream for the leg rash.     Use the ketoconazole to the scrotal and groin areas daily.                                 Please note that I am planning to retire on December 31, 2021.                   You are welcome to continue your care through this clinic.                   For a variety of reasons I will not refer you to a specific provider.                  Another option of course is to switch to a clinic which is closer to your residence.    You could go to the [facility]. The phone number there is [phone].             Return in about 6 months (around 12/10/2021) for follow up of several issues.    Herman Jarrett MD  [facility]    Maria C Whitten is a 89 year old who presents for the following health issues     HPI     Hypertension Follow-up      Do you check your blood pressure regularly outside of the clinic? Yes     Are you following a low salt diet? No    Are your blood pressures ever more than 140 on the top number " (systolic) OR more   than 90 on the bottom number (diastolic), for example 140/90? No      How many servings of fruits and vegetables do you eat daily?  2-3    On average, how many sweetened beverages do you drink each day (Examples: soda, juice, sweet tea, etc.  Do NOT count diet or artificially sweetened beverages)?   0    How many days per week do you exercise enough to make your heart beat faster? 7    How many minutes a day do you exercise enough to make your heart beat faster? 30 - 60    How many days per week do you miss taking your medication? 0        He reports good results from R ISAI. He may need the L ISAI also.              Needs an amox Rx for pre-dental proph.                  Recent echo with Cardiology with good results.          BP control has been good.                Wants a refill of triam cream for ankle dermatitis.              Has inguinal/scrotal/perineal discomfort.                       Voiding well.         meds reviewed.                 Review of Systems         Current Outpatient Medications   Medication Sig Dispense Refill     acetaminophen (TYLENOL) 325 MG tablet Take 2 tablets (650 mg) by mouth every 4 hours as needed for other (mild pain) (Patient taking differently: Take 500 mg by mouth every 4 hours as needed for other (mild pain) ) 100 tablet 0     amLODIPine (NORVASC) 10 MG tablet Take 1 tablet (10 mg) by mouth daily (Patient taking differently: Take 10 mg by mouth every morning ) 90 tablet 4     amoxicillin (AMOXIL) 500 MG capsule Take 2000mg (4 tablets) one hour prior to any dental cleaning or dental/medical procedure. 4 capsule 3     cyanocobalamin (VITAMIN B-12) 500 MCG tablet Take 1 tablet (500 mcg) by mouth daily (Patient taking differently: Take 500 mcg by mouth every morning ) 100 tablet 11     doxazosin (CARDURA) 4 MG tablet Take 1 tablet (4 mg) by mouth At Bedtime 90 tablet 4     fluticasone (FLONASE) 50 MCG/ACT nasal spray Spray 2 sprays into both nostrils every  morning 18.2 mL 11     triamcinolone (KENALOG) 0.1 % external cream Apply sparingly to affected area two times daily as needed (left ankle.lower leg) 80 g 0     Wt Readings from Last 4 Encounters:   06/10/21 109.3 kg (241 lb)   03/10/21 107.5 kg (237 lb)   01/26/21 112 kg (246 lb 14.6 oz)   01/04/21 115.2 kg (254 lb)     Past Surgical History:   Procedure Laterality Date     ARTHROPLASTY HIP Right 1/26/2021    Procedure: Right total hip arthroplasty;  Surgeon: Eyad Gonzalez MD;  [facility]     AS TOTAL KNEE ARTHROPLASTY       C TOTAL KNEE ARTHROPLASTY      Bilateral     CIRCUMCISION N/A 7/13/2016    Procedure: CIRCUMCISION;  Surgeon: Elgin Rhodes MD;  [facility]     circumcision       ENT SURGERY      detached retina--bilateral     EXPLORE SCROTUM Left 7/14/2020    Procedure: EXPLORE LEFT SCROTUM, EVACULATE HEMATOMA, excision of scrotal mass;  Surgeon: Yobani Valenzuela MD;  [facility]     HERNIA REPAIR  11/3/2008    mesh repair umbilical hernia     HERNIORRHAPHY INGUINAL Left 10/8/2019    Procedure: Open Left Inguinal Hernia Repair;  Surgeon: Antonio Landaverde MD;  [facility]     ORCHIECTOMY INGUINAL N/A 10/8/2019    Procedure: Left Orchiectomy;  Surgeon: Antonio Landaverde MD;  [facility]       Objective    /72   Pulse 75   Temp 98.1  F (36.7  C) (Oral)   Wt 109.3 kg (241 lb)   SpO2 97%   BMI 33.61 kg/m    Body mass index is 33.61 kg/m .  Physical Exam   GENERAL APPEARANCE: healthy, alert and no distress  CV: regular rates and rhythm, normal S1 S2, no S3 or S4 and no murmur, click or rub  SKIN: tinea cruris; ankle dermatitis  PSYCH: mentation appears normal and affect normal/bright

## 2021-06-10 NOTE — PATIENT INSTRUCTIONS
I refilled the triamcinolone cream for the leg rash.     Use the ketoconazole to the scrotal and groin areas daily.                                 Please note that I am planning to retire on December 31, 2021.                   You are welcome to continue your care through this clinic.                   For a variety of reasons I will not refer you to a specific provider.                  Another option of course is to switch to a clinic which is closer to your residence.    You could go to the [facility]. The phone number there is [phone].

## 2021-06-14 DIAGNOSIS — J30.2 SEASONAL ALLERGIC RHINITIS, UNSPECIFIED TRIGGER: Primary | ICD-10-CM

## 2021-06-15 RX ORDER — FLUTICASONE PROPIONATE 50 MCG
2 SPRAY, SUSPENSION (ML) NASAL EVERY MORNING
Qty: 16 ML | Refills: 11 | Status: SHIPPED | OUTPATIENT
Start: 2021-06-15 | End: 2022-06-15

## 2021-07-16 DIAGNOSIS — L20.9 ATOPIC DERMATITIS, UNSPECIFIED TYPE: ICD-10-CM

## 2021-07-16 RX ORDER — TRIAMCINOLONE ACETONIDE 1 MG/G
CREAM TOPICAL
Qty: 80 G | Refills: 0 | Status: SHIPPED | OUTPATIENT
Start: 2021-07-16 | End: 2021-08-16

## 2021-07-16 NOTE — TELEPHONE ENCOUNTER
Routing refill request to provider for review/approval because:  Drug interaction warning  Shalini Casey RN  [facility]=

## 2021-08-16 DIAGNOSIS — L20.9 ATOPIC DERMATITIS, UNSPECIFIED TYPE: ICD-10-CM

## 2021-08-16 RX ORDER — TRIAMCINOLONE ACETONIDE 1 MG/G
CREAM TOPICAL
Qty: 80 G | Refills: 0 | Status: SHIPPED | OUTPATIENT
Start: 2021-08-16 | End: 2021-10-15

## 2021-10-15 DIAGNOSIS — B35.6 TINEA CRURIS: ICD-10-CM

## 2021-10-15 DIAGNOSIS — L20.9 ATOPIC DERMATITIS, UNSPECIFIED TYPE: ICD-10-CM

## 2021-10-15 RX ORDER — KETOCONAZOLE 20 MG/G
CREAM TOPICAL DAILY
Qty: 30 G | Refills: 3 | Status: SHIPPED | OUTPATIENT
Start: 2021-10-15 | End: 2022-06-15

## 2021-10-15 RX ORDER — TRIAMCINOLONE ACETONIDE 1 MG/G
CREAM TOPICAL
Qty: 80 G | Refills: 0 | Status: SHIPPED | OUTPATIENT
Start: 2021-10-15 | End: 2022-06-15

## 2021-11-22 DIAGNOSIS — Z96.641 STATUS POST RIGHT HIP REPLACEMENT: Primary | ICD-10-CM

## 2021-11-29 NOTE — TELEPHONE ENCOUNTER
PT requesting orders to address activity limitations w/ LE strengthening, balance and gait training.  Frequency requested 1w1, 2w3, 1w1    [facility] now requests orders and shares plan of care/discharge summaries for some patients through [employer].  Please REPLY TO THIS MESSAGE OR ROUTE BACK TO THE AUTHOR in order to give authorization for orders when needed.  This is considered a verbal order, you will still receive a faxed copy of orders for signature.  Thank you for your assistance in improving collaboration for our patients.      Achieve Goal by 2/20/21  STG:  Pt will be able to execute HEP with cueing from PT needed less than 30% of time in order to improve his autonomy with managing condition.   STG: Pt will be IND with hip precautions with no cueing needed from PT in order to facilitate proper healing of R. ISAI.  Achieve by: 3/6/21  LTG:  Pt will ambulate 500' w/ FWW , Mod IND, no LOB, proper mechanics and pain reported less than 3/10 on average in order to improve his ability to get to and from medical appointments   LTG: Pt will improve TUG score from 20 sec to 14 sec indicated by MCID with no LOB and stability upon turning in order to reduce his risk for falls.   LTG:  Pt will ascend and descend x3 steps with Mod IND and B UE assist with no LOB and pain less than 3/10 reported in order to improve his ability to get to and from MD appointments   LTG:  Pt will execute STS transfer with Mod IND and proper mechanics with pain reported less than 3/10 on average in order to improve his ability to transfer and get to and from the bathroom.     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   The area of positive cancer is as marked on the Mohs map.

## 2022-01-13 ENCOUNTER — OFFICE VISIT (OUTPATIENT)
Dept: ORTHOPEDICS | Facility: CLINIC | Age: 87
End: 2022-01-13
Payer: MEDICARE

## 2022-01-13 ENCOUNTER — ANCILLARY PROCEDURE (OUTPATIENT)
Dept: GENERAL RADIOLOGY | Facility: CLINIC | Age: 87
End: 2022-01-13
Attending: ORTHOPAEDIC SURGERY
Payer: MEDICARE

## 2022-01-13 VITALS — HEIGHT: 71 IN | WEIGHT: 241 LBS | BODY MASS INDEX: 33.74 KG/M2

## 2022-01-13 DIAGNOSIS — Z96.641 STATUS POST RIGHT HIP REPLACEMENT: ICD-10-CM

## 2022-01-13 DIAGNOSIS — Z96.641 STATUS POST RIGHT HIP REPLACEMENT: Primary | ICD-10-CM

## 2022-01-13 PROCEDURE — 99213 OFFICE O/P EST LOW 20 MIN: CPT | Performed by: ORTHOPAEDIC SURGERY

## 2022-01-13 PROCEDURE — 73502 X-RAY EXAM HIP UNI 2-3 VIEWS: CPT | Mod: RT | Performed by: RADIOLOGY

## 2022-01-13 RX ORDER — POLYETHYLENE GLYCOL 3350 17 G/17G
POWDER, FOR SOLUTION ORAL
COMMUNITY
Start: 2021-01-27

## 2022-01-13 RX ORDER — NEOMYCIN SULFATE, POLYMYXIN B SULFATE, AND DEXAMETHASONE 3.5; 10000; 1 MG/G; [USP'U]/G; MG/G
OINTMENT OPHTHALMIC
COMMUNITY
Start: 2021-12-02

## 2022-01-13 ASSESSMENT — HOOS S4: HOW SEVERE IS YOUR HIP JOINT STIFFNESS AFTER FIRST WAKENING IN THE MORNING?: MILD

## 2022-01-13 ASSESSMENT — ACTIVITIES OF DAILY LIVING (ADL)
ADL_MEAN: .71
ADL_SUM: 12
ADL_SUBSCALE_SCORE: 82.35

## 2022-01-13 ASSESSMENT — MIFFLIN-ST. JEOR: SCORE: 1775.3

## 2022-01-13 NOTE — PROGRESS NOTES
[facility]  Orthopaedic Surgery, Joint Replacement Consultation  by Eyad Gonzalez M.D.    Fish Hong MRN# [id number]   Age: 89 year old YOB: 1931     Requesting physician: Herman Soto     Background history:  DX:  1. Degenerative arthritis of hips and knees    TREATMENTS:  1. Right and left total knee arthroplasty (Dr. Maik Burroughs), [facility]  2. 1/26/2021, R ISAI (Carlos) [facility]     Patient is doing quite well 1 year after right total hip arthroplasty procedure.  He notes that he is beginning to have some discomfort in his left groin region.  It is not intolerable however.  Otherwise he has no complaints.  He is ambulating and performing all regular activities.    Examination demonstrates that he is a full range of motion of the right hip without pain.  Full range of motion of the left hip as well but some pain at the extremes of motion.   His gait is normal.    Radiographs demonstrate right total of arthroplasty in satisfactory position without complication.  Mild degenerative disease of the left hip joint is noted.  Leg lengths are symmetric.    Impression and plan:  Satisfactory outcome with excellent function after right total hip arthroplasty.  Patient would like to consider whether not to undergo a left total hip arthroplasty and advised that we obtain an x-ray in 1 years time to consider whether or not progressive arthritis is present.    Patient also expressed desire to transfer his primary care to this facility.  I will make the appropriate referrals.    Return for follow-up in 1 years time with AP pelvis and frog-leg lateral view x-ray of both hip joints.    MD Ingrid Powers Family Professor  Oncology and Adult Reconstructive Surgery  Dept Orthopaedic Surgery, formerly [facility]  [phone] office, [phone] pager  [email or web address]    Total combined visit time and work time before and after clinic visit = 20 min

## 2022-01-13 NOTE — LETTER
1/13/2022         RE: Fish Hong  [address]  [place] [address]        Dear Colleague,    Thank you for referring your patient, Fish Hong, to the [facility] [place]. Please see a copy of my visit note below.        [facility]  Orthopaedic Surgery, Joint Replacement Consultation  by Eyad Gonzalez M.D.    Fish Hong MRN# [id number]   Age: 89 year old YOB: 1931     Requesting physician: Herman Soto     Background history:  DX:  1. Degenerative arthritis of hips and knees    TREATMENTS:  1. Right and left total knee arthroplasty (Dr. Maik Burroughs), [facility]  2. 1/26/2021, R ISAI (Carlos) [facility]     Patient is doing quite well 1 year after right total hip arthroplasty procedure.  He notes that he is beginning to have some discomfort in his left groin region.  It is not intolerable however.  Otherwise he has no complaints.  He is ambulating and performing all regular activities.    Examination demonstrates that he is a full range of motion of the right hip without pain.  Full range of motion of the left hip as well but some pain at the extremes of motion.   His gait is normal.    Radiographs demonstrate right total of arthroplasty in satisfactory position without complication.  Mild degenerative disease of the left hip joint is noted.  Leg lengths are symmetric.    Impression and plan:  Satisfactory outcome with excellent function after right total hip arthroplasty.  Patient would like to consider whether not to undergo a left total hip arthroplasty and advised that we obtain an x-ray in 1 years time to consider whether or not progressive arthritis is present.    Patient also expressed desire to transfer his primary care to this facility.  I will make the appropriate referrals.    Return for follow-up in 1 years time with AP pelvis and frog-leg lateral view x-ray of both hip joints.    Eyad Gonzalez MD  [facility]  Family Professor  Oncology and Adult Reconstructive Surgery  Dept Orthopaedic Surgery, [facility]  [phone] office, [phone] pager  [email or web address]    Total combined visit time and work time before and after clinic visit = 20 min

## 2022-01-13 NOTE — NURSING NOTE
ATC sent recall letter for patient to schedule 1 year follow up with 60 days notice.     Yara Vital ATC

## 2022-01-13 NOTE — NURSING NOTE
"Chief Complaint   Patient presents with     RECHECK     1 year f/u DOS 1/26/2021 Right ISAI       89 year old  4/11/1931    Ht 1.803 m (5' 11\")   Wt 109.3 kg (241 lb)   BMI 33.61 kg/m         Pain Assessment  Patient Currently in Pain: No     No Known Allergies    Current Outpatient Medications   Medication     acetaminophen (TYLENOL) 325 MG tablet     amLODIPine (NORVASC) 10 MG tablet     cyanocobalamin (VITAMIN B-12) 500 MCG tablet     doxazosin (CARDURA) 4 MG tablet     fluticasone (FLONASE) 50 MCG/ACT nasal spray     triamcinolone (KENALOG) 0.1 % external cream     amoxicillin (AMOXIL) 500 MG capsule     ketoconazole (NIZORAL) 2 % external cream     neomycin-polymyxin-dexamethasone (MAXITROL) 3.5-95840-8.1 ophthalmic ointment     polyethylene glycol (MIRALAX) 17 GM/Dose powder     No current facility-administered medications for this visit.       Questionnaires:    HOOS Hip Dysfunction & Osteoarthritis Outcome Questionnaire    Hip Dysfunction & Osteoarthritis Outcome Score (HOOS), English Version LK 2.0 (Maury Zavaleta, [employer], 2003) 1/13/2022   S1. Do you feel grinding, hear clicking or any other type of noise from your hip? Rarely   S2. Difficulties spreading legs wide apart None   S3. Difficulties to stride out when walking Mild   S4. How severe is your hip joint stiffness after first wakening in the morning? Mild   S5. How severe is your hip stiffness after sitting, lying or resting LATER IN THE DAY? Mild   Symptom Count 5   Symptom Sum 4   Symptom Mean 0.8   Symptom Subscale Score 80   P1. How often is your hip painful? Monthly   P2. Straightening your hip fully Mild   P3. Bending your hip FULLY None   P4. Walking on a flat surface None   P5. Going up or down stairs Mild   P6. At night while in bed None   P7. Sitting or lying Mild   P8. Standing upright Mild   P9. Walking on a hard surface (asphalt, concrete, etc.) Mild   P10. Walking on an uneven surface Mild   Pain Count 10   Pain Sum 7   Pain " Mean 0.7   Pain Subscale Score 82.5   A1. Descending stairs Mild   A2. Ascending stairs Mild   A3. Rising from sitting None   A4. Standing Mild   A5. Bending to the floor/ an object Mild   A6. Walking on a flat surface Mild   A7. Getting in/out of car Mild   A8. Going shopping Mild   A9. Putting on socks/stockings Mild   A10. Rising from bed None   A11. Taking off socks/stockings Mild   A12. Lying in bed (turning over, maintaining hip position) None   A13. Getting in/out of bed Mild   A14. Sitting Mild   A15. Getting on/off toilet None   A16. Heavy domestic duties (moving heavy boxes, scrubbing floors, etc.) Mild   A17. Light domestic duties (cooking, dusting, etc.) None   ADL Count 17   ADL Sum 12   ADL Mean 0.71   ADL Subscale Score 82.35   SP1. Squatting Mild   SP2. Running Mild   SP3. Twisting/pivoting on loaded leg Mild   SP4. Walking on uneven surface Mild   Sports/Rec Count 4   Sports/Rec Sum 4   Sports/Rec Mean 1   Sports/Rec Subscale Score 75   Q1. How often are you aware of your hip problem? Monthly   Q2. Have you modified you life style to avoid activities potentially damaging to your hip? Mildly   Q3. How much are you troubled with lack of confidence in your hip? Mildly   Q4. In general, how much difficulty do you have with your hip? Mild   QOL Count 4   QOL Sum 4   QOL Mean 1   Quality of Life Subscale Score 75        Promis 10 Assessment    PROMIS 10 1/13/2022   In general, would you say your health is: Good   In general, would you say your quality of life is: Good   In general, how would you rate your physical health? Good   In general, how would you rate your mental health, including your mood and your ability to think? Very good   In general, how would you rate your satisfaction with your social activities and relationships? Very good   In general, please rate how well you carry out your usual social activities and roles Good   To what extent are you able to carry out your everyday physical  activities such as walking, climbing stairs, carrying groceries, or moving a chair? Mostly   How often have you been bothered by emotional problems such as feeling anxious, depressed or irritable? Rarely   How would you rate your fatigue on average? Moderate   How would you rate your pain on average?   0 = No Pain  to  10 = Worst Imaginable Pain 5   In general, would you say your health is: 3   In general, would you say your quality of life is: 3   In general, how would you rate your physical health? 3   In general, how would you rate your mental health, including your mood and your ability to think? 4   In general, how would you rate your satisfaction with your social activities and relationships? 4   In general, please rate how well you carry out your usual social activities and roles. (This includes activities at home, at work and in your community, and responsibilities as a parent, child, spouse, employee, friend, etc.) 3   To what extent are you able to carry out your everyday physical activities such as walking, climbing stairs, carrying groceries, or moving a chair? 4   In the past 7 days, how often have you been bothered by emotional problems such as feeling anxious, depressed, or irritable? 2   In the past 7 days, how would you rate your fatigue on average? 3   In the past 7 days, how would you rate your pain on average, where 0 means no pain, and 10 means worst imaginable pain? 5   Global Mental Health Score 15   Global Physical Health Score 13   PROMIS TOTAL - SUBSCORES 28   Some recent data might be hidden

## 2022-04-05 NOTE — TELEPHONE ENCOUNTER
Patient is scheduled for surgery with Dr. Gonzalez    Spoke or left message with: Patient    Date of Surgery: 1/26/21    Location: [place]    Post op: 4 week    Pre-op with surgeon (if applicable): Complete    H&P: Scheduled with PAC 1/4/21    Additional imaging/appointments: N/A    Surgery packet: Received in clinic     Additional comments: COVID scheduled for 1/23/21 at [facility]   Bactrim Counseling:  I discussed with the patient the risks of sulfa antibiotics including but not limited to GI upset, allergic reaction, drug rash, diarrhea, dizziness, photosensitivity, and yeast infections.  Rarely, more serious reactions can occur including but not limited to aplastic anemia, agranulocytosis, methemoglobinemia, blood dyscrasias, liver or kidney failure, lung infiltrates or desquamative/blistering drug rashes.

## 2022-06-13 NOTE — PROGRESS NOTES
HPI:    Mr. Hong comes into establish care today. He does not smoke nor abuse EtOH. He as several daughters who live locally and look after him. He does live alone. He states was a  for about 35 years. He has a little lower back pain when he stands too long. He has a portable walker he uses just to be safe. No other HEENT, cardiopulmonary, abdominal, , neurological, systemic, psychiatric, lymphatic, endocrine, vascular complaints.     Past Medical History:   Diagnosis Date     Hematocele      Hypertension      Obesity      Osteoarthritis of both hips     s/p steroid injections     Phimosis      Pulmonary nodules      Rheumatic fever 1948     Past Surgical History:   Procedure Laterality Date     ARTHROPLASTY HIP Right 1/26/2021    Procedure: Right total hip arthroplasty;  Surgeon: Eyad Gonzalez MD;  [facility]     AS TOTAL KNEE ARTHROPLASTY       CIRCUMCISION N/A 7/13/2016    Procedure: CIRCUMCISION;  Surgeon: Elgin Rhodes MD;  Location: [facility] OR     circumcision       ENT SURGERY      detached retina--bilateral     EXPLORE SCROTUM Left 7/14/2020    Procedure: EXPLORE LEFT SCROTUM, EVACULATE HEMATOMA, excision of scrotal mass;  Surgeon: Yobani Valenzuela MD;  [facility] OR     HERNIA REPAIR  11/3/2008    mesh repair umbilical hernia     HERNIORRHAPHY INGUINAL Left 10/8/2019    Procedure: Open Left Inguinal Hernia Repair;  Surgeon: Antonio Landaverde MD;  [facility]     ORCHIECTOMY INGUINAL N/A 10/8/2019    Procedure: Left Orchiectomy;  Surgeon: Antonio Landaverde MD;  [facility]     ZZC TOTAL KNEE ARTHROPLASTY      Bilateral     PE:    Vitals noted, gen, nad, cooperative, alert, neck supple nl rom, lungs with good air movement, RRR, S1, S2, no MRG, abdomen, no acute findings, Grossly normal neurological exam. No B LE swelling. He has several skin changes on B dorsum of hands and B forearms.     A/P:    1. Immunizations; COVID Pfizer vaccine x 4. Pneumococcal 23  "done 10/20/2009. Prevnar 13 done 4/9/2018.  Prevnar 20 vaccine today. Tdap 3/31/2015. Check with insurance regarding Shingrix.   2. HTN; on Amlodipine; somewhat elevated today 151/81 and will follow.   3. Antibiotic prophylaxis before dental work with Amoxicillin for total R hip replacement  4. BPH on Doxazosin   5. B12 PO supplemention  6. Dermatology; I recommend a dermatology visit for a skin check but he declines.   7. Seen Dr. Gonzalez, orthopedics 1/13/2022 for follow up R TKA  8. Seen Cardiology, Dr. Bueno, 3/10/2021 for concerns about possible Aifb  9. Seen Urology, Ms. Silva 7/30/2020 for L scrotal mass  10. L inguinal hernia repair. See post-op surgery note from Dr. Landaverde 11/21/2019  11. Lipids; ordered today 6/15/2022      I will see him back in 3 months.     30 minutes spent on the date of the encounter doing chart review, history and exam, documentation and further activities as noted above \"exclusive of procedures and other billable interpretations  "

## 2022-06-15 ENCOUNTER — LAB (OUTPATIENT)
Dept: LAB | Facility: CLINIC | Age: 87
End: 2022-06-15
Payer: MEDICARE

## 2022-06-15 ENCOUNTER — OFFICE VISIT (OUTPATIENT)
Dept: INTERNAL MEDICINE | Facility: CLINIC | Age: 87
End: 2022-06-15
Payer: MEDICARE

## 2022-06-15 VITALS
BODY MASS INDEX: 34.03 KG/M2 | SYSTOLIC BLOOD PRESSURE: 151 MMHG | OXYGEN SATURATION: 94 % | WEIGHT: 244 LBS | HEART RATE: 82 BPM | DIASTOLIC BLOOD PRESSURE: 81 MMHG

## 2022-06-15 DIAGNOSIS — B35.6 TINEA CRURIS: ICD-10-CM

## 2022-06-15 DIAGNOSIS — Z23 NEED FOR VACCINATION: ICD-10-CM

## 2022-06-15 DIAGNOSIS — Z13.220 SCREENING CHOLESTEROL LEVEL: Primary | ICD-10-CM

## 2022-06-15 DIAGNOSIS — Z96.641 STATUS POST RIGHT HIP REPLACEMENT: ICD-10-CM

## 2022-06-15 DIAGNOSIS — E53.8 VITAMIN B12 DEFICIENCY (NON ANEMIC): ICD-10-CM

## 2022-06-15 DIAGNOSIS — J30.2 SEASONAL ALLERGIC RHINITIS, UNSPECIFIED TRIGGER: ICD-10-CM

## 2022-06-15 DIAGNOSIS — L20.9 ATOPIC DERMATITIS, UNSPECIFIED TYPE: ICD-10-CM

## 2022-06-15 DIAGNOSIS — I10 ESSENTIAL HYPERTENSION: ICD-10-CM

## 2022-06-15 DIAGNOSIS — Z13.220 SCREENING CHOLESTEROL LEVEL: ICD-10-CM

## 2022-06-15 LAB
ALBUMIN SERPL-MCNC: 3.5 G/DL (ref 3.4–5)
ALP SERPL-CCNC: 86 U/L (ref 40–150)
ALT SERPL W P-5'-P-CCNC: 17 U/L (ref 0–70)
ANION GAP SERPL CALCULATED.3IONS-SCNC: 9 MMOL/L (ref 3–14)
AST SERPL W P-5'-P-CCNC: 13 U/L (ref 0–45)
BASOPHILS # BLD AUTO: 0 10E3/UL (ref 0–0.2)
BASOPHILS NFR BLD AUTO: 0 %
BILIRUB SERPL-MCNC: 0.4 MG/DL (ref 0.2–1.3)
BUN SERPL-MCNC: 26 MG/DL (ref 7–30)
CALCIUM SERPL-MCNC: 9.1 MG/DL (ref 8.5–10.1)
CHLORIDE BLD-SCNC: 109 MMOL/L (ref 94–109)
CHOLEST SERPL-MCNC: 169 MG/DL
CO2 SERPL-SCNC: 24 MMOL/L (ref 20–32)
CREAT SERPL-MCNC: 1.3 MG/DL (ref 0.66–1.25)
EOSINOPHIL # BLD AUTO: 0.2 10E3/UL (ref 0–0.7)
EOSINOPHIL NFR BLD AUTO: 4 %
ERYTHROCYTE [DISTWIDTH] IN BLOOD BY AUTOMATED COUNT: 12.8 % (ref 10–15)
FASTING STATUS PATIENT QL REPORTED: YES
GFR SERPL CREATININE-BSD FRML MDRD: 52 ML/MIN/1.73M2
GLUCOSE BLD-MCNC: 101 MG/DL (ref 70–99)
HCT VFR BLD AUTO: 42.6 % (ref 40–53)
HDLC SERPL-MCNC: 39 MG/DL
HGB BLD-MCNC: 14.4 G/DL (ref 13.3–17.7)
IMM GRANULOCYTES # BLD: 0 10E3/UL
IMM GRANULOCYTES NFR BLD: 0 %
LDLC SERPL CALC-MCNC: 110 MG/DL
LYMPHOCYTES # BLD AUTO: 1.7 10E3/UL (ref 0.8–5.3)
LYMPHOCYTES NFR BLD AUTO: 30 %
MCH RBC QN AUTO: 29.9 PG (ref 26.5–33)
MCHC RBC AUTO-ENTMCNC: 33.8 G/DL (ref 31.5–36.5)
MCV RBC AUTO: 88 FL (ref 78–100)
MONOCYTES # BLD AUTO: 0.4 10E3/UL (ref 0–1.3)
MONOCYTES NFR BLD AUTO: 7 %
NEUTROPHILS # BLD AUTO: 3.4 10E3/UL (ref 1.6–8.3)
NEUTROPHILS NFR BLD AUTO: 59 %
NONHDLC SERPL-MCNC: 130 MG/DL
NRBC # BLD AUTO: 0 10E3/UL
NRBC BLD AUTO-RTO: 0 /100
PLATELET # BLD AUTO: 171 10E3/UL (ref 150–450)
POTASSIUM BLD-SCNC: 4.1 MMOL/L (ref 3.4–5.3)
PROT SERPL-MCNC: 7.2 G/DL (ref 6.8–8.8)
RBC # BLD AUTO: 4.82 10E6/UL (ref 4.4–5.9)
SODIUM SERPL-SCNC: 142 MMOL/L (ref 133–144)
TRIGL SERPL-MCNC: 100 MG/DL
VIT B12 SERPL-MCNC: 535 PG/ML (ref 193–986)
WBC # BLD AUTO: 5.8 10E3/UL (ref 4–11)

## 2022-06-15 PROCEDURE — 83921 ORGANIC ACID SINGLE QUANT: CPT | Performed by: INTERNAL MEDICINE

## 2022-06-15 PROCEDURE — 99214 OFFICE O/P EST MOD 30 MIN: CPT | Mod: 25 | Performed by: INTERNAL MEDICINE

## 2022-06-15 PROCEDURE — 36415 COLL VENOUS BLD VENIPUNCTURE: CPT | Performed by: PATHOLOGY

## 2022-06-15 PROCEDURE — G0009 ADMIN PNEUMOCOCCAL VACCINE: HCPCS | Performed by: INTERNAL MEDICINE

## 2022-06-15 PROCEDURE — 80053 COMPREHEN METABOLIC PANEL: CPT | Performed by: PATHOLOGY

## 2022-06-15 PROCEDURE — 85025 COMPLETE CBC W/AUTO DIFF WBC: CPT | Performed by: PATHOLOGY

## 2022-06-15 PROCEDURE — 90677 PCV20 VACCINE IM: CPT | Performed by: INTERNAL MEDICINE

## 2022-06-15 PROCEDURE — 80061 LIPID PANEL: CPT | Performed by: PATHOLOGY

## 2022-06-15 PROCEDURE — 82607 VITAMIN B-12: CPT | Performed by: PATHOLOGY

## 2022-06-15 RX ORDER — KETOCONAZOLE 20 MG/G
CREAM TOPICAL DAILY
Qty: 30 G | Refills: 3 | Status: SHIPPED | OUTPATIENT
Start: 2022-06-15 | End: 2023-05-23

## 2022-06-15 RX ORDER — UREA 10 %
500 LOTION (ML) TOPICAL DAILY
Qty: 100 TABLET | Refills: 11 | Status: SHIPPED | OUTPATIENT
Start: 2022-06-15 | End: 2023-08-02

## 2022-06-15 RX ORDER — AMLODIPINE BESYLATE 10 MG/1
10 TABLET ORAL DAILY
Qty: 90 TABLET | Refills: 4 | Status: SHIPPED | OUTPATIENT
Start: 2022-06-15 | End: 2023-07-21

## 2022-06-15 RX ORDER — FLUTICASONE PROPIONATE 50 MCG
2 SPRAY, SUSPENSION (ML) NASAL EVERY MORNING
Qty: 16 ML | Refills: 11 | Status: SHIPPED | OUTPATIENT
Start: 2022-06-15 | End: 2023-07-25

## 2022-06-15 RX ORDER — TRIAMCINOLONE ACETONIDE 1 MG/G
CREAM TOPICAL
Qty: 80 G | Refills: 1 | Status: SHIPPED | OUTPATIENT
Start: 2022-06-15 | End: 2022-12-08

## 2022-06-15 RX ORDER — DOXAZOSIN 4 MG/1
4 TABLET ORAL AT BEDTIME
Qty: 90 TABLET | Refills: 4 | Status: SHIPPED | OUTPATIENT
Start: 2022-06-15 | End: 2023-07-21

## 2022-06-15 RX ORDER — AMOXICILLIN 500 MG/1
CAPSULE ORAL
Qty: 12 CAPSULE | Refills: 3 | Status: SHIPPED | OUTPATIENT
Start: 2022-06-15 | End: 2023-05-09

## 2022-06-15 ASSESSMENT — PAIN SCALES - GENERAL: PAINLEVEL: NO PAIN (0)

## 2022-06-22 LAB — METHYLMALONATE SERPL-SCNC: 0.24 UMOL/L (ref 0–0.4)

## 2022-11-15 NOTE — PROGRESS NOTES
HPI:    Overall doing well. He has chronic lower R back pain. He had an MRI last lumbar 2013. He states he had a back injection that did not do that much. He has multiple family members in town. No other HEENT, cardiopulmonary, abdominal, , neurological, systemic, psychiatric, lymphatic, endocrine, vascular complaints. He still drives. He lives close by in [place]. He uses a walker sometimes.     Past Medical History:   Diagnosis Date     Hematocele      Hypertension      Obesity      Osteoarthritis of both hips     s/p steroid injections     Phimosis      Pulmonary nodules      Rheumatic fever 1948     Past Surgical History:   Procedure Laterality Date     ARTHROPLASTY HIP Right 1/26/2021    Procedure: Right total hip arthroplasty;  Surgeon: Eyad Gonzalez MD;  [facility]     AS TOTAL KNEE ARTHROPLASTY       CIRCUMCISION N/A 7/13/2016    Procedure: CIRCUMCISION;  Surgeon: Elgin Rhodes MD;  [facility]     circumcision       ENT SURGERY      detached retina--bilateral     EXPLORE SCROTUM Left 7/14/2020    Procedure: EXPLORE LEFT SCROTUM, EVACULATE HEMATOMA, excision of scrotal mass;  Surgeon: Yobani Valenzuela MD;  [facility]     HERNIA REPAIR  11/3/2008    mesh repair umbilical hernia     HERNIORRHAPHY INGUINAL Left 10/8/2019    Procedure: Open Left Inguinal Hernia Repair;  Surgeon: Antonio Landaverde MD;  [facility]     ORCHIECTOMY INGUINAL N/A 10/8/2019    Procedure: Left Orchiectomy;  Surgeon: Antonio Landaverde MD;  [facility]     ZZC TOTAL KNEE ARTHROPLASTY      Bilateral     PE:    Vitals noted, gen, nad, cooperative, alert, neck supple, nl rom, lungs with good air movement, RRR, S1, S2, no MRG, abdomen, no acute findings. Grossly normal neurologial exam. Some minor tenderness to palpation lower R back.     A/P:    1. Immunizations; COVID Pfizer vaccine x 4. Pneumococcal 23 done 10/20/2009. Prevnar 13 done 4/9/2018.  Prevnar 20 vaccine done on  6/15/2022.  Tdap 3/31/2015. Check with insurance regarding Shingrix. Influenza vaccine today 11/16/2022 and new Bi-valent COVID vaccine.   2. HTN; on Amlodipine.    3. Antibiotic prophylaxis before dental work with Amoxicillin for total R hip replacement  4. BPH on Doxazosin   5. B12 PO supplemention  6. Dermatology; I recommend a dermatology visit for a skin check but he declines.   7. Seen Dr. Gonzalez, orthopedics 1/13/2022 for follow up R TKA and next visit 1/9/2023  8. Seen Cardiology, Dr. Bueno, 3/10/2021 for concerns about possible Aifb  9. Seen Urology, Ms. Silva 7/30/2020 for L scrotal mass  10. L inguinal hernia repair. See post-op surgery note from Dr. Landaverde 11/21/2019  11. Lipids;  6/15/2022; , HDL 39 and TG's 100  12. MRI lumbar with R sided L5-S1 disc protrusion for back pain was done 6/5/2013. Re-ordered today and will see him back in about one month.             30 minutes spent on the date of the encounter doing chart review, history and exam, documentation and further activities as noted above exclusive of procedures and other billable interpretations

## 2022-11-16 ENCOUNTER — OFFICE VISIT (OUTPATIENT)
Dept: INTERNAL MEDICINE | Facility: CLINIC | Age: 87
End: 2022-11-16
Payer: MEDICARE

## 2022-11-16 VITALS
DIASTOLIC BLOOD PRESSURE: 80 MMHG | OXYGEN SATURATION: 95 % | BODY MASS INDEX: 34.05 KG/M2 | SYSTOLIC BLOOD PRESSURE: 139 MMHG | HEART RATE: 91 BPM | WEIGHT: 244.1 LBS

## 2022-11-16 DIAGNOSIS — Z23 NEED FOR VACCINATION: ICD-10-CM

## 2022-11-16 DIAGNOSIS — G89.29 OTHER CHRONIC BACK PAIN: Primary | ICD-10-CM

## 2022-11-16 DIAGNOSIS — M54.89 OTHER CHRONIC BACK PAIN: Primary | ICD-10-CM

## 2022-11-16 PROCEDURE — 99214 OFFICE O/P EST MOD 30 MIN: CPT | Mod: 25 | Performed by: INTERNAL MEDICINE

## 2022-11-16 PROCEDURE — 91312 COVID-19,PF,PFIZER BOOSTER BIVALENT: CPT | Performed by: INTERNAL MEDICINE

## 2022-11-16 PROCEDURE — G0008 ADMIN INFLUENZA VIRUS VAC: HCPCS | Performed by: INTERNAL MEDICINE

## 2022-11-16 PROCEDURE — 0124A COVID-19,PF,PFIZER BOOSTER BIVALENT: CPT | Performed by: INTERNAL MEDICINE

## 2022-11-16 PROCEDURE — 90662 IIV NO PRSV INCREASED AG IM: CPT | Performed by: INTERNAL MEDICINE

## 2022-11-16 NOTE — NURSING NOTE
Fish Hong is a 89 year old male that presents in clinic today for the following:     Chief Complaint   Patient presents with     Follow Up     Pt here for 3 month follow up; pt wants covid and flu shot       The patient's allergies and medications were reviewed. The patient's vitals were obtained without incident. The patient does not have any other questions for the provider.     Teetee Navarro, EMT at 7:30 AM on 11/16/2022.  Primary Care Clinic: [phone]

## 2022-11-17 DIAGNOSIS — Z96.641 STATUS POST RIGHT HIP REPLACEMENT: Primary | ICD-10-CM

## 2022-11-17 DIAGNOSIS — M25.552 LEFT HIP PAIN: ICD-10-CM

## 2022-11-22 ENCOUNTER — ANCILLARY PROCEDURE (OUTPATIENT)
Dept: MRI IMAGING | Facility: CLINIC | Age: 87
End: 2022-11-22
Attending: INTERNAL MEDICINE
Payer: MEDICARE

## 2022-11-22 DIAGNOSIS — M54.89 OTHER CHRONIC BACK PAIN: ICD-10-CM

## 2022-11-22 DIAGNOSIS — G89.29 OTHER CHRONIC BACK PAIN: ICD-10-CM

## 2022-11-22 PROCEDURE — 72148 MRI LUMBAR SPINE W/O DYE: CPT | Mod: MF | Performed by: STUDENT IN AN ORGANIZED HEALTH CARE EDUCATION/TRAINING PROGRAM

## 2022-11-22 PROCEDURE — G1010 CDSM STANSON: HCPCS | Mod: GC | Performed by: STUDENT IN AN ORGANIZED HEALTH CARE EDUCATION/TRAINING PROGRAM

## 2022-11-28 NOTE — TELEPHONE ENCOUNTER
DIAGNOSIS: Lumbar Pain    APPOINTMENT DATE: 11/29/2022   NOTES STATUS DETAILS   OFFICE NOTE from referring provider Internal 11/16/2022 - Colby Desouza MD - [facility]   MEDICATION LIST Internal    LABS     CBC/DIFF Internal 06/15/2022   INJECTIONS DONE IN RADIOLOGY Internal 06/12/2013   MRI Internal    CT SCAN Internal    XRAYS (IMAGES & REPORTS) Internal

## 2022-11-29 ENCOUNTER — ANCILLARY PROCEDURE (OUTPATIENT)
Dept: GENERAL RADIOLOGY | Facility: CLINIC | Age: 87
End: 2022-11-29
Attending: ORTHOPAEDIC SURGERY
Payer: MEDICARE

## 2022-11-29 ENCOUNTER — PRE VISIT (OUTPATIENT)
Dept: ORTHOPEDICS | Facility: CLINIC | Age: 87
End: 2022-11-29

## 2022-11-29 ENCOUNTER — OFFICE VISIT (OUTPATIENT)
Dept: ORTHOPEDICS | Facility: CLINIC | Age: 87
End: 2022-11-29
Attending: INTERNAL MEDICINE
Payer: MEDICARE

## 2022-11-29 VITALS — WEIGHT: 245 LBS | HEIGHT: 71 IN | BODY MASS INDEX: 34.3 KG/M2

## 2022-11-29 DIAGNOSIS — M54.89 OTHER CHRONIC BACK PAIN: ICD-10-CM

## 2022-11-29 DIAGNOSIS — G89.29 OTHER CHRONIC BACK PAIN: ICD-10-CM

## 2022-11-29 DIAGNOSIS — M54.50 LUMBAR PAIN: ICD-10-CM

## 2022-11-29 DIAGNOSIS — M54.50 LUMBAR PAIN: Primary | ICD-10-CM

## 2022-11-29 PROCEDURE — 72110 X-RAY EXAM L-2 SPINE 4/>VWS: CPT | Performed by: STUDENT IN AN ORGANIZED HEALTH CARE EDUCATION/TRAINING PROGRAM

## 2022-11-29 PROCEDURE — 99214 OFFICE O/P EST MOD 30 MIN: CPT | Performed by: ORTHOPAEDIC SURGERY

## 2022-11-29 NOTE — NURSING NOTE
"Reason For Visit:   Chief Complaint   Patient presents with     Consult     New patient, referred by Dr. Desouza, LBP ~30 years.  No recent injury, fall, accident.  MRI on 11/22/22.  C/C is localized LBP when standing.  Denies acute LE radiculopathy.  No surgical Hx on spine.  No recent RHIANNON, no Hx of physical therapy for spine.         Primary MD: Colby Desouza  Ref. MD: Colby Desouza    ?  No  Occupation Retired - .  Currently working? No.  Work status?  Retired.  Date of injury: NA  Type of injury: Chronic  Date of surgery: NA  Type of surgery: NA.  Smoker: No  Request smoking cessation information: No    Ht 1.8 m (5' 10.87\")   Wt 111.1 kg (245 lb)   BMI 34.30 kg/m      Pain Assessment  Patient Currently in Pain: Yes  0-10 Pain Scale: 7    Oswestry (ELI) Questionnaire    OSWESTRY DISABILITY INDEX 11/29/2022   Count 9   Sum 19   Oswestry Score (%) 42.22   Some recent data might be hidden            Neck Disability Index (NDI) Questionnaire    No flowsheet data found.                Promis 10 Assessment    PROMIS 10 1/13/2022   In general, would you say your health is: Good   In general, would you say your quality of life is: Good   In general, how would you rate your physical health? Good   In general, how would you rate your mental health, including your mood and your ability to think? Very good   In general, how would you rate your satisfaction with your social activities and relationships? Very good   In general, please rate how well you carry out your usual social activities and roles Good   To what extent are you able to carry out your everyday physical activities such as walking, climbing stairs, carrying groceries, or moving a chair? Mostly   How often have you been bothered by emotional problems such as feeling anxious, depressed or irritable? Rarely   How would you rate your fatigue on average? Moderate   How would you rate your pain on average?   0 = No Pain  to  10 = " Worst Imaginable Pain 5   In general, would you say your health is: 3   In general, would you say your quality of life is: 3   In general, how would you rate your physical health? 3   In general, how would you rate your mental health, including your mood and your ability to think? 4   In general, how would you rate your satisfaction with your social activities and relationships? 4   In general, please rate how well you carry out your usual social activities and roles. (This includes activities at home, at work and in your community, and responsibilities as a parent, child, spouse, employee, friend, etc.) 3   To what extent are you able to carry out your everyday physical activities such as walking, climbing stairs, carrying groceries, or moving a chair? 4   In the past 7 days, how often have you been bothered by emotional problems such as feeling anxious, depressed, or irritable? 2   In the past 7 days, how would you rate your fatigue on average? 3   In the past 7 days, how would you rate your pain on average, where 0 means no pain, and 10 means worst imaginable pain? 5   Global Mental Health Score 15   Global Physical Health Score 13   PROMIS TOTAL - SUBSCORES 28   Some recent data might be hidden                Daniel Marquez, ATC

## 2022-11-29 NOTE — PROGRESS NOTES
Spine Surgery Consultation    REFERRING PHYSICIAN: Colby Desouza   PRIMARY CARE PHYSICIAN: Colby Desouza           Chief Complaint:   Consult (New patient, referred by Dr. Desouza, LBP ~30 years.  No recent injury, fall, accident.  MRI on 11/22/22.  C/C is localized LBP when standing.  Denies acute LE radiculopathy.  No surgical Hx on spine.  No recent RHIANNON, no Hx of physical therapy for spine.  )      History of Present Illness:  Symptom Profile Including: location of symptoms, onset, severity, exacerbating/alleviating factors, previous treatments:        Fish Hong is a 89 year old male who presents today for evaluation of chronic low back pain (~30 years). Pain slowly worsening with time. He has been doing conservative management for years, but pain is becoming difficult to tolerate. LBP is Worse with standing. Can only stand a few minutes before needing to sit. Also painful with walking; can only walk a block before sitting. LBP improved with sitting and resting. Denies leg weakness. Denies bowel/bladder dysfunction/ incontinence.    Past treatments tried:  - Physical therapy: none  - Injections: 1 Rhiannon in 2010, no long lasting improvement  - Medications: heat pads, PRN oral medications  [facility]      Social:  Lives at home by himself. Has children who live nearby  Denies tobacco product usage  Ambulates with assistance of walker         Past Medical History:     Past Medical History:   Diagnosis Date     Hematocele      Hypertension      Obesity      Osteoarthritis of both hips     s/p steroid injections     Phimosis      Pulmonary nodules      Rheumatic fever 1948            Past Surgical History:     Past Surgical History:   Procedure Laterality Date     ARTHROPLASTY HIP Right 1/26/2021    Procedure: Right total hip arthroplasty;  Surgeon: Eyad Gonzalez MD;  [facility]     AS TOTAL KNEE ARTHROPLASTY       CIRCUMCISION N/A 7/13/2016    Procedure: CIRCUMCISION;  Surgeon:  Elgin Rhodes MD;  [facility]     circumcision       ENT SURGERY      detached retina--bilateral     EXPLORE SCROTUM Left 2020    Procedure: EXPLORE LEFT SCROTUM, EVACULATE HEMATOMA, excision of scrotal mass;  Surgeon: Yobani Valenzuela MD;  [facility]     HERNIA REPAIR  11/3/2008    mesh repair umbilical hernia     HERNIORRHAPHY INGUINAL Left 10/8/2019    Procedure: Open Left Inguinal Hernia Repair;  Surgeon: Antonio Landaverde MD;  [facility]     ORCHIECTOMY INGUINAL N/A 10/8/2019    Procedure: Left Orchiectomy;  Surgeon: Antonio Landaverde MD;  [facility]     ZZC TOTAL KNEE ARTHROPLASTY      Bilateral            Social History:     Social History     Tobacco Use     Smoking status: Former     Packs/day: 1.00     Years: 11.00     Pack years: 11.00     Types: Cigarettes     Start date: 10/15/1948     Quit date: 1960     Years since quittin.9     Smokeless tobacco: Never   Substance Use Topics     Alcohol use: Yes     Comment: very little            Family History:     Family History   Problem Relation Age of Onset     No Known Problems Mother      No Known Problems Father      Melanoma No family hx of      Skin Cancer No family hx of      Anesthesia Reaction No family hx of      Cardiovascular No family hx of      Deep Vein Thrombosis (DVT) No family hx of             Allergies:   No Known Allergies         Medications:     Current Outpatient Medications   Medication     acetaminophen (TYLENOL) 325 MG tablet     amLODIPine (NORVASC) 10 MG tablet     amoxicillin (AMOXIL) 500 MG capsule     cyanocobalamin (VITAMIN B-12) 500 MCG tablet     doxazosin (CARDURA) 4 MG tablet     fluticasone (FLONASE) 50 MCG/ACT nasal spray     ketoconazole (NIZORAL) 2 % external cream     neomycin-polymyxin-dexamethasone (MAXITROL) 3.5-19661-6.1 ophthalmic ointment     triamcinolone (KENALOG) 0.1 % external cream     polyethylene glycol (MIRALAX) 17 GM/Dose powder     No current  "facility-administered medications for this visit.             Review of Systems:     A 10 point ROS was performed and reviewed. Specific responses to these questions are noted at the end of the document.         Physical Exam:     PHYSICAL EXAM:   Constitutional - Patient is healthy, well-nourished and appears stated age.    Vitals: Ht 1.8 m (5' 10.87\")   Wt 111.1 kg (245 lb)   BMI 34.30 kg/m     Respiratory - Patient is breathing normally and in no respiratory distress.   Skin - No suspicious rashes or lesions.   Psychiatric - Normal mood and affect.   Cardiovascular - Extremities warm and well perfused.   Eyes - Visual acuity is normal to the written word.   ENT - Hearing intact to the spoken word.   GI - No abdominal distention.   Musculoskeletal - Non-antalgic gait without use of assistive devices.                 Lumbar Spine:    Appearance - Normal     Palpation - Non-tender to palpation    Motor -        LOWER EXTREMITY Left Right   Hip flexion 5/5 5/5   Knee flexion 5/5 5/5   Knee extension 5/5 5/5   Ankle dorsiflexion 5/5 5/5   Ankle plantarflexion 5/5 5/5   Great toe extension 5/5 5/5        Special tests -     Straight leg raise - negative     JAMES - negative     Pain with hip ROM - negativ     Neurologic - Sensation intact to light touch bilaterally.      REFLEXES Left Right             clonus 0 beats 0 beats   Patella 2+ 2+   Ankle jerk 2+ 2+   Babinski downgoign downgoing     Hip Exam:  No pain with hip log roll and no tenderness over the greater trochanters.    Alignment:  Patient stands with a neutral standing sagittal balance.         Imaging:   We ordered and independently reviewed new radiographs at this clinic visit. The results were discussed with the patient.  Findings include:    11/22/22 MRI lumbar spine w/o contrast: multilevel advanced arthritic changes.  Severe spinal canal stenosis at L4-5 and also L3-4. Mild- moderate canal stenosis L5-S1 with significant bilateral lateral recess " stenosis.             Assessment and Plan:   Assessment:  89 year old male with severe spinal stenosis L3-L5, moderate at L5-S1 with neurogenic claudication symptoms     Plan:  Reviewed MR and XR images with patient today to explain the source of his chronic low back pain. He has severe stenosis. We discussed non-op and operative treatment options. We discussed that surgery (lumbar decompression) should be a last resort due to patient's age causing increased risks of medical complications from surgery and prolonged post-op recovery. We will start with referral to PT and follow up afterwards to touch base.    - physical therapy referral  - follow up in 6 weeks to touch base on how PT is helping    Patient seen and plan discussed with Dr. Berger.   Rosalba Orozco (bertram Santillan), PAGenesisC    Respectfully,  Elgin Berger MD  Spine Surgery  [facility]    I, Elgin Berger MD, saw and evaluated Fish Hong  4/11/1931.  I have reviewed and discussed with the advanced practice provider their history, physical and plan.    I have personally reviewed the imaging, history, and physical exam.    I personally provided substantive care for this patient, including personally interpreting the imaging.  In addition, I formulated the entire plan noted above and dictated this to the PA/APRN so they could document it in the note. The ADRIANA is acting as a Scribe for this note. The plan represents my own Medical Decision making, which I determined in its entirety.      Elgin Berger MD

## 2022-11-29 NOTE — LETTER
11/29/2022         RE: Fish Hong  [address]  [place] [facility] [place] [address]        Dear Colleague,    Thank you for referring your patient, Fish Hong, to the [facility] [place]. Please see a copy of my visit note below.    Spine Surgery Consultation    REFERRING PHYSICIAN: Colby Desouza   PRIMARY CARE PHYSICIAN: Colby Desouza           Chief Complaint:   Consult (New patient, referred by Dr. Desouza, LBP ~30 years.  No recent injury, fall, accident.  MRI on 11/22/22.  C/C is localized LBP when standing.  Denies acute LE radiculopathy.  No surgical Hx on spine.  No recent CHARLES, no Hx of physical therapy for spine.  )      History of Present Illness:  Symptom Profile Including: location of symptoms, onset, severity, exacerbating/alleviating factors, previous treatments:        Fish Hong is a 89 year old male who presents today for evaluation of chronic low back pain (~30 years). Pain slowly worsening with time. He has been doing conservative management for years, but pain is becoming difficult to tolerate. LBP is Worse with standing. Can only stand a few minutes before needing to sit. Also painful with walking; can only walk a block before sitting. LBP improved with sitting and resting. Denies leg weakness. Denies bowel/bladder dysfunction/ incontinence.    Past treatments tried:  - Physical therapy: none  - Injections: 1 Charles in 2010, no long lasting improvement  - Medications: heat pads, PRN oral medications  [facility]      Social:  Lives at home by himself. Has children who live nearby  Denies tobacco product usage  Ambulates with assistance of walker         Past Medical History:     Past Medical History:   Diagnosis Date     Hematocele      Hypertension      Obesity      Osteoarthritis of both hips     s/p steroid injections     Phimosis      Pulmonary nodules      Rheumatic fever 1948            Past Surgical History:     Past Surgical  History:   Procedure Laterality Date     ARTHROPLASTY HIP Right 2021    Procedure: Right total hip arthroplasty;  Surgeon: Eyad Gonzalez MD;  [facility]     AS TOTAL KNEE ARTHROPLASTY       CIRCUMCISION N/A 2016    Procedure: CIRCUMCISION;  Surgeon: Elgin Rhodes MD;  [facility]     circumcision       ENT SURGERY      detached retina--bilateral     EXPLORE SCROTUM Left 2020    Procedure: EXPLORE LEFT SCROTUM, EVACULATE HEMATOMA, excision of scrotal mass;  Surgeon: Yobani Valenzuela MD;  [facility]     HERNIA REPAIR  11/3/2008    mesh repair umbilical hernia     HERNIORRHAPHY INGUINAL Left 10/8/2019    Procedure: Open Left Inguinal Hernia Repair;  Surgeon: Antonio Landaverde MD;  [facility]     ORCHIECTOMY INGUINAL N/A 10/8/2019    Procedure: Left Orchiectomy;  Surgeon: Antonio Landaverde MD;  [facility]     ZZC TOTAL KNEE ARTHROPLASTY      Bilateral            Social History:     Social History     Tobacco Use     Smoking status: Former     Packs/day: 1.00     Years: 11.00     Pack years: 11.00     Types: Cigarettes     Start date: 10/15/1948     Quit date: 1960     Years since quittin.9     Smokeless tobacco: Never   Substance Use Topics     Alcohol use: Yes     Comment: very little            Family History:     Family History   Problem Relation Age of Onset     No Known Problems Mother      No Known Problems Father      Melanoma No family hx of      Skin Cancer No family hx of      Anesthesia Reaction No family hx of      Cardiovascular No family hx of      Deep Vein Thrombosis (DVT) No family hx of             Allergies:   No Known Allergies         Medications:     Current Outpatient Medications   Medication     acetaminophen (TYLENOL) 325 MG tablet     amLODIPine (NORVASC) 10 MG tablet     amoxicillin (AMOXIL) 500 MG capsule     cyanocobalamin (VITAMIN B-12) 500 MCG tablet     doxazosin (CARDURA) 4 MG tablet     fluticasone (FLONASE) 50  "MCG/ACT nasal spray     ketoconazole (NIZORAL) 2 % external cream     neomycin-polymyxin-dexamethasone (MAXITROL) 3.5-44041-1.1 ophthalmic ointment     triamcinolone (KENALOG) 0.1 % external cream     polyethylene glycol (MIRALAX) 17 GM/Dose powder     No current facility-administered medications for this visit.             Review of Systems:     A 10 point ROS was performed and reviewed. Specific responses to these questions are noted at the end of the document.         Physical Exam:     PHYSICAL EXAM:   Constitutional - Patient is healthy, well-nourished and appears stated age.    Vitals: Ht 1.8 m (5' 10.87\")   Wt 111.1 kg (245 lb)   BMI 34.30 kg/m     Respiratory - Patient is breathing normally and in no respiratory distress.   Skin - No suspicious rashes or lesions.   Psychiatric - Normal mood and affect.   Cardiovascular - Extremities warm and well perfused.   Eyes - Visual acuity is normal to the written word.   ENT - Hearing intact to the spoken word.   GI - No abdominal distention.   Musculoskeletal - Non-antalgic gait without use of assistive devices.                 Lumbar Spine:    Appearance - Normal     Palpation - Non-tender to palpation    Motor -        LOWER EXTREMITY Left Right   Hip flexion 5/5 5/5   Knee flexion 5/5 5/5   Knee extension 5/5 5/5   Ankle dorsiflexion 5/5 5/5   Ankle plantarflexion 5/5 5/5   Great toe extension 5/5 5/5        Special tests -     Straight leg raise - negative     JAMES - negative     Pain with hip ROM - negativ     Neurologic - Sensation intact to light touch bilaterally.      REFLEXES Left Right             clonus 0 beats 0 beats   Patella 2+ 2+   Ankle jerk 2+ 2+   Babinski downgoign downgoing     Hip Exam:  No pain with hip log roll and no tenderness over the greater trochanters.    Alignment:  Patient stands with a neutral standing sagittal balance.         Imaging:   We ordered and independently reviewed new radiographs at this clinic visit. The results were " discussed with the patient.  Findings include:    11/22/22 MRI lumbar spine w/o contrast: multilevel advanced arthritic changes.  Severe spinal canal stenosis at L4-5 and also L3-4. Mild- moderate canal stenosis L5-S1 with significant bilateral lateral recess stenosis.             Assessment and Plan:   Assessment:  89 year old male with severe spinal stenosis L3-L5, moderate at L5-S1 with neurogenic claudication symptoms     Plan:  Reviewed MR and XR images with patient today to explain the source of his chronic low back pain. He has severe stenosis. We discussed non-op and operative treatment options. We discussed that surgery (lumbar decompression) should be a last resort due to patient's age causing increased risks of medical complications from surgery and prolonged post-op recovery. We will start with referral to PT and follow up afterwards to touch base.    - physical therapy referral  - follow up in 6 weeks to touch base on how PT is helping    Patient seen and plan discussed with Dr. Berger.   Rosalba Orozco (bertram Santillan), PAGenesisC    Respectfully,  Elgin Berger MD  Spine Surgery  [facility]    I, Elgin Berger MD, saw and evaluated Fish Hong  4/11/1931.  I have reviewed and discussed with the advanced practice provider their history, physical and plan.    I have personally reviewed the imaging, history, and physical exam.    I personally provided substantive care for this patient, including personally interpreting the imaging.  In addition, I formulated the entire plan noted above and dictated this to the PA/APRN so they could document it in the note. The ADRIANA is acting as a Scribe for this note. The plan represents my own Medical Decision making, which I determined in its entirety.      Elgin Berger MD

## 2022-12-01 ENCOUNTER — THERAPY VISIT (OUTPATIENT)
Dept: PHYSICAL THERAPY | Facility: CLINIC | Age: 87
End: 2022-12-01
Attending: ORTHOPAEDIC SURGERY
Payer: MEDICARE

## 2022-12-01 DIAGNOSIS — G89.29 OTHER CHRONIC BACK PAIN: ICD-10-CM

## 2022-12-01 DIAGNOSIS — M54.89 OTHER CHRONIC BACK PAIN: ICD-10-CM

## 2022-12-01 PROCEDURE — 97161 PT EVAL LOW COMPLEX 20 MIN: CPT | Mod: GP

## 2022-12-01 PROCEDURE — 97110 THERAPEUTIC EXERCISES: CPT | Mod: GP

## 2022-12-01 NOTE — PROGRESS NOTES
Physical Therapy Initial Evaluation  12/1/2022     Precautions/Restrictions/MD instructions: eval and treat    Therapist Assessment: Fish Hong is a 89 year old male patient presenting to Physical Therapy with chronic low back pain. Patient demonstrates severe hip flexor tightness limiting lumbar extension, flexed standing posture and symptom relief with repeated flexion. These impairments limit their ability to stand and walk without pain. Skilled PT services are necessary in order to reduce impairments and improve independent function.    SUBJECTIVE    Chief Complaint: low back pain  Paresthesia (yes/no):  no  Onset date: a while ago  Symptoms commenced as a result of: unknown   Pain severity: 0/10 current, 7/10 worst  Location of pain: midline low back  Quality of Pain: pressure   Better: sitting, rest  Worse:  Standing >5-10 minutes, walking  Time of day: positional related  Progression of Symptoms since onset:  Since onset, these symptoms are getting worse  Previous treatment: has included Cortisone injection (2010 pt unsure of exact year); effect was good  Previous Functional Status:  fully functional prior to pain onset/injury.        General health as reported by patient: good.    PMH: see [facility]   Surgical history/trauma: R ISAI 2021  Imaging: see epic  Medications: see epic    Occupation: retired Job duties: none  Current exercise regimen/Recreational activities: active with great grandchildren  Barriers to treatment: none        Patient's Goal(s): improve ability stand    OBJECTIVE    Posture:   Sitting:  Posterior pelvic tilted  Standing: unable to reach fully upright posture, tightness at hip flexors      Dynamic Movement Screen  Single leg stance observations: Requires UE support  Double limb squat observations: decreased depth, narrow base of support  Gait: uses 4WW, good gait speed, flexed posture     Movement Loss:   Chaitanya Mod Min Nil Pain   Flexion   x     Extension x    Pain in low back   Side  Gliding R  x      Side Gliding L  x        Neural Tension:   Slump: negative  Motor deficit:  5/5 LE myotomes except S1 no tested         Flexibility:    Hamstrings Hip Flexors   Left mild severe   Right mild severe       ASSESSMENT/PLAN  Patient is a 89 year old male with lumbar complaints.    Patient has the following significant findings with corresponding treatment plan.                Diagnosis 1:  Chronic low back pain    Pain -  manual therapy, self management, education, directional preference exercise and home program  Decreased ROM/flexibility - manual therapy, therapeutic exercise and home program  Decreased strength - therapeutic exercise, therapeutic activities and home program  Impaired balance - neuro re-education, therapeutic activities and home program  Impaired gait - gait training and home program  Impaired posture - neuro re-education and home program    Therapy Evaluation Codes:   1) History comprised of:   Personal factors that impact the plan of care:      Age and Time since onset of symptoms.    Comorbidity factors that impact the plan of care are:      None.     Medications impacting care: None.  2) Examination of Body Systems comprised of:   Body structures and functions that impact the plan of care:      Lumbar spine.   Activity limitations that impact the plan of care are:      Standing and Walking.  3) Clinical presentation characteristics are:   Stable/Uncomplicated.  4) Decision-Making    Low complexity using standardized patient assessment instrument and/or measureable assessment of functional outcome.  Cumulative Therapy Evaluation is: Low complexity.    Previous and current functional limitations:  (See Goal Flow Sheet for this information)    Short term and Long term goals: (See Goal Flow Sheet for this information)     Communication ability:  Patient appears to be able to clearly communicate and understand verbal and written communication and follow directions  correctly.  Treatment Explanation - The following has been discussed with the patient:   RX ordered/plan of care  Anticipated outcomes  Possible risks and side effects  This patient would benefit from PT intervention to resume normal activities.   Rehab potential is good.    Frequency:  2 X a month, once daily  Duration:  for 3 months  Discharge Plan:  Achieve all LTG.  Independent in home treatment program.  Reach maximal therapeutic benefit.    Please refer to the daily flowsheet for treatment today, total treatment time and time spent performing 1:1 timed codes.

## 2022-12-01 NOTE — PROGRESS NOTES
[facility]    OUTPATIENT Physical Therapy ORTHOPEDIC EVALUATION  PLAN OF TREATMENT FOR OUTPATIENT REHABILITATION  (COMPLETE FOR INITIAL CLAIMS ONLY)  Patient's Last Name, First Name, M.I.  YOB: 1931  HalFish  DONG    Provider s Name:  [facility]   Medical Record No.  [id number]   Start of Care Date:  12/01/22   Onset Date:   11/29/22 (MD orders)   Treatment Diagnosis:  LBP Medical Diagnosis:  Other chronic back pain       Goals:     12/01/22 0500   Body Part   Goals listed below are for Low back   Goal #1   Goal #1 standing   Previous Functional Level No restrictions   Current Functional Level Minutes patient can stand   Performance level 5-10 minutes with 7/10 pain   STG Target Performance Minutes patient will be able to stand   Performance level 15 minutes with pain <5/10   Rationale for housekeeping tasks such as vacuuming, bed making, mowing, gardening;for personal hygiene;for meal preparation;for safe household ambulation;for safe community ambulation   Due date 01/01/23   LTG Target Performance Minutes patient will be able to stand   Performance Level 20-30 minutes with pain <3/10   Rationale for housekeeping tasks such as vacuuming, bed making, mowing;for personal hygiene;for meal preparation;for safe household ambulation;for safe community ambulation   Due date 02/01/23       Therapy Frequency:  2x/month  Predicted Duration of Therapy Intervention:  3 months    Eliane Martinez PT                 I CERTIFY THE NEED FOR THESE SERVICES FURNISHED UNDER        THIS PLAN OF TREATMENT AND WHILE UNDER MY CARE     (Physician attestation of this document indicates review and certification of the therapy plan).                     Certification Date From:  12/01/22   Certification Date To:  03/01/23    Referring Provider:  Elgin Guerrero*    Initial  Assessment        See Epic Evaluation SOC Date: 12/01/22

## 2022-12-05 DIAGNOSIS — L20.9 ATOPIC DERMATITIS, UNSPECIFIED TYPE: ICD-10-CM

## 2022-12-08 RX ORDER — TRIAMCINOLONE ACETONIDE 1 MG/G
CREAM TOPICAL
Qty: 80 G | Refills: 1 | Status: SHIPPED | OUTPATIENT
Start: 2022-12-08 | End: 2023-05-09

## 2022-12-08 NOTE — TELEPHONE ENCOUNTER
triamcinolone (KENALOG) 0.1 % external cream    11/16/2022  [facility] Internal Medicine Colby Giles MD  Internal Medicine

## 2022-12-15 NOTE — PROGRESS NOTES
HPI:    Last visit with me 11/16/2022 and additional information in that note. He is doing well. He is still driving. He is doing PT every morning and he feels this is beneficial. He has worse neck pain and more with flexion/extension. He denies any radicular sxs. He does have some B hand joint stiffness in the morning. He states cervical MRI imaging about 12 years ago and feels he had some abnormalities. He is still using his walker. No other HEENT, cardiopulmonary, abdominal, , neurological, systemic, psychiatric, lymphatic, endocrine, vascular complaints.       Past Medical History:   Diagnosis Date     Hematocele      Hypertension      Obesity      Osteoarthritis of both hips     s/p steroid injections     Phimosis      Pulmonary nodules      Rheumatic fever 1948     Past Surgical History:   Procedure Laterality Date     ARTHROPLASTY HIP Right 1/26/2021    Procedure: Right total hip arthroplasty;  Surgeon: Eyad Gonzalez MD;  [facility]     AS TOTAL KNEE ARTHROPLASTY       CIRCUMCISION N/A 7/13/2016    Procedure: CIRCUMCISION;  Surgeon: Elgin Rhodes MD;  [facility]     circumcision       ENT SURGERY      detached retina--bilateral     EXPLORE SCROTUM Left 7/14/2020    Procedure: EXPLORE LEFT SCROTUM, EVACULATE HEMATOMA, excision of scrotal mass;  Surgeon: Yobani Valenzuela MD;  [facility]     HERNIA REPAIR  11/3/2008    mesh repair umbilical hernia     HERNIORRHAPHY INGUINAL Left 10/8/2019    Procedure: Open Left Inguinal Hernia Repair;  Surgeon: Antonio Landaverde MD;  [facility]     ORCHIECTOMY INGUINAL N/A 10/8/2019    Procedure: Left Orchiectomy;  Surgeon: Antonio Landaverde MD;  [facility]     [facility] TOTAL KNEE ARTHROPLASTY      Bilateral     PE:    Vitals noted, gen, nad, cooperative, alert, neck decreased ROM with flexion/extension and tenderness to mid cervical spine area, lungs with good air movement, RRR, S1, S2, Abdomen, no acute findings.     A/P:    1.  Immunizations; COVID Pfizer vaccine x 5 (Bi-valent 11/16/2022). Pneumococcal 23 done 10/20/2009. Prevnar 13 done 4/9/2018.  Prevnar 20 vaccine done on 6/15/2022.  Tdap 3/31/2015. Check with insurance regarding Shingrix. Influenza  11/16/2022    2. HTN; on Amlodipine.    3. Antibiotic prophylaxis before dental work with Amoxicillin for total R hip replacement  4. BPH on Doxazosin   5. B12 PO supplemention  6. Dermatology; I recommend a dermatology visit for a skin check but he declines.   7. Seen Dr. Gonzalez, orthopedics 1/13/2022 for follow up R TKA and next visit 1/9/2023  (one year follow up)  8. Seen Cardiology, Dr. Bueno, 3/10/2021 for concerns about possible Aifb  9. Seen Urology, Ms. Silva 7/30/2020 for L scrotal mass  10. L inguinal hernia repair. See post-op surgery note from Dr. Landaverde 11/21/2019  11. Lipids;  6/15/2022; , HDL 39 and TG's 100  12. MRI lumbar with R sided L5-S1 disc protrusion for back pain was done 6/5/2013. He had repeat MRI lumbar 11/22/2022 and saw Dr. Berger, orthopedic spine 11/29/2022. He has follow up 1/10/2023 with Dr. Berger.  He is getting PT.   13. Neck pain; ordered cervical MRI imaging. He can discuss the findings and his sxs. With Dr. Berger, orthopedic spine.             30 minutes spent on the date of the encounter doing chart review, history and exam, documentation and further activities as noted above exclusive of procedures and other billable interpretations

## 2022-12-16 ENCOUNTER — OFFICE VISIT (OUTPATIENT)
Dept: INTERNAL MEDICINE | Facility: CLINIC | Age: 87
End: 2022-12-16
Payer: MEDICARE

## 2022-12-16 VITALS
DIASTOLIC BLOOD PRESSURE: 82 MMHG | BODY MASS INDEX: 34.24 KG/M2 | HEIGHT: 71 IN | SYSTOLIC BLOOD PRESSURE: 139 MMHG | TEMPERATURE: 98.3 F | HEART RATE: 71 BPM | WEIGHT: 244.6 LBS | OXYGEN SATURATION: 95 %

## 2022-12-16 DIAGNOSIS — M54.2 NECK PAIN: Primary | ICD-10-CM

## 2022-12-16 PROCEDURE — 99214 OFFICE O/P EST MOD 30 MIN: CPT | Performed by: INTERNAL MEDICINE

## 2022-12-16 NOTE — NURSING NOTE
"Fish Hong is a 89 year old male patient that presents today in clinic for the following:    Chief Complaint   Patient presents with     RECHECK     Follow up.     The patient's allergies and medications were reviewed as noted. A set of vitals were recorded as noted without incident: /82 (BP Location: Right arm, Patient Position: Sitting, Cuff Size: Adult Large)   Pulse 71   Temp 98.3  F (36.8  C) (Oral)   Ht 1.803 m (5' 11\")   Wt 110.9 kg (244 lb 9.6 oz)   SpO2 95%   BMI 34.11 kg/m  . The patient does not have any other questions for the provider.    Laura Henderson, EMT at 11:37 AM on 12/16/2022.  Primary care clinic: [phone]  "

## 2022-12-28 ENCOUNTER — ANCILLARY PROCEDURE (OUTPATIENT)
Dept: MRI IMAGING | Facility: CLINIC | Age: 87
End: 2022-12-28
Attending: INTERNAL MEDICINE
Payer: MEDICARE

## 2022-12-28 DIAGNOSIS — M54.2 NECK PAIN: ICD-10-CM

## 2022-12-28 PROCEDURE — 72141 MRI NECK SPINE W/O DYE: CPT | Mod: MF | Performed by: STUDENT IN AN ORGANIZED HEALTH CARE EDUCATION/TRAINING PROGRAM

## 2022-12-28 PROCEDURE — G1010 CDSM STANSON: HCPCS | Mod: GC | Performed by: STUDENT IN AN ORGANIZED HEALTH CARE EDUCATION/TRAINING PROGRAM

## 2022-12-28 NOTE — DISCHARGE INSTRUCTIONS
MRI Contrast Discharge Instructions    The IV contrast you received today will pass out of your body in your  urine. This will happen in the next 24 hours. You will not feel this process.  Your urine will not change color.    Drink at least 4 extra glasses of water or juice today (unless your doctor  has restricted your fluids). This reduces the stress on your kidneys.  You may take your regular medicines.    If you are on dialysis: It is best to have dialysis today.    If you have a reaction: Most reactions happen right away. If you have  any new symptoms after leaving the hospital (such as hives or swelling),  call your hospital at the correct number below. Or call your family doctor.  If you have breathing distress or wheezing, call 911.    Special instructions: ***    I have read and understand the above information.    Signature:______________________________________ Date:___________    Staff:__________________________________________ Date:___________     Time:__________    [facility] Departments:    ___[place]: [phone]  ___[facility]: [phone]  ___[place]: [phone] ___[facility]: [phone]  ___[facility]: [phone]  ___[facility]: [phone]  ___[phone]  ___[facility]: [phone]  ___[phone]

## 2022-12-28 NOTE — LETTER
January 2, 2023      Fish Hong  [address]  [place] [address]        Dear ,    We are writing to inform you of your test results.    The results of your cervical MRI scan are attached and I recommend you keep your 1/10/2023 orthopedic appointment with Dr. Berger. I'll also send him a note regarding these findings.       Resulted Orders   MRI Cervical Spine w/o Contrast    Narrative    EXAM: MR CERVICAL SPINE W/O CONTRAST  12/28/2022 7:45 AM     HISTORY:  neck pain; Neck pain       COMPARISON:  None    TECHNIQUE: Sagittal T1-weighted, sagittal STIR, sagittal T2-weighted,  sagittal diffusion weighted, axial and sagittal gradient echo, and  axial T2-weighted images of the cervical spine were obtained without  intravenous contrast.    FINDINGS:  Straightening of the normal cervical lordosis. Trace anterolisthesis  at C2-3, C4-5, and C7-T1. Trace retrolisthesis at C3-4. Multilevel  disc space narrowing is most pronounced at C6-7, where there is a  large anterior disc osteophyte complex. Multilevel opposing endplate  degenerative marrow changes.    Questionable focal T2 hyperintense myelopathic cord signal at C5-6,  where there is severe spinal canal stenosis, better seen on sagittal  T2 and STIR.    The findings on a level by level basis are as follows:    C2-3: Partial ankylosis of the vertebral body and left facet joint.  Bilateral uncinate spurring. Mild left neural foraminal stenosis. No  right foraminal or spinal canal stenosis.    C3-4: Mild disc bulge. Bilateral facet arthropathy and uncinate  spurring. Severe bilateral neural foraminal stenosis, more significant  on the left. No canal stenosis.    C4-5: Trace anterolisthesis. Bilateral uncinate spurring. Bilateral  facet arthropathy. Severe bilateral neural foraminal stenosis worse on  the right. No significant spinal canal stenosis.    C5-6: Large posterior disc bulge. Ligamentum flavum hypertrophy.  Severe bilateral facet arthropathy  and uncinate spurring. Severe  bilateral neural foraminal stenosis. Severe spinal canal stenosis.    C6-7: Posterior disc bulge. Ligamentum flavum hypertrophy. Bilateral  facet arthropathy and uncinate spurring. Severe bilateral neural  foraminal stenosis. Severe spinal canal stenosis.    C7-T1: Grade 1 anterolisthesis on jose alberto of the disc. Bilateral facet  arthropathy and uncinate spurring. Ligamentum flavum hypertrophy.  Severe bilateral neural foraminal stenosis. Moderate spinal canal  stenosis.      Impression    IMPRESSION:  1. Severe spinal canal stenosis at C5-6 and C6-7 with questionable  focal myelopathic cord signal at C5 and C6.  2. Additional multilevel severe cervical spondylosis, most  significantly resulting in severe bilateral neural foraminal stenosis  at C3-4, C4-5, C5-6, C6-7, and C7-T1.    I have personally reviewed the examination and initial interpretation  and I agree with the findings.    REJI WEAVER MD         SYSTEM ID:  [id number]       If you have any questions or concerns, please call the clinic at the number listed above.       Sincerely,      Colby Desouza MD

## 2022-12-29 ENCOUNTER — THERAPY VISIT (OUTPATIENT)
Dept: PHYSICAL THERAPY | Facility: CLINIC | Age: 87
End: 2022-12-29
Payer: MEDICARE

## 2022-12-29 DIAGNOSIS — G89.29 OTHER CHRONIC BACK PAIN: Primary | ICD-10-CM

## 2022-12-29 DIAGNOSIS — M54.89 OTHER CHRONIC BACK PAIN: Primary | ICD-10-CM

## 2022-12-29 PROCEDURE — 97110 THERAPEUTIC EXERCISES: CPT | Mod: GP

## 2023-01-09 ENCOUNTER — ANCILLARY PROCEDURE (OUTPATIENT)
Dept: GENERAL RADIOLOGY | Facility: CLINIC | Age: 88
End: 2023-01-09
Attending: ORTHOPAEDIC SURGERY
Payer: MEDICARE

## 2023-01-09 ENCOUNTER — OFFICE VISIT (OUTPATIENT)
Dept: ORTHOPEDICS | Facility: CLINIC | Age: 88
End: 2023-01-09
Payer: MEDICARE

## 2023-01-09 VITALS — WEIGHT: 244 LBS | HEIGHT: 71 IN | BODY MASS INDEX: 34.16 KG/M2

## 2023-01-09 DIAGNOSIS — M25.552 LEFT HIP PAIN: ICD-10-CM

## 2023-01-09 DIAGNOSIS — Z96.641 STATUS POST RIGHT HIP REPLACEMENT: ICD-10-CM

## 2023-01-09 DIAGNOSIS — Z96.641 STATUS POST RIGHT HIP REPLACEMENT: Primary | ICD-10-CM

## 2023-01-09 PROCEDURE — 99213 OFFICE O/P EST LOW 20 MIN: CPT | Performed by: ORTHOPAEDIC SURGERY

## 2023-01-09 PROCEDURE — 73522 X-RAY EXAM HIPS BI 3-4 VIEWS: CPT | Mod: GC | Performed by: RADIOLOGY

## 2023-01-09 ASSESSMENT — ACTIVITIES OF DAILY LIVING (ADL)
ADL_MEAN: 1.12
ADL_SUBSCALE_SCORE: 72.06
ADL_SUM: 19

## 2023-01-09 ASSESSMENT — HOOS S4: HOW SEVERE IS YOUR HIP JOINT STIFFNESS AFTER FIRST WAKENING IN THE MORNING?: MILD

## 2023-01-09 NOTE — LETTER
1/9/2023         RE: Fish Hong  [address]  [place] [address]        Dear Colleague,    Thank you for referring your patient, Fish Hong, to the [facility] [place]. Please see a copy of my visit note below.        [facility]  Orthopaedic Surgery, Joint Replacement Consultation  by Eyad Gonzalez M.D.    Fish Hong MRN# [id number]   Age: 89 year old YOB: 1931     Requesting physician: Herman Soto     Background history:  DX:  1. Degenerative arthritis of hips and knees    TREATMENTS:  1. Right and left total knee arthroplasty (Dr. Maik Burroughs), [facility]  2. 1/26/2021, R ISAI (Carlos) [facility]           Patient is doing quite well 2 years after right total hip arthroplasty procedure.  He is fully weightbearing and ambulatory around his home independently.  Outside the home he uses a walker for balance purposes.  He drives and lives independently taking himself buying groceries etc.  He has some assistance from his daughter.    Occasionally has some back pain.  However he is not a surgical candidate.    He returned 2 years postop to consider would not undergo left total hip arthroplasty.  Denies any significant discomfort in the left hip joint.    Examination demonstrates that he is a full range of motion of the right hip without pain.  Full range of motion of the left hip as well but some pain at the extremes of motion.   His gait is normal.    Radiographs demonstrate right total of arthroplasty in satisfactory position without complication.  Mild degenerative disease of the left hip joint is noted.  Leg lengths are symmetric.    Impression and plan:  Satisfactory outcome with excellent function after right total hip arthroplasty.  He has no specific complaints in regards to the left hip joint today.  I see no progressive arthritis either radiographically.  I have not recommended any surgery on his left hip  joint.    Next follow-up visit at his 5-year anniversary postoperatively with radiographs of both hips.    MD Ingrid Powers Family Professor  Oncology and Adult Reconstructive Surgery  Dept Orthopaedic Surgery, [facility]  [phone] office, [phone] pager  [email or web address]    Total combined visit time and work time before and after clinic visit = 20 min

## 2023-01-09 NOTE — NURSING NOTE
"Chief Complaint   Patient presents with     RECHECK     2 Year follow up status post right ISAI. DOS: 01/26/2021       89 year old  4/11/1931    Ht 1.803 m (5' 11\")   Wt 110.7 kg (244 lb)   BMI 34.03 kg/m      Past Surgical History:   Procedure Laterality Date     ARTHROPLASTY HIP Right 1/26/2021    Procedure: Right total hip arthroplasty;  Surgeon: Eyad Gonzalez MD;  [facility]     AS TOTAL KNEE ARTHROPLASTY       CIRCUMCISION N/A 7/13/2016    Procedure: CIRCUMCISION;  Surgeon: Elgin Rhodes MD;  [facility]     circumcision       ENT SURGERY      detached retina--bilateral     EXPLORE SCROTUM Left 7/14/2020    Procedure: EXPLORE LEFT SCROTUM, EVACULATE HEMATOMA, excision of scrotal mass;  Surgeon: Yobani Valenzuela MD;  [facility]     HERNIA REPAIR  11/3/2008    mesh repair umbilical hernia     HERNIORRHAPHY INGUINAL Left 10/8/2019    Procedure: Open Left Inguinal Hernia Repair;  Surgeon: Antonio Landaverde MD;  [facility]     ORCHIECTOMY INGUINAL N/A 10/8/2019    Procedure: Left Orchiectomy;  Surgeon: Antonio Landaverde MD;  [facility]     ZZC TOTAL KNEE ARTHROPLASTY      Bilateral             Pain Assessment  Patient Currently in Pain: Denies                  [facility] [address] [place], [place] - [address]  [facility] - [place], [place] - ONE [facility] AT PORTAL TO REGISTERED [facility] SITES  [facility] [address] [place], [place] - [address] [place] [address]        No Known Allergies        Current Outpatient Medications   Medication     acetaminophen (TYLENOL) 325 MG tablet     amLODIPine (NORVASC) 10 MG tablet     amoxicillin (AMOXIL) 500 MG capsule     cyanocobalamin (VITAMIN B-12) 500 MCG tablet     doxazosin (CARDURA) 4 MG tablet     fluticasone (FLONASE) 50 MCG/ACT nasal spray     ketoconazole (NIZORAL) 2 % external cream     neomycin-polymyxin-dexamethasone (MAXITROL) 3.5-77325-5.1 ophthalmic ointment     triamcinolone " (KENALOG) 0.1 % external cream     polyethylene glycol (MIRALAX) 17 GM/Dose powder     No current facility-administered medications for this visit.             Questionnaires:    HOOS Hip Dysfunction & Osteoarthritis Outcome Questionnaire    Hip Dysfunction & Osteoarthritis Outcome Score (HOOS), English Version LK 2.0 (Maury Zavaleta, [employer]AM[employer]A, 2003) 1/13/2022   S1. Do you feel grinding, hear clicking or any other type of noise from your hip? Rarely   S2. Difficulties spreading legs wide apart None   S3. Difficulties to stride out when walking Mild   S4. How severe is your hip joint stiffness after first wakening in the morning? Mild   S5. How severe is your hip stiffness after sitting, lying or resting LATER IN THE DAY? Mild   Symptom Count 5   Symptom Sum 4   Symptom Mean 0.8   Symptom Subscale Score 80   P1. How often is your hip painful? Monthly   P2. Straightening your hip fully Mild   P3. Bending your hip FULLY None   P4. Walking on a flat surface None   P5. Going up or down stairs Mild   P6. At night while in bed None   P7. Sitting or lying Mild   P8. Standing upright Mild   P9. Walking on a hard surface (asphalt, concrete, etc.) Mild   P10. Walking on an uneven surface Mild   Pain Count 10   Pain Sum 7   Pain Mean 0.7   Pain Subscale Score 82.5   A1. Descending stairs Mild   A2. Ascending stairs Mild   A3. Rising from sitting None   A4. Standing Mild   A5. Bending to the floor/ an object Mild   A6. Walking on a flat surface Mild   A7. Getting in/out of car Mild   A8. Going shopping Mild   A9. Putting on socks/stockings Mild   A10. Rising from bed None   A11. Taking off socks/stockings Mild   A12. Lying in bed (turning over, maintaining hip position) None   A13. Getting in/out of bed Mild   A14. Sitting Mild   A15. Getting on/off toilet None   A16. Heavy domestic duties (moving heavy boxes, scrubbing floors, etc.) Mild   A17. Light domestic duties (cooking, dusting, etc.) None   ADL Count 17    ADL Sum 12   ADL Mean 0.71   ADL Subscale Score 82.35   SP1. Squatting Mild   SP2. Running Mild   SP3. Twisting/pivoting on loaded leg Mild   SP4. Walking on uneven surface Mild   Sports/Rec Count 4   Sports/Rec Sum 4   Sports/Rec Mean 1   Sports/Rec Subscale Score 75   Q1. How often are you aware of your hip problem? Monthly   Q2. Have you modified you life style to avoid activities potentially damaging to your hip? Mildly   Q3. How much are you troubled with lack of confidence in your hip? Mildly   Q4. In general, how much difficulty do you have with your hip? Mild   QOL Count 4   QOL Sum 4   QOL Mean 1   Quality of Life Subscale Score 75              KOOS Knee Survey Assessment    No flowsheet data found.           Promis 10 Assessment    PROMIS 10 1/13/2022   In general, would you say your health is: Good   In general, would you say your quality of life is: Good   In general, how would you rate your physical health? Good   In general, how would you rate your mental health, including your mood and your ability to think? Very good   In general, how would you rate your satisfaction with your social activities and relationships? Very good   In general, please rate how well you carry out your usual social activities and roles Good   To what extent are you able to carry out your everyday physical activities such as walking, climbing stairs, carrying groceries, or moving a chair? Mostly   How often have you been bothered by emotional problems such as feeling anxious, depressed or irritable? Rarely   How would you rate your fatigue on average? Moderate   How would you rate your pain on average?   0 = No Pain  to  10 = Worst Imaginable Pain 5   In general, would you say your health is: 3   In general, would you say your quality of life is: 3   In general, how would you rate your physical health? 3   In general, how would you rate your mental health, including your mood and your ability to think? 4   In general, how  would you rate your satisfaction with your social activities and relationships? 4   In general, please rate how well you carry out your usual social activities and roles. (This includes activities at home, at work and in your community, and responsibilities as a parent, child, spouse, employee, friend, etc.) 3   To what extent are you able to carry out your everyday physical activities such as walking, climbing stairs, carrying groceries, or moving a chair? 4   In the past 7 days, how often have you been bothered by emotional problems such as feeling anxious, depressed, or irritable? 2   In the past 7 days, how would you rate your fatigue on average? 3   In the past 7 days, how would you rate your pain on average, where 0 means no pain, and 10 means worst imaginable pain? 5   Global Mental Health Score 15   Global Physical Health Score 13   PROMIS TOTAL - SUBSCORES 28   Some recent data might be hidden              Ortho [place] Knee Questionnaire    No flowsheet data found.

## 2023-01-09 NOTE — PROGRESS NOTES
[facility]  Orthopaedic Surgery, Joint Replacement Consultation  by Eyad Gonzalez M.D.    Fish Hong MRN# [id number]   Age: 89 year old YOB: 1931     Requesting physician: Herman Soto     Background history:  DX:  1. Degenerative arthritis of hips and knees    TREATMENTS:  1. Right and left total knee arthroplasty (Dr. Maik Burroughs), [facility]  2. 1/26/2021, R ISAI (Carlos) [facility]           Patient is doing quite well 2 years after right total hip arthroplasty procedure.  He is fully weightbearing and ambulatory around his home independently.  Outside the home he uses a walker for balance purposes.  He drives and lives independently taking himself buying groceries etc.  He has some assistance from his daughter.    Occasionally has some back pain.  However he is not a surgical candidate.    He returned 2 years postop to consider would not undergo left total hip arthroplasty.  Denies any significant discomfort in the left hip joint.    Examination demonstrates that he is a full range of motion of the right hip without pain.  Full range of motion of the left hip as well but some pain at the extremes of motion.   His gait is normal.    Radiographs demonstrate right total of arthroplasty in satisfactory position without complication.  Mild degenerative disease of the left hip joint is noted.  Leg lengths are symmetric.    Impression and plan:  Satisfactory outcome with excellent function after right total hip arthroplasty.  He has no specific complaints in regards to the left hip joint today.  I see no progressive arthritis either radiographically.  I have not recommended any surgery on his left hip joint.    Next follow-up visit at his 5-year anniversary postoperatively with radiographs of both hips.    MD Ingrid Powers Family Professor  Oncology and Adult Reconstructive Surgery  Dept Orthopaedic Surgery, [facility]  [phone] office,  [phone] pager  [email or web address]    Total combined visit time and work time before and after clinic visit = 20 min

## 2023-01-10 ENCOUNTER — OFFICE VISIT (OUTPATIENT)
Dept: ORTHOPEDICS | Facility: CLINIC | Age: 88
End: 2023-01-10
Payer: MEDICARE

## 2023-01-10 DIAGNOSIS — M54.50 LUMBAR PAIN: Primary | ICD-10-CM

## 2023-01-10 DIAGNOSIS — M54.16 LUMBAR RADICULOPATHY: ICD-10-CM

## 2023-01-10 PROCEDURE — 99214 OFFICE O/P EST MOD 30 MIN: CPT | Mod: GC | Performed by: ORTHOPAEDIC SURGERY

## 2023-01-10 NOTE — PROGRESS NOTES
Spine Surgery Return Clinic Visit      Chief Complaint:   RECHECK (6 week return to see Dr. Berger discuss progress of PT )      Interval HPI:  Symptom Profile Including: location of symptoms, onset, severity, exacerbating/alleviating factors, previous treatments:        Fish Hong is a 89 year old male who we presents to clinic 6 weeks after previous visit with persistent lower back pain and newly described neck stiffness.  The patient states that he has been performing physical therapy as was previously recommended and notes that they have provided some additional exercises beyond what he was already previously doing at home.  He states that he has noticed some improvement in his flexibility, but still experiences persistent lower back pain.  In particular his symptoms flareup when he stands up straight and hyperextends his back or when he walks for a prolonged amount of time.  His symptoms partially resolve when he rests or takes a seat.  He notes that he has been taking Tylenol for the symptoms but has noted minimal improvement.  He states that he does not have radiation of pain into the legs or any numbness or tingling in the legs.  He is also not noted any elo weakness in his lower extremities.  He did receive a steroid injection in 2010 but recalls that it provided only partial and very temporary improvement.  He also was experiencing some neck stiffness about 2-1/2 months ago but as of about 1 month ago though symptoms have largely resolved.  When his symptoms were at their worst he was experiencing tightness on the right and left side of the neck with some radiation into the occipital head.  He never experienced radiation into the arms or any weakness or numbness or tingling.  He has not had issues with his dexterity or with his gait.  The patient would like to avoid surgery for his symptoms if possible.            Past Medical History:     Past Medical History:   Diagnosis Date     Hematocele       Hypertension      Obesity      Osteoarthritis of both hips     s/p steroid injections     Phimosis      Pulmonary nodules      Rheumatic fever             Past Surgical History:     Past Surgical History:   Procedure Laterality Date     ARTHROPLASTY HIP Right 2021    Procedure: Right total hip arthroplasty;  Surgeon: Eyad Gonzalez MD;  [facility]     AS TOTAL KNEE ARTHROPLASTY       CIRCUMCISION N/A 2016    Procedure: CIRCUMCISION;  Surgeon: Elgin Rhodes MD;  Location: [facility]     circumcision       ENT SURGERY      detached retina--bilateral     EXPLORE SCROTUM Left 2020    Procedure: EXPLORE LEFT SCROTUM, EVACULATE HEMATOMA, excision of scrotal mass;  Surgeon: Yobani Valenzuela MD;  [facility]     HERNIA REPAIR  11/3/2008    mesh repair umbilical hernia     HERNIORRHAPHY INGUINAL Left 10/8/2019    Procedure: Open Left Inguinal Hernia Repair;  Surgeon: Antonio Landaverde MD;  [facility]     ORCHIECTOMY INGUINAL N/A 10/8/2019    Procedure: Left Orchiectomy;  Surgeon: Antonio Landaverde MD;  [facility]     ZZC TOTAL KNEE ARTHROPLASTY      Bilateral            Social History:     Social History     Tobacco Use     Smoking status: Former     Packs/day: 1.00     Years: 11.00     Pack years: 11.00     Types: Cigarettes     Start date: 10/15/1948     Quit date: 1960     Years since quittin.0     Smokeless tobacco: Never   Substance Use Topics     Alcohol use: Yes     Comment: very little            Family History:     Family History   Problem Relation Age of Onset     No Known Problems Mother      No Known Problems Father      Melanoma No family hx of      Skin Cancer No family hx of      Anesthesia Reaction No family hx of      Cardiovascular No family hx of      Deep Vein Thrombosis (DVT) No family hx of             Allergies:   No Known Allergies         Medications:     Current Outpatient Medications   Medication     acetaminophen (TYLENOL) 325 MG  tablet     amLODIPine (NORVASC) 10 MG tablet     amoxicillin (AMOXIL) 500 MG capsule     cyanocobalamin (VITAMIN B-12) 500 MCG tablet     doxazosin (CARDURA) 4 MG tablet     fluticasone (FLONASE) 50 MCG/ACT nasal spray     ketoconazole (NIZORAL) 2 % external cream     neomycin-polymyxin-dexamethasone (MAXITROL) 3.5-53636-1.1 ophthalmic ointment     polyethylene glycol (MIRALAX) 17 GM/Dose powder     triamcinolone (KENALOG) 0.1 % external cream     No current facility-administered medications for this visit.             Review of Systems:   A focused musculoskeletal and neurologic ROS was performed with pertinent positives and negatives noted in the HPI.  Additional systems were also reviewed and are documented at the bottom of the note.         Physical Exam:   Vitals: There were no vitals taken for this visit.  Musculoskeletal, Neurologic, and Spine:   Cervical spine:    Appearance -no gross step-offs, kyphosis.    Motor -     C5: Deltoids R 5/5 and L 5/5 strength    C6: Biceps R 5/5 and L 5/5 strength     C7: Triceps R 5/5 and L 5/5 strength     C8:  R 5/5 and L 5/5 strength     T1: Dorsal interossei R 5/5 and L 5/5 strength      Sensation: intact to light touch in C5-T1       Lumbar Spine:    Appearance - No gross stepoffs or deformities    Motor -     L2-3: Hip flexion R 5/5  And L 5/5 strength          L3/4:  Knee extension R 5/5 and L 5/5 strength         L4/5:  Foot dorsiflexion R 5/5 L 5/5 and       EHL dorsiflexion R 5/5 L 5/5 strength         S1:  Plantarflexion/Peroneal Muscles  R 5/5 and L 5/5 strength    Sensation: intact to light touch L3-S1 distribution BLE          Neurologic:  REFLEXES Right Left   Biceps 2+ 2+   Triceps 2+ 2+   Brachioradialis 2+ 2+   Patella 2+ 2+   Ankle jerk 2+ 2+   Babinski     Clonus 0 beats 0 beats       Alignment:  Patient stands with a neutral standing sagittal balance.           Imaging:   We reviewed Cervical MRI dated 12/28 and lumbar MRI dated 11/22 at this clinic  visit. The results were discussed with the patient. Findings include:     Multilevel degenerative disc disease most pronounced at L3-L4, L4-L5, L5-S1.  There is also pronounced ligamentum flavum hypertrophy at these levels.  There is resultant severe lumbar stenosis at these levels.    Degenerative disc disease at C5-6 and C6-7 with resultant disc protrusion and cord compression at these levels.       Assessment and Plan:     89 year old male with chronic history of lower back pain that is refractory to Tylenol.  There has also been mild improvement with physical therapy and minimal improvement after a remote corticosteroid injection in the to the lumbar spine.  Presentation and imaging are consistent with lumbar spine stenosis and arthrosis most pronounced at L3-L4, L4-L5, L5-S1.  We discussed that given the patient's age surgical intervention should be seen as a last resort measure and should only be considered if the patient is experiencing severely debilitating symptoms.  Given this, we will continue with further conservative management including more physical therapy, NSAIDs and Tylenol as needed, and a repeat corticosteroid injection into the lumbar spine.  Neck symptoms and imaging are consistent with degenerative disc disease as well with some cord compression at C5-6 and C6-7.  Similarly we discussed that given the patient's age surgery would be a last resort option.  Furthermore, given the symptoms of prior neck tightness are largely resolved at this point in time notable managed with conservative measures such as anti-inflammatories as needed and physical therapy should his symptoms recur.  All of the patient's questions were answered.    Plan:  - Lumbar CSI L5-S1 interlaminar  - Tylenol and NSAIDs PRN   - Physical therapy     Oscar Machado MD  PGY-1 Orthopaedic Surgery    Attending MD (Dr. Elgin Berger) :  I reviewed and verified the history and physical exam of the patient and discussed the  patient's management with the other clinical providers involved in this patient's care including any involved residents or physicians assistants. I reviewed the above note and agree with the documented findings and plan of care, which were communicated to the patient.      Elgin Berger MD    Respectfully,  Elgin Berger MD  Spine Surgery  [facility]

## 2023-01-10 NOTE — LETTER
1/10/2023         RE: Fish Hong  [address]  [facility] [address]        Dear Colleague,    Thank you for referring your patient, Fish Hong, to the [facility] [place]. Please see a copy of my visit note below.    Spine Surgery Return Clinic Visit      Chief Complaint:   RECHECK (6 week return to see Dr. Berger discuss progress of PT )      Interval HPI:  Symptom Profile Including: location of symptoms, onset, severity, exacerbating/alleviating factors, previous treatments:        Fish Hong is a 89 year old male who we presents to clinic 6 weeks after previous visit with persistent lower back pain and newly described neck stiffness.  The patient states that he has been performing physical therapy as was previously recommended and notes that they have provided some additional exercises beyond what he was already previously doing at home.  He states that he has noticed some improvement in his flexibility, but still experiences persistent lower back pain.  In particular his symptoms flareup when he stands up straight and hyperextends his back or when he walks for a prolonged amount of time.  His symptoms partially resolve when he rests or takes a seat.  He notes that he has been taking Tylenol for the symptoms but has noted minimal improvement.  He states that he does not have radiation of pain into the legs or any numbness or tingling in the legs.  He is also not noted any elo weakness in his lower extremities.  He did receive a steroid injection in 2010 but recalls that it provided only partial and very temporary improvement.  He also was experiencing some neck stiffness about 2-1/2 months ago but as of about 1 month ago though symptoms have largely resolved.  When his symptoms were at their worst he was experiencing tightness on the right and left side of the neck with some radiation into the occipital head.  He never experienced radiation into the arms or  any weakness or numbness or tingling.  He has not had issues with his dexterity or with his gait.  The patient would like to avoid surgery for his symptoms if possible.            Past Medical History:     Past Medical History:   Diagnosis Date     Hematocele      Hypertension      Obesity      Osteoarthritis of both hips     s/p steroid injections     Phimosis      Pulmonary nodules      Rheumatic fever             Past Surgical History:     Past Surgical History:   Procedure Laterality Date     ARTHROPLASTY HIP Right 2021    Procedure: Right total hip arthroplasty;  Surgeon: Eyad Gonzalez MD;  [facility]     AS TOTAL KNEE ARTHROPLASTY       CIRCUMCISION N/A 2016    Procedure: CIRCUMCISION;  Surgeon: Elgin Rhodes MD;  [facility]     circumcision       ENT SURGERY      detached retina--bilateral     EXPLORE SCROTUM Left 2020    Procedure: EXPLORE LEFT SCROTUM, EVACULATE HEMATOMA, excision of scrotal mass;  Surgeon: Yobani Valenzuela MD;  [facility]     HERNIA REPAIR  11/3/2008    mesh repair umbilical hernia     HERNIORRHAPHY INGUINAL Left 10/8/2019    Procedure: Open Left Inguinal Hernia Repair;  Surgeon: Antonio Landaverde MD;  [facility]     ORCHIECTOMY INGUINAL N/A 10/8/2019    Procedure: Left Orchiectomy;  Surgeon: Antonio Landaverde MD;  [facility]     ZC TOTAL KNEE ARTHROPLASTY      Bilateral            Social History:     Social History     Tobacco Use     Smoking status: Former     Packs/day: 1.00     Years: 11.00     Pack years: 11.00     Types: Cigarettes     Start date: 10/15/1948     Quit date: 1960     Years since quittin.0     Smokeless tobacco: Never   Substance Use Topics     Alcohol use: Yes     Comment: very little            Family History:     Family History   Problem Relation Age of Onset     No Known Problems Mother      No Known Problems Father      Melanoma No family hx of      Skin Cancer No family hx of       Anesthesia Reaction No family hx of      Cardiovascular No family hx of      Deep Vein Thrombosis (DVT) No family hx of             Allergies:   No Known Allergies         Medications:     Current Outpatient Medications   Medication     acetaminophen (TYLENOL) 325 MG tablet     amLODIPine (NORVASC) 10 MG tablet     amoxicillin (AMOXIL) 500 MG capsule     cyanocobalamin (VITAMIN B-12) 500 MCG tablet     doxazosin (CARDURA) 4 MG tablet     fluticasone (FLONASE) 50 MCG/ACT nasal spray     ketoconazole (NIZORAL) 2 % external cream     neomycin-polymyxin-dexamethasone (MAXITROL) 3.5-79742-0.1 ophthalmic ointment     polyethylene glycol (MIRALAX) 17 GM/Dose powder     triamcinolone (KENALOG) 0.1 % external cream     No current facility-administered medications for this visit.             Review of Systems:   A focused musculoskeletal and neurologic ROS was performed with pertinent positives and negatives noted in the HPI.  Additional systems were also reviewed and are documented at the bottom of the note.         Physical Exam:   Vitals: There were no vitals taken for this visit.  Musculoskeletal, Neurologic, and Spine:   Cervical spine:    Appearance -no gross step-offs, kyphosis.    Motor -     C5: Deltoids R 5/5 and L 5/5 strength    C6: Biceps R 5/5 and L 5/5 strength     C7: Triceps R 5/5 and L 5/5 strength     C8:  R 5/5 and L 5/5 strength     T1: Dorsal interossei R 5/5 and L 5/5 strength      Sensation: intact to light touch in C5-T1       Lumbar Spine:    Appearance - No gross stepoffs or deformities    Motor -     L2-3: Hip flexion R 5/5  And L 5/5 strength          L3/4:  Knee extension R 5/5 and L 5/5 strength         L4/5:  Foot dorsiflexion R 5/5 L 5/5 and       EHL dorsiflexion R 5/5 L 5/5 strength         S1:  Plantarflexion/Peroneal Muscles  R 5/5 and L 5/5 strength    Sensation: intact to light touch L3-S1 distribution BLE          Neurologic:  REFLEXES Right Left   Biceps 2+ 2+   Triceps 2+ 2+    Brachioradialis 2+ 2+   Patella 2+ 2+   Ankle jerk 2+ 2+   Babinski     Clonus 0 beats 0 beats       Alignment:  Patient stands with a neutral standing sagittal balance.           Imaging:   We reviewed Cervical MRI dated 12/28 and lumbar MRI dated 11/22 at this clinic visit. The results were discussed with the patient. Findings include:     Multilevel degenerative disc disease most pronounced at L3-L4, L4-L5, L5-S1.  There is also pronounced ligamentum flavum hypertrophy at these levels.  There is resultant severe lumbar stenosis at these levels.    Degenerative disc disease at C5-6 and C6-7 with resultant disc protrusion and cord compression at these levels.       Assessment and Plan:     89 year old male with chronic history of lower back pain that is refractory to Tylenol.  There has also been mild improvement with physical therapy and minimal improvement after a remote corticosteroid injection in the to the lumbar spine.  Presentation and imaging are consistent with lumbar spine stenosis and arthrosis most pronounced at L3-L4, L4-L5, L5-S1.  We discussed that given the patient's age surgical intervention should be seen as a last resort measure and should only be considered if the patient is experiencing severely debilitating symptoms.  Given this, we will continue with further conservative management including more physical therapy, NSAIDs and Tylenol as needed, and a repeat corticosteroid injection into the lumbar spine.  Neck symptoms and imaging are consistent with degenerative disc disease as well with some cord compression at C5-6 and C6-7.  Similarly we discussed that given the patient's age surgery would be a last resort option.  Furthermore, given the symptoms of prior neck tightness are largely resolved at this point in time notable managed with conservative measures such as anti-inflammatories as needed and physical therapy should his symptoms recur.  All of the patient's questions were  answered.    Plan:  - Lumbar CSI L5-S1 interlaminar  - Tylenol and NSAIDs PRN   - Physical therapy     Oscar Machado MD  PGY-1 Orthopaedic Surgery    Attending MD (Dr. Elgin Berger) :  I reviewed and verified the history and physical exam of the patient and discussed the patient's management with the other clinical providers involved in this patient's care including any involved residents or physicians assistants. I reviewed the above note and agree with the documented findings and plan of care, which were communicated to the patient.      Elgin Berger MD    Respectfully,  Elgin Berger MD  Spine Surgery  [facility]

## 2023-01-10 NOTE — NURSING NOTE
Reason For Visit:   Chief Complaint   Patient presents with     RECHECK     6 week return to see Dr. Berger discuss progress of PT      Primary MD: Colby Desouza  Ref. MD: est.     ?  No  Occupation Retired - .  Currently working? No.  Work status?  Retired.  Date of injury: NA  Type of injury: Chronic  Date of surgery: NA  Type of surgery: NA.  Smoker: No  Request smoking cessation information: No    There were no vitals taken for this visit.         Oswestry (ELI) Questionnaire    OSWESTRY DISABILITY INDEX 11/29/2022   Count 9   Sum 19   Oswestry Score (%) 42.22   Some recent data might be hidden            Neck Disability Index (NDI) Questionnaire    No flowsheet data found.                Promis 10 Assessment    PROMIS 10 1/9/2023   In general, would you say your health is: Good   In general, would you say your quality of life is: Good   In general, how would you rate your physical health? Good   In general, how would you rate your mental health, including your mood and your ability to think? Good   In general, how would you rate your satisfaction with your social activities and relationships? Good   In general, please rate how well you carry out your usual social activities and roles Good   To what extent are you able to carry out your everyday physical activities such as walking, climbing stairs, carrying groceries, or moving a chair? Mostly   How often have you been bothered by emotional problems such as feeling anxious, depressed or irritable? Rarely   How would you rate your fatigue on average? Mild   How would you rate your pain on average?   0 = No Pain  to  10 = Worst Imaginable Pain 3   In general, would you say your health is: 3   In general, would you say your quality of life is: 3   In general, how would you rate your physical health? 3   In general, how would you rate your mental health, including your mood and your ability to think? 3   In general, how would you rate  your satisfaction with your social activities and relationships? 3   In general, please rate how well you carry out your usual social activities and roles. (This includes activities at home, at work and in your community, and responsibilities as a parent, child, spouse, employee, friend, etc.) 3   To what extent are you able to carry out your everyday physical activities such as walking, climbing stairs, carrying groceries, or moving a chair? 4   In the past 7 days, how often have you been bothered by emotional problems such as feeling anxious, depressed, or irritable? 2   In the past 7 days, how would you rate your fatigue on average? 2   In the past 7 days, how would you rate your pain on average, where 0 means no pain, and 10 means worst imaginable pain? 3   Global Mental Health Score 13   Global Physical Health Score 15   PROMIS TOTAL - SUBSCORES 28   Some recent data might be hidden                Lena Hernandez

## 2023-01-19 ENCOUNTER — TELEPHONE (OUTPATIENT)
Dept: INTERNAL MEDICINE | Facility: CLINIC | Age: 88
End: 2023-01-19
Payer: MEDICARE

## 2023-01-19 NOTE — TELEPHONE ENCOUNTER
[facility] Call Center    Phone Message    May a detailed message be left on voicemail: yes     Reason for Call: Other: Patient's daughter calling [facility] and [facility] there told them that he has an ear/sinus infection, but they couldn't write him an Rx there. [facility] hours before being told to go to his primary clinic. Caller is wondering if Dr. Desouza can write a prescription for his infection due to the patients mobility being limited? She is requesting a call back to discuss. Thank you.     [id number] [place], [place] - [address]    Action Taken: Message routed to:  [facility]    Travel Screening: Not Applicable

## 2023-01-23 ENCOUNTER — OFFICE VISIT (OUTPATIENT)
Dept: INTERNAL MEDICINE | Facility: CLINIC | Age: 88
End: 2023-01-23
Payer: MEDICARE

## 2023-01-23 VITALS
BODY MASS INDEX: 34.03 KG/M2 | HEART RATE: 74 BPM | SYSTOLIC BLOOD PRESSURE: 133 MMHG | OXYGEN SATURATION: 95 % | DIASTOLIC BLOOD PRESSURE: 81 MMHG | HEIGHT: 71 IN

## 2023-01-23 DIAGNOSIS — H65.01 NON-RECURRENT ACUTE SEROUS OTITIS MEDIA OF RIGHT EAR: Primary | ICD-10-CM

## 2023-01-23 PROCEDURE — 99213 OFFICE O/P EST LOW 20 MIN: CPT | Performed by: INTERNAL MEDICINE

## 2023-01-23 RX ORDER — AMOXICILLIN 500 MG/1
500 CAPSULE ORAL 3 TIMES DAILY
Qty: 21 CAPSULE | Refills: 0 | Status: SHIPPED | OUTPATIENT
Start: 2023-01-23 | End: 2023-01-30

## 2023-01-23 NOTE — TELEPHONE ENCOUNTER
Patient has appointment with Dr. Tavarez today.    Baldomero Alvarado RN on 1/23/2023 at 10:11 AM

## 2023-01-23 NOTE — NURSING NOTE
Fish Hong is a 89 year old male that presents in clinic today for the following:     Chief Complaint   Patient presents with     Ear Problem     R ear pain. Might have an infection per patient. Can't hear well out of the ear. Not the hearing aid.      Tinnitus       The patient's allergies and medications were reviewed. The patient's vitals were obtained without incident. The patient does not have any other questions for the provider.     Shane Martinez, EMT at 10:07 AM on 1/23/2023.  Primary Care Clinic: [phone]

## 2023-01-23 NOTE — PROGRESS NOTES
ASSESSMENT/PLAN:  Patient with acute otitis media on the right ear.  He has a long history of wearing hearing aids and thought it was related to that.  However his right tympanic membrane does look cloudy with fluid behind it.  He does not have a fever or upper respiratory symptoms to make me think of more systemic illness.  I sent a prescription for 7 days of amoxicillin at 500 mg 3 times a day.  I advised him to take the entire dose.  If he feels worse while taking this he needs to let us know for consideration of changing the therapy.    Number and complexity of problems:  1 acute uncomplicated illness    Amount and Complexity of Data Reviewed/Analyzed:    Risk of complication/morbidity of patient management:  Patient receiving prescription management    Bart Tavarez MD, FACP      Chief complaint:  Fish Hong is a 89 year old male presents for   Chief Complaint   Patient presents with     Ear Problem     R ear pain. Might have an infection per patient. Can't hear well out of the ear. Not the hearing aid.      Tinnitus        SUBJECTIVE:  Starting 7 days ago he had sharp pain on the right ear. Prior to that he had some hearing loss but thought it was the hearing aid. He had this checked 4 days ago by the audiologist but it was not the hearing aid. The hearing was also much worse then.  [facility]    No fever.  No rhinorrhea  He has a chronic cough but that has not changed    Medications and allergies were reviewed by me today.       Patient Active Problem List    Diagnosis Date Noted     Status post right hip replacement 06/10/2021     Priority: Medium     Degenerative joint disease (DJD) of hip 10/29/2020     Priority: Medium     Added automatically from request for surgery [id number]       Seasonal allergic rhinitis, unspecified trigger 06/30/2020     Priority: Medium     Hematocele 02/13/2020     Priority: Medium     Added automatically from request for surgery [id number]        "Vitamin B12 deficiency (non anemic) 04/23/2019     Priority: Medium     279 in 4/19; add po B12       History of smoking 04/22/2019     Priority: Medium     CT abd  In 2017 neg for AAA       Gastroesophageal reflux disease without esophagitis 04/21/2019     Priority: Medium     Atopic dermatitis, unspecified type 04/09/2018     Priority: Medium     ankles       Spinal stenosis of lumbar region without neurogenic claudication 04/09/2018     Priority: Medium     Greater trochanteric bursitis, right 04/09/2018     Priority: Medium     Pulmonary nodules 04/08/2018     Priority: Medium     7 mm RLL in 8/17 ; noted on an abdominal CT;               (quit smoking 1962)            In 4/19, CT only shows benign granulomas       Essential hypertension 09/22/2015     Priority: Medium     Preventive measure 04/08/2018     Priority: Low     PSA 2.57 in 4/16         PHYSICAL EXAM:  /81 (BP Location: Right arm, Patient Position: Sitting, Cuff Size: Adult Large)   Pulse 74   Ht 1.803 m (5' 11\")   SpO2 95%   BMI 34.03 kg/m    Constitutional: no distress, comfortable, pleasant   Ears, Nose and Throat: The right tympanic membrane is cloudy with a bit of moisture on the membrane making me think that there may have been a perforation but no obvious hole.  The membrane is bulging but not erythematous.  Skin: no concerning lesions   Lymphatic: no cervical lymphadenopathy                "

## 2023-02-02 ENCOUNTER — TELEPHONE (OUTPATIENT)
Dept: GENERAL RADIOLOGY | Facility: CLINIC | Age: 88
End: 2023-02-02
Payer: MEDICARE

## 2023-02-02 NOTE — TELEPHONE ENCOUNTER
Attempted x2 to contact patient with instructions for epidural steroid injection. No answer, no voice mail available.

## 2023-02-08 ENCOUNTER — ANCILLARY PROCEDURE (OUTPATIENT)
Dept: GENERAL RADIOLOGY | Facility: CLINIC | Age: 88
End: 2023-02-08
Attending: ORTHOPAEDIC SURGERY
Payer: MEDICARE

## 2023-02-08 VITALS
DIASTOLIC BLOOD PRESSURE: 78 MMHG | OXYGEN SATURATION: 97 % | TEMPERATURE: 98 F | SYSTOLIC BLOOD PRESSURE: 163 MMHG | HEART RATE: 78 BPM

## 2023-02-08 DIAGNOSIS — M54.16 LUMBAR RADICULOPATHY: ICD-10-CM

## 2023-02-08 DIAGNOSIS — M54.50 LUMBAR PAIN: ICD-10-CM

## 2023-02-08 PROCEDURE — 62323 NJX INTERLAMINAR LMBR/SAC: CPT | Performed by: RADIOLOGY

## 2023-02-08 RX ORDER — METHYLPREDNISOLONE ACETATE 80 MG/ML
80 INJECTION, SUSPENSION INTRA-ARTICULAR; INTRALESIONAL; INTRAMUSCULAR; SOFT TISSUE ONCE
Status: COMPLETED | OUTPATIENT
Start: 2023-02-08 | End: 2023-02-08

## 2023-02-08 RX ORDER — LIDOCAINE HYDROCHLORIDE 10 MG/ML
30 INJECTION, SOLUTION EPIDURAL; INFILTRATION; INTRACAUDAL; PERINEURAL ONCE
Status: COMPLETED | OUTPATIENT
Start: 2023-02-08 | End: 2023-02-08

## 2023-02-08 RX ORDER — BUPIVACAINE HYDROCHLORIDE 5 MG/ML
10 INJECTION, SOLUTION EPIDURAL; INTRACAUDAL ONCE
Status: COMPLETED | OUTPATIENT
Start: 2023-02-08 | End: 2023-02-08

## 2023-02-08 RX ORDER — IOPAMIDOL 408 MG/ML
10 INJECTION, SOLUTION INTRATHECAL ONCE
Status: COMPLETED | OUTPATIENT
Start: 2023-02-08 | End: 2023-02-08

## 2023-02-08 RX ADMIN — LIDOCAINE HYDROCHLORIDE 5 ML: 10 INJECTION, SOLUTION EPIDURAL; INFILTRATION; INTRACAUDAL; PERINEURAL at 09:57

## 2023-02-08 RX ADMIN — BUPIVACAINE HYDROCHLORIDE 10 MG: 5 INJECTION, SOLUTION EPIDURAL; INTRACAUDAL at 09:57

## 2023-02-08 RX ADMIN — IOPAMIDOL 2 ML: 408 INJECTION, SOLUTION INTRATHECAL at 09:57

## 2023-02-08 RX ADMIN — METHYLPREDNISOLONE ACETATE 80 MG: 80 INJECTION, SUSPENSION INTRA-ARTICULAR; INTRALESIONAL; INTRAMUSCULAR; SOFT TISSUE at 09:57

## 2023-02-08 NOTE — DISCHARGE INSTRUCTIONS
Discharge Instructions for Epidural Steroid Injection/Nerve Block  Care of injection site:  If you have new numbness down your leg after the injection, this may last up to 4-6 hours, but should go away. You may need help with walking until your leg feels normal.  Over the next 24 to 48 hours, pain at the injection site may increase before it gets better.  For the next 48 hours, use ice packs for 15 minutes, 3-4 times a day for pain relief.  If you have a bandage, you may remove it the next morning   Do not submerge injection site in water for 24 hours, (no baths. pools, hot tubs). Showers are OK.            Activity:  You should relax today and then you may return to your regular activity tomorrow.  You may eat a normal diet.    Medicines:  Continue to take all medications as ordered by your provider.  Restart all your blood thinner medicines tomorrow at your regular dose, including Coumadin (Warfarin).  If you are restarting Coumadin, talk to your doctor about having your INR checked.      The steroid should help reduce swelling and pain. This may take from 3-14 days. Pain from the shot will be mild and go away in 2-3 days. Please follow up with the provider that ordered this injection in a month if you don't already have an appointment with them. Let them know if the injection was effective.    Common side effects may include:  Insomnia  Heartburn  Flushed face  Water retention  Increased appetite  Increased blood sugar    If you have diabetes, watch your blood sugar closely. If needed, call your doctor to help control your blood sugar.    DO NOT GET THE COVID or FLU VACCINE 2 WEEKS AFTER YOUR INJECTION    Call your doctor or go to the Emergency Room if you have new severe pain or fever.

## 2023-03-17 ENCOUNTER — OFFICE VISIT (OUTPATIENT)
Dept: INTERNAL MEDICINE | Facility: CLINIC | Age: 88
End: 2023-03-17
Payer: MEDICARE

## 2023-03-17 VITALS
DIASTOLIC BLOOD PRESSURE: 68 MMHG | BODY MASS INDEX: 34.72 KG/M2 | HEIGHT: 71 IN | WEIGHT: 248 LBS | SYSTOLIC BLOOD PRESSURE: 117 MMHG | HEART RATE: 82 BPM | OXYGEN SATURATION: 97 %

## 2023-03-17 DIAGNOSIS — M54.9 BACK PAIN, UNSPECIFIED BACK LOCATION, UNSPECIFIED BACK PAIN LATERALITY, UNSPECIFIED CHRONICITY: Primary | ICD-10-CM

## 2023-03-17 PROCEDURE — 99214 OFFICE O/P EST MOD 30 MIN: CPT | Performed by: INTERNAL MEDICINE

## 2023-03-17 NOTE — NURSING NOTE
Fish Hong is a 89 year old male patient that presents today in clinic for the following:    Chief Complaint   Patient presents with     Follow Up     Pt would like to follow up on ear infection and back injection     The patient's allergies and medications were reviewed as noted. A set of vitals were recorded as noted without incident. The patient does not have any other questions for the provider.    Lena Lowry, EMT at 9:59 AM on 3/17/2023

## 2023-03-17 NOTE — PROGRESS NOTES
HPI:    Overall stable. He is still driving and trying to get some exercise. Still with minor lower back and neck pain but not worse. Otherwise, no additional HEENT, cardiopulmonary, abdominal, , neurological, systemic, psychiatric, lymphatic, endocrine, vascular complaints.     Past Medical History:   Diagnosis Date     Hematocele      Hypertension      Obesity      Osteoarthritis of both hips     s/p steroid injections     Phimosis      Pulmonary nodules      Rheumatic fever 1948     Past Surgical History:   Procedure Laterality Date     ARTHROPLASTY HIP Right 1/26/2021    Procedure: Right total hip arthroplasty;  Surgeon: Eyad Gonzalez MD;  [facility]     AS TOTAL KNEE ARTHROPLASTY       CIRCUMCISION N/A 7/13/2016    Procedure: CIRCUMCISION;  Surgeon: Elgin Rhodes MD;  [facility]     circumcision       ENT SURGERY      detached retina--bilateral     EXPLORE SCROTUM Left 7/14/2020    Procedure: EXPLORE LEFT SCROTUM, EVACULATE HEMATOMA, excision of scrotal mass;  Surgeon: Yobani Valenzuela MD;  [facility]     HERNIA REPAIR  11/3/2008    mesh repair umbilical hernia     HERNIORRHAPHY INGUINAL Left 10/8/2019    Procedure: Open Left Inguinal Hernia Repair;  Surgeon: Antonio Landaverde MD;  [facility]     ORCHIECTOMY INGUINAL N/A 10/8/2019    Procedure: Left Orchiectomy;  Surgeon: Antonio Landaverde MD;  [facility]     ZZC TOTAL KNEE ARTHROPLASTY      Bilateral     PE:    Vitals noted, gen, nad, cooperative, alert, neck supple nl rom, lungs with good air movement, RRR, S1, S2, no MRG, abdomen, no acute findings. Grossly normal neurological exam.     A/P:    1. Immunizations; COVID Pfizer vaccine x 5 (Bi-valent 11/16/2022). Pneumococcal 23 done 10/20/2009. Prevnar 13 done 4/9/2018.  Prevnar 20 vaccine done on 6/15/2022.  Tdap 3/31/2015. Check with insurance regarding Shingrix. Influenza  11/16/2022    2. HTN; on Amlodipine.    3. Antibiotic prophylaxis before dental  work with Amoxicillin for total R hip replacement  4. BPH on Doxazosin   5. B12 PO supplemention; last B12 535 on 6/15/2022.   6. Dermatology; I recommend a dermatology visit for a skin check but he declines.   7. Seen Dr. Gonzalez, orthopedics 1/9/2023for follow up R TKA   8. Seen Cardiology, Dr. Bueno, 3/10/2021 for concerns about possible Aifb  9. Seen Urology, Ms. Silva 7/30/2020 for L scrotal mass  10. L inguinal hernia repair. See post-op surgery note from Dr. Landaverde 11/21/2019  11. Lipids;  6/15/2022; , HDL 39 and TG's 100  12. MRI lumbar with R sided L5-S1 disc protrusion for back pain was done 6/5/2013. He had repeat MRI lumbar 11/22/2022 and saw Dr. Berger, orthopedic spine 11/29/2022. He had follow up 1/10/2023 with Dr. Berger.  He is getting PT. He had XR lumbar imaging 2/8/2023.   13. Neck pain; he had cervical MRI imaging on 12/28/2022. He discussed these findings with Dr. Berger on 1/10/2023.   14. Ear fullness less sxs. On Flonase if worse ENT evaluation, but getting better.          30 minutes spent on the date of the encounter doing chart review, history and exam, documentation and further activities as noted above exclusive of procedures and other billable interpretations

## 2023-05-04 DIAGNOSIS — Z96.641 STATUS POST RIGHT HIP REPLACEMENT: ICD-10-CM

## 2023-05-04 DIAGNOSIS — L20.9 ATOPIC DERMATITIS, UNSPECIFIED TYPE: ICD-10-CM

## 2023-05-09 RX ORDER — AMOXICILLIN 500 MG/1
CAPSULE ORAL
Qty: 4 CAPSULE | Refills: 4 | Status: SHIPPED | OUTPATIENT
Start: 2023-05-09 | End: 2024-05-09

## 2023-05-09 RX ORDER — TRIAMCINOLONE ACETONIDE 1 MG/G
CREAM TOPICAL
Qty: 80 G | Refills: 3 | Status: SHIPPED | OUTPATIENT
Start: 2023-05-09 | End: 2024-06-29

## 2023-05-09 NOTE — TELEPHONE ENCOUNTER
3/17/2023  [facility] Internal Medicine Colby Giles MD  Internal Medicine     Amoxicillin not on protocol for refill( dental / medical procedure prophylaxis). Last visit 3/17/23 Dr Desouza.

## 2023-05-10 NOTE — PROGRESS NOTES
Discharge Note    Fish failed to follow up and current status is unknown.  Please see information below for last relevant information on current status.  Patient seen for 2 visits.    SUBJECTIVE  Subjective changes noted by patient:  Feeling a cold coming on so would like to do a short visit. Has to sit after standing for 10 minutes due to pressure in low back.  .  Current pain level is  .     Previous pain level was  7/10.   Changes in function:  Yes (See Goal flowsheet attached for changes in current functional level)  Adverse reaction to treatment or activity: None    OBJECTIVE  Changes noted in objective findings: Good sit<>stand with slight right LE dynamic valgus.     ASSESSMENT/PLAN  Diagnosis: LBP   Updated problem list and treatment plan:   Pain - HEP  STG/LTGs have been met or progress has been made towards goals:  Yes, please see goal flowsheet for most current information  Assessment of Progress: current status is unknown.    Last current status: Pt is progressing as expected   Self Management Plans:  HEP  I have re-evaluated this patient and find that the nature, scope, duration and intensity of the therapy is appropriate for the medical condition of the patient.  Fish continues to require the following intervention to meet STG and LTG's:  HEP.    Recommendations:  Discharge with current home program.  Patient to follow up with MD as needed.    Please refer to the daily flowsheet for treatment today, total treatment time and time spent performing 1:1 timed codes.

## 2023-05-19 DIAGNOSIS — B35.6 TINEA CRURIS: ICD-10-CM

## 2023-05-23 RX ORDER — KETOCONAZOLE 20 MG/G
CREAM TOPICAL DAILY
Qty: 30 G | Refills: 3 | Status: SHIPPED | OUTPATIENT
Start: 2023-05-23 | End: 2024-05-09

## 2023-05-30 ENCOUNTER — TRANSFERRED RECORDS (OUTPATIENT)
Dept: HEALTH INFORMATION MANAGEMENT | Facility: CLINIC | Age: 88
End: 2023-05-30

## 2023-06-10 NOTE — OR NURSING
Goal Outcome Evaluation:  Plan of Care Reviewed With: patient        Progress: improving  Outcome Evaluation: Pt c/o insomnia overnight. Pt stated she prefers to lay on her side to sleep but would rather not due to the telemetry monitoring equipment. Pt IV fluids maintained. Remains on room air.  at bedside overnight. Pt denies pain, n/v/d. XR of lumbar spine. Awaiting MD rounds.          Patient transported to room. All belongings with patient (walker and 2 bags). Scripts given to Charge SHERRELL Dominguez.

## 2023-07-18 DIAGNOSIS — I10 ESSENTIAL HYPERTENSION: ICD-10-CM

## 2023-07-21 DIAGNOSIS — J30.2 SEASONAL ALLERGIC RHINITIS, UNSPECIFIED TRIGGER: ICD-10-CM

## 2023-07-21 RX ORDER — AMLODIPINE BESYLATE 10 MG/1
10 TABLET ORAL DAILY
Qty: 90 TABLET | Refills: 3 | Status: SHIPPED | OUTPATIENT
Start: 2023-07-21 | End: 2024-05-09

## 2023-07-21 RX ORDER — DOXAZOSIN 4 MG/1
4 TABLET ORAL AT BEDTIME
Qty: 90 TABLET | Refills: 3 | Status: SHIPPED | OUTPATIENT
Start: 2023-07-21 | End: 2024-05-09

## 2023-07-21 NOTE — TELEPHONE ENCOUNTER
DOXAZOSIN MESYLATE 4 MG TAB  Last Written Prescription Date:  6/15/2022  Last Fill Quantity: 90,   # refills: 4  Last Office Visit :  3/17/2023  Future Office visit:   8/2/2024  Routing refill request to provider for review/approval because:  Abnormal Creatinine  Refer to clinic for review     Recent Labs   Lab Test 06/15/22  0729   CR 1.30*      Antiadrenergic Antihypertensives Failed 07/18/2023 09:01 AM   Protocol Details  Normal serum creatinine on file in past 12 months            AMLODIPINE BESYLATE 10 MG TAB  Last Written Prescription Date:  6/15/2022  Last Fill Quantity: 90,   # refills: 4  Last Office Visit :  3/17/2023  Future Office visit:   8/2/2024  Routing refill request to provider for review/approval because:  Abnormal Creatinine  Refer to clinic for review        Calcium Channel Blockers Protocol  Failed 07/18/2023 09:01 AM   Protocol Details  Normal serum creatinine on file in past 12 months

## 2023-07-25 RX ORDER — FLUTICASONE PROPIONATE 50 MCG
2 SPRAY, SUSPENSION (ML) NASAL EVERY MORNING
Qty: 48 ML | Refills: 3 | Status: SHIPPED | OUTPATIENT
Start: 2023-07-25 | End: 2024-05-09

## 2023-08-02 ENCOUNTER — OFFICE VISIT (OUTPATIENT)
Dept: INTERNAL MEDICINE | Facility: CLINIC | Age: 88
End: 2023-08-02
Payer: MEDICARE

## 2023-08-02 VITALS
HEART RATE: 80 BPM | TEMPERATURE: 98 F | OXYGEN SATURATION: 95 % | HEIGHT: 71 IN | RESPIRATION RATE: 12 BRPM | WEIGHT: 245.2 LBS | DIASTOLIC BLOOD PRESSURE: 82 MMHG | SYSTOLIC BLOOD PRESSURE: 128 MMHG | BODY MASS INDEX: 34.33 KG/M2

## 2023-08-02 DIAGNOSIS — E53.8 VITAMIN B12 DEFICIENCY (NON ANEMIC): ICD-10-CM

## 2023-08-02 PROCEDURE — 99214 OFFICE O/P EST MOD 30 MIN: CPT | Performed by: INTERNAL MEDICINE

## 2023-08-02 RX ORDER — UREA 10 %
500 LOTION (ML) TOPICAL DAILY
Qty: 100 TABLET | Refills: 11 | Status: SHIPPED | OUTPATIENT
Start: 2023-08-02 | End: 2024-05-09

## 2023-08-02 ASSESSMENT — PAIN SCALES - GENERAL: PAINLEVEL: NO PAIN (0)

## 2023-08-02 NOTE — NURSING NOTE
Chief Complaint   Patient presents with    Recheck Medication     Patient comes in for a follow up.         Kendall Perez on 8/2/2023 at 11:58 AM

## 2023-08-02 NOTE — PROGRESS NOTES
History of Present Illness:  Mr. Hong is a 89 yo. Male with past medical history significant for HTN and osteoarthritis who presents today for four month follow up. Last visit 3/17/23 in person. He is still struggling with right ear fullness/discomfort. Was treated with amoxicillin for right sided otitis media 1/2023. This ear discomfort does not feel the same, not painful. He takes Flonase which he believes helps a bit. Does wear hearing aids. Otherwise doing well, has two great grandchildren that he sees frequently which he enjoys. [facility]     A detailed Review of Systems was performed, verified and is negative except as documented in the HPI.  All health questionnaires were reviewed, verified and relevant information documented above.    Past Medical History:  Past Medical History:   Diagnosis Date    Hematocele     Hypertension     Obesity     Osteoarthritis of both hips     s/p steroid injections    Phimosis     Pulmonary nodules     Rheumatic fever 1948     Past Surgical History:  Past Surgical History:   Procedure Laterality Date    ARTHROPLASTY HIP Right 1/26/2021    Procedure: Right total hip arthroplasty;  Surgeon: Eyad Gonzalez MD;  [facility]    AS TOTAL KNEE ARTHROPLASTY      CIRCUMCISION N/A 7/13/2016    Procedure: CIRCUMCISION;  Surgeon: Elgin Rhodes MD;  [facility]    circumcision      ENT SURGERY      detached retina--bilateral    EXPLORE SCROTUM Left 7/14/2020    Procedure: EXPLORE LEFT SCROTUM, EVACULATE HEMATOMA, excision of scrotal mass;  Surgeon: Yobani Valenzuela MD;  [facility]    HERNIA REPAIR  11/3/2008    mesh repair umbilical hernia    HERNIORRHAPHY INGUINAL Left 10/8/2019    Procedure: Open Left Inguinal Hernia Repair;  Surgeon: Antonio Landaverde MD;  [facility]    ORCHIECTOMY INGUINAL N/A 10/8/2019    Procedure: Left Orchiectomy;  Surgeon: Antonio Landaverde MD;  [facility] OR    [facility] [place] TOTAL KNEE  "ARTHROPLASTY      Bilateral     Active Meds:  Current Outpatient Medications   Medication    acetaminophen (TYLENOL) 325 MG tablet    amLODIPine (NORVASC) 10 MG tablet    amoxicillin (AMOXIL) 500 MG capsule    cyanocobalamin (VITAMIN B-12) 500 MCG tablet    doxazosin (CARDURA) 4 MG tablet    fluticasone (FLONASE) 50 MCG/ACT nasal spray    ketoconazole (NIZORAL) 2 % external cream    neomycin-polymyxin-dexamethasone (MAXITROL) 3.5-22432-8.1 ophthalmic ointment    polyethylene glycol (MIRALAX) 17 GM/Dose powder    triamcinolone (KENALOG) 0.1 % external cream     No current facility-administered medications for this visit.      Allergies:  Patient has no known allergies.    Family History:  family history includes No Known Problems in his father and mother.    Social History:  Social History     Tobacco Use    Smoking status: Former     Packs/day: 1.00     Years: 11.00     Pack years: 11.00     Types: Cigarettes     Start date: 10/15/1948     Quit date: 1960     Years since quittin.6    Smokeless tobacco: Never   Substance Use Topics    Alcohol use: Yes     Comment: very little    Drug use: No     Physical Exam:  Vitals: /82   Pulse 80   Temp 98  F (36.7  C)   Resp 12   Ht 1.803 m (5' 11\")   Wt 111.2 kg (245 lb 3.2 oz)   SpO2 95%   BMI 34.20 kg/m    Constitutional: Alert, oriented, pleasant, no acute distress  Head: Normocephalic, atraumatic  ENT: right ear with minimal cerumen impaction, no erythema, bulging of TM, drainage or perforation. Small blistering lesion of superior, posterior right TM. Left ear with minimal cerumen impaction, no erythema, bulging of TM, drainage or perforation.  Cardiovascular: Regular rate and rhythm, no murmurs, rubs or gallops, peripheral pulses full/symmetric  Respiratory: Good air movement bilaterally, lungs clear, no wheezes/rales/rhonchi  Musculoskeletal: No edema, normal muscle tone, normal gait  Neurologic: Alert and oriented, cranial nerves 2-12 intact, " grossly non-focal  Skin: No rashes/lesions  Psychiatric: normal mentation, affect and mood    Diagnostics:  [facility]: scanned into chart   - CBC wnr (Hgb 14.2)  - Glucose 100  - A1c: 5.5%   - Electrolytes, LFTs wnr   - Cr 1.2    Assessment and Plan:  1. Immunizations; COVID Pfizer vaccine x 5 (Bi-valent 11/16/2022). Pneumococcal 23 done 10/20/2009. Prevnar 13 done 4/9/2018.  Prevnar 20 vaccine done on 6/15/2022. Tdap 3/31/2015. Shingrix series completed 2022. Influenza 11/16/2022    2. HTN; on Amlodipine 10 mg.    3. Antibiotic prophylaxis before dental work with Amoxicillin for total R hip replacement.   4. BPH: on Doxazosin   5. B12 PO supplemention; last B12 535 on 6/15/2022.   6. Dermatology; [facility].   7. Seen Dr. Gonzalez, Orthopedics 1/9/2023 for follow up R TKA   8. Seen Cardiology, Dr. Bueno, 3/10/2021 for concerns about possible A fib  9. Seen Urology, Ms. Silva 7/30/2020 for L scrotal mass  10. L inguinal hernia repair. See post-op surgery note from Dr. Landaverde 11/21/2019  11. Lipids; 6/15/2022; , HDL 39 and TG's 100. Not on a statin.   12. MRI lumbar with R sided L5-S1 disc protrusion for back pain was done 6/5/2013. He had repeat MRI lumbar 11/22/2022 and saw Dr. Berger, orthopedic spine 11/29/2022. He had follow up 1/10/2023 with Dr. Berger. He is getting PT and had previous lumbar spine epidural injections. He had XR lumbar imaging 2/8/2023.   13. Neck pain; he had cervical MRI imaging on 12/28/2022. He discussed these findings with Dr. Berger on 1/10/2023.   14. Ear fullness: sxs continue, not like his acute otitis media episode 1/2023, but still significant. PE findings with small, blistering lesion on superior, posterior right TM. Flonase provides some relief for the fullness but discussed that he may benefit from seeing ENT providers and referral placed today.   15. A1c was 5.5% [facility] ([facility]/25/23).     Zamzam Guerrero,  PA-S2    Staff note;    I personally reviewed Mr. Hong's past medical history including his 5/2023 laboratory testing from the [facility], I interviewed him and conducted a physical examination. I agree with Ms. MccrayOtero's above assessment and plan.    ANGELINA Desouza MD    30 minutes spent on the date of the encounter doing chart review, history and exam, documentation and further activities as noted above exclusive of procedures and other billable interpretations

## 2023-08-03 NOTE — TELEPHONE ENCOUNTER
FUTURE VISIT INFORMATION      FUTURE VISIT INFORMATION:  Date: 10/10/23  Time: 12:30  Location:   REFERRAL INFORMATION:  Referring provider:  Colby Desouza MD  Referring providers clinic:  INTERNAL MED  Reason for visit/diagnosis  Vitamin B12 deficiency (non anemic) [E53.8]  ear fullness, please see Anju Enriquez  ref by Colby Desouza MD  recs in epic     RECORDS REQUESTED FROM:       Clinic name Comments Records Status Imaging Status   INTERNAL MED 8/2/23- OV WITH  Colby Desouza MD EPIC

## 2023-08-12 DIAGNOSIS — J30.2 SEASONAL ALLERGIC RHINITIS, UNSPECIFIED TRIGGER: ICD-10-CM

## 2023-08-17 RX ORDER — FLUTICASONE PROPIONATE 50 MCG
2 SPRAY, SUSPENSION (ML) NASAL EVERY MORNING
Qty: 144 ML | Refills: 2 | OUTPATIENT
Start: 2023-08-17

## 2023-10-09 DIAGNOSIS — Z01.10 ENCOUNTER FOR HEARING TEST: Primary | ICD-10-CM

## 2023-10-10 ENCOUNTER — PRE VISIT (OUTPATIENT)
Dept: OTOLARYNGOLOGY | Facility: CLINIC | Age: 88
End: 2023-10-10

## 2023-10-26 NOTE — TELEPHONE ENCOUNTER
FUTURE VISIT INFORMATION        FUTURE VISIT INFORMATION:  Date: 12/19/23  Time: 12:20 PM  Location: [facility]  REFERRAL INFORMATION:  Referring provider:  Colby Desouza MD  Referring providers clinic:  INTERNAL MED  Reason for visit/diagnosis  Vitamin B12 deficiency (non anemic) [E53.8]  ear fullness, please see Anju Enriquez  ref by Colby Desouza MD  recs in epic      RECORDS REQUESTED FROM:         Clinic name Comments Records Status Imaging Status   INTERNAL MED 8/2/23- OV WITH  Colby Desouza MD EPIC

## 2023-12-19 ENCOUNTER — OFFICE VISIT (OUTPATIENT)
Dept: OTOLARYNGOLOGY | Facility: CLINIC | Age: 88
End: 2023-12-19
Attending: INTERNAL MEDICINE
Payer: MEDICARE

## 2023-12-19 ENCOUNTER — PRE VISIT (OUTPATIENT)
Dept: OTOLARYNGOLOGY | Facility: CLINIC | Age: 88
End: 2023-12-19

## 2023-12-19 ENCOUNTER — OFFICE VISIT (OUTPATIENT)
Dept: AUDIOLOGY | Facility: CLINIC | Age: 88
End: 2023-12-19
Attending: INTERNAL MEDICINE
Payer: MEDICARE

## 2023-12-19 VITALS
HEART RATE: 86 BPM | SYSTOLIC BLOOD PRESSURE: 117 MMHG | DIASTOLIC BLOOD PRESSURE: 71 MMHG | TEMPERATURE: 98 F | WEIGHT: 246 LBS | BODY MASS INDEX: 34.44 KG/M2 | OXYGEN SATURATION: 95 % | HEIGHT: 71 IN

## 2023-12-19 DIAGNOSIS — H90.A31 MIXED CONDUCTIVE AND SENSORINEURAL HEARING LOSS OF RIGHT EAR WITH RESTRICTED HEARING OF LEFT EAR: Primary | ICD-10-CM

## 2023-12-19 DIAGNOSIS — H93.13 TINNITUS, BILATERAL: ICD-10-CM

## 2023-12-19 DIAGNOSIS — H61.23 BILATERAL IMPACTED CERUMEN: ICD-10-CM

## 2023-12-19 DIAGNOSIS — H90.A22 SENSORINEURAL HEARING LOSS (SNHL) OF LEFT EAR WITH RESTRICTED HEARING OF RIGHT EAR: ICD-10-CM

## 2023-12-19 DIAGNOSIS — H90.3 BILATERAL SENSORINEURAL HEARING LOSS: Primary | ICD-10-CM

## 2023-12-19 PROCEDURE — 92550 TYMPANOMETRY & REFLEX THRESH: CPT | Mod: 52 | Performed by: AUDIOLOGIST

## 2023-12-19 PROCEDURE — 92557 COMPREHENSIVE HEARING TEST: CPT | Performed by: AUDIOLOGIST

## 2023-12-19 PROCEDURE — 92504 EAR MICROSCOPY EXAMINATION: CPT | Performed by: REGISTERED NURSE

## 2023-12-19 PROCEDURE — 99203 OFFICE O/P NEW LOW 30 MIN: CPT | Mod: 25 | Performed by: REGISTERED NURSE

## 2023-12-19 PROCEDURE — 92565 STENGER TEST PURE TONE: CPT | Performed by: AUDIOLOGIST

## 2023-12-19 ASSESSMENT — PAIN SCALES - GENERAL: PAINLEVEL: NO PAIN (0)

## 2023-12-19 NOTE — NURSING NOTE
"Chief Complaint   Patient presents with    Consult     B12 deficiency     Blood pressure 117/71, pulse 86, temperature 98  F (36.7  C), height 1.803 m (5' 11\"), weight 111.6 kg (246 lb), SpO2 95%.  Ab Diggs LPN    "

## 2023-12-19 NOTE — PROGRESS NOTES
AUDIOLOGY REPORT     SUMMARY: Audiology visit completed. See audiogram for results.       RECOMMENDATIONS: Follow-up with ENT.     Barbara Sin CCC-A  Licensed Audiologist   [place] #[id number]

## 2023-12-19 NOTE — LETTER
12/19/2023       RE: Fish Hong  [address]  [facility] [address]     Dear Colleague,    Thank you for referring your patient, Fish Hong, to the [facility] [place] [facility]. Please see a copy of my visit note below.      Otolaryngology Clinic  December 19, 2023    Chief Complaint:   Bilateral tinnitus       History of Present Illness:   Fish Hong is a 89 year old male who presents today for evaluation of bilateral tinnitus.  Has a history of bilateral sensorineural hearing loss and [facility]  Reports a new onset of buzzing/ringing when hearing aids are in.  Denies any changes in hearing.  Patient reports right ear fullness.  Has taken amoxicillin for this fullness that improved symptoms but the right ear is still slightly full.  Patient denies any drainage, dizziness, history of frequent ear infections, ear surgeries or facial numbness/weakness.    Past Medical History:  Past Medical History:   Diagnosis Date    Hematocele     Hypertension     Obesity     Osteoarthritis of both hips     s/p steroid injections    Phimosis     Pulmonary nodules     Rheumatic fever 1948       Past Surgical History:  Past Surgical History:   Procedure Laterality Date    ARTHROPLASTY HIP Right 1/26/2021    Procedure: Right total hip arthroplasty;  Surgeon: Eyad Gonzalez MD;  [facility]    AS TOTAL KNEE ARTHROPLASTY      CIRCUMCISION N/A 7/13/2016    Procedure: CIRCUMCISION;  Surgeon: Elgin Rhodes MD;  [facility]    circumcision      ENT SURGERY      detached retina--bilateral    EXPLORE SCROTUM Left 7/14/2020    Procedure: EXPLORE LEFT SCROTUM, EVACULATE HEMATOMA, excision of scrotal mass;  Surgeon: Yobani Valenzuela MD;  [facility]    HERNIA REPAIR  11/3/2008    mesh repair umbilical hernia    HERNIORRHAPHY INGUINAL Left 10/8/2019    Procedure: Open Left  Inguinal Hernia Repair;  Surgeon: Antonio Landaverde MD;  [facility]    ORCHIECTOMY INGUINAL N/A 10/8/2019    Procedure: Left Orchiectomy;  Surgeon: Antonio Landaverde MD;  Location:  OR    [facility] TOTAL KNEE ARTHROPLASTY      Bilateral       Medications:  Current Outpatient Medications   Medication Sig Dispense Refill    acetaminophen (TYLENOL) 325 MG tablet Take 2 tablets (650 mg) by mouth every 4 hours as needed for other (mild pain) (Patient taking differently: Take 500 mg by mouth every 4 hours as needed for other (mild pain)) 100 tablet 0    amLODIPine (NORVASC) 10 MG tablet Take 1 tablet (10 mg) by mouth daily 90 tablet 3    amoxicillin (AMOXIL) 500 MG capsule TAKE 4 CAPSULES BY MOUTH ONE HOUR PRIOR TO ANY DENTAL CLEANING OR DENTAL/MEDICAL PROCEDURE. 4 capsule 4    cyanocobalamin (VITAMIN B-12) 500 MCG tablet Take 1 tablet (500 mcg) by mouth daily 100 tablet 11    doxazosin (CARDURA) 4 MG tablet Take 1 tablet (4 mg) by mouth At Bedtime 90 tablet 3    fluticasone (FLONASE) 50 MCG/ACT nasal spray SPRAY 2 SPRAYS INTO BOTH NOSTRILS EVERY MORNING 48 mL 3    ketoconazole (NIZORAL) 2 % external cream APPLY TOPICALLY DAILY APPLY TO THE INGUINAL AND PERINEAL AREA DAILY AS NEEDED. 30 g 3    neomycin-polymyxin-dexamethasone (MAXITROL) 3.5-61646-7.1 ophthalmic ointment       polyethylene glycol (MIRALAX) 17 GM/Dose powder       triamcinolone (KENALOG) 0.1 % external cream APPLY SPARINGLY TO AFFECTED AREA TWO TIMES DAILY AS NEEDED (LEFT ANKLE.LOWER LEG) 80 g 3       Allergies:  No Known Allergies     Social History:  Social History     Tobacco Use    Smoking status: Former     Packs/day: 1.00     Years: 11.00     Additional pack years: 0.00     Total pack years: 11.00     Types: Cigarettes     Start date: 10/15/1948     Quit date: 1960     Years since quittin.0    Smokeless tobacco: Never   Substance Use Topics    Alcohol use: Yes     Comment: very little    Drug use: No       ROS: 10 point ROS neg other  "than the symptoms noted above in the HPI.    Physical Exam:    /71   Pulse 86   Temp 98  F (36.7  C)   Ht 1.803 m (5' 11\")   Wt 111.6 kg (246 lb)   SpO2 95%   BMI 34.31 kg/m       Constitutional:  The patient was unaccompanied, well-groomed, and in no acute distress.     Skin: Normal:  warm and pink without rash    Neurologic: Alert and oriented x 3.  CN's III-XII within normal limits.  Voice normal.    Psychiatric: The patient's affect was calm, cooperative, and appropriate.     Communication:  Normal; communicates verbally, normal voice quality.    Respiratory: Breathing comfortably without stridor or exertion of accessory muscles.    Ears: Pinnae and tragus non-tender.       Otologic microscope exam:    Right ear was examined under the microscope.  Nonoccluding cerumen was cleaned using suction.  Normal appearing TM, nicely aerated middle ear space.  There is no evidence of effusion, retraction, or infection.    Left ear was also examined under the microscope.  Nonoccluding cerumen was cleaned using suction.  Normal appearing TM, nicely aerated middle ear space.          Audiogram: 12/19/2023 - data independently reviewed  Moderate sloping to profound sensorineural hearing loss bilaterally  WR right: 92% left: 88%   Tympanograms: type Ad bilaterally       Assessment and Plan:  1. Bilateral sensorineural hearing loss  Patient with bilateral sensorineural hearing loss.  Uses bilateral amplification with good benefit.  Re[facility] programmed to current hearing test.    2. Tinnitus, bilateral  Patient with new buzzing/humming in bilateral ears when wearing hearing aid.  After ear cleaning, symptoms did improve after patient put hearing aids back in.  Suspect that hearing aids may have been blocked with cerumen.  Recommend that patient continue to monitor and, again, [facility] for [facility] hearing " aid check and management.     Patient will follow up as needed if symptoms worsen or fail to improve with management strategies discussed above.    Anju Enriquez DNP, APRN, CNP  Otolaryngology  Head & Neck Surgery  [phone]    30 minutes spent by me on the date of the encounter doing chart review, history and exam, documentation and further activities per the note excluding time spent examining and cleaning ears under microscopy.          Again, thank you for allowing me to participate in the care of your patient.      Sincerely,    Leola Enriquez, NP

## 2023-12-26 NOTE — PROGRESS NOTES
Otolaryngology Clinic  December 19, 2023    Chief Complaint:   Bilateral tinnitus       History of Present Illness:   Fish Hong is a 89 year old male who presents today for evaluation of bilateral tinnitus.  Has a history of bilateral sensorineural hearing loss and [facility].  Reports a new onset of buzzing/ringing when hearing aids are in.  Denies any changes in hearing.  Patient reports right ear fullness.  Has taken amoxicillin for this fullness that improved symptoms but the right ear is still slightly full.  Patient denies any drainage, dizziness, history of frequent ear infections, ear surgeries or facial numbness/weakness.    Past Medical History:  Past Medical History:   Diagnosis Date     Hematocele      Hypertension      Obesity      Osteoarthritis of both hips     s/p steroid injections     Phimosis      Pulmonary nodules      Rheumatic fever 1948       Past Surgical History:  Past Surgical History:   Procedure Laterality Date     ARTHROPLASTY HIP Right 1/26/2021    Procedure: Right total hip arthroplasty;  Surgeon: Eyad Gonzalez MD;  [facility]     AS TOTAL KNEE ARTHROPLASTY       CIRCUMCISION N/A 7/13/2016    Procedure: CIRCUMCISION;  Surgeon: Elgin Rhodes MD;  [facility]     circumcision       ENT SURGERY      detached retina--bilateral     EXPLORE SCROTUM Left 7/14/2020    Procedure: EXPLORE LEFT SCROTUM, EVACULATE HEMATOMA, excision of scrotal mass;  Surgeon: Yobani Valenzuela MD;  [facility]     HERNIA REPAIR  11/3/2008    mesh repair umbilical hernia     HERNIORRHAPHY INGUINAL Left 10/8/2019    Procedure: Open Left Inguinal Hernia Repair;  Surgeon: Antonio Landaverde MD;  Location:  OR     ORCHIECTOMY INGUINAL N/A 10/8/2019    Procedure: Left Orchiectomy;  Surgeon: Antonio Landaverde MD;  Location:  OR     [facility] TOTAL KNEE ARTHROPLASTY      Bilateral       Medications:  Current Outpatient Medications   Medication Sig  "Dispense Refill     acetaminophen (TYLENOL) 325 MG tablet Take 2 tablets (650 mg) by mouth every 4 hours as needed for other (mild pain) (Patient taking differently: Take 500 mg by mouth every 4 hours as needed for other (mild pain)) 100 tablet 0     amLODIPine (NORVASC) 10 MG tablet Take 1 tablet (10 mg) by mouth daily 90 tablet 3     amoxicillin (AMOXIL) 500 MG capsule TAKE 4 CAPSULES BY MOUTH ONE HOUR PRIOR TO ANY DENTAL CLEANING OR DENTAL/MEDICAL PROCEDURE. 4 capsule 4     cyanocobalamin (VITAMIN B-12) 500 MCG tablet Take 1 tablet (500 mcg) by mouth daily 100 tablet 11     doxazosin (CARDURA) 4 MG tablet Take 1 tablet (4 mg) by mouth At Bedtime 90 tablet 3     fluticasone (FLONASE) 50 MCG/ACT nasal spray SPRAY 2 SPRAYS INTO BOTH NOSTRILS EVERY MORNING 48 mL 3     ketoconazole (NIZORAL) 2 % external cream APPLY TOPICALLY DAILY APPLY TO THE INGUINAL AND PERINEAL AREA DAILY AS NEEDED. 30 g 3     neomycin-polymyxin-dexamethasone (MAXITROL) 3.5-92378-7.1 ophthalmic ointment        polyethylene glycol (MIRALAX) 17 GM/Dose powder        triamcinolone (KENALOG) 0.1 % external cream APPLY SPARINGLY TO AFFECTED AREA TWO TIMES DAILY AS NEEDED (LEFT ANKLE.LOWER LEG) 80 g 3       Allergies:  No Known Allergies     Social History:  Social History     Tobacco Use     Smoking status: Former     Packs/day: 1.00     Years: 11.00     Additional pack years: 0.00     Total pack years: 11.00     Types: Cigarettes     Start date: 10/15/1948     Quit date: 1960     Years since quittin.0     Smokeless tobacco: Never   Substance Use Topics     Alcohol use: Yes     Comment: very little     Drug use: No       ROS: 10 point ROS neg other than the symptoms noted above in the HPI.    Physical Exam:    /71   Pulse 86   Temp 98  F (36.7  C)   Ht 1.803 m (5' 11\")   Wt 111.6 kg (246 lb)   SpO2 95%   BMI 34.31 kg/m       Constitutional:  The patient was unaccompanied, well-groomed, and in no acute distress.     Skin: Normal:  " warm and pink without rash    Neurologic: Alert and oriented x 3.  CN's III-XII within normal limits.  Voice normal.    Psychiatric: The patient's affect was calm, cooperative, and appropriate.     Communication:  Normal; communicates verbally, normal voice quality.    Respiratory: Breathing comfortably without stridor or exertion of accessory muscles.    Ears: Pinnae and tragus non-tender.       Otologic microscope exam:    Right ear was examined under the microscope.  Nonoccluding cerumen was cleaned using suction.  Normal appearing TM, nicely aerated middle ear space.  There is no evidence of effusion, retraction, or infection.    Left ear was also examined under the microscope.  Nonoccluding cerumen was cleaned using suction.  Normal appearing TM, nicely aerated middle ear space.          Audiogram: 12/19/2023 - data independently reviewed  Moderate sloping to profound sensorineural hearing loss bilaterally  WR right: 92% left: 88%   Tympanograms: type Ad bilaterally       Assessment and Plan:  1. Bilateral sensorineural hearing loss  Patient with bilateral sensorineural hearing loss.  Uses bilateral amplification with good benefit.  Recommend that [facility] sure that hearing aids are programmed to current hearing test.    2. Tinnitus, bilateral  Patient with new buzzing/humming in bilateral ears when wearing hearing aid.  After ear cleaning, symptoms did improve after patient put hearing aids back in.  Suspect that hearing aids may have been blocked with cerumen.  Recommend that patient continue to monitor and, again, [facility] for [facility] hearing aid check and management.     Patient will follow up as needed if symptoms worsen or fail to improve with management strategies discussed above.    Anju Enriquez DNP, APRN, CNP  Otolaryngology  Head & Neck Surgery  [phone]    30 minutes spent by me on the date of the encounter doing chart  review, history and exam, documentation and further activities per the note excluding time spent examining and cleaning ears under microscopy.

## 2024-05-08 NOTE — PROGRESS NOTES
HPI:    Last visit with us was 2/6/2024 and additional information in that note. Overall doing quite well and he continues to drive w/o problems. He helps out his daughter. She has [place] [facility]. He does not smoke nor abuse EtOH. He remains on this same medications. No other HEENT, cardiopulmonary, abdominal, , neurological, systemic, psychiatric, lymphatic, endocrine, vascular complaints.     Past Medical History:   Diagnosis Date    Hematocele     Hypertension     Obesity     Osteoarthritis of both hips     s/p steroid injections    Phimosis     Pulmonary nodules     Rheumatic fever 1948     Past Surgical History:   Procedure Laterality Date    ARTHROPLASTY HIP Right 1/26/2021    Procedure: Right total hip arthroplasty;  Surgeon: Eyad Gonzalez MD;  [facility]    AS TOTAL KNEE ARTHROPLASTY      CIRCUMCISION N/A 7/13/2016    Procedure: CIRCUMCISION;  Surgeon: Elgin Rhodes MD;  [facility]    circumcision      ENT SURGERY      detached retina--bilateral    EXPLORE SCROTUM Left 7/14/2020    Procedure: EXPLORE LEFT SCROTUM, EVACULATE HEMATOMA, excision of scrotal mass;  Surgeon: Yobani Valenzuela MD;  [facility]    HERNIA REPAIR  11/3/2008    mesh repair umbilical hernia    HERNIORRHAPHY INGUINAL Left 10/8/2019    Procedure: Open Left Inguinal Hernia Repair;  Surgeon: Antonio Landaverde MD;  [facility]    ORCHIECTOMY INGUINAL N/A 10/8/2019    Procedure: Left Orchiectomy;  Surgeon: Antonio Landaverde MD;  [facility]    [facility] TOTAL KNEE ARTHROPLASTY      Bilateral     PE:    Vitals noted, gen, nad, cooperative, alert, B hearing aids, neck supple nl rom, lungs with good air movement, RRR, S1, S2, abdomen, no acute findings. He has skin changes B dorsum of hands     Results for orders placed or performed in visit on 05/09/24   CBC with platelets and differential     Status: None   Result Value Ref Range    WBC Count 7.0 4.0 - 11.0 10e3/uL    RBC  Count 4.52 4.40 - 5.90 10e6/uL    Hemoglobin 13.7 13.3 - 17.7 g/dL    Hematocrit 40.7 40.0 - 53.0 %    MCV 90 78 - 100 fL    MCH 30.3 26.5 - 33.0 pg    MCHC 33.7 31.5 - 36.5 g/dL    RDW 13.0 10.0 - 15.0 %    Platelet Count 184 150 - 450 10e3/uL    % Neutrophils 65 %    % Lymphocytes 24 %    % Monocytes 7 %    % Eosinophils 4 %    % Basophils 0 %    % Immature Granulocytes 0 %    NRBCs per 100 WBC 0 <1 /100    Absolute Neutrophils 4.6 1.6 - 8.3 10e3/uL    Absolute Lymphocytes 1.7 0.8 - 5.3 10e3/uL    Absolute Monocytes 0.5 0.0 - 1.3 10e3/uL    Absolute Eosinophils 0.3 0.0 - 0.7 10e3/uL    Absolute Basophils 0.0 0.0 - 0.2 10e3/uL    Absolute Immature Granulocytes 0.0 <=0.4 10e3/uL    Absolute NRBCs 0.0 10e3/uL   CBC with platelets and differential     Status: None    Narrative    The following orders were created for panel order CBC with platelets and differential.  Procedure                               Abnormality         Status                     ---------                               -----------         ------                     CBC with platelets and d...[[id number]]                      Final result                 Please view results for these tests on the individual orders.         A/P:     1. Immunizations; COVID Pfizer vaccine x 5 (Bi-valent 11/16/2022). Moderna x 1 (10/6/2023) Pneumococcal 23 done 10/20/2009. Prevnar 13 done 4/9/2018.  Prevnar 20 vaccine done on 6/15/2022. Tdap 3/31/2015. Shingrix series completed 2022. Influenza 10/6/2023  2. HTN; on Amlodipine 10 mg.    3. Antibiotic prophylaxis before dental work with Amoxicillin for total R hip replacement.   4. BPH: on Doxazosin   5. B12 PO supplemention; last B12 535 on 6/15/2022.   6. Dermatology; Placed dermatology referral today 5/9/2024.    7. Seen Dr. Gonzalez, Orthopedics 1/9/2023 for follow up R TKA   8. Seen Cardiology, Dr. Bueno, 3/10/2021 for concerns about possible A fib  9. Seen Urology, Ms. Silva 7/30/2020 for L scrotal mass  10. L  inguinal hernia repair. See post-op surgery note from Dr. Landaverde 11/21/2019  11. Lipids; 6/15/2022; , HDL 39 and TG's 100. Not on a statin.   12. MRI lumbar with R sided L5-S1 disc protrusion for back pain was done 6/5/2013. He had repeat MRI lumbar 11/22/2022 and saw Dr. Berger, orthopedic spine 11/29/2022. He had follow up 1/10/2023 with Dr. Berger. He is getting PT and had previous lumbar spine epidural injections. He had XR lumbar imaging 2/8/2023.   13. Neck pain; he had cervical MRI imaging on 12/28/2022. He discussed these findings with Dr. Berger on 1/10/2023.   14. Ear fullness:  Flonase provides some relief for the fullness. He was seen 12/19/2023 by Ms. Enriquez, ENT  15. A1c [facility] 5.5[facility] (5/25/2023)    30 minutes spent on the date of the encounter doing chart review, history and exam, documentation and further activities as noted above exclusive of procedures and other billable interpretations

## 2024-05-09 ENCOUNTER — OFFICE VISIT (OUTPATIENT)
Dept: INTERNAL MEDICINE | Facility: CLINIC | Age: 89
End: 2024-05-09
Payer: MEDICARE

## 2024-05-09 ENCOUNTER — LAB (OUTPATIENT)
Dept: LAB | Facility: CLINIC | Age: 89
End: 2024-05-09
Payer: MEDICARE

## 2024-05-09 VITALS
DIASTOLIC BLOOD PRESSURE: 68 MMHG | OXYGEN SATURATION: 92 % | SYSTOLIC BLOOD PRESSURE: 113 MMHG | WEIGHT: 243.2 LBS | HEART RATE: 95 BPM | BODY MASS INDEX: 33.92 KG/M2

## 2024-05-09 DIAGNOSIS — E53.8 VITAMIN B12 DEFICIENCY (NON ANEMIC): ICD-10-CM

## 2024-05-09 DIAGNOSIS — D64.9 ANEMIA, UNSPECIFIED TYPE: ICD-10-CM

## 2024-05-09 DIAGNOSIS — Z96.641 STATUS POST RIGHT HIP REPLACEMENT: ICD-10-CM

## 2024-05-09 DIAGNOSIS — R73.09 INCREASED GLUCOSE LEVEL: ICD-10-CM

## 2024-05-09 DIAGNOSIS — J30.2 SEASONAL ALLERGIC RHINITIS, UNSPECIFIED TRIGGER: ICD-10-CM

## 2024-05-09 DIAGNOSIS — I10 ESSENTIAL HYPERTENSION: ICD-10-CM

## 2024-05-09 DIAGNOSIS — R73.09 INCREASED GLUCOSE LEVEL: Primary | ICD-10-CM

## 2024-05-09 DIAGNOSIS — B35.6 TINEA CRURIS: ICD-10-CM

## 2024-05-09 DIAGNOSIS — Z13.220 SCREENING CHOLESTEROL LEVEL: ICD-10-CM

## 2024-05-09 LAB
ALBUMIN SERPL BCG-MCNC: 3.8 G/DL (ref 3.5–5.2)
ALP SERPL-CCNC: 87 U/L (ref 40–150)
ALT SERPL W P-5'-P-CCNC: <5 U/L (ref 0–70)
ANION GAP SERPL CALCULATED.3IONS-SCNC: 9 MMOL/L (ref 7–15)
AST SERPL W P-5'-P-CCNC: 16 U/L (ref 0–45)
BASOPHILS # BLD AUTO: 0 10E3/UL (ref 0–0.2)
BASOPHILS NFR BLD AUTO: 0 %
BILIRUB SERPL-MCNC: 0.4 MG/DL
BUN SERPL-MCNC: 28.7 MG/DL (ref 8–23)
CALCIUM SERPL-MCNC: 9.3 MG/DL (ref 8.2–9.6)
CHLORIDE SERPL-SCNC: 107 MMOL/L (ref 98–107)
CHOLEST SERPL-MCNC: 160 MG/DL
CREAT SERPL-MCNC: 1.39 MG/DL (ref 0.67–1.17)
DEPRECATED HCO3 PLAS-SCNC: 23 MMOL/L (ref 22–29)
EGFRCR SERPLBLD CKD-EPI 2021: 47 ML/MIN/1.73M2
EOSINOPHIL # BLD AUTO: 0.3 10E3/UL (ref 0–0.7)
EOSINOPHIL NFR BLD AUTO: 4 %
ERYTHROCYTE [DISTWIDTH] IN BLOOD BY AUTOMATED COUNT: 13 % (ref 10–15)
FASTING STATUS PATIENT QL REPORTED: ABNORMAL
FASTING STATUS PATIENT QL REPORTED: ABNORMAL
GLUCOSE SERPL-MCNC: 97 MG/DL (ref 70–99)
HBA1C MFR BLD: 5.8 %
HCT VFR BLD AUTO: 40.7 % (ref 40–53)
HDLC SERPL-MCNC: 37 MG/DL
HGB BLD-MCNC: 13.7 G/DL (ref 13.3–17.7)
IMM GRANULOCYTES # BLD: 0 10E3/UL
IMM GRANULOCYTES NFR BLD: 0 %
IRON BINDING CAPACITY (ROCHE): 230 UG/DL (ref 240–430)
IRON SATN MFR SERPL: 32 % (ref 15–46)
IRON SERPL-MCNC: 73 UG/DL (ref 61–157)
LDLC SERPL CALC-MCNC: 105 MG/DL
LYMPHOCYTES # BLD AUTO: 1.7 10E3/UL (ref 0.8–5.3)
LYMPHOCYTES NFR BLD AUTO: 24 %
MCH RBC QN AUTO: 30.3 PG (ref 26.5–33)
MCHC RBC AUTO-ENTMCNC: 33.7 G/DL (ref 31.5–36.5)
MCV RBC AUTO: 90 FL (ref 78–100)
MONOCYTES # BLD AUTO: 0.5 10E3/UL (ref 0–1.3)
MONOCYTES NFR BLD AUTO: 7 %
NEUTROPHILS # BLD AUTO: 4.6 10E3/UL (ref 1.6–8.3)
NEUTROPHILS NFR BLD AUTO: 65 %
NONHDLC SERPL-MCNC: 123 MG/DL
NRBC # BLD AUTO: 0 10E3/UL
NRBC BLD AUTO-RTO: 0 /100
PLATELET # BLD AUTO: 184 10E3/UL (ref 150–450)
POTASSIUM SERPL-SCNC: 4.4 MMOL/L (ref 3.4–5.3)
PROT SERPL-MCNC: 6.9 G/DL (ref 6.4–8.3)
RBC # BLD AUTO: 4.52 10E6/UL (ref 4.4–5.9)
SODIUM SERPL-SCNC: 139 MMOL/L (ref 135–145)
TRIGL SERPL-MCNC: 88 MG/DL
VIT B12 SERPL-MCNC: 558 PG/ML (ref 232–1245)
WBC # BLD AUTO: 7 10E3/UL (ref 4–11)

## 2024-05-09 PROCEDURE — 80053 COMPREHEN METABOLIC PANEL: CPT | Performed by: PATHOLOGY

## 2024-05-09 PROCEDURE — 80061 LIPID PANEL: CPT | Performed by: PATHOLOGY

## 2024-05-09 PROCEDURE — 83540 ASSAY OF IRON: CPT | Performed by: PATHOLOGY

## 2024-05-09 PROCEDURE — 83036 HEMOGLOBIN GLYCOSYLATED A1C: CPT | Performed by: INTERNAL MEDICINE

## 2024-05-09 PROCEDURE — 99214 OFFICE O/P EST MOD 30 MIN: CPT | Performed by: INTERNAL MEDICINE

## 2024-05-09 PROCEDURE — 99000 SPECIMEN HANDLING OFFICE-LAB: CPT | Performed by: PATHOLOGY

## 2024-05-09 PROCEDURE — 82607 VITAMIN B-12: CPT | Performed by: INTERNAL MEDICINE

## 2024-05-09 PROCEDURE — 85025 COMPLETE CBC W/AUTO DIFF WBC: CPT | Performed by: PATHOLOGY

## 2024-05-09 PROCEDURE — 36415 COLL VENOUS BLD VENIPUNCTURE: CPT | Performed by: PATHOLOGY

## 2024-05-09 PROCEDURE — 83921 ORGANIC ACID SINGLE QUANT: CPT | Performed by: INTERNAL MEDICINE

## 2024-05-09 PROCEDURE — 83550 IRON BINDING TEST: CPT | Performed by: PATHOLOGY

## 2024-05-09 RX ORDER — DOXAZOSIN 4 MG/1
4 TABLET ORAL AT BEDTIME
Qty: 90 TABLET | Refills: 3 | Status: SHIPPED | OUTPATIENT
Start: 2024-05-09

## 2024-05-09 RX ORDER — KETOCONAZOLE 20 MG/G
CREAM TOPICAL DAILY
Qty: 30 G | Refills: 3 | Status: SHIPPED | OUTPATIENT
Start: 2024-05-09

## 2024-05-09 RX ORDER — FLUTICASONE PROPIONATE 50 MCG
2 SPRAY, SUSPENSION (ML) NASAL EVERY MORNING
Qty: 48 ML | Refills: 3 | Status: SHIPPED | OUTPATIENT
Start: 2024-05-09

## 2024-05-09 RX ORDER — AMOXICILLIN 500 MG/1
CAPSULE ORAL
Qty: 4 CAPSULE | Refills: 4 | Status: SHIPPED | OUTPATIENT
Start: 2024-05-09

## 2024-05-09 RX ORDER — AMLODIPINE BESYLATE 10 MG/1
10 TABLET ORAL DAILY
Qty: 90 TABLET | Refills: 3 | Status: SHIPPED | OUTPATIENT
Start: 2024-05-09

## 2024-05-09 RX ORDER — FLUOCINONIDE 0.5 MG/G
OINTMENT TOPICAL 2 TIMES DAILY
Qty: 30 G | Refills: 1 | Status: SHIPPED | OUTPATIENT
Start: 2024-05-09 | End: 2024-07-19

## 2024-05-09 RX ORDER — UREA 10 %
500 LOTION (ML) TOPICAL DAILY
Qty: 100 TABLET | Refills: 11 | Status: SHIPPED | OUTPATIENT
Start: 2024-05-09

## 2024-05-09 NOTE — PROGRESS NOTES
Fish is a 89 year old that presents in clinic today for the following:     Chief Complaint   Patient presents with    Follow Up     6 months follow up            5/9/2024     6:56 AM   Additional Questions   Roomed by MR     Screenings as of today     PHQ-2 Total Score (Adult) - Positive if 3 or more points; Administer   PHQ-9 if positive 0        LEX Lebron at 6:58 AM on 5/9/2024

## 2024-05-10 ENCOUNTER — TELEPHONE (OUTPATIENT)
Dept: INTERNAL MEDICINE | Facility: CLINIC | Age: 89
End: 2024-05-10
Payer: MEDICARE

## 2024-05-10 NOTE — TELEPHONE ENCOUNTER
Unable to reach pt for the patient to call back and schedule the following:    Appointment type: derm referral  Provider: per PCP  Return date: any  Specialty phone number: [phone]     
Statement Selected

## 2024-05-14 ENCOUNTER — TELEPHONE (OUTPATIENT)
Dept: INTERNAL MEDICINE | Facility: CLINIC | Age: 89
End: 2024-05-14
Payer: MEDICARE

## 2024-05-15 LAB — METHYLMALONATE SERPL-SCNC: 0.22 UMOL/L (ref 0–0.4)

## 2024-06-20 DIAGNOSIS — L20.9 ATOPIC DERMATITIS, UNSPECIFIED TYPE: ICD-10-CM

## 2024-06-29 RX ORDER — TRIAMCINOLONE ACETONIDE 1 MG/G
CREAM TOPICAL
Qty: 80 G | Refills: 3 | Status: SHIPPED | OUTPATIENT
Start: 2024-06-29

## 2024-07-17 DIAGNOSIS — J30.2 SEASONAL ALLERGIC RHINITIS, UNSPECIFIED TRIGGER: ICD-10-CM

## 2024-07-17 DIAGNOSIS — E53.8 VITAMIN B12 DEFICIENCY (NON ANEMIC): ICD-10-CM

## 2024-07-17 DIAGNOSIS — I10 ESSENTIAL HYPERTENSION: ICD-10-CM

## 2024-07-17 DIAGNOSIS — Z96.641 STATUS POST RIGHT HIP REPLACEMENT: ICD-10-CM

## 2024-07-17 DIAGNOSIS — B35.6 TINEA CRURIS: ICD-10-CM

## 2024-07-19 RX ORDER — FLUOCINONIDE 0.5 MG/G
OINTMENT TOPICAL 2 TIMES DAILY
Qty: 30 G | Refills: 1 | Status: SHIPPED | OUTPATIENT
Start: 2024-07-19 | End: 2024-09-24

## 2024-07-19 NOTE — TELEPHONE ENCOUNTER
Disp Refills Start End CLOVIS   fluocinonide (LIDEX) 0.05 % external ointment 30 g 1 5/9/2024 -- No   Sig - Route: Apply topically 2 times daily - Topical     Last Office Visit : 5/9/2024  [facility] [place]      Future Office visit:    11/14/2024 7:00 AM (30 min)  [facility]    Arrive by:  6:45 AM   [facility] RETURN   [facility]   Colby Desouza MD     ----------------------          Pass/Fail Protocol Criteria:  Topical Steroids and Nonsteroidals Protocol [id number]/17/2024 12:40 AM   Protocol Details High potency steroid not ordered    Patient is age 6 or older    Authorizing prescriber's most recent note related to this medication read.    Recent (12 mo) or future (30 days) visit within the authorizing provider's specialty    Medication is active on med list

## 2024-09-15 DIAGNOSIS — J30.2 SEASONAL ALLERGIC RHINITIS, UNSPECIFIED TRIGGER: ICD-10-CM

## 2024-09-15 DIAGNOSIS — B35.6 TINEA CRURIS: ICD-10-CM

## 2024-09-15 DIAGNOSIS — I10 ESSENTIAL HYPERTENSION: ICD-10-CM

## 2024-09-15 DIAGNOSIS — Z96.641 STATUS POST RIGHT HIP REPLACEMENT: ICD-10-CM

## 2024-09-15 DIAGNOSIS — E53.8 VITAMIN B12 DEFICIENCY (NON ANEMIC): ICD-10-CM

## 2024-09-20 NOTE — TELEPHONE ENCOUNTER
fluocinonide (LIDEX) 0.05 % external ointment       Last Written Prescription Date:  7/19/24  Last Fill Quantity: 30g,   # refills: 1  Last Office Visit : 5/9/24  Future Office visit:  11/14/24    Routing refill request to provider for review/approval because:    Topical Steroids and Nonsteroidals Protocol [id number]/15/2024 07:40 AM   Protocol Details High potency steroid not ordered      Darcie JEFFREY RN  P Central Nursing/Red Flag Triage & Med Refill Team

## 2024-09-24 RX ORDER — FLUOCINONIDE 0.5 MG/G
OINTMENT TOPICAL 2 TIMES DAILY
Qty: 30 G | Refills: 1 | Status: SHIPPED | OUTPATIENT
Start: 2024-09-24

## 2024-11-13 NOTE — PROCEDURES
HPI;    Fish comes in for follow up today.  He still drives. He does yard work and takes care of his great great grandchildren. He has 3 daughters in town. He has a little SOB but this is stable and not worse. He gets some of his care at the [facility]. No other HEENT, cardiopulmonary, abdominal, , neurological, systemic, psychiatric, lymphatic, endocrine, vascular complaints.     Past Medical History:   Diagnosis Date    Hematocele     Hypertension     Obesity     Osteoarthritis of both hips     s/p steroid injections    Phimosis     Pulmonary nodules     Rheumatic fever 1948     Past Surgical History:   Procedure Laterality Date    ARTHROPLASTY HIP Right 1/26/2021    Procedure: Right total hip arthroplasty;  Surgeon: Eyda Gonzalez MD;  Location: [facility] OR    AS TOTAL KNEE ARTHROPLASTY      CIRCUMCISION N/A 7/13/2016    Procedure: CIRCUMCISION;  Surgeon: Elgin Rhodes MD;  Location: [facility] OR    circumcision      ENT SURGERY      detached retina--bilateral    EXPLORE SCROTUM Left 7/14/2020    Procedure: EXPLORE LEFT SCROTUM, EVACULATE HEMATOMA, excision of scrotal mass;  Surgeon: Yobani Valenzuela MD;  Location: [facility] OR    HERNIA REPAIR  11/3/2008    mesh repair umbilical hernia    HERNIORRHAPHY INGUINAL Left 10/8/2019    Procedure: Open Left Inguinal Hernia Repair;  Surgeon: Antonio Landaverde MD;  Location: [facility] OR    ORCHIECTOMY INGUINAL N/A 10/8/2019    Procedure: Left Orchiectomy;  Surgeon: Antonio Landaverde MD;  Location: [facility] OR    ZZC TOTAL KNEE ARTHROPLASTY      Bilateral     PE:    Vitals noted, gen, nad, cooperative, alert, neck supple nl rom, lungs with good air movement, RRR, S1, S2, soft murmur, abdomen, no acute findings. Grossly normal neurological exam. He has excellent distal B LE pulses.     Results for orders placed or performed in visit on 11/14/24   Comprehensive metabolic panel     Status: Abnormal   Result Value Ref Range    Sodium 140 135 - 145 mmol/L    Potassium 3.9 3.4 - 5.3  mmol/L    Carbon Dioxide (CO2) 23 22 - 29 mmol/L    Anion Gap 9 7 - 15 mmol/L    Urea Nitrogen 23.3 (H) 8.0 - 23.0 mg/dL    Creatinine 1.34 (H) 0.67 - 1.17 mg/dL    GFR Estimate 49 (L) >60 mL/min/1.73m2    Calcium 9.1 8.8 - 10.4 mg/dL    Chloride 108 (H) 98 - 107 mmol/L    Glucose 131 (H) 70 - 99 mg/dL    Alkaline Phosphatase 77 40 - 150 U/L    AST 16 0 - 45 U/L    ALT 10 0 - 70 U/L    Protein Total 6.3 (L) 6.4 - 8.3 g/dL    Albumin 3.6 3.5 - 5.2 g/dL    Bilirubin Total 0.3 <=1.2 mg/dL   CBC with platelets and differential     Status: Abnormal   Result Value Ref Range    WBC Count 5.4 4.0 - 11.0 10e3/uL    RBC Count 4.32 (L) 4.40 - 5.90 10e6/uL    Hemoglobin 13.0 (L) 13.3 - 17.7 g/dL    Hematocrit 38.7 (L) 40.0 - 53.0 %    MCV 90 78 - 100 fL    MCH 30.1 26.5 - 33.0 pg    MCHC 33.6 31.5 - 36.5 g/dL    RDW 13.0 10.0 - 15.0 %    Platelet Count 157 150 - 450 10e3/uL    % Neutrophils 57 %    % Lymphocytes 29 %    % Monocytes 8 %    % Eosinophils 5 %    % Basophils 0 %    % Immature Granulocytes 0 %    NRBCs per 100 WBC 0 <1 /100    Absolute Neutrophils 3.1 1.6 - 8.3 10e3/uL    Absolute Lymphocytes 1.5 0.8 - 5.3 10e3/uL    Absolute Monocytes 0.4 0.0 - 1.3 10e3/uL    Absolute Eosinophils 0.3 0.0 - 0.7 10e3/uL    Absolute Basophils 0.0 0.0 - 0.2 10e3/uL    Absolute Immature Granulocytes 0.0 <=0.4 10e3/uL    Absolute NRBCs 0.0 10e3/uL   CBC with platelets and differential     Status: Abnormal    Narrative    The following orders were created for panel order CBC with platelets and differential.  Procedure                               Abnormality         Status                     ---------                               -----------         ------                     CBC with platelets and d...[[id number]]  Abnormal            Final result                 Please view results for these tests on the individual orders.         A/P:     1. Immunizations; COVID Pfizer vaccine x 6 . Moderna x 2.  Pneumococcal 23 done 10/20/2009.  Prevnar 13 done 4/9/2018.  Prevnar 20 vaccine done on 6/15/2022. Tdap 3/31/2015. Shingrix series completed 2022. Influenza 10/11/2024  2. HTN; on Amlodipine 10 mg.    3. Antibiotic prophylaxis before dental work with Amoxicillin for total R hip replacement.   4. BPH: on Doxazosin   5. B12 PO supplemention; last B12 558 on 5/9/2024. Methylmalonic acid normal at 0.22  6. Dermatology; He has an appt. With Dr. Frazier 12/19/2024. .  7. Seen Dr. Gonzalez, Orthopedics 1/9/2023 for follow up R TKA   8. Seen Cardiology, Dr. Bueno, 3/10/2021 for concerns about possible A fib  9. Seen Urology, Ms. Lagrant 7/30/2020 for L scrotal mass  10. L inguinal hernia repair. See post-op surgery note from Dr. Landaverde 11/21/2019  11. Lipids; 5/9/2024; , HDL 37 and TG's 88.   12. MRI lumbar with R sided L5-S1 disc protrusion for back pain was done 6/5/2013. He had repeat MRI lumbar 11/22/2022 and saw Dr. Berger, orthopedic spine 11/29/2022. He had follow up 1/10/2023 with Dr. Berger. He has gotten PT and had previous lumbar spine epidural injections. He had XR lumbar imaging 2/8/2023.   13. Neck pain; he had cervical MRI imaging on 12/28/2022. He discussed these findings with Dr. Berger on 1/10/2023.   14. Ear fullness:  Flonase provides some relief for the fullness. He was seen 12/19/2023 by Ms. Enriquez, ENT  15. [facility]). Last A1c 5.8% on 5/9/2024.   16. Soft murmur ordered resting echo today 11/14/2024

## 2024-11-14 ENCOUNTER — OFFICE VISIT (OUTPATIENT)
Dept: INTERNAL MEDICINE | Facility: CLINIC | Age: 89
End: 2024-11-14
Payer: COMMERCIAL

## 2024-11-14 ENCOUNTER — LAB (OUTPATIENT)
Dept: LAB | Facility: CLINIC | Age: 89
End: 2024-11-14
Payer: COMMERCIAL

## 2024-11-14 VITALS
BODY MASS INDEX: 34.8 KG/M2 | WEIGHT: 249.5 LBS | HEART RATE: 82 BPM | DIASTOLIC BLOOD PRESSURE: 76 MMHG | SYSTOLIC BLOOD PRESSURE: 138 MMHG | OXYGEN SATURATION: 95 %

## 2024-11-14 DIAGNOSIS — I10 BENIGN ESSENTIAL HYPERTENSION: Primary | ICD-10-CM

## 2024-11-14 DIAGNOSIS — I10 BENIGN ESSENTIAL HYPERTENSION: ICD-10-CM

## 2024-11-14 LAB
ALBUMIN SERPL BCG-MCNC: 3.6 G/DL (ref 3.5–5.2)
ALP SERPL-CCNC: 77 U/L (ref 40–150)
ALT SERPL W P-5'-P-CCNC: 10 U/L (ref 0–70)
ANION GAP SERPL CALCULATED.3IONS-SCNC: 9 MMOL/L (ref 7–15)
AST SERPL W P-5'-P-CCNC: 16 U/L (ref 0–45)
BASOPHILS # BLD AUTO: 0 10E3/UL (ref 0–0.2)
BASOPHILS NFR BLD AUTO: 0 %
BILIRUB SERPL-MCNC: 0.3 MG/DL
BUN SERPL-MCNC: 23.3 MG/DL (ref 8–23)
CALCIUM SERPL-MCNC: 9.1 MG/DL (ref 8.8–10.4)
CHLORIDE SERPL-SCNC: 108 MMOL/L (ref 98–107)
CREAT SERPL-MCNC: 1.34 MG/DL (ref 0.67–1.17)
EGFRCR SERPLBLD CKD-EPI 2021: 49 ML/MIN/1.73M2
EOSINOPHIL # BLD AUTO: 0.3 10E3/UL (ref 0–0.7)
EOSINOPHIL NFR BLD AUTO: 5 %
ERYTHROCYTE [DISTWIDTH] IN BLOOD BY AUTOMATED COUNT: 13 % (ref 10–15)
GLUCOSE SERPL-MCNC: 131 MG/DL (ref 70–99)
HCO3 SERPL-SCNC: 23 MMOL/L (ref 22–29)
HCT VFR BLD AUTO: 38.7 % (ref 40–53)
HGB BLD-MCNC: 13 G/DL (ref 13.3–17.7)
IMM GRANULOCYTES # BLD: 0 10E3/UL
IMM GRANULOCYTES NFR BLD: 0 %
LYMPHOCYTES # BLD AUTO: 1.5 10E3/UL (ref 0.8–5.3)
LYMPHOCYTES NFR BLD AUTO: 29 %
MCH RBC QN AUTO: 30.1 PG (ref 26.5–33)
MCHC RBC AUTO-ENTMCNC: 33.6 G/DL (ref 31.5–36.5)
MCV RBC AUTO: 90 FL (ref 78–100)
MONOCYTES # BLD AUTO: 0.4 10E3/UL (ref 0–1.3)
MONOCYTES NFR BLD AUTO: 8 %
NEUTROPHILS # BLD AUTO: 3.1 10E3/UL (ref 1.6–8.3)
NEUTROPHILS NFR BLD AUTO: 57 %
NRBC # BLD AUTO: 0 10E3/UL
NRBC BLD AUTO-RTO: 0 /100
PLATELET # BLD AUTO: 157 10E3/UL (ref 150–450)
POTASSIUM SERPL-SCNC: 3.9 MMOL/L (ref 3.4–5.3)
PROT SERPL-MCNC: 6.3 G/DL (ref 6.4–8.3)
RBC # BLD AUTO: 4.32 10E6/UL (ref 4.4–5.9)
SODIUM SERPL-SCNC: 140 MMOL/L (ref 135–145)
WBC # BLD AUTO: 5.4 10E3/UL (ref 4–11)

## 2024-11-14 PROCEDURE — 36415 COLL VENOUS BLD VENIPUNCTURE: CPT | Performed by: PATHOLOGY

## 2024-11-14 PROCEDURE — 80053 COMPREHEN METABOLIC PANEL: CPT | Performed by: PATHOLOGY

## 2024-11-14 PROCEDURE — 85025 COMPLETE CBC W/AUTO DIFF WBC: CPT | Performed by: PATHOLOGY

## 2024-11-14 NOTE — PROGRESS NOTES
Fish Hong is a 89 year old male that presents in clinic today for the following:     Chief Complaint   Patient presents with    Follow Up     HTN         The patient's allergies and medications were reviewed. The patient's vitals were obtained without incident.     [place], [place]   Primary Care Clinic

## 2024-12-19 ENCOUNTER — OFFICE VISIT (OUTPATIENT)
Dept: DERMATOLOGY | Facility: CLINIC | Age: 89
End: 2024-12-19
Attending: INTERNAL MEDICINE
Payer: MEDICARE

## 2024-12-19 DIAGNOSIS — R21 RASH AND NONSPECIFIC SKIN ERUPTION: Primary | ICD-10-CM

## 2024-12-19 DIAGNOSIS — D48.9 NEOPLASM OF UNCERTAIN BEHAVIOR: ICD-10-CM

## 2024-12-19 DIAGNOSIS — L81.4 LENTIGINES: ICD-10-CM

## 2024-12-19 DIAGNOSIS — L57.8 ACTINIC SKIN DAMAGE: ICD-10-CM

## 2024-12-19 DIAGNOSIS — L82.0 INFLAMED SEBORRHEIC KERATOSIS: ICD-10-CM

## 2024-12-19 DIAGNOSIS — L57.0 ACTINIC KERATOSIS: ICD-10-CM

## 2024-12-19 PROCEDURE — 88341 IMHCHEM/IMCYTCHM EA ADD ANTB: CPT | Mod: 26 | Performed by: DERMATOLOGY

## 2024-12-19 PROCEDURE — 88341 IMHCHEM/IMCYTCHM EA ADD ANTB: CPT | Mod: TC | Performed by: STUDENT IN AN ORGANIZED HEALTH CARE EDUCATION/TRAINING PROGRAM

## 2024-12-19 PROCEDURE — 88305 TISSUE EXAM BY PATHOLOGIST: CPT | Mod: 26 | Performed by: DERMATOLOGY

## 2024-12-19 PROCEDURE — 88342 IMHCHEM/IMCYTCHM 1ST ANTB: CPT | Mod: 26 | Performed by: DERMATOLOGY

## 2024-12-19 RX ORDER — FLUOCINONIDE 0.5 MG/G
OINTMENT TOPICAL 2 TIMES DAILY
Qty: 60 G | Refills: 3 | Status: SHIPPED | OUTPATIENT
Start: 2024-12-19

## 2024-12-19 ASSESSMENT — PAIN SCALES - GENERAL: PAINLEVEL_OUTOF10: MILD PAIN (3)

## 2024-12-19 NOTE — PATIENT INSTRUCTIONS
Around red and itchy spots on ankles, apply stronger topical steroid twice a day until the spots are clear. Follow with a thick moisturizer or ointment like vaseline     Wound Care After a Biopsy    What is a skin biopsy?  A skin biopsy allows the doctor to examine a very small piece of tissue under the microscope to determine the diagnosis and the best treatment for the skin condition. A local anesthetic (numbing medicine) is injected with a very small needle into the skin area to be tested. A small piece of skin is taken from the area. Sometimes a suture (stitch) is used.     What are the risks of a skin biopsy?  I will experience scar, bleeding, swelling, pain, crusting and redness. I may experience incomplete removal or recurrence. Risks of this procedure are excessive bleeding, bruising, infection, nerve damage, numbness, thick (hypertrophic or keloidal) scar and non-diagnostic biopsy.    How should I care for my wound for the first 24 hours?  Keep the wound dry and covered for 24 hours  If it bleeds, hold direct pressure on the area for 15 minutes. If bleeding does not stop, call us or go to the emergency room  Avoid strenuous exercise the first 1-2 days or as your doctor instructs you    How should I care for the wound after 24 hours?  After 24 hours, remove the bandage  You may bathe or shower as normal  If you had a scalp biopsy, you can shampoo as usual and can use shower water to clean the biopsy site daily  Clean the wound once a day with gentle soap and water  Do not scrub, be gentle  Apply white petroleum/Vaseline after cleaning the wound with a cotton swab or a clean finger, and keep the site covered with a Bandaid /bandage. Bandages are not necessary with a scalp biopsy  If you are unable to cover the site with a Bandaid /bandage, re-apply ointment 2-3 times a day to keep the site moist. Moisture will help with healing  Avoid strenuous activity for first 1-2 days  Avoid lakes, rivers, pools, and  oceans until the stitches are removed or the site is healed    How do I clean my wound?  Wash hands thoroughly with soap or use hand  before all wound care  Clean the wound with gentle soap and water  Apply white petroleum/Vaseline  to wound after it is clean  Replace the Bandaid /bandage to keep the wound covered for the first few days or as instructed by your doctor  If you had a scalp biopsy, warm shower water to the area on a daily basis should suffice    What should I use to clean my wound?   Cotton-tipped applicators (Qtips )  White petroleum jelly (Vaseline ). Use a clean new container and use Q-tips to apply.  Bandaids  as needed  Gentle soap     How should I care for my wound long term?  Do not get your wound dirty  Keep up with wound care for one week or until the area is healed.  A small scab will form and fall off by itself when the area is completely healed. The area will be red and will become pink in color as it heals. Sun protection is very important for how your scar will turn out. Sunscreen with an SPF 30 or greater is recommended once the area is healed.  You should have some soreness but it should be mild and slowly go away over several days. Talk to your doctor about using tylenol for pain,    When should I call my doctor?  If you have increased:   Pain or swelling  Pus or drainage (clear or slightly yellow drainage is ok)  Temperature over 100F  Spreading redness or warmth around wound    When will I hear about my results?  The biopsy results can take 2 weeks to come back.  Your results will automatically release to DrAvailable before your provider has even reviewed them.  The clinic will call you with the results, send you a DrAvailable message, or have you schedule a follow-up clinic or phone time to discuss the results.  Contact our clinics if you do not hear from us in 2 weeks.    Who should I call with questions?  [facility]: [phone]  [facility]  [facility]: [phone]  For urgent needs outside of business hours call the [facility] at [phone] and ask for the dermatology resident on call

## 2024-12-19 NOTE — NURSING NOTE
Lidocaine-epinephrine 1-1:547787 % injection   1.2 mL once for one use, starting 12/19/2024 ending 12/19/2024,  2mL disp, R-0, injection  Injected by Rolly Pavon EMT    Manuel Pavon, EMT  Clinic Support  [facility]     [phone]    Employed by [facility] [employer]

## 2024-12-19 NOTE — PROGRESS NOTES
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  - s/p shavy biopsy 12/19/24, pending results  # Actinic keratoses  - s/p cryotherapy 12/19/24  #Inflamed seborrheic keratoses  - s/p cryotherapy 12/19/24  #Nummular eczema  - Current tx: fluocinonide 0.05% ointment.  ____________________________________________    Assessment & Plan:    #History of SCC in situ, R calf  # Neoplasm of unspecified behavior of the skin on the R calf at site of previous biposy. The differential diagnosis includes SCC sv keratoacanthoma.   - Evidence of recurrence at site of previous malignancy   - Shave biopsy performed today (see procedure note(s) below).    # Actinic keratoses, forehead, hands  - Cryotherapy performed today (see procedure note(s) below).    # Irritated seborrheic keratoses  - Cryotherapy performed today (see procedure note(s) below).    #Nummular eczema  #Xerosis   - Start fluocinonide 0.05% ointment. Apply BID to ankles until rash is clear and then BID PRN. Follow with Vaseline or Aquaphor     #Actinic skin damage  # Benign lesions: Multiple benign nevi, solar lentigos, seborrheic keratoses.  - Reassurance provided and benign nature of conditions discussed. No treatment is necessary at this time unless the lesion changes or becomes symptomatic.   - ABCDs of melanoma were discussed and self skin checks were advised.  - Sun precaution was advised including the use of sun screens of SPF 30 or higher, sun protective clothing, and avoidance of tanning beds.    Procedures Performed:   - Cryotherapy procedure note, location(s): forehead, hands, R axillae. After verbal consent and discussion of risks and benefits including, but not limited to, dyspigmentation/scar, blister, and pain, 7 aks and 1 ISK lesion(s)  was(were) treated with 1-2 mm freeze border for 1-2 cycles with liquid nitrogen. Post cryotherapy instructions were provided.  - Procedure(s) performed by faculty.     - Shave removal, location(s): R calf .  After discussion of benefits and risks including but not limited to bleeding/bruising, pain/swelling, infection, scar, incomplete removal, nerve damage/numbness, recurrence, and non-diagnostic biopsy, written consent, verbal consent and photographs were obtained. Time-out was performed. The area was cleaned with isopropyl alcohol. 0.5mL of 1% lidocaine with epinephrine was injected to obtain adequate anesthesia of each lesion. Shave removal was performed. Hemostasis was achieved with aluminium chloride. Vaseline and a sterile dressing were applied. The patient tolerated the procedure and no complications were noted. The patient was provided with verbal and written post care instructions.    - Procedure(s) performed by medical student with close faculty supervision.     Follow-up: 6 month(s) in-person, or earlier for new or changing lesions    Staff and Medical Student:     Patient seen and discussed with Attending Physician, Dr. Maciel.   Mariela South, MS3  ____________________________________________    CC: Skin Check (Fish is here today for a FBSE. Pt reports skin rash that comes and goes on both ankles.)    HPI:  Mr. Fish Hong is a(n) 89 year old male who presents today as a new patient for FBSE and a rash on his ankles. He currently has a rash on his ankles that bothers him and is very itchy. He notices that the itching is worst at night and has gotten worse with the winter weather changes. The underlying area is also quite dry.  He currently uses triamcinolone 0.1% ointment a couple of times a week after the shower when it is itchy.  He does not use any moisturizer or Vaseline to help with skin dryness.     He also has a spot on his R calf that he has noticed growing. It is in the same spot of a  previous skin cancer that he had removed years ago. It has been growing over the past year. It it not itchy, bleeding, or otherwise bothersome. He does not pick at it.      Personal hx of skin cancer: SCC, R calf   Family history of melanoma: no  Wears sunscreen consistently: no  History of tanning bed use: previously   History of immunosuppression: no    Patient is otherwise feeling well, without additional skin concerns.    Social history: He is retired, previously in the [employer]. He currently takes care of two great grandkids with his wife.     Labs:  None reviewed.    Physical Exam:  Vitals: There were no vitals taken for this visit.  SKIN: Full skin, which includes the head/face, both arms, chest, back, abdomen,both legs, genitalia and/or groin buttocks, digits and/or nails, was examined.  - Rios phototype I  - Firm, erythematous nodule without ulceration at site of previous SCC on R calf   - Flat brown macules and patches in a sun exposed areas on face and extremities  - Multiple dome-shaped bright red papule on the back and abdomen.   - Multiple stuck on brown papules on trunk and extremities   - Multiple gritty, pink, scaly macules on the scalp, forehead, and hands  - Nummular, erythematous patches with underlying scale and numerous excoriations on bilateral ankles  - No other lesions of concern on areas examined.         Medications:  Current Outpatient Medications   Medication Sig Dispense Refill    acetaminophen (TYLENOL) 325 MG tablet Take 2 tablets (650 mg) by mouth every 4 hours as needed for other (mild pain) 100 tablet 0    amLODIPine (NORVASC) 10 MG tablet Take 1 tablet (10 mg) by mouth daily 90 tablet 3    cyanocobalamin (VITAMIN B-12) 500 MCG tablet Take 1 tablet (500 mcg) by mouth daily 100 tablet 11    doxazosin (CARDURA) 4 MG tablet Take 1 tablet (4 mg) by mouth at bedtime 90 tablet 3    fluocinonide (LIDEX) 0.05 % external ointment APPLY TOPICALLY 2 TIMES DAILY TO AFFECTED AREA 30 g 1     fluticasone (FLONASE) 50 MCG/ACT nasal spray Spray 2 sprays into both nostrils every morning 48 mL 3    ketoconazole (NIZORAL) 2 % external cream Apply topically daily Apply to the inguinal and perineal area daily as needed. 30 g 3    triamcinolone (KENALOG) 0.1 % external cream APPLY SPARINGLY TO AFFECTED AREA TWO TIMES DAILY AS NEEDED (LEFT ANKLE.LOWER LEG) 80 g 3    amoxicillin (AMOXIL) 500 MG capsule TAKE 4 CAPSULES BY MOUTH ONE HOUR PRIOR TO ANY DENTAL CLEANING OR DENTAL/MEDICAL PROCEDURE. (Patient not taking: Reported on 12/19/2024) 4 capsule 4    neomycin-polymyxin-dexamethasone (MAXITROL) 3.5-94022-5.1 ophthalmic ointment  (Patient not taking: Reported on 12/19/2024)      polyethylene glycol (MIRALAX) 17 GM/Dose powder  (Patient not taking: Reported on 12/19/2024)       No current facility-administered medications for this visit.      Past Medical History:   Patient Active Problem List   Diagnosis    Essential hypertension    Pulmonary nodules    Preventive measure    Atopic dermatitis, unspecified type    Spinal stenosis of lumbar region without neurogenic claudication    Greater trochanteric bursitis, right    Gastroesophageal reflux disease without esophagitis    History of smoking    Vitamin B12 deficiency (non anemic)    Hematocele    Seasonal allergic rhinitis, unspecified trigger    Degenerative joint disease (DJD) of hip    Status post right hip replacement     Past Medical History:   Diagnosis Date    Hematocele     Hypertension     Obesity     Osteoarthritis of both hips     s/p steroid injections    Phimosis     Pulmonary nodules     Rheumatic fever 1948        CC Colby Desouza MD  [address] [facility] [address] [place] [address] on close of this encounter.

## 2024-12-19 NOTE — NURSING NOTE
Dermatology Rooming Note    Fish Hong's goals for this visit include:   Chief Complaint   Patient presents with    Skin Check     Fish is here today for a FBSE. Pt reports skin rash that comes and goes on both ankles.    Santos Lazo MA on 12/19/2024 at 2:04 PM

## 2024-12-19 NOTE — LETTER
12/19/2024       RE: Fish Hong  [address]  [facility] [address]     Dear Colleague,    Thank you for referring your patient, Fish Hong, to the [facility] [place] at [facility]. Please see a copy of my visit note below.    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  - s/p shavy biopsy 12/19/24, pending results  # Actinic keratoses  - s/p cryotherapy 12/19/24  #Inflamed seborrheic keratoses  - s/p cryotherapy 12/19/24  #Nummular eczema  - Current tx: fluocinonide 0.05% ointment.  ____________________________________________    Assessment & Plan:    #History of SCC in situ, R calf  # Neoplasm of unspecified behavior of the skin on the R calf at site of previous biposy. The differential diagnosis includes SCC sv keratoacanthoma.   - Evidence of recurrence at site of previous malignancy   - Shave biopsy performed today (see procedure note(s) below).    # Actinic keratoses, forehead, hands  - Cryotherapy performed today (see procedure note(s) below).    # Irritated seborrheic keratoses  - Cryotherapy performed today (see procedure note(s) below).    #Nummular eczema  #Xerosis   - Start fluocinonide 0.05% ointment. Apply BID to ankles until rash is clear and then BID PRN. Follow with Vaseline or Aquaphor     #Actinic skin damage  # Benign lesions: Multiple benign nevi, solar lentigos, seborrheic keratoses.  - Reassurance provided and benign nature of conditions discussed. No treatment is necessary at this time unless the lesion changes or becomes symptomatic.   - ABCDs of melanoma were discussed and self skin checks were advised.  - Sun precaution was advised including the use of sun screens of SPF 30 or  higher, sun protective clothing, and avoidance of tanning beds.    Procedures Performed:   - Cryotherapy procedure note, location(s): forehead, hands, R axillae. After verbal consent and discussion of risks and benefits including, but not limited to, dyspigmentation/scar, blister, and pain, 7 aks and 1 ISK lesion(s) was(were) treated with 1-2 mm freeze border for 1-2 cycles with liquid nitrogen. Post cryotherapy instructions were provided.  - Procedure(s) performed by faculty.     - Shave removal, location(s): R calf .  After discussion of benefits and risks including but not limited to bleeding/bruising, pain/swelling, infection, scar, incomplete removal, nerve damage/numbness, recurrence, and non-diagnostic biopsy, written consent, verbal consent and photographs were obtained. Time-out was performed. The area was cleaned with isopropyl alcohol. 0.5mL of 1% lidocaine with epinephrine was injected to obtain adequate anesthesia of each lesion. Shave removal was performed. Hemostasis was achieved with aluminium chloride. Vaseline and a sterile dressing were applied. The patient tolerated the procedure and no complications were noted. The patient was provided with verbal and written post care instructions.    - Procedure(s) performed by medical student with close faculty supervision.     Follow-up: 6 month(s) in-person, or earlier for new or changing lesions    Staff and Medical Student:     Patient seen and discussed with Attending Physician, Dr. Maciel.   Mariela South, MS3  ____________________________________________    CC: Skin Check (Fish is here today for a FBSE. Pt reports skin rash that comes and goes on both ankles.)    HPI:  Mr. Fish Hong is a(n) 89 year old male who presents today as a new patient for FBSE and a rash on his ankles. He currently has a rash on his ankles that bothers him and is very itchy. He notices that the itching is worst at night and has gotten worse with the winter weather changes.  The underlying area is also quite dry.  He currently uses triamcinolone 0.1% ointment a couple of times a week after the shower when it is itchy.  He does not use any moisturizer or Vaseline to help with skin dryness.     He also has a spot on his R calf that he has noticed growing. It is in the same spot of a previous skin cancer that he had removed years ago. It has been growing over the past year. It it not itchy, bleeding, or otherwise bothersome. He does not pick at it.      Personal hx of skin cancer: SCC, R calf   Family history of melanoma: no  Wears sunscreen consistently: no  History of tanning bed use: previously   History of immunosuppression: no    Patient is otherwise feeling well, without additional skin concerns.    Social history: He is retired, previously in the [email or web address]. He currently takes care of two great grandkids with his wife.     Labs:  None reviewed.    Physical Exam:  Vitals: There were no vitals taken for this visit.  SKIN: Full skin, which includes the head/face, both arms, chest, back, abdomen,both legs, genitalia and/or groin buttocks, digits and/or nails, was examined.  - Rios phototype I  - Firm, erythematous nodule without ulceration at site of previous SCC on R calf   - Flat brown macules and patches in a sun exposed areas on face and extremities  - Multiple dome-shaped bright red papule on the back and abdomen.   - Multiple stuck on brown papules on trunk and extremities   - Multiple gritty, pink, scaly macules on the scalp, forehead, and hands  - Nummular, erythematous patches with underlying scale and numerous excoriations on bilateral ankles  - No other lesions of concern on areas examined.         Medications:  Current Outpatient Medications   Medication Sig Dispense Refill     acetaminophen (TYLENOL) 325 MG tablet Take 2 tablets (650 mg) by mouth every 4 hours as needed for other (mild pain) 100 tablet 0     amLODIPine (NORVASC) 10 MG tablet Take 1 tablet (10 mg) by  mouth daily 90 tablet 3     cyanocobalamin (VITAMIN B-12) 500 MCG tablet Take 1 tablet (500 mcg) by mouth daily 100 tablet 11     doxazosin (CARDURA) 4 MG tablet Take 1 tablet (4 mg) by mouth at bedtime 90 tablet 3     fluocinonide (LIDEX) 0.05 % external ointment APPLY TOPICALLY 2 TIMES DAILY TO AFFECTED AREA 30 g 1     fluticasone (FLONASE) 50 MCG/ACT nasal spray Spray 2 sprays into both nostrils every morning 48 mL 3     ketoconazole (NIZORAL) 2 % external cream Apply topically daily Apply to the inguinal and perineal area daily as needed. 30 g 3     triamcinolone (KENALOG) 0.1 % external cream APPLY SPARINGLY TO AFFECTED AREA TWO TIMES DAILY AS NEEDED (LEFT ANKLE.LOWER LEG) 80 g 3     amoxicillin (AMOXIL) 500 MG capsule TAKE 4 CAPSULES BY MOUTH ONE HOUR PRIOR TO ANY DENTAL CLEANING OR DENTAL/MEDICAL PROCEDURE. (Patient not taking: Reported on 12/19/2024) 4 capsule 4     neomycin-polymyxin-dexamethasone (MAXITROL) 3.5-62416-4.1 ophthalmic ointment  (Patient not taking: Reported on 12/19/2024)       polyethylene glycol (MIRALAX) 17 GM/Dose powder  (Patient not taking: Reported on 12/19/2024)       No current facility-administered medications for this visit.      Past Medical History:   Patient Active Problem List   Diagnosis     Essential hypertension     Pulmonary nodules     Preventive measure     Atopic dermatitis, unspecified type     Spinal stenosis of lumbar region without neurogenic claudication     Greater trochanteric bursitis, right     Gastroesophageal reflux disease without esophagitis     History of smoking     Vitamin B12 deficiency (non anemic)     Hematocele     Seasonal allergic rhinitis, unspecified trigger     Degenerative joint disease (DJD) of hip     Status post right hip replacement     Past Medical History:   Diagnosis Date     Hematocele      Hypertension      Obesity      Osteoarthritis of both hips     s/p steroid injections     Phimosis      Pulmonary nodules      Rheumatic fever 1948         CC Colby Desouza MD  [address] [address] on close of this encounter.      Attestation with edits by Farrukh Maciel MD at 12/19/2024  3:13 PM:  I have personally examined this patient and agree with the medical student's documentation and plan of care. I have reviewed and amended the medical student's note as necessary. I personally performed all procedure(s).The documentation accurately reflects my clinical observations, diagnoses, treatment and follow-up plans.     Farrukh Maciel M.D.   Dermatology Staff       Again, thank you for allowing me to participate in the care of your patient.      Sincerely,    Farrukh Maciel MD

## 2024-12-24 LAB
PATH REPORT.COMMENTS IMP SPEC: NORMAL
PATH REPORT.COMMENTS IMP SPEC: NORMAL
PATH REPORT.FINAL DX SPEC: NORMAL
PATH REPORT.GROSS SPEC: NORMAL
PATH REPORT.MICROSCOPIC SPEC OTHER STN: NORMAL
PATH REPORT.RELEVANT HX SPEC: NORMAL

## 2025-05-18 NOTE — PROGRESS NOTES
HPI;    He states seasonal allergies and now has a sinus infection. He states when this happens he takes amoxicillin and this clears this up. No SOB. No cough. Otherwise, he tries to remain active. No additional HEENT, cardiopulmonary, abdominal, , neurological, systemic, psychiatric, lymphatic, endocrine, vascular complaints.     Past Medical History:   Diagnosis Date    Hematocele     Hypertension     Obesity     Osteoarthritis of both hips     s/p steroid injections    Phimosis     Pulmonary nodules     Rheumatic fever 1948     Past Surgical History:   Procedure Laterality Date    ARTHROPLASTY HIP Right 1/26/2021    Procedure: Right total hip arthroplasty;  Surgeon: Eyad Gonzalez MD;  Location: [facility] OR    AS TOTAL KNEE ARTHROPLASTY      CIRCUMCISION N/A 7/13/2016    Procedure: CIRCUMCISION;  Surgeon: Elgin Rhodes MD;  Location: [facility] OR    circumcision      ENT SURGERY      detached retina--bilateral    EXPLORE SCROTUM Left 7/14/2020    Procedure: EXPLORE LEFT SCROTUM, EVACULATE HEMATOMA, excision of scrotal mass;  Surgeon: Yobani Valenzuela MD;  Location: [facility] OR    HERNIA REPAIR  11/3/2008    mesh repair umbilical hernia    HERNIORRHAPHY INGUINAL Left 10/8/2019    Procedure: Open Left Inguinal Hernia Repair;  Surgeon: Antonio Landaverde MD;  Location: [facility] OR    ORCHIECTOMY INGUINAL N/A 10/8/2019    Procedure: Left Orchiectomy;  Surgeon: Antonio Landaverde MD;  Location:  OR    [facility] TOTAL KNEE ARTHROPLASTY      Bilateral     PE:    Vitals noted, gen nad, cooperative, alert, B hearing aid.       Results for orders placed or performed in visit on 05/19/25   Lipid panel reflex to direct LDL Fasting     Status: Abnormal   Result Value Ref Range    Cholesterol 163 <200 mg/dL    Triglycerides 97 <150 mg/dL    Direct Measure HDL 39 (L) >=40 mg/dL    LDL Cholesterol Calculated 105 (H) <100 mg/dL    Non HDL Cholesterol 124 <130 mg/dL    Patient Fasting > 8hrs? Yes     Narrative     Cholesterol  Desirable: < 200 mg/dL  Borderline High: 200 - 239 mg/dL  High: >= 240 mg/dL    Triglycerides  Normal: < 150 mg/dL  Borderline High: 150 - 199 mg/dL  High: 200-499 mg/dL  Very High: >= 500 mg/dL    Direct Measure HDL  Female: >= 50 mg/dL   Male: >= 40 mg/dL    LDL Cholesterol  Desirable: < 100 mg/dL  Above Desirable: 100 - 129 mg/dL   Borderline High: 130 - 159 mg/dL   High:  160 - 189 mg/dL   Very High: >= 190 mg/dL    Non HDL Cholesterol  Desirable: < 130 mg/dL  Above Desirable: 130 - 159 mg/dL  Borderline High: 160 - 189 mg/dL  High: 190 - 219 mg/dL  Very High: >= 220 mg/dL   Comprehensive metabolic panel     Status: Abnormal   Result Value Ref Range    Sodium 138 135 - 145 mmol/L    Potassium 4.2 3.4 - 5.3 mmol/L    Carbon Dioxide (CO2) 22 22 - 29 mmol/L    Anion Gap 11 7 - 15 mmol/L    Urea Nitrogen 17.0 8.0 - 23.0 mg/dL    Creatinine 1.18 (H) 0.67 - 1.17 mg/dL    GFR Estimate 57 (L) >60 mL/min/1.73m2    Calcium 9.2 8.8 - 10.4 mg/dL    Chloride 105 98 - 107 mmol/L    Glucose 99 70 - 99 mg/dL    Alkaline Phosphatase 87 40 - 150 U/L    AST 19 0 - 45 U/L    ALT 11 0 - 70 U/L    Protein Total 7.0 6.4 - 8.3 g/dL    Albumin 3.9 3.5 - 5.2 g/dL    Bilirubin Total 0.4 <=1.2 mg/dL    Patient Fasting > 8hrs? Yes    CBC with platelets and differential     Status: None   Result Value Ref Range    WBC Count 5.6 4.0 - 11.0 10e3/uL    RBC Count 4.75 4.40 - 5.90 10e6/uL    Hemoglobin 14.0 13.3 - 17.7 g/dL    Hematocrit 41.5 40.0 - 53.0 %    MCV 87 78 - 100 fL    MCH 29.5 26.5 - 33.0 pg    MCHC 33.7 31.5 - 36.5 g/dL    RDW 13.1 10.0 - 15.0 %    Platelet Count 162 150 - 450 10e3/uL    % Neutrophils 62 %    % Lymphocytes 27 %    % Monocytes 7 %    % Eosinophils 4 %    % Basophils 0 %    % Immature Granulocytes 0 %    NRBCs per 100 WBC 0 <1 /100    Absolute Neutrophils 3.5 1.6 - 8.3 10e3/uL    Absolute Lymphocytes 1.5 0.8 - 5.3 10e3/uL    Absolute Monocytes 0.4 0.0 - 1.3 10e3/uL    Absolute Eosinophils 0.2 0.0 - 0.7  10e3/uL    Absolute Basophils 0.0 0.0 - 0.2 10e3/uL    Absolute Immature Granulocytes 0.0 <=0.4 10e3/uL    Absolute NRBCs 0.0 10e3/uL   CBC with platelets and differential     Status: None    Narrative    The following orders were created for panel order CBC with platelets and differential.  Procedure                               Abnormality         Status                     ---------                               -----------         ------                     CBC with platelets and ...[[id number]]                      Final result                 Please view results for these tests on the individual orders.       A/P:     1. Immunizations; COVID Pfizer vaccine x 6 . Moderna x 2.  Pneumococcal 23 done 10/20/2009. Prevnar 13 done 4/9/2018.  Prevnar 20 vaccine done on 6/15/2022. Tdap 3/31/2015. Shingrix series completed 2022. Influenza 10/11/2024  2. HTN; on Amlodipine 10 mg.    3. Antibiotic prophylaxis before dental work with Amoxicillin for total R hip replacement.   4. BPH: on Doxazosin   5. B12 PO supplemention; last B12 558 on 5/9/2024. Methylmalonic acid normal at 0.22  6. Dermatology; He was seen in Dermatology by Dr. Maciel 12/19/2024 and next 7/8/2025.  7. Seen Dr. Gonzalez, Orthopedics 1/9/2023 for follow up R TKA   8. Seen Cardiology, Dr. Bueno, 3/10/2021 for concerns about possible A fib  9. Seen Urology, Ms. Silva 7/30/2020 for L scrotal mass  10. L inguinal hernia repair. See post-op surgery note from Dr. Landaverde 11/21/2019  11. Lipids; 5/9/2024; , HDL 37 and TG's 88.   12. MRI lumbar with R sided L5-S1 disc protrusion for back pain was done 6/5/2013. He had repeat MRI lumbar 11/22/2022 and saw Dr. Berger, orthopedic spine 11/29/2022. He had follow up 1/10/2023 with Dr. Berger. He has gotten PT and had previous lumbar spine epidural injections. He had XR lumbar imaging 2/8/2023.   13. Neck pain; he had cervical MRI imaging on 12/28/2022. He discussed these findings with Dr. Berger on 1/10/2023.    14. Ear fullness:  Flonase provides some relief for the fullness. He was seen 12/19/2023 by Ms. Enriquez, ENT  15. [facility]). Last A1c 5.8% on 5/9/2024.   16. Soft murmur ordered resting echo ordered  11/14/2024 but not done  17. Sent in Rx. For Amoxicillin for his current sinus complaints.     40 minutes spent on the date of the encounter doing chart review, history and exam, documentation and further activities as noted above exclusive of procedures and other billable interpretations

## 2025-05-19 ENCOUNTER — OFFICE VISIT (OUTPATIENT)
Dept: INTERNAL MEDICINE | Facility: CLINIC | Age: OVER 89
End: 2025-05-19
Payer: MEDICARE

## 2025-05-19 ENCOUNTER — LAB (OUTPATIENT)
Dept: LAB | Facility: CLINIC | Age: OVER 89
End: 2025-05-19
Payer: MEDICARE

## 2025-05-19 VITALS
DIASTOLIC BLOOD PRESSURE: 86 MMHG | HEART RATE: 76 BPM | SYSTOLIC BLOOD PRESSURE: 156 MMHG | BODY MASS INDEX: 35.01 KG/M2 | OXYGEN SATURATION: 98 % | WEIGHT: 250.1 LBS | HEIGHT: 71 IN | RESPIRATION RATE: 18 BRPM | TEMPERATURE: 97.5 F

## 2025-05-19 DIAGNOSIS — J30.2 SEASONAL ALLERGIC RHINITIS, UNSPECIFIED TRIGGER: ICD-10-CM

## 2025-05-19 DIAGNOSIS — R73.09 INCREASED GLUCOSE LEVEL: ICD-10-CM

## 2025-05-19 DIAGNOSIS — J32.9 SINUSITIS, UNSPECIFIED CHRONICITY, UNSPECIFIED LOCATION: ICD-10-CM

## 2025-05-19 DIAGNOSIS — R73.09 INCREASED GLUCOSE LEVEL: Primary | ICD-10-CM

## 2025-05-19 DIAGNOSIS — I10 ESSENTIAL HYPERTENSION: ICD-10-CM

## 2025-05-19 LAB
ALBUMIN SERPL BCG-MCNC: 3.9 G/DL (ref 3.5–5.2)
ALP SERPL-CCNC: 87 U/L (ref 40–150)
ALT SERPL W P-5'-P-CCNC: 11 U/L (ref 0–70)
ANION GAP SERPL CALCULATED.3IONS-SCNC: 11 MMOL/L (ref 7–15)
AST SERPL W P-5'-P-CCNC: 19 U/L (ref 0–45)
BASOPHILS # BLD AUTO: 0 10E3/UL (ref 0–0.2)
BASOPHILS NFR BLD AUTO: 0 %
BILIRUB SERPL-MCNC: 0.4 MG/DL
BUN SERPL-MCNC: 17 MG/DL (ref 8–23)
CALCIUM SERPL-MCNC: 9.2 MG/DL (ref 8.8–10.4)
CHLORIDE SERPL-SCNC: 105 MMOL/L (ref 98–107)
CHOLEST SERPL-MCNC: 163 MG/DL
CREAT SERPL-MCNC: 1.18 MG/DL (ref 0.67–1.17)
EGFRCR SERPLBLD CKD-EPI 2021: 57 ML/MIN/1.73M2
EOSINOPHIL # BLD AUTO: 0.2 10E3/UL (ref 0–0.7)
EOSINOPHIL NFR BLD AUTO: 4 %
ERYTHROCYTE [DISTWIDTH] IN BLOOD BY AUTOMATED COUNT: 13.1 % (ref 10–15)
EST. AVERAGE GLUCOSE BLD GHB EST-MCNC: 117 MG/DL
FASTING STATUS PATIENT QL REPORTED: YES
FASTING STATUS PATIENT QL REPORTED: YES
GLUCOSE SERPL-MCNC: 99 MG/DL (ref 70–99)
HBA1C MFR BLD: 5.7 %
HCO3 SERPL-SCNC: 22 MMOL/L (ref 22–29)
HCT VFR BLD AUTO: 41.5 % (ref 40–53)
HDLC SERPL-MCNC: 39 MG/DL
HGB BLD-MCNC: 14 G/DL (ref 13.3–17.7)
IMM GRANULOCYTES # BLD: 0 10E3/UL
IMM GRANULOCYTES NFR BLD: 0 %
LDLC SERPL CALC-MCNC: 105 MG/DL
LYMPHOCYTES # BLD AUTO: 1.5 10E3/UL (ref 0.8–5.3)
LYMPHOCYTES NFR BLD AUTO: 27 %
MCH RBC QN AUTO: 29.5 PG (ref 26.5–33)
MCHC RBC AUTO-ENTMCNC: 33.7 G/DL (ref 31.5–36.5)
MCV RBC AUTO: 87 FL (ref 78–100)
MONOCYTES # BLD AUTO: 0.4 10E3/UL (ref 0–1.3)
MONOCYTES NFR BLD AUTO: 7 %
NEUTROPHILS # BLD AUTO: 3.5 10E3/UL (ref 1.6–8.3)
NEUTROPHILS NFR BLD AUTO: 62 %
NONHDLC SERPL-MCNC: 124 MG/DL
NRBC # BLD AUTO: 0 10E3/UL
NRBC BLD AUTO-RTO: 0 /100
PLATELET # BLD AUTO: 162 10E3/UL (ref 150–450)
POTASSIUM SERPL-SCNC: 4.2 MMOL/L (ref 3.4–5.3)
PROT SERPL-MCNC: 7 G/DL (ref 6.4–8.3)
RBC # BLD AUTO: 4.75 10E6/UL (ref 4.4–5.9)
SODIUM SERPL-SCNC: 138 MMOL/L (ref 135–145)
TRIGL SERPL-MCNC: 97 MG/DL
WBC # BLD AUTO: 5.6 10E3/UL (ref 4–11)

## 2025-05-19 PROCEDURE — 85025 COMPLETE CBC W/AUTO DIFF WBC: CPT | Performed by: PATHOLOGY

## 2025-05-19 PROCEDURE — 36415 COLL VENOUS BLD VENIPUNCTURE: CPT | Performed by: PATHOLOGY

## 2025-05-19 PROCEDURE — 80053 COMPREHEN METABOLIC PANEL: CPT | Performed by: PATHOLOGY

## 2025-05-19 PROCEDURE — 99215 OFFICE O/P EST HI 40 MIN: CPT | Performed by: INTERNAL MEDICINE

## 2025-05-19 PROCEDURE — 3078F DIAST BP <80 MM HG: CPT | Performed by: INTERNAL MEDICINE

## 2025-05-19 PROCEDURE — 99000 SPECIMEN HANDLING OFFICE-LAB: CPT | Performed by: PATHOLOGY

## 2025-05-19 PROCEDURE — 80061 LIPID PANEL: CPT | Performed by: PATHOLOGY

## 2025-05-19 PROCEDURE — 3077F SYST BP >= 140 MM HG: CPT | Performed by: INTERNAL MEDICINE

## 2025-05-19 PROCEDURE — 83036 HEMOGLOBIN GLYCOSYLATED A1C: CPT | Performed by: INTERNAL MEDICINE

## 2025-05-19 RX ORDER — AMOXICILLIN 500 MG/1
500 CAPSULE ORAL 2 TIMES DAILY
Qty: 14 CAPSULE | Refills: 0 | Status: SHIPPED | OUTPATIENT
Start: 2025-05-19

## 2025-05-19 RX ORDER — AMLODIPINE BESYLATE 10 MG/1
10 TABLET ORAL DAILY
Qty: 90 TABLET | Refills: 3 | Status: SHIPPED | OUTPATIENT
Start: 2025-05-19

## 2025-05-19 RX ORDER — DOXAZOSIN 4 MG/1
4 TABLET ORAL AT BEDTIME
Qty: 90 TABLET | Refills: 3 | Status: SHIPPED | OUTPATIENT
Start: 2025-05-19

## 2025-05-19 RX ORDER — FLUTICASONE PROPIONATE 50 MCG
2 SPRAY, SUSPENSION (ML) NASAL EVERY MORNING
Qty: 48 ML | Refills: 3 | Status: SHIPPED | OUTPATIENT
Start: 2025-05-19

## 2025-05-23 ENCOUNTER — RESULTS FOLLOW-UP (OUTPATIENT)
Dept: INTERNAL MEDICINE | Facility: CLINIC | Age: OVER 89
End: 2025-05-23

## 2025-06-11 DIAGNOSIS — Z96.641 STATUS POST RIGHT HIP REPLACEMENT: Primary | ICD-10-CM

## 2025-06-11 NOTE — PROGRESS NOTES
[facility]  Orthopaedic Surgery, Joint Replacement Consultation  by Eyad Gonzalez M.D.    Fish Hong MRN# [id number]   Age: 89 year old YOB: 1931     Requesting physician: Herman Soto     Background history:  DX:  Degenerative arthritis of hips and knees    TREATMENTS:  Right and left total knee arthroplasty (Dr. Maik Burroughs), [facility]  1/26/2021, R ISAI (Carlos) [facility]       Fish returns to see me as he was working on a bobcat recently and noticed some discomfort in his thigh.  It appears to be resolving somewhat.  He was concerned and made an appointment to see me.    On examination he is independently ambulatory.  There is no limp.  He is full extension and flexion beyond 120 degrees of both knee joints and no evidence of effusion or inflammation.  The right hip is a full symmetric range of motion with the contralateral left side without pain.  Minimal tenderness in his posterior hamstring.    Radiographs demonstrate implant satisfactory position.    Impression:  Right posterior hamstring strain.    Plan:  Nonsurgical management.  Follow-up as needed.  I reassured the patient that his symptoms most likely fully resolved.    MD Ingrid Powers Family Professor  Oncology and Adult Reconstructive Surgery  Dept Orthopaedic Surgery, [facility]  [phone] office, [phone] pager  [email or web address]    Total combined visit time and work time before and after clinic visit, independent of trainee, on encounter date = 20 min

## 2025-06-16 ENCOUNTER — OFFICE VISIT (OUTPATIENT)
Dept: ORTHOPEDICS | Facility: CLINIC | Age: OVER 89
End: 2025-06-16
Payer: COMMERCIAL

## 2025-06-16 ENCOUNTER — ANCILLARY PROCEDURE (OUTPATIENT)
Dept: GENERAL RADIOLOGY | Facility: CLINIC | Age: OVER 89
End: 2025-06-16
Attending: ORTHOPAEDIC SURGERY
Payer: MEDICARE

## 2025-06-16 DIAGNOSIS — Z96.641 STATUS POST RIGHT HIP REPLACEMENT: ICD-10-CM

## 2025-06-16 DIAGNOSIS — Z96.641 STATUS POST RIGHT HIP REPLACEMENT: Primary | ICD-10-CM

## 2025-06-16 PROCEDURE — 73502 X-RAY EXAM HIP UNI 2-3 VIEWS: CPT | Mod: RT | Performed by: RADIOLOGY

## 2025-06-16 PROCEDURE — 99213 OFFICE O/P EST LOW 20 MIN: CPT | Performed by: ORTHOPAEDIC SURGERY

## 2025-06-16 NOTE — LETTER
6/16/2025      Fish Hong  [address]  [facility] [address]      Dear Colleague,    Thank you for referring your patient, Fish Hong, to the [facility] [place]. Please see a copy of my visit note below.        [facility]  Orthopaedic Surgery, Joint Replacement Consultation  by Eyad Gonzalez M.D.    Fish Hong MRN# [id number]   Age: 89 year old YOB: 1931     Requesting physician: Herman Soto     Background history:  DX:  Degenerative arthritis of hips and knees    TREATMENTS:  Right and left total knee arthroplasty (Dr. Maik Burroughs), [facility]  1/26/2021, R ISAI (Carlos) [facility]       Fish returns to see me as he was working on a bobcat recently and noticed some discomfort in his thigh.  It appears to be resolving somewhat.  He was concerned and made an appointment to see me.    On examination he is independently ambulatory.  There is no limp.  He is full extension and flexion beyond 120 degrees of both knee joints and no evidence of effusion or inflammation.  The right hip is a full symmetric range of motion with the contralateral left side without pain.  Minimal tenderness in his posterior hamstring.    Radiographs demonstrate implant satisfactory position.    Impression:  Right posterior hamstring strain.    Plan:  Nonsurgical management.  Follow-up as needed.  I reassured the patient that his symptoms most likely fully resolved.    Eyad Gonzalez MD  [employer] Family Professor  Oncology and Adult Reconstructive Surgery  Dept Orthopaedic Surgery, [facility]  [phone] office, [phone] pager  [email or web address]    Total combined visit time and work time before and after clinic visit, independent of trainee, on encounter date = 20 min        Again, thank you for allowing me to participate in the care of your patient.        Sincerely,        Eyad Gonzalez MD    Electronically signed

## 2025-07-01 DIAGNOSIS — I10 ESSENTIAL HYPERTENSION: ICD-10-CM

## 2025-07-03 RX ORDER — DOXAZOSIN 4 MG/1
4 TABLET ORAL AT BEDTIME
Qty: 90 TABLET | Refills: 3 | OUTPATIENT
Start: 2025-07-03

## 2025-07-03 RX ORDER — AMLODIPINE BESYLATE 10 MG/1
10 TABLET ORAL DAILY
Qty: 90 TABLET | Refills: 3 | OUTPATIENT
Start: 2025-07-03

## 2025-07-04 NOTE — TELEPHONE ENCOUNTER
Last Written Prescription:   Disp Refills Start End CLOVIS   amLODIPine (NORVASC) 10 MG tablet 90 tablet 3 5/19/2025 -- No   Sig - Route: Take 1 tablet (10 mg) by mouth daily. - Oral     doxazosin (CARDURA) 4 MG tablet 90 tablet 3 5/19/2025 -- No   Sig - Route: Take 1 tablet (4 mg) by mouth at bedtime. - Oral     ----------------------  Last Visit Date:   5/19/2025  [facility] [place]    Future Visit Date:   11/17/2025 12:30 PM (30 min)  [place]    Arrive by: 12:15 PM   UM RETURN   [facility] ([facility])   Colby Desouza MD     ----------------------      Refill decision:     [x] Medication unable to be refilled by RN due to: Other: Should already have refills on file, signed 5/2025 - 90 day supply with 3 refills        Request from pharmacy:  Requested Prescriptions   Pending Prescriptions Disp Refills    amLODIPine (NORVASC) 10 MG tablet [Pharmacy Med Name: AMLODIPINE BESYLATE 10 MG TAB] 90 tablet 3     Sig: TAKE 1 TABLET (10 MG) BY MOUTH DAILY.       Calcium Channel Blockers Protocol  Failed - 7/3/2025  8:06 PM        Failed - Most recent blood pressure under 140/90 in past 12 months     BP Readings from Last 3 Encounters:   05/19/25 (!) 156/86   11/14/24 138/76   05/09/24 113/68       No data recorded            Passed - Medication is active on med list and the sig matches. RN to manually verify dose and sig if red X/fail.     If the protocol passes (green check), you do not need to verify med dose and sig.    A prescription matches if they are the same clinical intention.    For Example: once daily and every morning are the same.    The protocol can not identify upper and lower case letters as matching and will fail.     For Example: Take 1 tablet (50 mg) by mouth daily     TAKE 1 TABLET (50 MG) BY MOUTH DAILY    For all fails (red x), verify dose and sig.    If the refill does match what is on file, the RN can still proceed to approve the refill request.       If they do not match,  route to the appropriate provider.             Passed - Medication is indicated for associated diagnosis        Passed - Recent (12 month) or future (90 days) visit with authorizing provider's specialty (provided they have been seen in the past 15 months)     The patient must have completed an in-person or virtual visit within the past 12 months or has a future visit scheduled within the next 90 days with the authorizing provider s specialty.  Urgent care and e-visits do not qualify as an office visit for this protocol.          Passed - Patient is age 18 or older          doxazosin (CARDURA) 4 MG tablet [Pharmacy Med Name: DOXAZOSIN MESYLATE 4 MG TAB] 90 tablet 3     Sig: TAKE 1 TABLET BY MOUTH AT BEDTIME       Alpha Blockers Failed - 7/3/2025  8:06 PM        Failed - Most recent blood pressure under 140/90 in past 12 months     BP Readings from Last 3 Encounters:   05/19/25 (!) 156/86   11/14/24 138/76   05/09/24 113/68       No data recorded            Passed - Patient does not have Tadalafil, Vardenafil, or Sildenafil on their medication list        Passed - Medication is active on med list and the sig matches. RN to manually verify dose and sig if red X/fail.     If the protocol passes (green check), you do not need to verify med dose and sig.    A prescription matches if they are the same clinical intention.    For Example: once daily and every morning are the same.    The protocol can not identify upper and lower case letters as matching and will fail.     For Example: Take 1 tablet (50 mg) by mouth daily     TAKE 1 TABLET (50 MG) BY MOUTH DAILY    For all fails (red x), verify dose and sig.    If the refill does match what is on file, the RN can still proceed to approve the refill request.       If they do not match, route to the appropriate provider.             Passed - Recent (12 month) or future (90 days) visit with authorizing provider's specialty (provided they have been seen in the past 15 months)      The patient must have completed an in-person or virtual visit within the past 12 months or has a future visit scheduled within the next 90 days with the authorizing provider s specialty.  Urgent care and e-visits do not qualify as an office visit for this protocol.          Passed - Medication indicated for associated diagnosis     Medication is associated with one or more of the following diagnoses:  Benign prostatic hyperplasia  Disorder of the urinary system  Erectile dysfunction  Neurogenic bladder  Overactive bladder  Raynaud s  Ureteral stent-related symptoms  Urinary retention  Posttraumatic Stress Disorder  Lower urinary tract symptoms  Hypertensive disorder  Urinary frequency due to benign prostatic hypertrophy          Passed - Patient is 18 years of age or older       Antihypertensive Agents Failed - 7/3/2025  8:06 PM        Failed - Most recent blood pressure under 140/90 in past 12 months     BP Readings from Last 3 Encounters:   05/19/25 (!) 156/86   11/14/24 138/76   05/09/24 113/68       No data recorded            Failed - Has GFR on file in past 12 months and most recent value is normal        Passed - Medication is active on med list        Passed - Medication indicated for associated diagnosis     Medication is associated with one or more of the following diagnoses:     Hypertension   Benign Prostatic Hypertrophy   Urolithiasis          Passed - Recent (12 mo) or future (90 days) visit within the authorizing provider's specialty     The patient must have completed an in-person or virtual visit within the past 12 months or has a future visit scheduled within the next 90 days with the authorizing provider s specialty.  Urgent care and e-visits do not qualify as an office visit for this protocol.          Passed - Patient is age 18 or older

## 2025-07-08 ENCOUNTER — OFFICE VISIT (OUTPATIENT)
Dept: DERMATOLOGY | Facility: CLINIC | Age: OVER 89
End: 2025-07-08
Payer: MEDICARE

## 2025-07-08 DIAGNOSIS — D18.01 CHERRY ANGIOMA: ICD-10-CM

## 2025-07-08 DIAGNOSIS — D48.5 NEOPLASM OF UNCERTAIN BEHAVIOR OF SKIN: Primary | ICD-10-CM

## 2025-07-08 DIAGNOSIS — L82.0 INFLAMED SEBORRHEIC KERATOSIS: ICD-10-CM

## 2025-07-08 DIAGNOSIS — L82.1 SK (SEBORRHEIC KERATOSIS): ICD-10-CM

## 2025-07-08 DIAGNOSIS — L81.4 LENTIGINES: ICD-10-CM

## 2025-07-08 DIAGNOSIS — L57.0 ACTINIC KERATOSIS: ICD-10-CM

## 2025-07-08 DIAGNOSIS — L57.8 ACTINIC SKIN DAMAGE: ICD-10-CM

## 2025-07-08 PROCEDURE — 88305 TISSUE EXAM BY PATHOLOGIST: CPT | Mod: 26 | Performed by: DERMATOLOGY

## 2025-07-08 PROCEDURE — 88305 TISSUE EXAM BY PATHOLOGIST: CPT | Mod: TC | Performed by: STUDENT IN AN ORGANIZED HEALTH CARE EDUCATION/TRAINING PROGRAM

## 2025-07-08 ASSESSMENT — PAIN SCALES - GENERAL: PAINLEVEL_OUTOF10: NO PAIN (0)

## 2025-07-08 NOTE — LETTER
7/8/2025       RE: Fish Hong  [address]  [facility] [address]     Dear Colleague,    Thank you for referring your patient, Fish Hong, to the [facility] [place] [facility]. Please see a copy of my visit note below.    I have personally examined this patient and agree with the medical student's documentation and plan of care. I have reviewed and amended the medical student's note as necessary. I personally performed all procedure(s).The documentation accurately reflects my clinical observations, diagnoses, treatment and follow-up plans.     Farrukh Maciel M.D.   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    ____________________________________________    Assessment & Plan:  #History of SCC in situ, R calf  # Neoplasm of unspecified behavior of the skin on right ear (1) and left dorsal hand (2). The differential diagnosis includes (1) SCC vs CHN (2) SCC vs BVK.   - Shave biopsy performed today (see procedure note(s) below).           # Actinic keratoses, forehead, hands, right helix  - discussed efudex vs LN2  - prefers LN2   - see procedure note     # Irritated seborrheic keratoses  - Cryotherapy performed today (see procedure note(s) below).     #Nummular eczema - no active lesions today  #Xerosis   - Continue fluocinonide 0.05% ointment during flares. Apply BID to ankles until rash is clear and then BID PRN. Follow with Vaseline or  Aquaphor      #Actinic skin damage  # Benign lesions: Multiple benign nevi, solar lentigos, seborrheic keratoses.  - Reassurance provided and benign nature of conditions discussed. No treatment is necessary at this time unless the lesion changes or becomes symptomatic.   - ABCDs of melanoma were discussed and self skin checks were advised.  - Sun precaution was advised including the use of sun screens of SPF 30 or higher, sun protective clothing, and avoidance of tanning beds.      Procedures Performed:   - Shave biopsy procedure note, location(s): right helix and left dorsal hand. After discussion of benefits and risks including but not limited to bleeding, infection, scar, incomplete removal, recurrence, and non-diagnostic biopsy, verbal consent and photographs were obtained. The area was cleaned with isopropyl alcohol. 0.5mL of 1% lidocaine with epinephrine was injected to obtain adequate anesthesia of lesion(s). Shave biopsy at site(s) performed. Hemostasis was achieved with aluminium chloride. Petrolatum ointment and a sterile dressing were applied. The patient tolerated the procedure and no complications were noted. The patient was provided with verbal and written post care instructions.   - Procedure(s) performed by faculty.     - Cryotherapy procedure note, location(s): forehead, hands, R axillae, neck. After verbal consent and discussion of risks and benefits including, but not limited to, dyspigmentation/scar, blister, and pain, 6 aks on face, ears and hands and 20 ISK lesion(s) around neck was(were) treated with 1-2 mm freeze border for 1-2 cycles with liquid nitrogen. Post cryotherapy instructions were provided.  - Procedure(s) performed by faculty.       Follow-up: 6 month(s) in-person, or earlier for new or changing lesions    Staff and Medical Student:    Carolin Prado, MS4  [facility]     Seen and Staffed with Dr. SAMEER Maciel  ____________________________________________    CC: No  chief complaint on file.    HPI:  Mr. Fish Hong is a(n) 89 year old male who presents today as a return patient for FBSE.    Pt last seen on 12/19/24 for a FBSE by Dr. SAMEER Maciel. Today, patient has a lesion of concern on his right helix that has been periodically bleeding for the last few months. His applies fluocinonide 0.05% ointment to nummular eczema on his ankle when it is active, but it hasn't flared for a few months.     Personal hx of skin cancer: SCC, R calf   Family history of melanoma: no  Wears sunscreen consistently: no  History of tanning bed use: previously   History of immunosuppression: no  Patient is otherwise feeling well, without additional skin concerns.    Labs:  None reviewed.    Physical Exam:  Vitals: There were no vitals taken for this visit.  SKIN: Full skin, which includes the head/face, both arms, chest, back, abdomen,both legs, genitalia and/or groin buttocks, digits and/or nails, was examined.  - Rios phototype I  - Firm, erythematous nodule without ulceration at site of previous SCC on R calf   - Superficial erosion with crusting on right helix  - Flat brown macules and patches in a sun exposed areas on face, neck and extremities  - Multiple dome-shaped bright red papule on the back and abdomen.   - Multiple stuck on brown papules on trunk and extremities   - Multiple gritty, pink, scaly macules on the forehead and hands  - No other lesions of concern on areas examined.     Medications:  Current Outpatient Medications   Medication Sig Dispense Refill     acetaminophen (TYLENOL) 325 MG tablet Take 2 tablets (650 mg) by mouth every 4 hours as needed for other (mild pain) 100 tablet 0     amLODIPine (NORVASC) 10 MG tablet Take 1 tablet (10 mg) by mouth daily. 90 tablet 3     amoxicillin (AMOXIL) 500 MG capsule Take 1 capsule (500 mg) by mouth 2 times daily. 14 capsule 0     amoxicillin (AMOXIL) 500 MG capsule TAKE 4 CAPSULES BY MOUTH ONE HOUR PRIOR TO ANY DENTAL CLEANING OR  DENTAL/MEDICAL PROCEDURE. (Patient not taking: Reported on 5/19/2025) 4 capsule 4     cyanocobalamin (VITAMIN B-12) 500 MCG tablet Take 1 tablet (500 mcg) by mouth daily 100 tablet 11     doxazosin (CARDURA) 4 MG tablet Take 1 tablet (4 mg) by mouth at bedtime. 90 tablet 3     fluocinonide (LIDEX) 0.05 % external ointment Apply topically 2 times daily. 60 g 3     fluocinonide (LIDEX) 0.05 % external ointment APPLY TOPICALLY 2 TIMES DAILY TO AFFECTED AREA 30 g 1     fluticasone (FLONASE) 50 MCG/ACT nasal spray Spray 2 sprays into both nostrils every morning. 48 mL 3     ketoconazole (NIZORAL) 2 % external cream Apply topically daily Apply to the inguinal and perineal area daily as needed. 30 g 3     neomycin-polymyxin-dexamethasone (MAXITROL) 3.5-88320-1.1 ophthalmic ointment        polyethylene glycol (MIRALAX) 17 GM/Dose powder        triamcinolone (KENALOG) 0.1 % external cream APPLY SPARINGLY TO AFFECTED AREA TWO TIMES DAILY AS NEEDED (LEFT ANKLE.LOWER LEG) 80 g 3     No current facility-administered medications for this visit.      Past Medical History:   Patient Active Problem List   Diagnosis     Essential hypertension     Pulmonary nodules     Preventive measure     Atopic dermatitis, unspecified type     Spinal stenosis of lumbar region without neurogenic claudication     Greater trochanteric bursitis, right     Gastroesophageal reflux disease without esophagitis     History of smoking     Vitamin B12 deficiency (non anemic)     Hematocele     Seasonal allergic rhinitis, unspecified trigger     Degenerative joint disease (DJD) of hip     Status post right hip replacement     Past Medical History:   Diagnosis Date     Hematocele      Hypertension      Obesity      Osteoarthritis of both hips     s/p steroid injections     Phimosis      Pulmonary nodules      Rheumatic fever 1948        CC Farrukh Maciel MD  [address] [place] [address] on close of this encounter.      Lidocaine-epinephrine  1-1:408523 % injection   0.5 mL once for one use, starting 7/8/2025 ending 7/8/2025,  2mL disp, R-0, injection  Injected by [facility] Villanueva      Again, thank you for allowing me to participate in the care of your patient.      Sincerely,    Farrukh Maciel MD

## 2025-07-08 NOTE — PATIENT INSTRUCTIONS
Wound Care After a Biopsy    What is a skin biopsy?  A skin biopsy allows the doctor to examine a very small piece of tissue under the microscope to determine the diagnosis and the best treatment for the skin condition. A local anesthetic (numbing medicine) is injected with a very small needle into the skin area to be tested. A small piece of skin is taken from the area. Sometimes a suture (stitch) is used.     What are the risks of a skin biopsy?  I will experience scar, bleeding, swelling, pain, crusting and redness. I may experience incomplete removal or recurrence. Risks of this procedure are excessive bleeding, bruising, infection, nerve damage, numbness, thick (hypertrophic or keloidal) scar and non-diagnostic biopsy.    How should I care for my wound for the first 24 hours?  Keep the wound dry and covered for 24 hours  If it bleeds, hold direct pressure on the area for 15 minutes. If bleeding does not stop, call us or go to the emergency room  Avoid strenuous exercise the first 1-2 days or as your doctor instructs you    How should I care for the wound after 24 hours?  After 24 hours, remove the bandage  You may bathe or shower as normal  If you had a scalp biopsy, you can shampoo as usual and can use shower water to clean the biopsy site daily  Clean the wound once a day with gentle soap and water  Do not scrub, be gentle  Apply white petroleum/Vaseline after cleaning the wound with a cotton swab or a clean finger, and keep the site covered with a Bandaid /bandage. Bandages are not necessary with a scalp biopsy  If you are unable to cover the site with a Bandaid /bandage, re-apply ointment 2-3 times a day to keep the site moist. Moisture will help with healing  Avoid strenuous activity for first 1-2 days  Avoid lakes, rivers, pools, and oceans until the stitches are removed or the site is healed    How do I clean my wound?  Wash hands thoroughly with soap or use hand  before all wound care  Clean  the wound with gentle soap and water  Apply white petroleum/Vaseline  to wound after it is clean  Replace the Bandaid /bandage to keep the wound covered for the first few days or as instructed by your doctor  If you had a scalp biopsy, warm shower water to the area on a daily basis should suffice    What should I use to clean my wound?   Cotton-tipped applicators (Qtips )  White petroleum jelly (Vaseline ). Use a clean new container and use Q-tips to apply.  Bandaids  as needed  Gentle soap     How should I care for my wound long term?  Do not get your wound dirty  Keep up with wound care for one week or until the area is healed.  A small scab will form and fall off by itself when the area is completely healed. The area will be red and will become pink in color as it heals. Sun protection is very important for how your scar will turn out. Sunscreen with an SPF 30 or greater is recommended once the area is healed.  You should have some soreness but it should be mild and slowly go away over several days. Talk to your doctor about using tylenol for pain,    When should I call my doctor?  If you have increased:   Pain or swelling  Pus or drainage (clear or slightly yellow drainage is ok)  Temperature over 100F  Spreading redness or warmth around wound    When will I hear about my results?  The biopsy results can take 2 weeks to come back.  Your results will automatically release to [facility] before your provider has even reviewed them.  The clinic will call you with the results, send you a [facility] message, or have you schedule a follow-up clinic or phone time to discuss the results.  Contact our clinics if you do not hear from us in 2 weeks.    Who should I call with questions?  [facility]: [phone]  [facility]: [phone]  For urgent needs outside of business hours call the [facility] at [phone] and ask for the dermatology resident on call      Cryotherapy    What is it?  Use of a very cold liquid, such as liquid nitrogen, to freeze and destroy abnormal skin cells that need to be removed    What should I expect?  Tenderness and redness  A small blister that might grow and fill with dark purple blood. There may be crusting.  More than one treatment may be needed if the lesions do not go away.    How do I care for the treated area?  Gently wash the area with your hands when bathing.  Use a thin layer of Vaseline to help with healing. You may use a Band-Aid.   The area should heal within 7-10 days and may leave behind a pink or lighter color.   Do not use an antibiotic or Neosporin ointment.   You may take acetaminophen (Tylenol) for pain.     Call your doctor if you have:  Severe pain  Signs of infection (warmth, redness, cloudy yellow drainage, and or a bad smell)  Questions or concerns    Who should I call with questions?      [facility]: [phone]      [facility]: [phone]      For urgent needs outside of business hours call the [facility] at [phone] and ask for the dermatology resident on call

## 2025-07-08 NOTE — PROGRESS NOTES
I have personally examined this patient and agree with the medical student's documentation and plan of care. I have reviewed and amended the medical student's note as necessary. I personally performed all procedure(s).The documentation accurately reflects my clinical observations, diagnoses, treatment and follow-up plans.     Farrukh Maciel M.D.   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    ____________________________________________    Assessment & Plan:  #History of SCC in situ, R calf  # Neoplasm of unspecified behavior of the skin on right ear (1) and left dorsal hand (2). The differential diagnosis includes (1) SCC vs CHN (2) SCC vs BVK.   - Shave biopsy performed today (see procedure note(s) below).           # Actinic keratoses, forehead, hands, right helix  - discussed efudex vs LN2  - prefers LN2   - see procedure note     # Irritated seborrheic keratoses  - Cryotherapy performed today (see procedure note(s) below).     #Nummular eczema - no active lesions today  #Xerosis   - Continue fluocinonide 0.05% ointment during flares. Apply BID to ankles until rash is clear and then BID PRN. Follow with Vaseline or Aquaphor      #Actinic skin damage  # Benign lesions: Multiple benign nevi, solar lentigos, seborrheic keratoses.  - Reassurance provided and benign nature of conditions discussed. No treatment is necessary at this time unless the lesion changes or becomes symptomatic.   - ABCDs of melanoma were discussed and self skin  checks were advised.  - Sun precaution was advised including the use of sun screens of SPF 30 or higher, sun protective clothing, and avoidance of tanning beds.      Procedures Performed:   - Shave biopsy procedure note, location(s): right helix and left dorsal hand. After discussion of benefits and risks including but not limited to bleeding, infection, scar, incomplete removal, recurrence, and non-diagnostic biopsy, verbal consent and photographs were obtained. The area was cleaned with isopropyl alcohol. 0.5mL of 1% lidocaine with epinephrine was injected to obtain adequate anesthesia of lesion(s). Shave biopsy at site(s) performed. Hemostasis was achieved with aluminium chloride. Petrolatum ointment and a sterile dressing were applied. The patient tolerated the procedure and no complications were noted. The patient was provided with verbal and written post care instructions.   - Procedure(s) performed by faculty.     - Cryotherapy procedure note, location(s): forehead, hands, R axillae, neck. After verbal consent and discussion of risks and benefits including, but not limited to, dyspigmentation/scar, blister, and pain, 6 aks on face, ears and hands and 20 ISK lesion(s) around neck was(were) treated with 1-2 mm freeze border for 1-2 cycles with liquid nitrogen. Post cryotherapy instructions were provided.  - Procedure(s) performed by faculty.       Follow-up: 6 month(s) in-person, or earlier for new or changing lesions    Staff and Medical Student:    Carolin Prado, MS4  [facility]     Seen and Staffed with Dr. SAMEER Maciel  ____________________________________________    CC: No chief complaint on file.    HPI:  Mr. Fish Hong is a(n) 89 year old male who presents today as a return patient for FBSE.    Pt last seen on 12/19/24 for a FBSE by Dr. SAMEER Maciel. Today, patient has a lesion of concern on his right helix that has been periodically bleeding for the last few months. His applies fluocinonide  0.05% ointment to nummular eczema on his ankle when it is active, but it hasn't flared for a few months.     Personal hx of skin cancer: SCC, R calf   Family history of melanoma: no  Wears sunscreen consistently: no  History of tanning bed use: previously   History of immunosuppression: no  Patient is otherwise feeling well, without additional skin concerns.    Labs:  None reviewed.    Physical Exam:  Vitals: There were no vitals taken for this visit.  SKIN: Full skin, which includes the head/face, both arms, chest, back, abdomen,both legs, genitalia and/or groin buttocks, digits and/or nails, was examined.  - Rios phototype I  - Firm, erythematous nodule without ulceration at site of previous SCC on R calf   - Superficial erosion with crusting on right helix  - Flat brown macules and patches in a sun exposed areas on face, neck and extremities  - Multiple dome-shaped bright red papule on the back and abdomen.   - Multiple stuck on brown papules on trunk and extremities   - Multiple gritty, pink, scaly macules on the forehead and hands  - No other lesions of concern on areas examined.     Medications:  Current Outpatient Medications   Medication Sig Dispense Refill    acetaminophen (TYLENOL) 325 MG tablet Take 2 tablets (650 mg) by mouth every 4 hours as needed for other (mild pain) 100 tablet 0    amLODIPine (NORVASC) 10 MG tablet Take 1 tablet (10 mg) by mouth daily. 90 tablet 3    amoxicillin (AMOXIL) 500 MG capsule Take 1 capsule (500 mg) by mouth 2 times daily. 14 capsule 0    amoxicillin (AMOXIL) 500 MG capsule TAKE 4 CAPSULES BY MOUTH ONE HOUR PRIOR TO ANY DENTAL CLEANING OR DENTAL/MEDICAL PROCEDURE. (Patient not taking: Reported on 5/19/2025) 4 capsule 4    cyanocobalamin (VITAMIN B-12) 500 MCG tablet Take 1 tablet (500 mcg) by mouth daily 100 tablet 11    doxazosin (CARDURA) 4 MG tablet Take 1 tablet (4 mg) by mouth at bedtime. 90 tablet 3    fluocinonide (LIDEX) 0.05 % external ointment Apply  topically 2 times daily. 60 g 3    fluocinonide (LIDEX) 0.05 % external ointment APPLY TOPICALLY 2 TIMES DAILY TO AFFECTED AREA 30 g 1    fluticasone (FLONASE) 50 MCG/ACT nasal spray Spray 2 sprays into both nostrils every morning. 48 mL 3    ketoconazole (NIZORAL) 2 % external cream Apply topically daily Apply to the inguinal and perineal area daily as needed. 30 g 3    neomycin-polymyxin-dexamethasone (MAXITROL) 3.5-34665-0.1 ophthalmic ointment       polyethylene glycol (MIRALAX) 17 GM/Dose powder       triamcinolone (KENALOG) 0.1 % external cream APPLY SPARINGLY TO AFFECTED AREA TWO TIMES DAILY AS NEEDED (LEFT ANKLE.LOWER LEG) 80 g 3     No current facility-administered medications for this visit.      Past Medical History:   Patient Active Problem List   Diagnosis    Essential hypertension    Pulmonary nodules    Preventive measure    Atopic dermatitis, unspecified type    Spinal stenosis of lumbar region without neurogenic claudication    Greater trochanteric bursitis, right    Gastroesophageal reflux disease without esophagitis    History of smoking    Vitamin B12 deficiency (non anemic)    Hematocele    Seasonal allergic rhinitis, unspecified trigger    Degenerative joint disease (DJD) of hip    Status post right hip replacement     Past Medical History:   Diagnosis Date    Hematocele     Hypertension     Obesity     Osteoarthritis of both hips     s/p steroid injections    Phimosis     Pulmonary nodules     Rheumatic fever 1948        CC Farrukh Maciel MD  [address] [place] [address] on close of this encounter.

## 2025-07-08 NOTE — PROGRESS NOTES
Lidocaine-epinephrine 1-1:579518 % injection   0.5 mL once for one use, starting 7/8/2025 ending 7/8/2025,  2mL disp, R-0, injection  Injected by [facility]anue[facility]

## 2025-07-08 NOTE — NURSING NOTE
Dermatology Rooming Note    Fish Hong's goals for this visit include:   Chief Complaint   Patient presents with    Derm Problem     Spot of concern on R ear and on neck. Pt reports itching of the back and armpits as well. Rash on ankles have cleared up. Tingling skin posterior R thigh.     Marisa Dimas, [facility]

## 2025-07-10 LAB
PATH REPORT.COMMENTS IMP SPEC: ABNORMAL
PATH REPORT.COMMENTS IMP SPEC: ABNORMAL
PATH REPORT.COMMENTS IMP SPEC: YES
PATH REPORT.FINAL DX SPEC: ABNORMAL
PATH REPORT.GROSS SPEC: ABNORMAL
PATH REPORT.MICROSCOPIC SPEC OTHER STN: ABNORMAL
PATH REPORT.RELEVANT HX SPEC: ABNORMAL

## 2025-07-31 NOTE — TELEPHONE ENCOUNTER
FUTURE VISIT INFORMATION      FUTURE VISIT INFORMATION:  Date: 8/19/25  Time: 9:30  Location: [facility]  REFERRAL INFORMATION:  Referring provider:  SAMEER Maciel  Referring providers clinic:  Derm  Reason for visit/diagnosis  SCC; R Helix, well-differentiated and SCC; Left dorsal hand,well-differentiated      RECORDS REQUESTED FROM:       Clinic name Comments Records Status Imaging Status   Derm 7/8/25  Path # [id number] [facility]

## 2025-08-10 DIAGNOSIS — R21 RASH AND NONSPECIFIC SKIN ERUPTION: ICD-10-CM

## 2025-08-12 RX ORDER — FLUOCINONIDE 0.5 MG/G
OINTMENT TOPICAL 2 TIMES DAILY
Qty: 60 G | Refills: 2 | Status: SHIPPED | OUTPATIENT
Start: 2025-08-12

## 2025-08-19 ENCOUNTER — PRE VISIT (OUTPATIENT)
Dept: DERMATOLOGY | Facility: CLINIC | Age: OVER 89
End: 2025-08-19

## (undated) DEVICE — PREP CHLORAPREP 26ML TINTED ORANGE  260815

## (undated) DEVICE — SU MONOCRYL 4-0 PS-2 18" UND Y496G

## (undated) DEVICE — LINEN GOWN X4 5410

## (undated) DEVICE — LINEN MAYO STAND COVER OVERSIZE PACK 5458

## (undated) DEVICE — ESU GROUND PAD ADULT W/CORD E7507

## (undated) DEVICE — POSITIONER ABDUCTION PILLOW FOAM MED FP-ABDUCTM

## (undated) DEVICE — BLADE SAW SAGITTAL STRK 25X90X1.27MM HD SYS 6 6125-127-090

## (undated) DEVICE — BLADE CLIPPER SGL USE 9680

## (undated) DEVICE — SU SILK 0 TIE 6X18" A186H

## (undated) DEVICE — LINEN TOWEL PACK X5 5464

## (undated) DEVICE — DRSG PRIMAPORE 03 1/8X6" 66000318

## (undated) DEVICE — SU VICRYL 0 CT-1 3X27" J430T

## (undated) DEVICE — GLOVE PROTEXIS W/NEU-THERA 7.0  2D73TE70

## (undated) DEVICE — LINEN GOWN OVERSIZE 5408

## (undated) DEVICE — ADH SKIN CLOSURE PREMIERPRO EXOFIN 1.0ML 3470

## (undated) DEVICE — SPONGE RAY-TEC 4X8" 7318

## (undated) DEVICE — SUCTION MANIFOLD DORNOCH ULTRA CART UL-CL500

## (undated) DEVICE — SU ETHIBOND 1 CT-1 30" X425H

## (undated) DEVICE — PACK TOTAL HIP W/POUCH RIVERSIDE LATEX FREE

## (undated) DEVICE — ESU ELEC NDL 1" COATED/INSULATED E1465

## (undated) DEVICE — LINEN TOWEL PACK X6 WHITE 5487

## (undated) DEVICE — SU VICRYL 3-0 SH 27" J316H

## (undated) DEVICE — SU MONOCRYL 4-0 PS-2 27" UND Y426H

## (undated) DEVICE — ESU GROUND PAD UNIVERSAL W/O CORD

## (undated) DEVICE — PREP SKIN SCRUB TRAY 4461A

## (undated) DEVICE — DRSG TEGADERM ALGINATE AG 4X5" 90303

## (undated) DEVICE — SU VICRYL 3-0 SH 27" UND J416H

## (undated) DEVICE — GOWN XLG DISP 9545

## (undated) DEVICE — SU SILK 4-0 SH 30" K831H

## (undated) DEVICE — SOL NACL 0.9% IRRIG 1000ML BOTTLE 2F7124

## (undated) DEVICE — BASIN EMESIS STERILE  SSK9005A

## (undated) DEVICE — SU VICRYL 0 CT-2 27" J334H

## (undated) DEVICE — DRAIN PENROSE 3/8X18" LATEX 0918020

## (undated) DEVICE — SYR 10ML LL W/O NDL 302995

## (undated) DEVICE — GLOVE PROTEXIS POWDER FREE 7.5 ORTHOPEDIC 2D73ET75

## (undated) DEVICE — GLOVE PROTEXIS W/NEU-THERA 7.5  2D73TE75

## (undated) DEVICE — SOL NACL 0.9% IRRIG 500ML BOTTLE 2F7123

## (undated) DEVICE — SU VICRYL 0 CTX 36" J370H

## (undated) DEVICE — DRAPE IOBAN INCISE 13X13" 6640EZ

## (undated) DEVICE — DRAPE LAP TRANSVERSE 29421

## (undated) DEVICE — DRSG STERI STRIP 1/2X4" R1547

## (undated) DEVICE — PACK MINOR CUSTOM ASC

## (undated) DEVICE — SU CHROMIC 3-0 SH 27" G122H

## (undated) DEVICE — BLADE KNIFE SURG 15 371115

## (undated) DEVICE — GLOVE PROTEXIS W/NEU-THERA 8.0  2D73TE80

## (undated) DEVICE — Device

## (undated) DEVICE — DRSG PRIMAPORE 02X3" 7133

## (undated) DEVICE — STRAP KNEE/BODY 31143004

## (undated) DEVICE — PREP POVIDONE-IODINE 7.5% SCRUB 4OZ BOTTLE MDS093945

## (undated) DEVICE — GLOVE PROTEXIS BLUE W/NEU-THERA 7.5  2D73EB75

## (undated) DEVICE — SU VICRYL 2-0 CT 36" J957H

## (undated) DEVICE — DRSG TEGADERM 4X4 3/4" 1626W

## (undated) DEVICE — SU PDS II 3-0 PS-1 18" Z683G

## (undated) DEVICE — SPONGE KITTNER 30-101

## (undated) DEVICE — PREP DURAPREP 26ML APL 8630

## (undated) DEVICE — SU SILK 2-0 TIE 12X30" A305H

## (undated) DEVICE — DECANTER VIAL 2006S

## (undated) DEVICE — LINEN BACK PACK 5440

## (undated) DEVICE — STRAP STIRRUP W/SLIP 30187-030

## (undated) DEVICE — PREP DURAPREP REMOVER 4OZ 8611

## (undated) DEVICE — SOL WATER IRRIG 1000ML BOTTLE 2F7114

## (undated) DEVICE — DRAPE STOCKINETTE 8" 8586

## (undated) RX ORDER — LIDOCAINE HYDROCHLORIDE 20 MG/ML
INJECTION, SOLUTION INFILTRATION; PERINEURAL
Status: DISPENSED
Start: 2019-09-05

## (undated) RX ORDER — METHYLPREDNISOLONE ACETATE 80 MG/ML
INJECTION, SUSPENSION INTRA-ARTICULAR; INTRALESIONAL; INTRAMUSCULAR; SOFT TISSUE
Status: DISPENSED
Start: 2023-02-08

## (undated) RX ORDER — HYDROMORPHONE HYDROCHLORIDE 1 MG/ML
INJECTION, SOLUTION INTRAMUSCULAR; INTRAVENOUS; SUBCUTANEOUS
Status: DISPENSED
Start: 2021-01-26

## (undated) RX ORDER — FENTANYL CITRATE 50 UG/ML
INJECTION, SOLUTION INTRAMUSCULAR; INTRAVENOUS
Status: DISPENSED
Start: 2021-01-26

## (undated) RX ORDER — LIDOCAINE HYDROCHLORIDE 20 MG/ML
INJECTION, SOLUTION EPIDURAL; INFILTRATION; INTRACAUDAL; PERINEURAL
Status: DISPENSED
Start: 2020-07-14

## (undated) RX ORDER — PROPOFOL 10 MG/ML
INJECTION, EMULSION INTRAVENOUS
Status: DISPENSED
Start: 2020-07-14

## (undated) RX ORDER — LIDOCAINE HYDROCHLORIDE AND EPINEPHRINE 10; 10 MG/ML; UG/ML
INJECTION, SOLUTION INFILTRATION; PERINEURAL
Status: DISPENSED
Start: 2019-10-08

## (undated) RX ORDER — TRIAMCINOLONE ACETONIDE 40 MG/ML
INJECTION, SUSPENSION INTRA-ARTICULAR; INTRAMUSCULAR
Status: DISPENSED
Start: 2018-10-17

## (undated) RX ORDER — BUPIVACAINE HYDROCHLORIDE 2.5 MG/ML
INJECTION, SOLUTION EPIDURAL; INFILTRATION; INTRACAUDAL
Status: DISPENSED
Start: 2019-10-08

## (undated) RX ORDER — TRIAMCINOLONE ACETONIDE 40 MG/ML
INJECTION, SUSPENSION INTRA-ARTICULAR; INTRAMUSCULAR
Status: DISPENSED
Start: 2019-07-24

## (undated) RX ORDER — PROPOFOL 10 MG/ML
INJECTION, EMULSION INTRAVENOUS
Status: DISPENSED
Start: 2019-10-08

## (undated) RX ORDER — LIDOCAINE HYDROCHLORIDE 10 MG/ML
INJECTION, SOLUTION INFILTRATION; PERINEURAL
Status: DISPENSED
Start: 2020-06-25

## (undated) RX ORDER — ACETAMINOPHEN 325 MG/1
TABLET ORAL
Status: DISPENSED
Start: 2021-01-26

## (undated) RX ORDER — ROPIVACAINE HYDROCHLORIDE 5 MG/ML
INJECTION, SOLUTION EPIDURAL; INFILTRATION; PERINEURAL
Status: DISPENSED
Start: 2020-06-25

## (undated) RX ORDER — DEXAMETHASONE SODIUM PHOSPHATE 4 MG/ML
INJECTION, SOLUTION INTRA-ARTICULAR; INTRALESIONAL; INTRAMUSCULAR; INTRAVENOUS; SOFT TISSUE
Status: DISPENSED
Start: 2020-07-14

## (undated) RX ORDER — TRIAMCINOLONE ACETONIDE 40 MG/ML
INJECTION, SUSPENSION INTRA-ARTICULAR; INTRAMUSCULAR
Status: DISPENSED
Start: 2020-06-25

## (undated) RX ORDER — CELECOXIB 200 MG/1
CAPSULE ORAL
Status: DISPENSED
Start: 2021-01-26

## (undated) RX ORDER — TRIAMCINOLONE ACETONIDE 40 MG/ML
INJECTION, SUSPENSION INTRA-ARTICULAR; INTRAMUSCULAR
Status: DISPENSED
Start: 2020-02-13

## (undated) RX ORDER — FENTANYL CITRATE 50 UG/ML
INJECTION, SOLUTION INTRAMUSCULAR; INTRAVENOUS
Status: DISPENSED
Start: 2019-10-08

## (undated) RX ORDER — OXYCODONE HYDROCHLORIDE 5 MG/1
TABLET ORAL
Status: DISPENSED
Start: 2020-07-14

## (undated) RX ORDER — LIDOCAINE HYDROCHLORIDE 10 MG/ML
INJECTION, SOLUTION EPIDURAL; INFILTRATION; INTRACAUDAL; PERINEURAL
Status: DISPENSED
Start: 2019-07-24

## (undated) RX ORDER — LIDOCAINE HYDROCHLORIDE 10 MG/ML
INJECTION, SOLUTION EPIDURAL; INFILTRATION; INTRACAUDAL; PERINEURAL
Status: DISPENSED
Start: 2023-02-08

## (undated) RX ORDER — ACETAMINOPHEN 325 MG/1
TABLET ORAL
Status: DISPENSED
Start: 2019-10-08

## (undated) RX ORDER — LIDOCAINE HYDROCHLORIDE 10 MG/ML
INJECTION, SOLUTION EPIDURAL; INFILTRATION; INTRACAUDAL; PERINEURAL
Status: DISPENSED
Start: 2019-04-24

## (undated) RX ORDER — FENTANYL CITRATE 50 UG/ML
INJECTION, SOLUTION INTRAMUSCULAR; INTRAVENOUS
Status: DISPENSED
Start: 2020-07-14

## (undated) RX ORDER — LIDOCAINE HYDROCHLORIDE 10 MG/ML
INJECTION, SOLUTION INFILTRATION; PERINEURAL
Status: DISPENSED
Start: 2018-10-17

## (undated) RX ORDER — TRIAMCINOLONE ACETONIDE 40 MG/ML
INJECTION, SUSPENSION INTRA-ARTICULAR; INTRAMUSCULAR
Status: DISPENSED
Start: 2019-08-29

## (undated) RX ORDER — TRANEXAMIC ACID 650 MG/1
TABLET ORAL
Status: DISPENSED
Start: 2021-01-26

## (undated) RX ORDER — ROPIVACAINE HYDROCHLORIDE 5 MG/ML
INJECTION, SOLUTION EPIDURAL; INFILTRATION; PERINEURAL
Status: DISPENSED
Start: 2019-08-29

## (undated) RX ORDER — ROPIVACAINE HYDROCHLORIDE 5 MG/ML
INJECTION, SOLUTION EPIDURAL; INFILTRATION; PERINEURAL
Status: DISPENSED
Start: 2020-02-13

## (undated) RX ORDER — BUPIVACAINE HYDROCHLORIDE 5 MG/ML
INJECTION, SOLUTION EPIDURAL; INTRACAUDAL
Status: DISPENSED
Start: 2023-02-08

## (undated) RX ORDER — GABAPENTIN 300 MG/1
CAPSULE ORAL
Status: DISPENSED
Start: 2021-01-26

## (undated) RX ORDER — CEFAZOLIN SODIUM 2 G/100ML
INJECTION, SOLUTION INTRAVENOUS
Status: DISPENSED
Start: 2021-01-26

## (undated) RX ORDER — CEFAZOLIN SODIUM 1 G/3ML
INJECTION, POWDER, FOR SOLUTION INTRAMUSCULAR; INTRAVENOUS
Status: DISPENSED
Start: 2021-01-26

## (undated) RX ORDER — DOXYCYCLINE 100 MG/10ML
INJECTION, POWDER, LYOPHILIZED, FOR SOLUTION INTRAVENOUS
Status: DISPENSED
Start: 2019-09-05

## (undated) RX ORDER — BUPIVACAINE HYDROCHLORIDE 5 MG/ML
INJECTION, SOLUTION EPIDURAL; INTRACAUDAL
Status: DISPENSED
Start: 2020-07-14

## (undated) RX ORDER — EPHEDRINE SULFATE 50 MG/ML
INJECTION, SOLUTION INTRAMUSCULAR; INTRAVENOUS; SUBCUTANEOUS
Status: DISPENSED
Start: 2020-07-14

## (undated) RX ORDER — PHENYLEPHRINE HCL IN 0.9% NACL 1 MG/10 ML
SYRINGE (ML) INTRAVENOUS
Status: DISPENSED
Start: 2020-07-14

## (undated) RX ORDER — PHENYLEPHRINE HCL IN 0.9% NACL 1 MG/10 ML
SYRINGE (ML) INTRAVENOUS
Status: DISPENSED
Start: 2019-10-08

## (undated) RX ORDER — CLINDAMYCIN PHOSPHATE 150 MG/ML
INJECTION, SOLUTION INTRAVENOUS
Status: DISPENSED
Start: 2020-07-14

## (undated) RX ORDER — CEFAZOLIN SODIUM 1 G/3ML
INJECTION, POWDER, FOR SOLUTION INTRAMUSCULAR; INTRAVENOUS
Status: DISPENSED
Start: 2020-07-14

## (undated) RX ORDER — TRIAMCINOLONE ACETONIDE 40 MG/ML
INJECTION, SUSPENSION INTRA-ARTICULAR; INTRAMUSCULAR
Status: DISPENSED
Start: 2019-04-24

## (undated) RX ORDER — CEFAZOLIN SODIUM 2 G/100ML
INJECTION, SOLUTION INTRAVENOUS
Status: DISPENSED
Start: 2019-10-08

## (undated) RX ORDER — LIDOCAINE HYDROCHLORIDE 10 MG/ML
INJECTION, SOLUTION INFILTRATION; PERINEURAL
Status: DISPENSED
Start: 2019-08-29